# Patient Record
Sex: MALE | Race: WHITE | Employment: OTHER | URBAN - METROPOLITAN AREA
[De-identification: names, ages, dates, MRNs, and addresses within clinical notes are randomized per-mention and may not be internally consistent; named-entity substitution may affect disease eponyms.]

---

## 2017-02-12 DIAGNOSIS — M06.00 RHEUMATOID ARTHRITIS WITH NEGATIVE RHEUMATOID FACTOR, INVOLVING UNSPECIFIED SITE (HCC): ICD-10-CM

## 2017-02-12 DIAGNOSIS — Z51.11 ENCOUNTER FOR ANTINEOPLASTIC CHEMOTHERAPY: ICD-10-CM

## 2017-02-28 ENCOUNTER — OFFICE VISIT (OUTPATIENT)
Dept: RHEUMATOLOGY | Age: 62
End: 2017-02-28

## 2017-02-28 VITALS
HEART RATE: 96 BPM | DIASTOLIC BLOOD PRESSURE: 76 MMHG | SYSTOLIC BLOOD PRESSURE: 112 MMHG | HEIGHT: 75 IN | BODY MASS INDEX: 35.43 KG/M2 | WEIGHT: 285 LBS

## 2017-02-28 DIAGNOSIS — Z79.899 ENCOUNTER FOR LONG-TERM (CURRENT) USE OF HIGH-RISK MEDICATION: ICD-10-CM

## 2017-02-28 DIAGNOSIS — M06.09 RHEUMATOID ARTHRITIS OF MULTIPLE SITES WITH NEGATIVE RHEUMATOID FACTOR (HCC): Primary | ICD-10-CM

## 2017-02-28 DIAGNOSIS — Z51.81 ENCOUNTER FOR THERAPEUTIC DRUG MONITORING: ICD-10-CM

## 2017-02-28 LAB
ALBUMIN SERPL-MCNC: 4.2 G/DL (ref 3.4–5)
ALP BLD-CCNC: 74 U/L (ref 40–129)
ALT SERPL-CCNC: 13 U/L (ref 10–40)
AST SERPL-CCNC: 9 U/L (ref 15–37)
BASOPHILS ABSOLUTE: 0 K/UL (ref 0–0.2)
BASOPHILS RELATIVE PERCENT: 0.4 %
BILIRUB SERPL-MCNC: 0.5 MG/DL (ref 0–1)
BILIRUBIN DIRECT: <0.2 MG/DL (ref 0–0.3)
BILIRUBIN, INDIRECT: ABNORMAL MG/DL (ref 0–1)
CREAT SERPL-MCNC: 0.8 MG/DL (ref 0.8–1.3)
EOSINOPHILS ABSOLUTE: 0.3 K/UL (ref 0–0.6)
EOSINOPHILS RELATIVE PERCENT: 3.6 %
GFR AFRICAN AMERICAN: >60
GFR NON-AFRICAN AMERICAN: >60
HCT VFR BLD CALC: 42.8 % (ref 40.5–52.5)
HEMOGLOBIN: 14.2 G/DL (ref 13.5–17.5)
LYMPHOCYTES ABSOLUTE: 1.9 K/UL (ref 1–5.1)
LYMPHOCYTES RELATIVE PERCENT: 28 %
MCH RBC QN AUTO: 31.5 PG (ref 26–34)
MCHC RBC AUTO-ENTMCNC: 33.1 G/DL (ref 31–36)
MCV RBC AUTO: 95.1 FL (ref 80–100)
MONOCYTES ABSOLUTE: 0.4 K/UL (ref 0–1.3)
MONOCYTES RELATIVE PERCENT: 6.2 %
NEUTROPHILS ABSOLUTE: 4.3 K/UL (ref 1.7–7.7)
NEUTROPHILS RELATIVE PERCENT: 61.8 %
PDW BLD-RTO: 12.9 % (ref 12.4–15.4)
PLATELET # BLD: 223 K/UL (ref 135–450)
PMV BLD AUTO: 9.4 FL (ref 5–10.5)
RBC # BLD: 4.5 M/UL (ref 4.2–5.9)
TOTAL PROTEIN: 6.1 G/DL (ref 6.4–8.2)
WBC # BLD: 6.9 K/UL (ref 4–11)

## 2017-02-28 PROCEDURE — 99214 OFFICE O/P EST MOD 30 MIN: CPT | Performed by: INTERNAL MEDICINE

## 2017-05-28 DIAGNOSIS — M06.00 RHEUMATOID ARTHRITIS WITH NEGATIVE RHEUMATOID FACTOR, INVOLVING UNSPECIFIED SITE (HCC): ICD-10-CM

## 2017-05-28 DIAGNOSIS — Z51.11 ENCOUNTER FOR ANTINEOPLASTIC CHEMOTHERAPY: ICD-10-CM

## 2017-06-02 ENCOUNTER — TELEPHONE (OUTPATIENT)
Dept: INTERNAL MEDICINE CLINIC | Age: 62
End: 2017-06-02

## 2017-07-03 DIAGNOSIS — M06.00 RHEUMATOID ARTHRITIS WITH NEGATIVE RHEUMATOID FACTOR, INVOLVING UNSPECIFIED SITE (HCC): ICD-10-CM

## 2017-07-03 DIAGNOSIS — Z51.11 ENCOUNTER FOR ANTINEOPLASTIC CHEMOTHERAPY: ICD-10-CM

## 2017-07-18 ENCOUNTER — HOSPITAL ENCOUNTER (OUTPATIENT)
Dept: OTHER | Age: 62
Discharge: HOME | End: 2017-07-31
Attending: INTERNAL MEDICINE | Admitting: INTERNAL MEDICINE

## 2017-07-24 ENCOUNTER — OFFICE VISIT (OUTPATIENT)
Dept: RHEUMATOLOGY | Age: 62
End: 2017-07-24

## 2017-07-24 VITALS
HEIGHT: 75 IN | DIASTOLIC BLOOD PRESSURE: 82 MMHG | SYSTOLIC BLOOD PRESSURE: 130 MMHG | BODY MASS INDEX: 34.69 KG/M2 | HEART RATE: 88 BPM | WEIGHT: 279 LBS

## 2017-07-24 DIAGNOSIS — Z79.899 ENCOUNTER FOR LONG-TERM (CURRENT) USE OF HIGH-RISK MEDICATION: ICD-10-CM

## 2017-07-24 DIAGNOSIS — Z51.81 ENCOUNTER FOR THERAPEUTIC DRUG MONITORING: ICD-10-CM

## 2017-07-24 DIAGNOSIS — M06.00 RHEUMATOID ARTHRITIS WITH NEGATIVE RHEUMATOID FACTOR, INVOLVING UNSPECIFIED SITE (HCC): Primary | ICD-10-CM

## 2017-07-24 LAB
ALBUMIN SERPL-MCNC: 4.3 G/DL (ref 3.4–5)
ALP BLD-CCNC: 84 U/L (ref 40–129)
ALT SERPL-CCNC: 19 U/L (ref 10–40)
AST SERPL-CCNC: 13 U/L (ref 15–37)
BASOPHILS ABSOLUTE: 0.1 K/UL (ref 0–0.2)
BASOPHILS RELATIVE PERCENT: 0.8 %
BILIRUB SERPL-MCNC: 0.5 MG/DL (ref 0–1)
BILIRUBIN DIRECT: <0.2 MG/DL (ref 0–0.3)
BILIRUBIN, INDIRECT: ABNORMAL MG/DL (ref 0–1)
CREAT SERPL-MCNC: 0.9 MG/DL (ref 0.8–1.3)
EOSINOPHILS ABSOLUTE: 0.2 K/UL (ref 0–0.6)
EOSINOPHILS RELATIVE PERCENT: 3 %
GFR AFRICAN AMERICAN: >60
GFR NON-AFRICAN AMERICAN: >60
HCT VFR BLD CALC: 43.6 % (ref 40.5–52.5)
HEMOGLOBIN: 14.8 G/DL (ref 13.5–17.5)
LYMPHOCYTES ABSOLUTE: 1.7 K/UL (ref 1–5.1)
LYMPHOCYTES RELATIVE PERCENT: 28 %
MCH RBC QN AUTO: 31.4 PG (ref 26–34)
MCHC RBC AUTO-ENTMCNC: 33.9 G/DL (ref 31–36)
MCV RBC AUTO: 92.8 FL (ref 80–100)
MONOCYTES ABSOLUTE: 0.6 K/UL (ref 0–1.3)
MONOCYTES RELATIVE PERCENT: 9.7 %
NEUTROPHILS ABSOLUTE: 3.6 K/UL (ref 1.7–7.7)
NEUTROPHILS RELATIVE PERCENT: 58.5 %
PDW BLD-RTO: 12.5 % (ref 12.4–15.4)
PLATELET # BLD: 222 K/UL (ref 135–450)
PMV BLD AUTO: 9.8 FL (ref 5–10.5)
RBC # BLD: 4.7 M/UL (ref 4.2–5.9)
TOTAL PROTEIN: 7.1 G/DL (ref 6.4–8.2)
WBC # BLD: 6.2 K/UL (ref 4–11)

## 2017-07-24 PROCEDURE — 99214 OFFICE O/P EST MOD 30 MIN: CPT | Performed by: INTERNAL MEDICINE

## 2017-10-12 ENCOUNTER — OFFICE VISIT (OUTPATIENT)
Dept: ORTHOPEDIC SURGERY | Age: 62
End: 2017-10-12

## 2017-10-12 VITALS
BODY MASS INDEX: 37.77 KG/M2 | DIASTOLIC BLOOD PRESSURE: 87 MMHG | RESPIRATION RATE: 16 BRPM | WEIGHT: 285 LBS | HEIGHT: 73 IN | SYSTOLIC BLOOD PRESSURE: 128 MMHG

## 2017-10-12 DIAGNOSIS — R22.31 MASS OF FINGER, RIGHT: Primary | ICD-10-CM

## 2017-10-12 PROBLEM — R22.30 MASS OF FINGER: Status: ACTIVE | Noted: 2017-10-12

## 2017-10-12 PROCEDURE — 99243 OFF/OP CNSLTJ NEW/EST LOW 30: CPT | Performed by: ORTHOPAEDIC SURGERY

## 2017-10-12 RX ORDER — GABAPENTIN 600 MG/1
300 TABLET ORAL 3 TIMES DAILY
COMMUNITY
End: 2020-12-07

## 2017-10-12 NOTE — LETTER
CONSENT TO OPERATION  AND/OR OTHER PROCEDURE(S)          PATIENT : Allyson Rosas OF BIRTH:  1955      DATE : 10/12/17          1. I request and consent that Dr. Tiny Leonard,  and/or his associates or assistants perform an operation and/or procedures on the above patient at  [place] [address], to treat the condition(s) which appear indicated by the diagnostic studies already performed. I have been told that in general terms the nature, purpose and reasonable expectations of the operation and/or procedure(s) are:     Excision Soft Tissue Tumor Tip of Right Thumb      2. It has been explained to me by the informing physician that during the course of the operation and/or procedure(s) unforeseen conditions may be revealed that necessitate an extension of the original operation and/or procedure(s) or different operation and/or procedures than those set forth in Paragraph 1. I therefore authorize and request that my physician and/or his associates or assistants perform such operations and/or procedures as are necessary and desirable in the exercise of professional judgment. The authority granted under this Paragraph 2 shall extend to all conditions that require treatment and are known to my physician at the time the operation is commenced. 3. I have been made aware by the informing physician of certain risks and consequences that are associated with the operation and/or procedure(s) described in Paragraph 1, the reasonable alternative methods or treatment, the possible consequences, the possibility that the operation and/or procedure(s) may be unsuccessful and the possibility of complications. I understand the reasonably known risks to be:      ? Bleeding  ? Infection  ? Poor Healing  ? Possible Damage to Nerve, Vessel, Tendon/Muscle or Bone  ? Need for further Treatment/Surgery  ? Stiffness  ? Pain  ? Residual or Recurrent Symptoms  ? Anesthetic and/or Medical Risks  ?

## 2017-10-12 NOTE — ADDENDUM NOTE
Encounter addended by: Ming Sharma MA on: 10/12/2017 10:43 AM<BR>    Actions taken: Letter status changed

## 2017-10-24 ENCOUNTER — OFFICE VISIT (OUTPATIENT)
Dept: RHEUMATOLOGY | Age: 62
End: 2017-10-24

## 2017-10-24 VITALS
HEIGHT: 73 IN | BODY MASS INDEX: 38.48 KG/M2 | HEART RATE: 80 BPM | WEIGHT: 290.38 LBS | DIASTOLIC BLOOD PRESSURE: 86 MMHG | SYSTOLIC BLOOD PRESSURE: 136 MMHG

## 2017-10-24 DIAGNOSIS — Z79.899 ENCOUNTER FOR LONG-TERM (CURRENT) USE OF HIGH-RISK MEDICATION: ICD-10-CM

## 2017-10-24 DIAGNOSIS — M06.00 RHEUMATOID ARTHRITIS WITH NEGATIVE RHEUMATOID FACTOR, INVOLVING UNSPECIFIED SITE (HCC): Primary | ICD-10-CM

## 2017-10-24 DIAGNOSIS — Z51.81 ENCOUNTER FOR THERAPEUTIC DRUG MONITORING: ICD-10-CM

## 2017-10-24 LAB
ALBUMIN SERPL-MCNC: 4.4 G/DL (ref 3.4–5)
ALP BLD-CCNC: 82 U/L (ref 40–129)
ALT SERPL-CCNC: 17 U/L (ref 10–40)
AST SERPL-CCNC: 14 U/L (ref 15–37)
BASOPHILS ABSOLUTE: 0 K/UL (ref 0–0.2)
BASOPHILS RELATIVE PERCENT: 0.4 %
BILIRUB SERPL-MCNC: 0.5 MG/DL (ref 0–1)
BILIRUBIN DIRECT: <0.2 MG/DL (ref 0–0.3)
BILIRUBIN, INDIRECT: ABNORMAL MG/DL (ref 0–1)
CREAT SERPL-MCNC: 1 MG/DL (ref 0.8–1.3)
EOSINOPHILS ABSOLUTE: 0.3 K/UL (ref 0–0.6)
EOSINOPHILS RELATIVE PERCENT: 4.9 %
GFR AFRICAN AMERICAN: >60
GFR NON-AFRICAN AMERICAN: >60
HCT VFR BLD CALC: 41.8 % (ref 40.5–52.5)
HEMOGLOBIN: 14.1 G/DL (ref 13.5–17.5)
LYMPHOCYTES ABSOLUTE: 1.9 K/UL (ref 1–5.1)
LYMPHOCYTES RELATIVE PERCENT: 29.5 %
MCH RBC QN AUTO: 31.5 PG (ref 26–34)
MCHC RBC AUTO-ENTMCNC: 33.7 G/DL (ref 31–36)
MCV RBC AUTO: 93.6 FL (ref 80–100)
MONOCYTES ABSOLUTE: 0.6 K/UL (ref 0–1.3)
MONOCYTES RELATIVE PERCENT: 9.4 %
NEUTROPHILS ABSOLUTE: 3.5 K/UL (ref 1.7–7.7)
NEUTROPHILS RELATIVE PERCENT: 55.8 %
PDW BLD-RTO: 12.7 % (ref 12.4–15.4)
PLATELET # BLD: 196 K/UL (ref 135–450)
PMV BLD AUTO: 9.5 FL (ref 5–10.5)
RBC # BLD: 4.46 M/UL (ref 4.2–5.9)
TOTAL PROTEIN: 6.7 G/DL (ref 6.4–8.2)
WBC # BLD: 6.4 K/UL (ref 4–11)

## 2017-10-24 PROCEDURE — 99214 OFFICE O/P EST MOD 30 MIN: CPT | Performed by: INTERNAL MEDICINE

## 2017-10-24 RX ORDER — PREDNISONE 1 MG/1
5 TABLET ORAL DAILY
Qty: 100 TABLET | Refills: 1 | Status: SHIPPED | OUTPATIENT
Start: 2017-10-24 | End: 2017-11-07

## 2017-10-24 RX ORDER — FOLIC ACID 1 MG/1
1 TABLET ORAL DAILY
Qty: 90 TABLET | Refills: 3 | Status: SHIPPED | OUTPATIENT
Start: 2017-10-24 | End: 2018-10-31 | Stop reason: SDUPTHER

## 2017-10-24 NOTE — PROGRESS NOTES
SUBJECTIVE:    Patient ID: Siena Edwards is a 65 y.o. male. The patient returns for follow-up of rheumatoid arthritis. He's concerned that he might have gout. He's had episodes of pain in his small toes 345/45. He states that they're swollen but not read as very tender. The episodes last usually less than 24 hours and often come on while he is lying in bed. He continues on methotrexate 20 mg a week prednisone 5 mg a day. Review of Systems:    Respiratory: denies cough, denies shortness of breath  Gastrointestinal: denies abdominal pain, denies nausea  Musculoskeletal:                  45 minutes       Morning stiffness  Ears, nose, mouth: denies mucosal sores  Skin: denies rash, denies alopecia. The remainder of his review of systems is negative    Prior to Visit Medications    Medication Sig Taking? Authorizing Provider   methotrexate (RHEUMATREX) 2.5 MG chemo tablet TAKE 8 TABLETS ONE TIME WEEKLY Yes Neha Mendoza MD   predniSONE (DELTASONE) 5 MG tablet Take 1 tablet by mouth daily Yes Neha Mendoza MD   folic acid (FOLVITE) 1 MG tablet Take 1 tablet by mouth daily Yes Neha Mendoza MD   gabapentin (NEURONTIN) 600 MG tablet Take 600 mg by mouth nightly 2 at bed time Yes Historical Provider, MD   meloxicam (MOBIC) 15 MG tablet Take 15 mg by mouth daily Yes Historical Provider, MD   pravastatin (PRAVACHOL) 10 MG tablet Take 10 mg by mouth daily Yes Historical Provider, MD   losartan (COZAAR) 50 MG tablet Take 50 mg by mouth daily Yes Historical Provider, MD   Loratadine 10 MG CAPS Take by mouth Yes Historical Provider, MD   Umeclidinium-Vilanterol 62.5-25 MCG/INH AEPB Inhale into the lungs Yes Historical Provider, MD   HYDROcodone-acetaminophen (NORCO) 5-325 MG per tablet Take 1 tablet by mouth every 6 hours as needed for Pain Yes Historical Provider, MD   omeprazole (PRILOSEC) 20 MG capsule Take 20 mg by mouth daily.  Yes Historical Provider, MD   albuterol (PROVENTIL HFA;VENTOLIN HFA) 108 next episode or at least take a picture of his toes when they are involved. I'll see him back in 3 months time.   Patient already had flu vaccine

## 2017-11-07 ENCOUNTER — PAT TELEPHONE (OUTPATIENT)
Dept: PREADMISSION TESTING | Age: 62
End: 2017-11-07

## 2017-11-07 VITALS — HEIGHT: 76 IN | BODY MASS INDEX: 34.46 KG/M2 | WEIGHT: 283 LBS

## 2017-11-07 RX ORDER — OMEPRAZOLE 20 MG/1
20 CAPSULE, DELAYED RELEASE ORAL DAILY
COMMUNITY

## 2017-11-07 ASSESSMENT — PAIN - FUNCTIONAL ASSESSMENT: PAIN_FUNCTIONAL_ASSESSMENT: 0-10

## 2017-11-07 ASSESSMENT — PAIN SCALES - GENERAL: PAINLEVEL_OUTOF10: 6

## 2017-11-07 ASSESSMENT — PAIN DESCRIPTION - PAIN TYPE: TYPE: CHRONIC PAIN

## 2017-11-07 ASSESSMENT — PAIN DESCRIPTION - DESCRIPTORS: DESCRIPTORS: ACHING

## 2017-11-07 ASSESSMENT — PAIN DESCRIPTION - FREQUENCY: FREQUENCY: CONTINUOUS

## 2017-11-07 ASSESSMENT — PAIN DESCRIPTION - ORIENTATION: ORIENTATION: RIGHT

## 2017-11-07 NOTE — PRE-PROCEDURE INSTRUCTIONS
C-Difficile admission screening and protocol:     * Admitted with diarrhea?no     *Prior history of C-Diff. In last 3 months?no     *Antibiotic use in the past 6-8 weeks? yes- sinus     *Prior hospitalization or nursing home in the last month?     no

## 2017-11-07 NOTE — PRE-PROCEDURE INSTRUCTIONS
[address] time__0745 per pt__________        Surgery time__905__________    Take the following medications with a sip of water:losartan,loratadine, omeprazole and anoroTake your inhaler as directed the DOS and bring with you DOS. Do not eat or drink anything after 12:00 midnight prior to your surgery. This includes water chewing gum, mints and ice chips. You may brush your teeth and gargle the morning of your surgery, but do not swallow the water     Please see your family doctor/pediatrician for a history and physical and/or concerning medications. Bring any test results/reports from your physicians office. If you are under the care of a heart doctor or specialist doctor, please be aware that you may be asked to them for clearance    You may be asked to stop blood thinners such as Coumadin, Plavix, Fragmin, Lovenox, etc., or any anti-inflammatories such as:  Aspirin, Ibuprofen, Advil, Naproxen prior to your surgery. We also ask that you stop any OTC medications such as fish oil, vitamin E, glucosamine, garlic, Multivitamins, COQ 10, etc.    We ask that you do not smoke 24 hours prior to surgery  We ask that you do not  drink any alcoholic beverages 24 hours prior to surgery     You must make arrangements for a responsible adult to take you home after your surgery. For your safety you will not be allowed to leave alone or drive yourself home. Your surgery will be cancelled if you do not have a ride home. Also for your safety, it is strongly suggested that someone stay with you the first 24 hours after your surgery. A parent or legal guardian must accompany a child scheduled for surgery and plan to stay at the hospital until the child is discharged. Please do not bring other children with you. For your comfort, please wear simple loose fitting clothing to the hospital.  Please do not bring valuables.     Do not wear any make-up or nail polish on your fingers or toes      For your safety, please do not wear any jewelry or body piercing's on the day of surgery. All jewelry must be removed. If you have dentures, they will be removed before going to operating room. For your convenience, we will provide you with a container. If you wear contact lenses or glasses, they will be removed, please bring a case for them. If you have a living will and a durable power of  for healthcare, please bring in a copy. As part of our patient safety program to minimize surgical site infections, we ask you to do the following:    · Please notify your surgeon if you develop any illness between         now and the  day of your surgery. · This includes a cough, cold, fever, sore throat, nausea,         or vomiting, and diarrhea, etc.  ·  Please notify your surgeon if you experience dizziness, shortness         of breath or blurred vision between now and the time of your surgery. Do not shave your operative site 96 hours prior to surgery. For face and neck surgery, men may use an electric razor 48 hours   prior to surgery. You may shower the night before surgery or the morning of   your surgery with an antibacterial soap. You will need to bring a photo ID and insurance card    [facility] has an onsite pharmacy, would you like to utilize our pharmacy     If you will be staying overnight and use a C-pap machine, please bring   your C-pap to hospital     Our goal is to provide you with excellent care, therefore, visitors will be limited to two(2) in the room at a time so that we may focus on providing this care for you. Please contact pre-admission testing if you have any further questions.                  [facility] phone number:  [phone] PAT fax number:  [phone]  Please note these are generalized instructions for all surgical cases, you may be provided with more specific instructions according to your surgery.

## 2017-11-09 ENCOUNTER — HOSPITAL ENCOUNTER (OUTPATIENT)
Dept: SURGERY | Age: 62
Discharge: HOME | End: 2017-11-09
Admitting: ORTHOPAEDIC SURGERY

## 2017-11-09 VITALS
RESPIRATION RATE: 18 BRPM | DIASTOLIC BLOOD PRESSURE: 87 MMHG | WEIGHT: 286.6 LBS | SYSTOLIC BLOOD PRESSURE: 147 MMHG | HEART RATE: 76 BPM | TEMPERATURE: 97.1 F | HEIGHT: 76 IN | BODY MASS INDEX: 34.9 KG/M2 | OXYGEN SATURATION: 97 %

## 2017-11-09 RX ORDER — SODIUM CHLORIDE 9 MG/ML
INJECTION, SOLUTION INTRAVENOUS CONTINUOUS
Status: DISCONTINUED | OUTPATIENT
Start: 2017-11-09 | End: 2017-11-10 | Stop reason: HOSPADM

## 2017-11-09 RX ORDER — FENTANYL CITRATE 50 UG/ML
25 INJECTION, SOLUTION INTRAMUSCULAR; INTRAVENOUS EVERY 5 MIN PRN
Status: DISCONTINUED | OUTPATIENT
Start: 2017-11-09 | End: 2017-11-10 | Stop reason: HOSPADM

## 2017-11-09 RX ORDER — SODIUM CHLORIDE 0.9 % (FLUSH) 0.9 %
10 SYRINGE (ML) INJECTION PRN
Status: DISCONTINUED | OUTPATIENT
Start: 2017-11-09 | End: 2017-11-10 | Stop reason: HOSPADM

## 2017-11-09 RX ORDER — LABETALOL HYDROCHLORIDE 5 MG/ML
5 INJECTION, SOLUTION INTRAVENOUS EVERY 10 MIN PRN
Status: DISCONTINUED | OUTPATIENT
Start: 2017-11-09 | End: 2017-11-10 | Stop reason: HOSPADM

## 2017-11-09 RX ORDER — SODIUM CHLORIDE 0.9 % (FLUSH) 0.9 %
10 SYRINGE (ML) INJECTION EVERY 12 HOURS SCHEDULED
Status: DISCONTINUED | OUTPATIENT
Start: 2017-11-09 | End: 2017-11-10 | Stop reason: HOSPADM

## 2017-11-09 RX ORDER — PROMETHAZINE HYDROCHLORIDE 25 MG/ML
6.25 INJECTION, SOLUTION INTRAMUSCULAR; INTRAVENOUS
Status: ACTIVE | OUTPATIENT
Start: 2017-11-09 | End: 2017-11-09

## 2017-11-09 ASSESSMENT — PAIN DESCRIPTION - PAIN TYPE
TYPE: SURGICAL PAIN
TYPE: SURGICAL PAIN

## 2017-11-09 ASSESSMENT — PAIN DESCRIPTION - DESCRIPTORS: DESCRIPTORS: ACHING;SHOOTING

## 2017-11-09 ASSESSMENT — PAIN SCALES - GENERAL
PAINLEVEL_OUTOF10: 0

## 2017-11-09 ASSESSMENT — PAIN - FUNCTIONAL ASSESSMENT: PAIN_FUNCTIONAL_ASSESSMENT: 0-10

## 2017-11-09 NOTE — PROGRESS NOTES
To ACU room 4 by cart from PACU. VSS. Awake and talking. Denies pain. Right hand elevated on a pillow. Right pink and warm to touch.

## 2017-11-09 NOTE — PROGRESS NOTES
Awake, alert and oriented. Denies pain and nausea. Dressing remains dry and intact. Phase 1 criteria met, will transfer.

## 2017-11-09 NOTE — ANESTHESIA POST-OP
[facility] Department of Anesthesiology  Post-Anesthesia Note       Name:  Carlos Peraza                                  Age:  65 y.o. MRN:  [id number]     Last Vitals & Oxygen Saturation: BP (!) 147/87   Pulse 76   Temp 97.1 °F (36.2 °C) (Temporal)   Resp 18   Ht 6' 4\" (1.93 m)   Wt 286 lb 9.6 oz (130 kg)   SpO2 97%   BMI 34.89 kg/m²   Patient Vitals for the past 4 hrs:   BP Temp Temp src Pulse Resp SpO2 Height Weight   11/09/17 1016 (!) 147/87 - - 76 18 97 % - -   11/09/17 0951 118/82 97.1 °F (36.2 °C) Temporal 71 18 96 % - -   11/09/17 0942 (!) 140/86 97.8 °F (36.6 °C) Temporal 83 17 95 % - -   11/09/17 0937 129/86 - - 89 14 97 % - -   11/09/17 0935 - - - 90 - - - -   11/09/17 0932 134/89 - - 87 13 97 % - -   11/09/17 0930 (!) 144/90 98.8 °F (37.1 °C) Temporal 88 16 97 % - -   11/09/17 0810 (!) 157/98 98 °F (36.7 °C) - 84 14 93 % 6' 4\" (1.93 m) 286 lb 9.6 oz (130 kg)       Level of consciousness:  Awake, alert to baseline    Respiratory: Respirations easy, no distress. Stable. Cardiovascular: Hemodynamically stable. Hydration: Adequate. PONV: Adequately managed. Post-op pain: Adequately controlled. Post-op assessment: Tolerated anesthetic well without complication. Complications:  None.     Victorino Chau MD  November 9, 2017   12:01 PM

## 2017-11-09 NOTE — OP NOTE
OPERATIVE REPORT              . Patient:  Tessa Rouse    YOB: 1955  Date of Service:  11/9/2017   Location:  [facility]      Preoperative Diagnosis:  Right Thumb Skin Lesion. Postoperative Diagnosis:  Same. Procedure:  Excision of Right Thumb hyponychial Skin Lesion with partial nail plate removal    Surgeon:  Armand Crawley. Evie Gallegos MD    Anesthesia:  MAC/TIVA    Blood Loss:  Minimal.     Complications:  None. Tourniquet Time:  5 minutes. Indications:  Mr. Tessa Rouse is a 65y.o. year old male with a history of Right Thumb hyponychial  Skin Lesion which has been symptomatic. As his symptoms have not responded to conservative treatment, I have discussed with him preoperatively the complications, limitations, expectations, alternatives and risks of the surgical care which he understood. All of his questions have been fully answered, and Mr. Tessa Rouse has provided written informed consent to proceed. After written consent was obtained and the proper operative site was identified and marked, Mr. Tessa Rouse was brought to the operating room, placed in the supine position on the operating room table with the Right arm extended upon a hand table. The planned anesthesia was administered by the anesthesia service and the Right upper extremity was prepped and draped in the usual sterile fashion. After Eshmarch exsanguination, the pneumo-tourniquet was inflated to 250 milimeters of mercury about the upper arm. The distal 1/3 of the nail plate was elevated and removed from the sterile matrix. A 0.8 cm. elliptical incision was fashioned directly around the visible lesion, including a 1mm margin of normal tissue. Dissection was carried carefully through the subcutaneous tissue identifying and protecting the neurovascular structures. The skin lesion and surrounding margin was dissected free of the surrounding soft tissues.   The lesion did not appear to penetrate the deep dermis and was able to be easily removed intact. The resected specimen  measured 1.0 cm in greatest dimension. Final inspection revealed no further visible abnormality. The wound was irrigated copiously with sterile saline for irrigation. The pneumo-tourniquet was deflated after a period of 5 minutes elevation. The fingers & flap were immediately pink and well perfused. Hemostasis was easily obtained with direct pressure and electrocautery and the wound was closed with interrupted absorbable sutures in the skin. The wound was dressed with adaptic, dry sterile dressings, and a very well padded hand and finger dressing was applied. Mr. Vish Soni  was awakened from anesthesia having tolerated the procedure without apparent complication, and was returned to the recovery room in stable condition. At the conclusion of the procedure all needle, instrument, and sponge counts were correct. Leslye Clark MD   11/9/2017 , 9:26 AM

## 2017-11-09 NOTE — H&P
Pre-operative Update of H&P:    I  have seen & examined . Hussein Raphael related solely to his hand and upper extremity conditions, prior to the scheduled procedure on the date of his surgery. The indications for the planned surgical procedure & and his upper-extremity conditionare unchanged. Please see the Anesthesia Pre-Op Note from date of surgery for Mr. Gosia Ma's systemic evaluation.

## 2017-11-09 NOTE — PROGRESS NOTES
Dressed to go home. Discharged to home, ambulatory to car. Stable. Holding right hand up when walking. Given plastic sleeve to ear in the shower.

## 2017-11-09 NOTE — ANESTHESIA PRE-OP
[facility] Department of Anesthesiology  Pre-Anesthesia Evaluation/Consultation       Name:  Siena Edwards  : 1955  Age:  65 y.o.                                            MRN:  [id number]  Date: 2017           Procedure (Scheduled):  Right thumb mass excision  Surgeon:  Dr. Myesha Georges     Allergies   Allergen Reactions    Pcn [Penicillins] Shortness Of Breath    Cephalosporins Itching    Ciprofloxacin Itching    Codeine Itching    Erythromycin Itching    Morphine And Related Itching    Fenoprofen Calcium Other (See Comments)     Pt doesn`t recall     Patient Active Problem List   Diagnosis    Rheumatoid arthritis ([address]    Encounter for antineoplastic chemotherapy    HTN (hypertension)    Abnormal results of cardiovascular function studies    Mass of finger     Past Medical History:   Diagnosis Date    Arthritis     Asthma     BPH (benign prostatic hyperplasia)     Cardiomyopathy (HCC)     GERD (gastroesophageal reflux disease)     HTN (hypertension) 10/29/2012    MVP (mitral valve prolapse)     PPD positive     Prolonged emergence from general anesthesia     Prostatism     Tricuspid valve prolapse     Unspecified sleep apnea      Past Surgical History:   Procedure Laterality Date    CARDIAC CATHETERIZATION      CHOLECYSTECTOMY      FOOT SURGERY      INGUINAL HERNIA REPAIR      bilateral    KNEE ARTHROSCOPY      TENDON RELEASE      right elbow    TONSILLECTOMY       Social History   Substance Use Topics    Smoking status: Former Smoker     Types: Cigarettes     Quit date: 1982    Smokeless tobacco: Never Used      Comment: rare cigar    Alcohol use Yes      Comment: occ     Medications  Current Outpatient Prescriptions on File Prior to Encounter   Medication Sig Dispense Refill    omeprazole (PRILOSEC) 20 MG delayed release capsule Take 20 mg by mouth      methotrexate (RHEUMATREX) 2.5 MG chemo tablet TAKE 8 TABLETS ONE TIME WEEKLY 96 tablet 0    folic acid amitriptyline (ELAVIL) 100 MG tablet Take 100 mg by mouth nightly.  duloxetine (CYMBALTA) 30 MG capsule Take 60 mg by mouth daily. Current Facility-Administered Medications   Medication Dose Route Frequency Provider Last Rate Last Dose    0.9 % sodium chloride infusion   Intravenous Continuous Candida Chappell MD        sodium chloride flush 0.9 % injection 10 mL  10 mL Intravenous 2 times per day Candida Chappell MD        sodium chloride flush 0.9 % injection 10 mL  10 mL Intravenous PRN Candida Chappell MD         Vital Signs (Current) There were no vitals filed for this visit. Vital Signs Statistics (for past 48 hrs)     No Data Recorded    BP Readings from Last 3 Encounters:   10/24/17 136/86   10/12/17 128/87   07/24/17 130/82     BMI  There is no height or weight on file to calculate BMI. Estimated body mass index is 34.45 kg/m² as calculated from the following:    Height as of 11/7/17: 6' 4\" (1.93 m). Weight as of 11/7/17: 283 lb (128.4 kg).     CBC   Lab Results   Component Value Date    WBC 6.4 10/24/2017    RBC 4.46 10/24/2017    HGB 14.1 10/24/2017    HCT 41.8 10/24/2017    MCV 93.6 10/24/2017    RDW 12.7 10/24/2017     10/24/2017     CMP    Lab Results   Component Value Date     09/21/2016    K 4.9 09/21/2016     09/21/2016    CO2 26 09/21/2016    BUN 12 09/21/2016    CREATININE 1.0 10/24/2017    GFRAA >60 10/24/2017    GFRAA >60 06/11/2013    AGRATIO 1.8 09/21/2016    LABGLOM >60 10/24/2017    GLUCOSE 112 09/21/2016    PROT 6.7 10/24/2017    PROT 6.3 03/12/2013    CALCIUM 9.3 09/21/2016    BILITOT 0.5 10/24/2017    ALKPHOS 82 10/24/2017    AST 14 10/24/2017    ALT 17 10/24/2017     BMP    Lab Results   Component Value Date     09/21/2016    K 4.9 09/21/2016     09/21/2016    CO2 26 09/21/2016    BUN 12 09/21/2016    CREATININE 1.0 10/24/2017    CALCIUM 9.3 09/21/2016    GFRAA >60 10/24/2017    GFRAA >60 06/11/2013    LABGLOM >60 10/24/2017

## 2017-11-09 NOTE — PROGRESS NOTES
Pt to PACU from OR. Asleep on arrival. OPA present. Dressing to R thumb dry and intact, elevated on pillow.

## 2017-11-10 NOTE — ADDENDUM NOTE
Encounter addended by: Eleno Moon MD on: 11/10/2017  8:24 AM<BR>    Actions taken: Letter status changed

## 2017-11-13 NOTE — ADDENDUM NOTE
Encounter addended by: Geetha Antonio MA on: 11/13/2017  9:37 AM<BR>    Actions taken: Letter status changed

## 2017-11-17 ENCOUNTER — OFFICE VISIT (OUTPATIENT)
Dept: ORTHOPEDIC SURGERY | Age: 62
End: 2017-11-17

## 2017-11-17 VITALS — HEIGHT: 76 IN | WEIGHT: 286 LBS | RESPIRATION RATE: 16 BRPM | BODY MASS INDEX: 34.83 KG/M2

## 2017-11-17 DIAGNOSIS — R22.31 MASS OF FINGER, RIGHT: Primary | ICD-10-CM

## 2017-11-17 PROCEDURE — 99024 POSTOP FOLLOW-UP VISIT: CPT | Performed by: ORTHOPAEDIC SURGERY

## 2017-11-17 NOTE — Clinical Note
Dear  Priya Roth MD,  Thank you very much for your referral or Mr. Cookie Grullon to me for evaluation and treatment of his Hand & Wrist condition. I appreciate your confidence in me and thank you for allowing me the opportunity to care for your patients. If I can be of any further assistance to you on this or any other patient, please do not hesitate to contact me. Sincerely,  Isha Summers.  Jenn Cates MD

## 2017-11-18 NOTE — PATIENT INSTRUCTIONS
Hand Range of Motion Instructions      Dr. Chavis Flavors    1. Be cautions in resuming fulll activities and use of the hand for next 2 - 4 weeks. 2. Perform the following exercises VIGOROUSLY at least four times a day. Exercises should be performed in the seated position with elbow on tabletop or other firm surface. If you cannot make these motions on your own, you may use other hand to assist in making these motions. A. Fully straighten fingers until hand is flat. Fully bend fingers until hand is in a full fist.   B. Bend wrist forward and backward (grasp hand around knuckles with other hand to do so). C. Rotate forearm so that your palm faces towards your face. Rotate forearm so that your palm faces away from your face (grasp hand around wrist with other hand to do so). D. Fully straighten elbow. Fully bend elbow. 3. Continue light use of the hand progressing to more normal us as it feels comfortable to do so. 4. In 2 - 4 weeks you may discontinue using the brace (if you were using one) and resume normal use of the hand and wrist if you have regained full and painless motion and function. 5. If you are unable to achieve  full and painless motion and function over 4 weeks, please call the office at [phone] to schedule a follow-up appointment with Dr. Santosh Clark. Thank you for choosing [facility]) Physicians for your Hand and Upper Extremity needs. If we can be of any further assistance to you, please do not hesitate to contact us.     Office Phone Number:  [phone]  or  [phone]

## 2017-11-18 NOTE — PROGRESS NOTES
Mr. Siena Edwards returns today in follow-up of his recent right hyponychial Thumb Mass Excision done approximately 1 week ago. He has done well noting mild discomfort and no other reported complications. He notes pre-operative symptoms to be Improved at this time. Physical Exam:  Skin incision is healing well, no significant drainage, no significant erythema. Digital range of motion is full and equal bilateral.  Wrist shows full and equal bilateral range of motion. Sensation is normal in the Whole Hand. Vascular examination reveals normal and good capillary refill. Swelling is mild. There is little residual discomfort at the surgical site, no evidence for recurance of the mass. Impression:  Mr. Siena Edwards is doing well after recent right  hyponychial Thumb Mass Excision. Plan:  Mr. Siena Edwards is instructed in work on Active & Passive range of motion of the digits, wrist, & elbow. These modalities were specifically demonstrated to him today. We discussed the appropriateness of gradual resumption of use of the operated hand and the return to normal use as comfort allows. He is given instructions regarding management of the fresh surgical incision and progressive use of desensitization and tissue massage techniques. We discussed the appropriate expectations and timeline for symptom improvement including the potential for some longer term residual swelling or fullness at the surgical site. I have reviewed the surgical pathology report with him today. He is provided a written patient instruction sheet titled: Postoperative Instructions After Finger Mass Excision. I have asked Mr. Siena Edwards to follow-up with me, either by scheduling an appointment for approximately 2-4 weeks from now, or by contacting me by telephone over the next 2-4 weeks if his symptoms have not fully resolved or if he has not regained full & painless return of function.       He is also specifically

## 2018-01-24 ENCOUNTER — OFFICE VISIT (OUTPATIENT)
Dept: RHEUMATOLOGY | Age: 63
End: 2018-01-24

## 2018-01-24 VITALS
BODY MASS INDEX: 35.31 KG/M2 | HEART RATE: 92 BPM | DIASTOLIC BLOOD PRESSURE: 82 MMHG | WEIGHT: 290 LBS | SYSTOLIC BLOOD PRESSURE: 116 MMHG | HEIGHT: 76 IN

## 2018-01-24 DIAGNOSIS — M06.09 RHEUMATOID ARTHRITIS OF MULTIPLE SITES WITH NEGATIVE RHEUMATOID FACTOR (HCC): Primary | ICD-10-CM

## 2018-01-24 DIAGNOSIS — Z79.899 ENCOUNTER FOR LONG-TERM (CURRENT) USE OF HIGH-RISK MEDICATION: ICD-10-CM

## 2018-01-24 DIAGNOSIS — M25.512 CHRONIC LEFT SHOULDER PAIN: ICD-10-CM

## 2018-01-24 DIAGNOSIS — Z51.81 ENCOUNTER FOR THERAPEUTIC DRUG MONITORING: ICD-10-CM

## 2018-01-24 DIAGNOSIS — G89.29 CHRONIC LEFT SHOULDER PAIN: ICD-10-CM

## 2018-01-24 LAB
ALBUMIN SERPL-MCNC: 4.4 G/DL (ref 3.4–5)
ALP BLD-CCNC: 74 U/L (ref 40–129)
ALT SERPL-CCNC: 16 U/L (ref 10–40)
AST SERPL-CCNC: 14 U/L (ref 15–37)
BASOPHILS ABSOLUTE: 0 K/UL (ref 0–0.2)
BASOPHILS RELATIVE PERCENT: 0.5 %
BILIRUB SERPL-MCNC: 0.5 MG/DL (ref 0–1)
BILIRUBIN DIRECT: <0.2 MG/DL (ref 0–0.3)
BILIRUBIN, INDIRECT: ABNORMAL MG/DL (ref 0–1)
CREAT SERPL-MCNC: 1 MG/DL (ref 0.8–1.3)
EOSINOPHILS ABSOLUTE: 0.2 K/UL (ref 0–0.6)
EOSINOPHILS RELATIVE PERCENT: 3.4 %
GFR AFRICAN AMERICAN: >60
GFR NON-AFRICAN AMERICAN: >60
HCT VFR BLD CALC: 42.3 % (ref 40.5–52.5)
HEMOGLOBIN: 14.4 G/DL (ref 13.5–17.5)
LYMPHOCYTES ABSOLUTE: 1.7 K/UL (ref 1–5.1)
LYMPHOCYTES RELATIVE PERCENT: 29.3 %
MCH RBC QN AUTO: 31.7 PG (ref 26–34)
MCHC RBC AUTO-ENTMCNC: 34.1 G/DL (ref 31–36)
MCV RBC AUTO: 92.9 FL (ref 80–100)
MONOCYTES ABSOLUTE: 0.5 K/UL (ref 0–1.3)
MONOCYTES RELATIVE PERCENT: 8.5 %
NEUTROPHILS ABSOLUTE: 3.4 K/UL (ref 1.7–7.7)
NEUTROPHILS RELATIVE PERCENT: 58.3 %
PDW BLD-RTO: 12.8 % (ref 12.4–15.4)
PLATELET # BLD: 219 K/UL (ref 135–450)
PMV BLD AUTO: 9.9 FL (ref 5–10.5)
RBC # BLD: 4.55 M/UL (ref 4.2–5.9)
TOTAL PROTEIN: 6.8 G/DL (ref 6.4–8.2)
WBC # BLD: 5.8 K/UL (ref 4–11)

## 2018-01-24 PROCEDURE — 99214 OFFICE O/P EST MOD 30 MIN: CPT | Performed by: INTERNAL MEDICINE

## 2018-01-24 NOTE — PROGRESS NOTES
SUBJECTIVE:    Patient ID: Billy Hurtado is a 65 y.o. male. The patient returns for follow-up of rheumatoid arthritis. His only complaint is left shoulder discomfort. He continues on methotrexate 20 mg a week meloxicam 15 mg a day and folic acid 1 mg daily. Review of Systems:    Respiratory: denies cough, denies shortness of breath  Gastrointestinal: denies abdominal pain, denies nausea  Musculoskeletal:               45-60 minutes          Morning stiffness  Ears, nose, mouth: denies mucosal sores  Skin: denies rash, denies alopecia. The remainder of his review of systems is negative. Prior to Visit Medications    Medication Sig Taking? Authorizing Provider   methotrexate (RHEUMATREX) 2.5 MG chemo tablet TAKE 8 TABLETS ONE TIME WEEKLY Yes Oliverio Winter MD   omeprazole (PRILOSEC) 20 MG delayed release capsule Take 20 mg by mouth Yes Historical Provider, MD   folic acid (FOLVITE) 1 MG tablet Take 1 tablet by mouth daily Yes Oliverio Winter MD   gabapentin (NEURONTIN) 600 MG tablet Take 600 mg by mouth nightly 2 at bed time Yes Historical Provider, MD   meloxicam (MOBIC) 15 MG tablet Take 15 mg by mouth daily Yes Historical Provider, MD   pravastatin (PRAVACHOL) 10 MG tablet Take 10 mg by mouth daily Yes Historical Provider, MD   losartan (COZAAR) 50 MG tablet Take 50 mg by mouth daily Yes Historical Provider, MD   Loratadine 10 MG CAPS Take by mouth daily  Yes Historical Provider, MD   Umeclidinium-Vilanterol 62.5-25 MCG/INH AEPB Inhale into the lungs daily  Yes Historical Provider, MD   HYDROcodone-acetaminophen (NORCO) 5-325 MG per tablet Take 1 tablet by mouth every 6 hours as needed for Pain Yes Historical Provider, MD   montelukast (SINGULAIR) 10 MG tablet Take 10 mg by mouth nightly. Yes Historical Provider, MD   tamsulosin (FLOMAX) 0.4 MG capsule Take 0.4 mg by mouth daily. Yes Historical Provider, MD   amitriptyline (ELAVIL) 100 MG tablet Take 100 mg by mouth nightly.  Yes Historical

## 2018-02-13 ENCOUNTER — HOSPITAL ENCOUNTER (OUTPATIENT)
Dept: OTHER | Age: 63
Discharge: HOME | End: 2018-02-13
Attending: INTERNAL MEDICINE | Admitting: INTERNAL MEDICINE

## 2018-02-13 DIAGNOSIS — G89.29 CHRONIC LEFT SHOULDER PAIN: ICD-10-CM

## 2018-02-13 DIAGNOSIS — M25.512 CHRONIC LEFT SHOULDER PAIN: ICD-10-CM

## 2018-02-13 DIAGNOSIS — M06.09 RHEUMATOID ARTHRITIS OF MULTIPLE SITES WITH NEGATIVE RHEUMATOID FACTOR (HCC): ICD-10-CM

## 2018-05-01 ENCOUNTER — TELEPHONE (OUTPATIENT)
Dept: INTERNAL MEDICINE CLINIC | Age: 63
End: 2018-05-01

## 2018-05-01 ENCOUNTER — OFFICE VISIT (OUTPATIENT)
Dept: RHEUMATOLOGY | Age: 63
End: 2018-05-01

## 2018-05-01 VITALS
HEIGHT: 76 IN | DIASTOLIC BLOOD PRESSURE: 82 MMHG | BODY MASS INDEX: 35.19 KG/M2 | HEART RATE: 80 BPM | WEIGHT: 289 LBS | SYSTOLIC BLOOD PRESSURE: 124 MMHG

## 2018-05-01 DIAGNOSIS — M06.09 RHEUMATOID ARTHRITIS OF MULTIPLE SITES WITH NEGATIVE RHEUMATOID FACTOR (HCC): Primary | ICD-10-CM

## 2018-05-01 DIAGNOSIS — Z79.899 ENCOUNTER FOR LONG-TERM (CURRENT) USE OF HIGH-RISK MEDICATION: ICD-10-CM

## 2018-05-01 DIAGNOSIS — Z51.81 ENCOUNTER FOR THERAPEUTIC DRUG MONITORING: ICD-10-CM

## 2018-05-01 LAB
ALBUMIN SERPL-MCNC: 4.4 G/DL (ref 3.4–5)
ALP BLD-CCNC: 76 U/L (ref 40–129)
ALT SERPL-CCNC: 14 U/L (ref 10–40)
AST SERPL-CCNC: 12 U/L (ref 15–37)
BASOPHILS ABSOLUTE: 0 K/UL (ref 0–0.2)
BASOPHILS RELATIVE PERCENT: 0.5 %
BILIRUB SERPL-MCNC: 0.5 MG/DL (ref 0–1)
BILIRUBIN DIRECT: <0.2 MG/DL (ref 0–0.3)
BILIRUBIN, INDIRECT: ABNORMAL MG/DL (ref 0–1)
CREAT SERPL-MCNC: 0.9 MG/DL (ref 0.8–1.3)
EOSINOPHILS ABSOLUTE: 0.2 K/UL (ref 0–0.6)
EOSINOPHILS RELATIVE PERCENT: 3.1 %
GFR AFRICAN AMERICAN: >60
GFR NON-AFRICAN AMERICAN: >60
HCT VFR BLD CALC: 41.4 % (ref 40.5–52.5)
HEMOGLOBIN: 14.1 G/DL (ref 13.5–17.5)
LYMPHOCYTES ABSOLUTE: 1.3 K/UL (ref 1–5.1)
LYMPHOCYTES RELATIVE PERCENT: 23.4 %
MCH RBC QN AUTO: 31.2 PG (ref 26–34)
MCHC RBC AUTO-ENTMCNC: 34 G/DL (ref 31–36)
MCV RBC AUTO: 91.8 FL (ref 80–100)
MONOCYTES ABSOLUTE: 0.4 K/UL (ref 0–1.3)
MONOCYTES RELATIVE PERCENT: 7.3 %
NEUTROPHILS ABSOLUTE: 3.8 K/UL (ref 1.7–7.7)
NEUTROPHILS RELATIVE PERCENT: 65.7 %
PDW BLD-RTO: 13 % (ref 12.4–15.4)
PLATELET # BLD: 207 K/UL (ref 135–450)
PMV BLD AUTO: 9.8 FL (ref 5–10.5)
RBC # BLD: 4.51 M/UL (ref 4.2–5.9)
TOTAL PROTEIN: 6.5 G/DL (ref 6.4–8.2)
WBC # BLD: 5.7 K/UL (ref 4–11)

## 2018-05-01 PROCEDURE — 99214 OFFICE O/P EST MOD 30 MIN: CPT | Performed by: INTERNAL MEDICINE

## 2018-05-09 ENCOUNTER — OFFICE VISIT (OUTPATIENT)
Dept: RHEUMATOLOGY | Age: 63
End: 2018-05-09

## 2018-05-09 VITALS
DIASTOLIC BLOOD PRESSURE: 84 MMHG | HEIGHT: 76 IN | WEIGHT: 289 LBS | HEART RATE: 88 BPM | BODY MASS INDEX: 35.19 KG/M2 | SYSTOLIC BLOOD PRESSURE: 134 MMHG

## 2018-05-09 DIAGNOSIS — M06.09 RHEUMATOID ARTHRITIS OF MULTIPLE SITES WITH NEGATIVE RHEUMATOID FACTOR (HCC): Primary | ICD-10-CM

## 2018-05-09 DIAGNOSIS — G89.29 CHRONIC LEFT SHOULDER PAIN: ICD-10-CM

## 2018-05-09 DIAGNOSIS — M25.512 CHRONIC LEFT SHOULDER PAIN: ICD-10-CM

## 2018-05-09 PROCEDURE — 20610 DRAIN/INJ JOINT/BURSA W/O US: CPT | Performed by: INTERNAL MEDICINE

## 2018-05-09 RX ORDER — TRIAMCINOLONE ACETONIDE 40 MG/ML
40 INJECTION, SUSPENSION INTRA-ARTICULAR; INTRAMUSCULAR ONCE
Status: COMPLETED | OUTPATIENT
Start: 2018-05-09 | End: 2018-05-09

## 2018-05-09 RX ADMIN — TRIAMCINOLONE ACETONIDE 40 MG: 40 INJECTION, SUSPENSION INTRA-ARTICULAR; INTRAMUSCULAR at 08:15

## 2018-10-31 ENCOUNTER — OFFICE VISIT (OUTPATIENT)
Dept: RHEUMATOLOGY | Age: 63
End: 2018-10-31
Payer: COMMERCIAL

## 2018-10-31 VITALS
BODY MASS INDEX: 35.33 KG/M2 | SYSTOLIC BLOOD PRESSURE: 122 MMHG | HEART RATE: 88 BPM | WEIGHT: 290.13 LBS | DIASTOLIC BLOOD PRESSURE: 82 MMHG | HEIGHT: 76 IN

## 2018-10-31 DIAGNOSIS — Z51.81 ENCOUNTER FOR THERAPEUTIC DRUG MONITORING: ICD-10-CM

## 2018-10-31 DIAGNOSIS — M06.00 RHEUMATOID ARTHRITIS WITH NEGATIVE RHEUMATOID FACTOR, INVOLVING UNSPECIFIED SITE (HCC): Primary | ICD-10-CM

## 2018-10-31 DIAGNOSIS — Z79.899 ENCOUNTER FOR LONG-TERM (CURRENT) USE OF HIGH-RISK MEDICATION: ICD-10-CM

## 2018-10-31 LAB
ALBUMIN SERPL-MCNC: 4.4 G/DL (ref 3.4–5)
ALP BLD-CCNC: 79 U/L (ref 40–129)
ALT SERPL-CCNC: 16 U/L (ref 10–40)
AST SERPL-CCNC: 13 U/L (ref 15–37)
BASOPHILS ABSOLUTE: 0 K/UL (ref 0–0.2)
BASOPHILS RELATIVE PERCENT: 0.5 %
BILIRUB SERPL-MCNC: 0.4 MG/DL (ref 0–1)
BILIRUBIN DIRECT: <0.2 MG/DL (ref 0–0.3)
BILIRUBIN, INDIRECT: ABNORMAL MG/DL (ref 0–1)
CREAT SERPL-MCNC: 0.9 MG/DL (ref 0.8–1.3)
EOSINOPHILS ABSOLUTE: 0.2 K/UL (ref 0–0.6)
EOSINOPHILS RELATIVE PERCENT: 3.5 %
GFR AFRICAN AMERICAN: >60
GFR NON-AFRICAN AMERICAN: >60
HCT VFR BLD CALC: 42.3 % (ref 40.5–52.5)
HEMOGLOBIN: 14.4 G/DL (ref 13.5–17.5)
LYMPHOCYTES ABSOLUTE: 1.8 K/UL (ref 1–5.1)
LYMPHOCYTES RELATIVE PERCENT: 29 %
MCH RBC QN AUTO: 31.6 PG (ref 26–34)
MCHC RBC AUTO-ENTMCNC: 34.1 G/DL (ref 31–36)
MCV RBC AUTO: 92.9 FL (ref 80–100)
MONOCYTES ABSOLUTE: 0.5 K/UL (ref 0–1.3)
MONOCYTES RELATIVE PERCENT: 8.7 %
NEUTROPHILS ABSOLUTE: 3.7 K/UL (ref 1.7–7.7)
NEUTROPHILS RELATIVE PERCENT: 58.3 %
PDW BLD-RTO: 12.9 % (ref 12.4–15.4)
PLATELET # BLD: 228 K/UL (ref 135–450)
PMV BLD AUTO: 9.6 FL (ref 5–10.5)
RBC # BLD: 4.55 M/UL (ref 4.2–5.9)
TOTAL PROTEIN: 6.7 G/DL (ref 6.4–8.2)
WBC # BLD: 6.3 K/UL (ref 4–11)

## 2018-10-31 PROCEDURE — 99214 OFFICE O/P EST MOD 30 MIN: CPT | Performed by: INTERNAL MEDICINE

## 2018-10-31 RX ORDER — FINASTERIDE 5 MG/1
5 TABLET, FILM COATED ORAL DAILY
COMMUNITY

## 2018-10-31 RX ORDER — FOLIC ACID 1 MG/1
1 TABLET ORAL DAILY
Qty: 90 TABLET | Refills: 3 | Status: SHIPPED | OUTPATIENT
Start: 2018-10-31 | End: 2020-01-21

## 2018-10-31 NOTE — PROGRESS NOTES
amitriptyline (ELAVIL) 100 MG tablet Take 100 mg by mouth nightly. Yes Historical Provider, MD   duloxetine (CYMBALTA) 30 MG capsule Take 60 mg by mouth daily. Yes Historical Provider, MD        OBJECTIVE:  /82   Pulse 88   Ht 6' 4\" (1.93 m)   Wt 290 lb 2 oz (131.6 kg)   BMI 35.32 kg/m²      Physical Exam:    General: the patient demonstrated normal gait and posture without evidence of overt muscle wasting. Hygiene appears normal    Ears, mouth, nose, throat: no mucosal sores      Respiratory: assessment of the patient's respiratory effort showed no evidence of abnormal respiration and no use of accessory muscles. Diaphragmatic movement is normal.  Percussion of the patient's chest showed no evidence of dullness flatness or hyperresonance. Auscultation of the patient's lungs reveal normal breath sounds in all lung fields. There is no evidence of abnormal sounds such as rales or wheezes. There is no evidence of friction rub. Cardiovascular: palpation of the patient's chest revealed no abnormal thrill. The point of maximal impulse showed no displacement. Auscultation of the heart revealed no evidence of murmur gallop or abnormal heart sound. Musculoskeletal: One plus soft tissue swelling wrists trace swelling right elbow trace swelling PIPs fists 100% on the right 90% on the left  Skin: no rashes    ASSESSMENT: Rheumatoid arthritis. Chemotherapy        PLAN:   Blood work will be obtained today to monitor for adverse drug reactions. Laboratory studies from last visit were reviewed. I'll see patient back in 3 months time.   He declined flu vaccine

## 2019-03-05 ENCOUNTER — OFFICE VISIT (OUTPATIENT)
Dept: RHEUMATOLOGY | Age: 64
End: 2019-03-05
Payer: COMMERCIAL

## 2019-03-05 VITALS
SYSTOLIC BLOOD PRESSURE: 118 MMHG | BODY MASS INDEX: 35.68 KG/M2 | HEART RATE: 60 BPM | WEIGHT: 293 LBS | HEIGHT: 76 IN | DIASTOLIC BLOOD PRESSURE: 72 MMHG

## 2019-03-05 DIAGNOSIS — M06.00 RHEUMATOID ARTHRITIS WITH NEGATIVE RHEUMATOID FACTOR, INVOLVING UNSPECIFIED SITE (HCC): Primary | ICD-10-CM

## 2019-03-05 DIAGNOSIS — Z79.899 ENCOUNTER FOR LONG-TERM (CURRENT) USE OF HIGH-RISK MEDICATION: ICD-10-CM

## 2019-03-05 DIAGNOSIS — Z51.81 ENCOUNTER FOR THERAPEUTIC DRUG MONITORING: ICD-10-CM

## 2019-03-05 PROCEDURE — 99214 OFFICE O/P EST MOD 30 MIN: CPT | Performed by: INTERNAL MEDICINE

## 2019-03-06 LAB
ALBUMIN SERPL-MCNC: 4.6 G/DL (ref 3.4–5)
ALP BLD-CCNC: 75 U/L (ref 40–129)
ALT SERPL-CCNC: 14 U/L (ref 10–40)
AST SERPL-CCNC: 16 U/L (ref 15–37)
BASOPHILS ABSOLUTE: 0.1 K/UL (ref 0–0.2)
BASOPHILS RELATIVE PERCENT: 1 %
BILIRUB SERPL-MCNC: 0.6 MG/DL (ref 0–1)
BILIRUBIN DIRECT: <0.2 MG/DL (ref 0–0.3)
BILIRUBIN, INDIRECT: NORMAL MG/DL (ref 0–1)
CREAT SERPL-MCNC: 1 MG/DL (ref 0.8–1.3)
EOSINOPHILS ABSOLUTE: 0.2 K/UL (ref 0–0.6)
EOSINOPHILS RELATIVE PERCENT: 3.1 %
GFR AFRICAN AMERICAN: >60
GFR NON-AFRICAN AMERICAN: >60
HCT VFR BLD CALC: 45 % (ref 40.5–52.5)
HEMOGLOBIN: 14.7 G/DL (ref 13.5–17.5)
LYMPHOCYTES ABSOLUTE: 1.9 K/UL (ref 1–5.1)
LYMPHOCYTES RELATIVE PERCENT: 27.1 %
MCH RBC QN AUTO: 31.3 PG (ref 26–34)
MCHC RBC AUTO-ENTMCNC: 32.6 G/DL (ref 31–36)
MCV RBC AUTO: 96.1 FL (ref 80–100)
MONOCYTES ABSOLUTE: 0.6 K/UL (ref 0–1.3)
MONOCYTES RELATIVE PERCENT: 8.3 %
NEUTROPHILS ABSOLUTE: 4.3 K/UL (ref 1.7–7.7)
NEUTROPHILS RELATIVE PERCENT: 60.5 %
PDW BLD-RTO: 13.3 % (ref 12.4–15.4)
PLATELET # BLD: 226 K/UL (ref 135–450)
PMV BLD AUTO: 9.6 FL (ref 5–10.5)
RBC # BLD: 4.69 M/UL (ref 4.2–5.9)
TOTAL PROTEIN: 6.7 G/DL (ref 6.4–8.2)
WBC # BLD: 7.1 K/UL (ref 4–11)

## 2019-05-14 ENCOUNTER — HOSPITAL ENCOUNTER (OUTPATIENT)
Dept: NON INVASIVE DIAGNOSTICS | Age: 64
Discharge: HOME | End: 2019-05-14
Payer: COMMERCIAL

## 2019-05-14 LAB
LV EF: 49 %
LVEF MODALITY: NORMAL

## 2019-05-14 PROCEDURE — 3430000000 HC RX DIAGNOSTIC RADIOPHARMACEUTICAL: Performed by: INTERNAL MEDICINE

## 2019-05-14 PROCEDURE — A9502 TC99M TETROFOSMIN: HCPCS | Performed by: INTERNAL MEDICINE

## 2019-05-14 PROCEDURE — 93017 CV STRESS TEST TRACING ONLY: CPT

## 2019-05-14 PROCEDURE — 78452 HT MUSCLE IMAGE SPECT MULT: CPT

## 2019-05-14 RX ADMIN — TETROFOSMIN 10 MILLICURIE: 1.38 INJECTION, POWDER, LYOPHILIZED, FOR SOLUTION INTRAVENOUS at 08:06

## 2019-05-14 RX ADMIN — TETROFOSMIN 30 MILLICURIE: 1.38 INJECTION, POWDER, LYOPHILIZED, FOR SOLUTION INTRAVENOUS at 09:19

## 2019-05-21 ENCOUNTER — HOSPITAL ENCOUNTER (OUTPATIENT)
Dept: NON INVASIVE DIAGNOSTICS | Age: 64
Discharge: HOME | End: 2019-05-21
Payer: COMMERCIAL

## 2019-05-21 LAB
LV EF: 43 %
LVEF MODALITY: NORMAL

## 2019-05-21 PROCEDURE — 93306 TTE W/DOPPLER COMPLETE: CPT

## 2019-06-05 ENCOUNTER — OFFICE VISIT (OUTPATIENT)
Dept: RHEUMATOLOGY | Age: 64
End: 2019-06-05
Payer: COMMERCIAL

## 2019-06-05 VITALS
BODY MASS INDEX: 34.83 KG/M2 | HEART RATE: 72 BPM | WEIGHT: 286 LBS | SYSTOLIC BLOOD PRESSURE: 120 MMHG | DIASTOLIC BLOOD PRESSURE: 78 MMHG | HEIGHT: 76 IN

## 2019-06-05 DIAGNOSIS — M06.00 RHEUMATOID ARTHRITIS WITH NEGATIVE RHEUMATOID FACTOR, INVOLVING UNSPECIFIED SITE (HCC): Primary | ICD-10-CM

## 2019-06-05 DIAGNOSIS — Z51.81 ENCOUNTER FOR THERAPEUTIC DRUG MONITORING: ICD-10-CM

## 2019-06-05 DIAGNOSIS — Z79.899 ENCOUNTER FOR LONG-TERM (CURRENT) USE OF HIGH-RISK MEDICATION: ICD-10-CM

## 2019-06-05 LAB
ALBUMIN SERPL-MCNC: 4.3 G/DL (ref 3.4–5)
ALP BLD-CCNC: 76 U/L (ref 40–129)
ALT SERPL-CCNC: 13 U/L (ref 10–40)
AST SERPL-CCNC: 13 U/L (ref 15–37)
BASOPHILS ABSOLUTE: 0 K/UL (ref 0–0.2)
BASOPHILS RELATIVE PERCENT: 0.3 %
BILIRUB SERPL-MCNC: 0.6 MG/DL (ref 0–1)
BILIRUBIN DIRECT: <0.2 MG/DL (ref 0–0.3)
BILIRUBIN, INDIRECT: ABNORMAL MG/DL (ref 0–1)
CREAT SERPL-MCNC: 0.9 MG/DL (ref 0.8–1.3)
EOSINOPHILS ABSOLUTE: 0.2 K/UL (ref 0–0.6)
EOSINOPHILS RELATIVE PERCENT: 3.7 %
GFR AFRICAN AMERICAN: >60
GFR NON-AFRICAN AMERICAN: >60
HCT VFR BLD CALC: 40.5 % (ref 40.5–52.5)
HEMOGLOBIN: 13.5 G/DL (ref 13.5–17.5)
LYMPHOCYTES ABSOLUTE: 1.6 K/UL (ref 1–5.1)
LYMPHOCYTES RELATIVE PERCENT: 26.4 %
MCH RBC QN AUTO: 31.1 PG (ref 26–34)
MCHC RBC AUTO-ENTMCNC: 33.3 G/DL (ref 31–36)
MCV RBC AUTO: 93.4 FL (ref 80–100)
MONOCYTES ABSOLUTE: 0.5 K/UL (ref 0–1.3)
MONOCYTES RELATIVE PERCENT: 7.9 %
NEUTROPHILS ABSOLUTE: 3.8 K/UL (ref 1.7–7.7)
NEUTROPHILS RELATIVE PERCENT: 61.7 %
PDW BLD-RTO: 13.1 % (ref 12.4–15.4)
PLATELET # BLD: 209 K/UL (ref 135–450)
PMV BLD AUTO: 9.6 FL (ref 5–10.5)
RBC # BLD: 4.34 M/UL (ref 4.2–5.9)
TOTAL PROTEIN: 6.7 G/DL (ref 6.4–8.2)
WBC # BLD: 6.2 K/UL (ref 4–11)

## 2019-06-05 PROCEDURE — 99214 OFFICE O/P EST MOD 30 MIN: CPT | Performed by: INTERNAL MEDICINE

## 2019-06-05 NOTE — PROGRESS NOTES
SUBJECTIVE:    Patient ID: Yoel Venegas is a 59 y.o. male. The patient returns with rheumatoid arthritis. He continues on meloxicam 15 mg a day and methotrexate 20 mg weekly. He is complaining left lower back discomfort. This limits his ADLs  Review of Systems:    Respiratory: denies cough, denies shortness of breath  Gastrointestinal: denies abdominal pain, denies nausea  Musculoskeletal:                  60 minutes       Morning stiffness  Ears, nose, mouth: denies mucosal sores  Skin: denies rash, denies alopecia. The remainder of his review of systems is negative    Prior to Visit Medications    Medication Sig Taking? Authorizing Provider   methotrexate (RHEUMATREX) 2.5 MG chemo tablet TAKE EIGHT TABLETS BY MOUTH ONCE WEEKLY Yes Frank Kaminski MD   finasteride (PROSCAR) 5 MG tablet Take 5 mg by mouth daily Yes Historical Provider, MD   folic acid (FOLVITE) 1 MG tablet Take 1 tablet by mouth daily Yes Frank Kaminski MD   omeprazole (PRILOSEC) 20 MG delayed release capsule Take 20 mg by mouth Yes Historical Provider, MD   gabapentin (NEURONTIN) 600 MG tablet Take 600 mg by mouth nightly 2 at bed time Yes Historical Provider, MD   meloxicam (MOBIC) 15 MG tablet Take 15 mg by mouth daily Yes Historical Provider, MD   pravastatin (PRAVACHOL) 10 MG tablet Take 10 mg by mouth daily Yes Historical Provider, MD   losartan (COZAAR) 50 MG tablet Take 50 mg by mouth daily Yes Historical Provider, MD   Loratadine 10 MG CAPS Take by mouth daily  Yes Historical Provider, MD   Umeclidinium-Vilanterol 62.5-25 MCG/INH AEPB Inhale into the lungs daily  Yes Historical Provider, MD   HYDROcodone-acetaminophen (NORCO) 5-325 MG per tablet Take 1 tablet by mouth every 6 hours as needed for Pain Yes Historical Provider, MD   montelukast (SINGULAIR) 10 MG tablet Take 10 mg by mouth nightly.  Yes Historical Provider, MD   tamsulosin (FLOMAX) 0.4 MG capsule Take 0.4 mg by mouth 2 times daily  Yes Historical Provider, MD amitriptyline (ELAVIL) 100 MG tablet Take 100 mg by mouth nightly. Yes Historical Provider, MD   duloxetine (CYMBALTA) 30 MG capsule Take 60 mg by mouth daily. Yes Historical Provider, MD        OBJECTIVE:  /78   Pulse 72   Ht 6' 4\" (1.93 m)   Wt 286 lb (129.7 kg)   BMI 34.81 kg/m²      Physical Exam:    General: the patient demonstrated normal gait and posture without evidence of overt muscle wasting. Hygiene appears normal    Ears, mouth, nose, throat: no mucosal sores      Respiratory: assessment of the patient's respiratory effort showed no evidence of abnormal respiration and no use of accessory muscles. Diaphragmatic movement is normal.  Percussion of the patient's chest showed no evidence of dullness flatness or hyperresonance. Auscultation of the patient's lungs reveal normal breath sounds in all lung fields. There is no evidence of abnormal sounds such as rales or wheezes. There is no evidence of friction rub. Cardiovascular: palpation of the patient's chest revealed no abnormal thrill. The point of maximal impulse showed no displacement. Auscultation of the heart revealed no evidence of murmur gallop or abnormal heart sound. Musculoskeletal: One plus soft tissue swelling wrists trace swelling PIPs fists % trace swelling knees left hip full range of motion minimal tenderness in the region the trochanteric bursa tenderness in the left paraspinal approximately L 45  Skin: no rashes    ASSESSMENT: Rheumatoid arthritis. Chemotherapy. Low back pain        PLAN: Blood work was obtained today to monitor for adverse drug reactions. Laboratory studies were reviewed. I'll see the patient back in 3 months time. He was reminded to perform his core strengthening exercises  . [place]

## 2019-07-26 ENCOUNTER — OFFICE VISIT (OUTPATIENT)
Dept: CARDIOLOGY CLINIC | Age: 64
End: 2019-07-26
Payer: COMMERCIAL

## 2019-07-26 VITALS
DIASTOLIC BLOOD PRESSURE: 82 MMHG | WEIGHT: 281 LBS | HEIGHT: 75 IN | SYSTOLIC BLOOD PRESSURE: 128 MMHG | OXYGEN SATURATION: 95 % | HEART RATE: 76 BPM | BODY MASS INDEX: 34.94 KG/M2

## 2019-07-26 DIAGNOSIS — E78.2 MIXED HYPERLIPIDEMIA: ICD-10-CM

## 2019-07-26 DIAGNOSIS — I10 ESSENTIAL HYPERTENSION: ICD-10-CM

## 2019-07-26 DIAGNOSIS — R07.9 CHEST PAIN IN ADULT: ICD-10-CM

## 2019-07-26 DIAGNOSIS — R06.02 SOB (SHORTNESS OF BREATH): Primary | ICD-10-CM

## 2019-07-26 DIAGNOSIS — R42 DIZZINESS: ICD-10-CM

## 2019-07-26 DIAGNOSIS — G47.33 OSA (OBSTRUCTIVE SLEEP APNEA): ICD-10-CM

## 2019-07-26 PROCEDURE — 93000 ELECTROCARDIOGRAM COMPLETE: CPT | Performed by: INTERNAL MEDICINE

## 2019-07-26 PROCEDURE — 99205 OFFICE O/P NEW HI 60 MIN: CPT | Performed by: INTERNAL MEDICINE

## 2019-07-26 NOTE — PROGRESS NOTES
[facility], [place], operated by [facility]      Cardiology Consult    Matteo Rossi  1955    Referring Physician: Nati Schneider MD  Reason for Referral: Abnormal echocardiogram    CC: \"I have been getting dizzy. \"    HPI:  The patient is 59 y.o. male with a past medical history significant for hypertension, hyperlipidemia, VICKIE, and pre diabetes. Today, he is here as a new patient. He says that he has been getting dizzy and shortness of breath. He said that he does get a sharp chest pain intermittently but this has been happening for years. He says that his shortness of breath happen with activity but it does not happen all the time. He also get episodes of dizziness. He notice the dizziness more when he is out in the heat. Patient reports compliance to his medications. Past Medical History:   Diagnosis Date    Arthritis     Asthma     BPH (benign prostatic hyperplasia)     Cardiomyopathy ([facility] [address]     GERD (gastroesophageal reflux disease)     HTN (hypertension) 10/29/2012    MVP (mitral valve prolapse)     PPD positive     Prolonged emergence from general anesthesia     Prostatism     Tricuspid valve prolapse     Unspecified sleep apnea      Past Surgical History:   Procedure Laterality Date    CARDIAC CATHETERIZATION      CHOLECYSTECTOMY      FOOT SURGERY      HAND SURGERY Right 2017    thumb mass excision    INGUINAL HERNIA REPAIR      bilateral    KNEE ARTHROSCOPY      TENDON RELEASE      right elbow    TONSILLECTOMY       Family History   Problem Relation Age of Onset    Rheum Arthritis Other     Heart Disease Father     Heart Disease Brother     Lupus Neg Hx      Social History     Tobacco Use    Smoking status: Former Smoker     Types: Cigarettes     Last attempt to quit: 1982     Years since quittin.5    Smokeless tobacco: Never Used    Tobacco comment: rare cigar   Substance Use Topics    Alcohol use:  Yes     Alcohol/week: 12.0 standard drinks diaphoresis. Psychiatric: He has a normal mood and affect. His speech is normal and behavior is normal.     Lab Review:   FLP:    Lab Results   Component Value Date    TRIG 136 09/21/2016    HDL 40 09/21/2016    HDL 41 03/06/2012    LDLCALC 100 09/21/2016    LABVLDL 27 09/21/2016     BUN/Creatinine:    Lab Results   Component Value Date    BUN 12 09/21/2016    CREATININE 0.9 06/05/2019       EKG Interpretation: 7/26/19 Sinus rhythm    Image Review:   Stress test 5/14/19  Normal myocardial perfusion study.    Normal myocardial perfusion.    Normal LV size and systolic function.    Overall findings represent a low risk study. ECHO 5/21/19  There is mild concentric left ventricular hypertrophy. Left ventricular cavity size is mildly dilated. Overall left ventricular systolic function appears mildly reduced. Ejection fraction is visually estimated to be 40-45%. Grade I diastolic dysfunction with normal LV filling pressures. The aortic root is mildly dilated & measures at 4.1 cms. Mildly dilated right ventricle. TAPSE 2.2cm, RV mid 4cm  IVC size is normal (<2.1cm) and collapses > 50% with respiration consistent  with normal RA pressure (3mmHg). Assessment/Plan:   Chest Pain  He is here today as a new patient for intermittent chest pain. He reports that his chest pain is sharp and has been going on for many years. He does notice sweats at times. He also has shortness of breath and dizziness with and without chest pain. his EKG shows no acute changes. His recent nuclear stress test showed no reversible ischemia . due to his ongoing symptoms of chest pain shortness of breath and dizziness, I will do a chest CT with contrast and if this does not show any abnormalities if his chest CT shows no abnormalities, I have recommended that he have an angiogram. I have explained the risks and benefits of this and he is agreeable to this. Shortness of breath  Reports shortness of breath with exertion.  Recent

## 2019-08-01 ENCOUNTER — HOSPITAL ENCOUNTER (OUTPATIENT)
Dept: CT IMAGING | Age: 64
Discharge: HOME | End: 2019-08-01
Payer: COMMERCIAL

## 2019-08-01 DIAGNOSIS — R07.9 CHEST PAIN IN ADULT: ICD-10-CM

## 2019-08-01 DIAGNOSIS — R07.9 CHEST PAIN, UNSPECIFIED TYPE: ICD-10-CM

## 2019-08-01 DIAGNOSIS — R06.02 SOB (SHORTNESS OF BREATH): ICD-10-CM

## 2019-08-01 LAB
GFR AFRICAN AMERICAN: >60
GFR NON-AFRICAN AMERICAN: >60
PERFORMED ON: NORMAL
POC CREATININE: 1 MG/DL (ref 0.8–1.3)
POC SAMPLE TYPE: NORMAL

## 2019-08-01 PROCEDURE — 82565 ASSAY OF CREATININE: CPT

## 2019-08-01 PROCEDURE — 71260 CT THORAX DX C+: CPT

## 2019-08-01 PROCEDURE — 6360000004 HC RX CONTRAST MEDICATION: Performed by: INTERNAL MEDICINE

## 2019-08-01 RX ADMIN — IOPAMIDOL 75 ML: 755 INJECTION, SOLUTION INTRAVENOUS at 09:33

## 2019-08-05 ENCOUNTER — OFFICE VISIT (OUTPATIENT)
Dept: PULMONOLOGY | Age: 64
End: 2019-08-05
Payer: COMMERCIAL

## 2019-08-05 VITALS
DIASTOLIC BLOOD PRESSURE: 80 MMHG | OXYGEN SATURATION: 94 % | SYSTOLIC BLOOD PRESSURE: 130 MMHG | WEIGHT: 284 LBS | BODY MASS INDEX: 35.31 KG/M2 | HEART RATE: 98 BPM | HEIGHT: 75 IN | TEMPERATURE: 98.3 F

## 2019-08-05 DIAGNOSIS — M06.09 RHEUMATOID ARTHRITIS OF MULTIPLE SITES WITH NEGATIVE RHEUMATOID FACTOR (HCC): ICD-10-CM

## 2019-08-05 DIAGNOSIS — Z72.0 TOBACCO ABUSE: ICD-10-CM

## 2019-08-05 DIAGNOSIS — R91.1 LUNG NODULE: Primary | ICD-10-CM

## 2019-08-05 PROCEDURE — 99204 OFFICE O/P NEW MOD 45 MIN: CPT | Performed by: INTERNAL MEDICINE

## 2019-08-05 NOTE — PROGRESS NOTES
tablet Take 15 mg by mouth daily      pravastatin (PRAVACHOL) 10 MG tablet Take 10 mg by mouth daily      losartan (COZAAR) 50 MG tablet Take 50 mg by mouth daily      Loratadine 10 MG CAPS Take by mouth daily       HYDROcodone-acetaminophen (NORCO) 5-325 MG per tablet Take 1 tablet by mouth every 6 hours as needed for Pain      montelukast (SINGULAIR) 10 MG tablet Take 10 mg by mouth nightly.  tamsulosin (FLOMAX) 0.4 MG capsule Take 0.4 mg by mouth 2 times daily       amitriptyline (ELAVIL) 100 MG tablet Take 100 mg by mouth nightly.  duloxetine (CYMBALTA) 30 MG capsule Take 60 mg by mouth daily.  Umeclidinium-Vilanterol 62.5-25 MCG/INH AEPB Inhale into the lungs daily        No current facility-administered medications for this visit. Data Reviewed:   CBC and Renal reviewed  Last CBC  Lab Results   Component Value Date    WBC 6.2 06/05/2019    RBC 4.34 06/05/2019    HGB 13.5 06/05/2019    MCV 93.4 06/05/2019     06/05/2019     Last Renal  Lab Results   Component Value Date     09/21/2016    K 4.9 09/21/2016     09/21/2016    CO2 26 09/21/2016    CO2 27 01/23/2014    CO2 27 04/24/2012    BUN 12 09/21/2016    CREATININE 1.0 08/01/2019    CREATININE 0.9 06/05/2019    GLUCOSE 112 09/21/2016    CALCIUM 9.3 09/21/2016       Last ABG  POC Blood Gas: No results found for: POCPH, POCPCO2, POCPO2, POCHCO3, NBEA, BXTC4TOQ  No results for input(s): PH, PCO2, PO2, HCO3, BE, O2SAT in the last 72 hours. Radiology Review:  Pertinent images / reports were reviewed as a part of this visit. CT Chest w/ contrast:   Results for orders placed during the hospital encounter of 08/01/19   CT CHEST W CONTRAST    Narrative EXAMINATION:  CT OF THE CHEST WITH CONTRAST 8/1/2019 9:33 am    TECHNIQUE:  CT of the chest was performed with the administration of intravenous  contrast. Multiplanar reformatted images are provided for review.  Dose  modulation, iterative reconstruction, and/or

## 2019-08-05 NOTE — ASSESSMENT & PLAN NOTE
-Patient has multiple lung nodule seen on CT scan, some of which are stable for 2009.  -However, there are 2 lesions described above, that appear new. -Given his 30-pack-year smoking history he is at risk of developing a lung neoplasm.  -Having said that the general appearance of his lung nodules appear to be very small, more consistent with possible previous infectious process, scarring, or less likely rheumatoid nodules.  -We will monitor with serial CT scans. Next in 6 months.

## 2019-09-15 DIAGNOSIS — M06.00 RHEUMATOID ARTHRITIS WITH NEGATIVE RHEUMATOID FACTOR, INVOLVING UNSPECIFIED SITE (HCC): Primary | ICD-10-CM

## 2019-09-15 DIAGNOSIS — Z51.81 ENCOUNTER FOR THERAPEUTIC DRUG MONITORING: ICD-10-CM

## 2019-10-02 ENCOUNTER — OFFICE VISIT (OUTPATIENT)
Dept: CARDIOLOGY CLINIC | Age: 64
End: 2019-10-02
Payer: COMMERCIAL

## 2019-10-02 VITALS
HEIGHT: 75 IN | SYSTOLIC BLOOD PRESSURE: 118 MMHG | HEART RATE: 84 BPM | OXYGEN SATURATION: 93 % | BODY MASS INDEX: 34.19 KG/M2 | DIASTOLIC BLOOD PRESSURE: 74 MMHG | WEIGHT: 275 LBS

## 2019-10-02 DIAGNOSIS — R53.83 FATIGUE, UNSPECIFIED TYPE: ICD-10-CM

## 2019-10-02 DIAGNOSIS — E78.2 MIXED HYPERLIPIDEMIA: ICD-10-CM

## 2019-10-02 DIAGNOSIS — R07.9 CHEST PAIN, UNSPECIFIED TYPE: Primary | ICD-10-CM

## 2019-10-02 DIAGNOSIS — I10 ESSENTIAL HYPERTENSION: ICD-10-CM

## 2019-10-02 DIAGNOSIS — G47.30 SLEEP APNEA IN ADULT: ICD-10-CM

## 2019-10-02 PROCEDURE — 99214 OFFICE O/P EST MOD 30 MIN: CPT | Performed by: INTERNAL MEDICINE

## 2019-10-02 PROCEDURE — 93000 ELECTROCARDIOGRAM COMPLETE: CPT | Performed by: INTERNAL MEDICINE

## 2019-10-15 ENCOUNTER — HOSPITAL ENCOUNTER (OUTPATIENT)
Dept: GENERAL RADIOLOGY | Age: 64
Discharge: HOME | End: 2019-10-15
Payer: COMMERCIAL

## 2019-10-15 ENCOUNTER — HOSPITAL ENCOUNTER (OUTPATIENT)
Age: 64
End: 2019-10-15
Payer: COMMERCIAL

## 2019-10-15 DIAGNOSIS — M79.671 RIGHT FOOT PAIN: ICD-10-CM

## 2019-10-15 DIAGNOSIS — M25.571 ACUTE RIGHT ANKLE PAIN: ICD-10-CM

## 2019-10-15 PROCEDURE — 73630 X-RAY EXAM OF FOOT: CPT

## 2019-10-15 PROCEDURE — 73610 X-RAY EXAM OF ANKLE: CPT

## 2019-11-27 ENCOUNTER — HOSPITAL ENCOUNTER (OUTPATIENT)
Age: 64
Discharge: HOME | End: 2019-11-27
Payer: COMMERCIAL

## 2019-11-27 ENCOUNTER — HOSPITAL ENCOUNTER (OUTPATIENT)
Dept: GENERAL RADIOLOGY | Age: 64
Discharge: HOME | End: 2019-11-27
Payer: COMMERCIAL

## 2019-11-27 ENCOUNTER — OFFICE VISIT (OUTPATIENT)
Dept: RHEUMATOLOGY | Age: 64
End: 2019-11-27
Payer: COMMERCIAL

## 2019-11-27 VITALS
BODY MASS INDEX: 35.68 KG/M2 | DIASTOLIC BLOOD PRESSURE: 80 MMHG | HEIGHT: 75 IN | HEART RATE: 80 BPM | SYSTOLIC BLOOD PRESSURE: 118 MMHG | WEIGHT: 287 LBS

## 2019-11-27 DIAGNOSIS — M25.552 BILATERAL HIP PAIN: ICD-10-CM

## 2019-11-27 DIAGNOSIS — M25.551 BILATERAL HIP PAIN: ICD-10-CM

## 2019-11-27 DIAGNOSIS — M06.00 RHEUMATOID ARTHRITIS WITH NEGATIVE RHEUMATOID FACTOR, INVOLVING UNSPECIFIED SITE (HCC): Primary | ICD-10-CM

## 2019-11-27 DIAGNOSIS — Z51.81 ENCOUNTER FOR THERAPEUTIC DRUG MONITORING: ICD-10-CM

## 2019-11-27 DIAGNOSIS — M06.00 RHEUMATOID ARTHRITIS WITH NEGATIVE RHEUMATOID FACTOR, INVOLVING UNSPECIFIED SITE (HCC): ICD-10-CM

## 2019-11-27 DIAGNOSIS — Z79.899 ENCOUNTER FOR LONG-TERM (CURRENT) USE OF HIGH-RISK MEDICATION: ICD-10-CM

## 2019-11-27 LAB
ALBUMIN SERPL-MCNC: 4.3 G/DL (ref 3.4–5)
ALP BLD-CCNC: 77 U/L (ref 40–129)
ALT SERPL-CCNC: 12 U/L (ref 10–40)
AST SERPL-CCNC: 13 U/L (ref 15–37)
BASOPHILS ABSOLUTE: 0 K/UL (ref 0–0.2)
BASOPHILS RELATIVE PERCENT: 0.4 %
BILIRUB SERPL-MCNC: 0.4 MG/DL (ref 0–1)
BILIRUBIN DIRECT: <0.2 MG/DL (ref 0–0.3)
BILIRUBIN, INDIRECT: ABNORMAL MG/DL (ref 0–1)
CREAT SERPL-MCNC: 0.8 MG/DL (ref 0.8–1.3)
EOSINOPHILS ABSOLUTE: 0.2 K/UL (ref 0–0.6)
EOSINOPHILS RELATIVE PERCENT: 3 %
GFR AFRICAN AMERICAN: >60
GFR NON-AFRICAN AMERICAN: >60
HCT VFR BLD CALC: 38.5 % (ref 40.5–52.5)
HEMOGLOBIN: 13.1 G/DL (ref 13.5–17.5)
LYMPHOCYTES ABSOLUTE: 1.8 K/UL (ref 1–5.1)
LYMPHOCYTES RELATIVE PERCENT: 22.6 %
MCH RBC QN AUTO: 32.4 PG (ref 26–34)
MCHC RBC AUTO-ENTMCNC: 34 G/DL (ref 31–36)
MCV RBC AUTO: 95.2 FL (ref 80–100)
MONOCYTES ABSOLUTE: 0.8 K/UL (ref 0–1.3)
MONOCYTES RELATIVE PERCENT: 9.6 %
NEUTROPHILS ABSOLUTE: 5.2 K/UL (ref 1.7–7.7)
NEUTROPHILS RELATIVE PERCENT: 64.4 %
PDW BLD-RTO: 12.9 % (ref 12.4–15.4)
PLATELET # BLD: 226 K/UL (ref 135–450)
PMV BLD AUTO: 9.2 FL (ref 5–10.5)
RBC # BLD: 4.04 M/UL (ref 4.2–5.9)
TOTAL PROTEIN: 6.5 G/DL (ref 6.4–8.2)
WBC # BLD: 8.1 K/UL (ref 4–11)

## 2019-11-27 PROCEDURE — 99214 OFFICE O/P EST MOD 30 MIN: CPT | Performed by: INTERNAL MEDICINE

## 2019-11-27 PROCEDURE — 73521 X-RAY EXAM HIPS BI 2 VIEWS: CPT

## 2019-11-27 RX ORDER — OXYBUTYNIN CHLORIDE 10 MG/1
10 TABLET, EXTENDED RELEASE ORAL DAILY
COMMUNITY
End: 2021-04-29

## 2019-12-02 ENCOUNTER — TELEPHONE (OUTPATIENT)
Dept: INTERNAL MEDICINE CLINIC | Age: 64
End: 2019-12-02

## 2019-12-03 ENCOUNTER — OFFICE VISIT (OUTPATIENT)
Dept: RHEUMATOLOGY | Age: 64
End: 2019-12-03
Payer: COMMERCIAL

## 2019-12-03 VITALS
BODY MASS INDEX: 35.68 KG/M2 | HEIGHT: 75 IN | WEIGHT: 287 LBS | SYSTOLIC BLOOD PRESSURE: 118 MMHG | DIASTOLIC BLOOD PRESSURE: 72 MMHG | HEART RATE: 76 BPM

## 2019-12-03 DIAGNOSIS — M70.62 TROCHANTERIC BURSITIS OF LEFT HIP: Primary | ICD-10-CM

## 2019-12-03 PROCEDURE — 20610 DRAIN/INJ JOINT/BURSA W/O US: CPT | Performed by: INTERNAL MEDICINE

## 2019-12-03 RX ORDER — LIDOCAINE HYDROCHLORIDE 20 MG/ML
5 INJECTION, SOLUTION INFILTRATION; PERINEURAL ONCE
Status: COMPLETED | OUTPATIENT
Start: 2019-12-03 | End: 2019-12-03

## 2019-12-03 RX ORDER — TRIAMCINOLONE ACETONIDE 40 MG/ML
40 INJECTION, SUSPENSION INTRA-ARTICULAR; INTRAMUSCULAR ONCE
Status: COMPLETED | OUTPATIENT
Start: 2019-12-03 | End: 2019-12-03

## 2019-12-03 RX ADMIN — TRIAMCINOLONE ACETONIDE 40 MG: 40 INJECTION, SUSPENSION INTRA-ARTICULAR; INTRAMUSCULAR at 13:48

## 2019-12-03 RX ADMIN — LIDOCAINE HYDROCHLORIDE 5 ML: 20 INJECTION, SOLUTION INFILTRATION; PERINEURAL at 13:47

## 2019-12-06 DIAGNOSIS — M06.00 RHEUMATOID ARTHRITIS WITH NEGATIVE RHEUMATOID FACTOR, INVOLVING UNSPECIFIED SITE (HCC): ICD-10-CM

## 2019-12-06 DIAGNOSIS — Z51.81 ENCOUNTER FOR THERAPEUTIC DRUG MONITORING: ICD-10-CM

## 2020-01-21 RX ORDER — FOLIC ACID 1 MG/1
TABLET ORAL
Qty: 90 TABLET | Refills: 3 | Status: SHIPPED | OUTPATIENT
Start: 2020-01-21 | End: 2020-04-22 | Stop reason: SDUPTHER

## 2020-02-21 ENCOUNTER — TELEPHONE (OUTPATIENT)
Dept: PULMONOLOGY | Age: 65
End: 2020-02-21

## 2020-03-11 ENCOUNTER — TELEPHONE (OUTPATIENT)
Dept: PULMONOLOGY | Age: 65
End: 2020-03-11

## 2020-04-22 ENCOUNTER — VIRTUAL VISIT (OUTPATIENT)
Dept: RHEUMATOLOGY | Age: 65
End: 2020-04-22
Payer: COMMERCIAL

## 2020-04-22 PROCEDURE — 99214 OFFICE O/P EST MOD 30 MIN: CPT | Performed by: INTERNAL MEDICINE

## 2020-04-22 RX ORDER — FOLIC ACID 1 MG/1
TABLET ORAL
Qty: 90 TABLET | Refills: 3 | Status: SHIPPED | OUTPATIENT
Start: 2020-04-22 | End: 2021-05-28

## 2020-04-22 NOTE — PROGRESS NOTES
Historical Provider, MD   montelukast (SINGULAIR) 10 MG tablet Take 10 mg by mouth nightly. Yes Historical Provider, MD   tamsulosin (FLOMAX) 0.4 MG capsule Take 0.4 mg by mouth 2 times daily  Yes Historical Provider, MD   amitriptyline (ELAVIL) 100 MG tablet Take 100 mg by mouth nightly. Yes Historical Provider, MD   duloxetine (CYMBALTA) 30 MG capsule Take 60 mg by mouth daily. Yes Historical Provider, MD        OBJECTIVE:  There were no vitals taken for this visit. Physical Exam:    General: Alert male in no acute distress breathing comfortably. Musculoskeletal:swelling at both wrists using video  Skin: no rashes    ASSESSMENT: Rheumatoid arthritis active. Chemotherapy        PLAN: Patient will have blood work to monitor for adverse drug reaction. Labs from his previous visit were reviewed. He is willing to consider an investigational drug study and once the studies restart he will be contacted. I will see the patient back in 3 months time for routine follow-up.

## 2020-04-28 LAB
ALBUMIN SERPL-MCNC: 4.1 G/DL (ref 3.4–5)
ALP BLD-CCNC: 69 U/L (ref 40–129)
ALT SERPL-CCNC: 21 U/L (ref 10–40)
AST SERPL-CCNC: 16 U/L (ref 15–37)
BASOPHILS ABSOLUTE: 0 K/UL (ref 0–0.2)
BASOPHILS RELATIVE PERCENT: 0.4 %
BILIRUB SERPL-MCNC: 0.4 MG/DL (ref 0–1)
BILIRUBIN DIRECT: <0.2 MG/DL (ref 0–0.3)
BILIRUBIN, INDIRECT: NORMAL MG/DL (ref 0–1)
CREAT SERPL-MCNC: 0.8 MG/DL (ref 0.8–1.3)
EOSINOPHILS ABSOLUTE: 0.2 K/UL (ref 0–0.6)
EOSINOPHILS RELATIVE PERCENT: 3.3 %
GFR AFRICAN AMERICAN: >60
GFR NON-AFRICAN AMERICAN: >60
HCT VFR BLD CALC: 39.3 % (ref 40.5–52.5)
HEMOGLOBIN: 13.4 G/DL (ref 13.5–17.5)
LYMPHOCYTES ABSOLUTE: 1.5 K/UL (ref 1–5.1)
LYMPHOCYTES RELATIVE PERCENT: 23.9 %
MCH RBC QN AUTO: 32.4 PG (ref 26–34)
MCHC RBC AUTO-ENTMCNC: 34.2 G/DL (ref 31–36)
MCV RBC AUTO: 94.6 FL (ref 80–100)
MONOCYTES ABSOLUTE: 0.6 K/UL (ref 0–1.3)
MONOCYTES RELATIVE PERCENT: 8.6 %
NEUTROPHILS ABSOLUTE: 4.1 K/UL (ref 1.7–7.7)
NEUTROPHILS RELATIVE PERCENT: 63.8 %
PDW BLD-RTO: 13.3 % (ref 12.4–15.4)
PLATELET # BLD: 194 K/UL (ref 135–450)
PMV BLD AUTO: 9.5 FL (ref 5–10.5)
RBC # BLD: 4.16 M/UL (ref 4.2–5.9)
TOTAL PROTEIN: 6.4 G/DL (ref 6.4–8.2)
WBC # BLD: 6.4 K/UL (ref 4–11)

## 2020-07-22 ENCOUNTER — OFFICE VISIT (OUTPATIENT)
Dept: RHEUMATOLOGY | Age: 65
End: 2020-07-22
Payer: COMMERCIAL

## 2020-07-22 VITALS
WEIGHT: 285 LBS | HEIGHT: 75 IN | TEMPERATURE: 97.2 F | DIASTOLIC BLOOD PRESSURE: 80 MMHG | HEART RATE: 88 BPM | BODY MASS INDEX: 35.43 KG/M2 | SYSTOLIC BLOOD PRESSURE: 104 MMHG

## 2020-07-22 LAB
ALBUMIN SERPL-MCNC: 4.5 G/DL (ref 3.4–5)
ALP BLD-CCNC: 74 U/L (ref 40–129)
ALT SERPL-CCNC: 15 U/L (ref 10–40)
AST SERPL-CCNC: 14 U/L (ref 15–37)
BASOPHILS ABSOLUTE: 0 K/UL (ref 0–0.2)
BASOPHILS RELATIVE PERCENT: 0.5 %
BILIRUB SERPL-MCNC: 0.5 MG/DL (ref 0–1)
BILIRUBIN DIRECT: <0.2 MG/DL (ref 0–0.3)
BILIRUBIN, INDIRECT: ABNORMAL MG/DL (ref 0–1)
CREAT SERPL-MCNC: 1 MG/DL (ref 0.8–1.3)
EOSINOPHILS ABSOLUTE: 0.3 K/UL (ref 0–0.6)
EOSINOPHILS RELATIVE PERCENT: 3.7 %
GFR AFRICAN AMERICAN: >60
GFR NON-AFRICAN AMERICAN: >60
HCT VFR BLD CALC: 39.9 % (ref 40.5–52.5)
HEMOGLOBIN: 13.7 G/DL (ref 13.5–17.5)
LYMPHOCYTES ABSOLUTE: 1.6 K/UL (ref 1–5.1)
LYMPHOCYTES RELATIVE PERCENT: 23 %
MCH RBC QN AUTO: 33.6 PG (ref 26–34)
MCHC RBC AUTO-ENTMCNC: 34.3 G/DL (ref 31–36)
MCV RBC AUTO: 97.9 FL (ref 80–100)
MONOCYTES ABSOLUTE: 0.5 K/UL (ref 0–1.3)
MONOCYTES RELATIVE PERCENT: 6.9 %
NEUTROPHILS ABSOLUTE: 4.6 K/UL (ref 1.7–7.7)
NEUTROPHILS RELATIVE PERCENT: 65.9 %
PDW BLD-RTO: 13.9 % (ref 12.4–15.4)
PLATELET # BLD: 220 K/UL (ref 135–450)
PMV BLD AUTO: 9.3 FL (ref 5–10.5)
RBC # BLD: 4.07 M/UL (ref 4.2–5.9)
TOTAL PROTEIN: 6.7 G/DL (ref 6.4–8.2)
WBC # BLD: 7 K/UL (ref 4–11)

## 2020-07-22 PROCEDURE — 99214 OFFICE O/P EST MOD 30 MIN: CPT | Performed by: INTERNAL MEDICINE

## 2020-07-22 NOTE — PROGRESS NOTES
by mouth nightly. Yes Historical Provider, MD   tamsulosin (FLOMAX) 0.4 MG capsule Take 0.4 mg by mouth 2 times daily  Yes Historical Provider, MD   amitriptyline (ELAVIL) 100 MG tablet Take 100 mg by mouth nightly. Yes Historical Provider, MD   duloxetine (CYMBALTA) 30 MG capsule Take 60 mg by mouth daily. Yes Historical Provider, MD        OBJECTIVE:  /80   Pulse 88   Temp 97.2 °F (36.2 °C)   Ht 6' 3\" (1.905 m)   Wt 285 lb (129.3 kg)   BMI 35.62 kg/m²      Physical Exam:    General: the patient demonstrated normal gait and posture without evidence of overt muscle wasting. Hygiene appears normal    Ears, mouth, nose, throat: no mucosal sores      Respiratory: assessment of the patient's respiratory effort showed no evidence of abnormal respiration and no use of accessory muscles. Diaphragmatic movement is normal.  Percussion of the patient's chest showed no evidence of dullness flatness or hyperresonance. Auscultation of the patient's lungs reveal normal breath sounds in all lung fields. There is no evidence of abnormal sounds such as rales or wheezes. There is no evidence of friction rub. Cardiovascular: palpation of the patient's chest revealed no abnormal thrill. The point of maximal impulse showed no displacement. Auscultation of the heart revealed no evidence of murmur gallop or abnormal heart sound. Musculoskeletal: 1+ soft tissue swelling both wrists. No swelling PIPs. Fists 100%. Trace to 1+ soft tissue swelling knees  Skin: no rashes    ASSESSMENT: Rheumatoid arthritis. Chemotherapy. Neuropathic pain both feet      PLAN: Nerve conduction velocity will be arranged. Blood work will be obtained to monitor for adverse drug reactions. I will see the patient back in 3 months time. In the interim he will increase Neurontin to 600 mg 3 pills daily.

## 2020-07-30 ENCOUNTER — PROCEDURE VISIT (OUTPATIENT)
Dept: NEUROLOGY | Age: 65
End: 2020-07-30
Payer: COMMERCIAL

## 2020-07-30 PROCEDURE — 95909 NRV CNDJ TST 5-6 STUDIES: CPT | Performed by: PSYCHIATRY & NEUROLOGY

## 2020-07-30 PROCEDURE — 95886 MUSC TEST DONE W/N TEST COMP: CPT | Performed by: PSYCHIATRY & NEUROLOGY

## 2020-08-05 ENCOUNTER — TELEPHONE (OUTPATIENT)
Dept: PULMONOLOGY | Age: 65
End: 2020-08-05

## 2020-08-05 NOTE — TELEPHONE ENCOUNTER
CT chest expires today 8/5/20. Please place a new order the patient plans to have this done within the next week.

## 2020-08-13 ENCOUNTER — TELEPHONE (OUTPATIENT)
Dept: INTERNAL MEDICINE CLINIC | Age: 65
End: 2020-08-13

## 2020-08-13 NOTE — TELEPHONE ENCOUNTER
Spoke with the patient and his wife reviewed results of the EMG will discuss with Dr. Abbie Hanson on whether a biopsy would be helpful.

## 2020-08-17 NOTE — TELEPHONE ENCOUNTER
Spoke with Dr Larissa Carroll rec b12 tsh hgbA1C repeat emg in 6 months if test above neg.  If progress then consider amyloidosis

## 2020-08-17 NOTE — TELEPHONE ENCOUNTER
Spoke with the pt. He already had this stuff done with his primary care physician. He asked me to call and get them faxed to the office. Called 's office. They are faxing the results.      Cell# [phone]

## 2020-08-18 DIAGNOSIS — G62.9 NEUROPATHY: ICD-10-CM

## 2020-08-18 DIAGNOSIS — R53.83 FATIGUE, UNSPECIFIED TYPE: ICD-10-CM

## 2020-08-18 LAB — TSH REFLEX: 2.04 UIU/ML (ref 0.27–4.2)

## 2020-08-19 LAB
ALBUMIN SERPL-MCNC: 3.5 G/DL (ref 3.1–4.9)
ALPHA-1-GLOBULIN: 0.3 G/DL (ref 0.2–0.4)
ALPHA-2-GLOBULIN: 0.6 G/DL (ref 0.4–1.1)
BETA GLOBULIN: 1 G/DL (ref 0.9–1.6)
GAMMA GLOBULIN: 0.8 G/DL (ref 0.6–1.8)
SPE/IFE INTERPRETATION: NORMAL
TOTAL PROTEIN: 6.2 G/DL (ref 6.4–8.2)

## 2020-08-21 ENCOUNTER — HOSPITAL ENCOUNTER (OUTPATIENT)
Dept: CT IMAGING | Age: 65
Discharge: HOME | End: 2020-08-21
Payer: COMMERCIAL

## 2020-08-21 PROCEDURE — 71250 CT THORAX DX C-: CPT

## 2020-08-24 ENCOUNTER — TELEPHONE (OUTPATIENT)
Dept: CARDIOLOGY CLINIC | Age: 65
End: 2020-08-24

## 2020-08-24 NOTE — PROGRESS NOTES
(See Comments)     Pt doesn`t recall     Current Outpatient Medications   Medication Sig Dispense Refill    methotrexate (RHEUMATREX) 2.5 MG chemo tablet TAKE EIGHT TABLETS BY MOUTH ONCE WEEKLY 32 tablet 0    folic acid (FOLVITE) 1 MG tablet TAKE ONE TABLET BY MOUTH DAILY 90 tablet 3    oxybutynin (DITROPAN-XL) 10 MG extended release tablet Take 10 mg by mouth daily      finasteride (PROSCAR) 5 MG tablet Take 5 mg by mouth daily      omeprazole (PRILOSEC) 20 MG delayed release capsule Take 20 mg by mouth      gabapentin (NEURONTIN) 600 MG tablet Take 600 mg by mouth nightly 2 at bed time      meloxicam (MOBIC) 15 MG tablet Take 15 mg by mouth daily      pravastatin (PRAVACHOL) 10 MG tablet Take 10 mg by mouth daily      losartan (COZAAR) 50 MG tablet Take 50 mg by mouth daily      Loratadine 10 MG CAPS Take by mouth daily       Umeclidinium-Vilanterol 62.5-25 MCG/INH AEPB Inhale into the lungs as needed       HYDROcodone-acetaminophen (NORCO) 5-325 MG per tablet Take 1 tablet by mouth every 6 hours as needed for Pain      montelukast (SINGULAIR) 10 MG tablet Take 10 mg by mouth nightly.  tamsulosin (FLOMAX) 0.4 MG capsule Take 0.4 mg by mouth 2 times daily       amitriptyline (ELAVIL) 100 MG tablet Take 100 mg by mouth nightly.  duloxetine (CYMBALTA) 30 MG capsule Take 60 mg by mouth daily. No current facility-administered medications for this visit. Review of Systems:  · Constitutional: no unanticipated weight loss. There's been no change in energy level, sleep pattern, or activity level. No fevers, chills. · Eyes: No visual changes or diplopia. No scleral icterus. · ENT: No Headaches, hearing loss or vertigo. No mouth sores or sore throat. · Cardiovascular: as reviewed in HPI  · Respiratory: No cough or wheezing, no sputum production. No hematemesis. · Gastrointestinal: No abdominal pain, appetite loss, blood in stools.  No change in bowel or bladder habits. · Genitourinary: No dysuria, trouble voiding, or hematuria. · Musculoskeletal:  No gait disturbance, no joint complaints. · Integumentary: No rash or pruritis. · Neurological: No headache, diplopia, change in muscle strength, numbness or tingling. · Psychiatric: No anxiety or depression. · Endocrine: No temperature intolerance. No excessive thirst, fluid intake, or urination. No tremor. · Hematologic/Lymphatic: No abnormal bruising or bleeding, blood clots or swollen lymph nodes. · Allergic/Immunologic: No nasal congestion or hives. Physical Exam:   /86   Pulse 86   Temp 97.8 °F (36.6 °C)   Ht 6' 3\" (1.905 m)   Wt 285 lb (129.3 kg)   SpO2 96%   BMI 35.62 kg/m²   Wt Readings from Last 3 Encounters:   07/22/20 285 lb (129.3 kg)   12/03/19 287 lb (130.2 kg)   11/27/19 287 lb (130.2 kg)     Constitutional: He is oriented to person, place, and time. He appears well-developed and well-nourished. In no acute distress. Head: Normocephalic and atraumatic. Pupils equal and round. Neck: Neck supple. No JVP or carotid bruit appreciated. No mass and no thyromegaly present. No lymphadenopathy present. Cardiovascular: Normal rate. Normal heart sounds. Exam reveals no gallop and no friction rub. No murmur heard. Pulmonary/Chest: Effort normal and breath sounds normal. No respiratory distress. He has no wheezes, rhonchi or rales. Abdominal: Soft, non-tender. Bowel sounds are normal. He exhibits no organomegaly, mass or bruit. Extremities: No edema, cyanosis, or clubbing. Pulses are 2+ radial/dorsalis pedis/posterior tibial/carotid bilaterally. Neurological: No gross cranial nerve deficit. Coordination normal.   Skin: Skin is warm and dry. There is no rash or diaphoresis. Psychiatric: He has a normal mood and affect.  His speech is normal and behavior is normal.     Lab Review:   Lab Results   Component Value Date    TRIG 136 09/21/2016    HDL 40 09/21/2016    HDL 41 03/06/2012    LDLCALC 100 09/21/2016    LABVLDL 27 09/21/2016      Lab Results   Component Value Date    BUN 12 09/21/2016    CREATININE 1.0 07/22/2020       EKG Interpretation:   7/26/19 Sinus rhythm  10/2/20 Sinus  Rhythm  - occasional ectopic ventricular beat    8/25/20 Sinus  Rhythm  - frequent multiform ectopic ventricular beats, # VECs = 2, # types 2  -consider old anterior infarct,  -Nonspecific ST depression   +   Nonspecific T-abnormality  -Nondiagnostic. Image Review:   Chest CT 8/1/19  Atherosclerosis, including coronary artery calcification.       Several indeterminate pulmonary nodules, measuring up to approximately 6 mm. Follow-up recommendations are as below.       RECOMMENDATIONS:   Fleischner Society guidelines for follow-up and management of incidentally   detected pulmonary nodules:       Single Solid Nodule:       Nodule size less than 6 mm   In a low-risk patient, no routine follow-up. In a high-risk patient, optional CT at 12 months.       Nodule size equals 6-8 mm   In a low-risk patient, CT at 6-12 months, then consider CT at 18-24 months. In a high-risk patient, CT at 6-12 months, then CT at 18-24 months.       Nodule size greater than 8 mm           In a low-risk patient, consider CT at 3 months, PET/CT, or tissue sampling.       In a high-risk patient, consider CT at 3 months, PET/CT, or tissue sampling.       Multiple Solid Nodules:       Nodule size less than 6 mm   In a low-risk patient, no routine follow-up. In a high-risk patient, optional CT at 12 months.       Nodule size equals 6-8 mm   In a low-risk patient, CT at 3-6 months, then consider CT at 18-24 months. In a high-risk patient, CT at 3-6 months, then CT at 18-24 months.       Nodule size greater than 8 mm   In a low-risk patient, CT at 3-6 months, then consider CT at 18-24 months.    In a high-risk patient, CT at 3-6 months, then CT at 18-24 months.       - Low risk patients include individuals with minimal or absent history of smoking and other known risk factors.       - High risk patients include individuals with a history or smoking or known   risk factors.         Stress test 5/14/19  Normal myocardial perfusion study.    Normal myocardial perfusion.    Normal LV size and systolic function.    Overall findings represent a low risk study. ECHO 5/21/19  There is mild concentric left ventricular hypertrophy. Left ventricular cavity size is mildly dilated. Overall left ventricular systolic function appears mildly reduced. Ejection fraction is visually estimated to be 40-45%. Grade I diastolic dysfunction with normal LV filling pressures. The aortic root is mildly dilated & measures at 4.1 cms. Mildly dilated right ventricle. TAPSE 2.2cm, RV mid 4cm  IVC size is normal (<2.1cm) and collapses > 50% with respiration consistent  with normal RA pressure (3mmHg). Assessment/Plan:     Unstable angina  - Pt is her for an urgent visit. He is experiencing increasing chest tightness with radiation to his jaws, SOB & diaphoresis. which  occurs with minimal exertion & also at rest. His  EKG  today shows subtle changes  His symptoms are very suspicious for unstable angina. I will proceed with ben angio with possible intervention today. He will be taken to the cath lab. I discussed the risks and benefits of cardiac catheterization with the patient. I also discussed the possible therapies and alternatives including medical management, angioplasty and stenting or coronary bypass surgery. The patient is amenable to undergoing the procedure and voices understanding of the risks. We will have the procedure scheduled. Hypertension, essential   BP is stable today on medical therapy. Hyperlipidemia, unspecified   No recent lipid profile. Continue Pravachol 10 mg daily. Will repeat lipid profile in the near future. Sleep apnea  Has not worn CPap in several years. Would recommend he repeat sleep study.  We placed referral

## 2020-08-24 NOTE — TELEPHONE ENCOUNTER
Reached back out to patient and wife-they are agreeable to tomorrow morning at 0945. Instructed regarding ED. They both vu.

## 2020-08-24 NOTE — TELEPHONE ENCOUNTER
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 He states that he has had left sided chest pain for years and has undergone cardiac workup. He last saw you 10/2019. He started a week ago with chest pain radiating across his chest accompanied by \"funny jaw pain\", SOB at rest, worsening VALVERDE, diaphoresis, headache and worsening fatigue. He is not able to lay flat without all symptoms worsening and having to sit up immediately. He also reports that symptoms  Worsen with minimal exertion. Symptoms have been constant for the last week, waxing and waning in intensity. He is not on a water pill, states he saw Dr. Kathrin Fofana last week and his weight was 298# and today 287# but is feeling worse. He is not checking weight at home daily as he has not been instructed to. Last night his pain was constant from midnight to 0430 and was the worst it has been in a weeks time. He states he is worried because his brother had chest pain for years, repeat cardiac testing wnl and ended up finding out that he had a 90% LAD stenosis. Father had heart disease but was not able to tell me exactly what he had. When he saw Dr. Kathrin Fofana today, he did an EKG and sent him home with Nitro-SL. Has not picked up script yet to take because the pharmacy told him they are \"very busy. \" His wife called Dr. Kathrin Fofana while the patient and I were on the phone and took our fax number for the EKG and progress note to be sent over urgently but was instructed that it will not be faxed until tomorrow. Last labs 7/2020.     While we were on the phone, he wanted to mention that he has severe  Leg pain that he has been following with Rheumatology and Neurology for but feels it is progressive in nature and has undergone recent testing.     (text message also sent to review inbox)

## 2020-08-25 ENCOUNTER — OFFICE VISIT (OUTPATIENT)
Dept: CARDIOLOGY CLINIC | Age: 65
End: 2020-08-25
Payer: COMMERCIAL

## 2020-08-25 ENCOUNTER — HOSPITAL ENCOUNTER (OUTPATIENT)
Dept: CARDIAC CATH/INVASIVE PROCEDURES | Age: 65
Discharge: HOME | End: 2020-08-26
Attending: INTERNAL MEDICINE | Admitting: INTERNAL MEDICINE
Payer: COMMERCIAL

## 2020-08-25 VITALS
HEART RATE: 86 BPM | HEIGHT: 75 IN | BODY MASS INDEX: 35.43 KG/M2 | TEMPERATURE: 97.8 F | SYSTOLIC BLOOD PRESSURE: 138 MMHG | DIASTOLIC BLOOD PRESSURE: 86 MMHG | OXYGEN SATURATION: 96 % | WEIGHT: 285 LBS

## 2020-08-25 PROBLEM — Z98.61 CAD S/P PERCUTANEOUS CORONARY ANGIOPLASTY: Status: ACTIVE | Noted: 2020-08-25

## 2020-08-25 PROBLEM — I25.10 CAD S/P PERCUTANEOUS CORONARY ANGIOPLASTY: Status: ACTIVE | Noted: 2020-08-25

## 2020-08-25 LAB
ANION GAP SERPL CALCULATED.3IONS-SCNC: 11 MMOL/L (ref 3–16)
BUN BLDV-MCNC: 16 MG/DL (ref 7–20)
CALCIUM SERPL-MCNC: 9.1 MG/DL (ref 8.3–10.6)
CHLORIDE BLD-SCNC: 104 MMOL/L (ref 99–110)
CO2: 21 MMOL/L (ref 21–32)
CREAT SERPL-MCNC: 0.9 MG/DL (ref 0.8–1.3)
EKG ATRIAL RATE: 71 BPM
EKG DIAGNOSIS: NORMAL
EKG P AXIS: 35 DEGREES
EKG P-R INTERVAL: 176 MS
EKG Q-T INTERVAL: 418 MS
EKG QRS DURATION: 88 MS
EKG QTC CALCULATION (BAZETT): 454 MS
EKG R AXIS: 29 DEGREES
EKG T AXIS: 28 DEGREES
EKG VENTRICULAR RATE: 71 BPM
GFR AFRICAN AMERICAN: >60
GFR NON-AFRICAN AMERICAN: >60
GLUCOSE BLD-MCNC: 127 MG/DL (ref 70–99)
HCT VFR BLD CALC: 43.3 % (ref 40.5–52.5)
HEMOGLOBIN: 15 G/DL (ref 13.5–17.5)
LEFT VENTRICULAR EJECTION FRACTION MODE: NORMAL
LV EF: 60 %
MCH RBC QN AUTO: 32.5 PG (ref 26–34)
MCHC RBC AUTO-ENTMCNC: 34.6 G/DL (ref 31–36)
MCV RBC AUTO: 94 FL (ref 80–100)
PDW BLD-RTO: 13.2 % (ref 12.4–15.4)
PLATELET # BLD: 251 K/UL (ref 135–450)
PMV BLD AUTO: 10.4 FL (ref 5–10.5)
POC ACT LR: 270 SEC
POTASSIUM SERPL-SCNC: 3.9 MMOL/L (ref 3.5–5.1)
RBC # BLD: 4.61 M/UL (ref 4.2–5.9)
REASON FOR REJECTION: NORMAL
REJECTED TEST: NORMAL
SODIUM BLD-SCNC: 136 MMOL/L (ref 136–145)
TROPONIN: 0.1 NG/ML
TROPONIN: 0.2 NG/ML
WBC # BLD: 7.3 K/UL (ref 4–11)

## 2020-08-25 PROCEDURE — C1769 GUIDE WIRE: HCPCS

## 2020-08-25 PROCEDURE — 6370000000 HC RX 637 (ALT 250 FOR IP): Performed by: INTERNAL MEDICINE

## 2020-08-25 PROCEDURE — 99215 OFFICE O/P EST HI 40 MIN: CPT | Performed by: INTERNAL MEDICINE

## 2020-08-25 PROCEDURE — 93010 ELECTROCARDIOGRAM REPORT: CPT | Performed by: INTERNAL MEDICINE

## 2020-08-25 PROCEDURE — 84484 ASSAY OF TROPONIN QUANT: CPT

## 2020-08-25 PROCEDURE — 93000 ELECTROCARDIOGRAM COMPLETE: CPT | Performed by: INTERNAL MEDICINE

## 2020-08-25 PROCEDURE — 99153 MOD SED SAME PHYS/QHP EA: CPT

## 2020-08-25 PROCEDURE — 99152 MOD SED SAME PHYS/QHP 5/>YRS: CPT

## 2020-08-25 PROCEDURE — C1725 CATH, TRANSLUMIN NON-LASER: HCPCS

## 2020-08-25 PROCEDURE — C1874 STENT, COATED/COV W/DEL SYS: HCPCS

## 2020-08-25 PROCEDURE — 94760 N-INVAS EAR/PLS OXIMETRY 1: CPT

## 2020-08-25 PROCEDURE — 99220 PR INITIAL OBSERVATION CARE/DAY 70 MINUTES: CPT | Performed by: INTERNAL MEDICINE

## 2020-08-25 PROCEDURE — 92941 PRQ TRLML REVSC TOT OCCL AMI: CPT

## 2020-08-25 PROCEDURE — C1887 CATHETER, GUIDING: HCPCS

## 2020-08-25 PROCEDURE — 2500000003 HC RX 250 WO HCPCS

## 2020-08-25 PROCEDURE — 80048 BASIC METABOLIC PNL TOTAL CA: CPT

## 2020-08-25 PROCEDURE — 85027 COMPLETE CBC AUTOMATED: CPT

## 2020-08-25 PROCEDURE — C1894 INTRO/SHEATH, NON-LASER: HCPCS

## 2020-08-25 PROCEDURE — 6370000000 HC RX 637 (ALT 250 FOR IP)

## 2020-08-25 PROCEDURE — 2709999900 HC NON-CHARGEABLE SUPPLY

## 2020-08-25 PROCEDURE — 93458 L HRT ARTERY/VENTRICLE ANGIO: CPT | Performed by: INTERNAL MEDICINE

## 2020-08-25 PROCEDURE — 92928 PRQ TCAT PLMT NTRAC ST 1 LES: CPT | Performed by: INTERNAL MEDICINE

## 2020-08-25 PROCEDURE — 93458 L HRT ARTERY/VENTRICLE ANGIO: CPT

## 2020-08-25 PROCEDURE — 2580000003 HC RX 258

## 2020-08-25 PROCEDURE — 6360000004 HC RX CONTRAST MEDICATION: Performed by: INTERNAL MEDICINE

## 2020-08-25 PROCEDURE — 85347 COAGULATION TIME ACTIVATED: CPT

## 2020-08-25 PROCEDURE — 6360000002 HC RX W HCPCS

## 2020-08-25 PROCEDURE — 36415 COLL VENOUS BLD VENIPUNCTURE: CPT

## 2020-08-25 PROCEDURE — 93005 ELECTROCARDIOGRAM TRACING: CPT | Performed by: INTERNAL MEDICINE

## 2020-08-25 PROCEDURE — 1200000000 HC SEMI PRIVATE

## 2020-08-25 RX ORDER — SODIUM CHLORIDE 0.9 % (FLUSH) 0.9 %
10 SYRINGE (ML) INJECTION EVERY 12 HOURS SCHEDULED
Status: DISCONTINUED | OUTPATIENT
Start: 2020-08-25 | End: 2020-08-26 | Stop reason: HOSPADM

## 2020-08-25 RX ORDER — TAMSULOSIN HYDROCHLORIDE 0.4 MG/1
0.4 CAPSULE ORAL 2 TIMES DAILY
Status: DISCONTINUED | OUTPATIENT
Start: 2020-08-25 | End: 2020-08-26 | Stop reason: HOSPADM

## 2020-08-25 RX ORDER — GABAPENTIN 300 MG/1
600 CAPSULE ORAL NIGHTLY
Status: DISCONTINUED | OUTPATIENT
Start: 2020-08-25 | End: 2020-08-26 | Stop reason: HOSPADM

## 2020-08-25 RX ORDER — OXYBUTYNIN CHLORIDE 10 MG/1
10 TABLET, EXTENDED RELEASE ORAL NIGHTLY
Status: DISCONTINUED | OUTPATIENT
Start: 2020-08-25 | End: 2020-08-26 | Stop reason: HOSPADM

## 2020-08-25 RX ORDER — MORPHINE SULFATE 2 MG/ML
2 INJECTION, SOLUTION INTRAMUSCULAR; INTRAVENOUS
Status: ACTIVE | OUTPATIENT
Start: 2020-08-25 | End: 2020-08-25

## 2020-08-25 RX ORDER — ATROPINE SULFATE 0.4 MG/ML
0.5 AMPUL (ML) INJECTION
Status: ACTIVE | OUTPATIENT
Start: 2020-08-25 | End: 2020-08-25

## 2020-08-25 RX ORDER — LOSARTAN POTASSIUM 50 MG/1
50 TABLET ORAL DAILY
Status: DISCONTINUED | OUTPATIENT
Start: 2020-08-26 | End: 2020-08-26 | Stop reason: HOSPADM

## 2020-08-25 RX ORDER — METOPROLOL TARTRATE 50 MG/1
25 TABLET, FILM COATED ORAL 2 TIMES DAILY
COMMUNITY
End: 2021-09-29 | Stop reason: ALTCHOICE

## 2020-08-25 RX ORDER — FENTANYL CITRATE 50 UG/ML
25 INJECTION, SOLUTION INTRAMUSCULAR; INTRAVENOUS
Status: ACTIVE | OUTPATIENT
Start: 2020-08-25 | End: 2020-08-25

## 2020-08-25 RX ORDER — ASPIRIN 81 MG/1
81 TABLET, CHEWABLE ORAL DAILY
Status: DISCONTINUED | OUTPATIENT
Start: 2020-08-26 | End: 2020-08-26 | Stop reason: HOSPADM

## 2020-08-25 RX ORDER — PANTOPRAZOLE SODIUM 40 MG/1
40 TABLET, DELAYED RELEASE ORAL
Status: DISCONTINUED | OUTPATIENT
Start: 2020-08-26 | End: 2020-08-26 | Stop reason: HOSPADM

## 2020-08-25 RX ORDER — FINASTERIDE 5 MG/1
5 TABLET, FILM COATED ORAL DAILY
Status: DISCONTINUED | OUTPATIENT
Start: 2020-08-26 | End: 2020-08-26 | Stop reason: HOSPADM

## 2020-08-25 RX ORDER — MONTELUKAST SODIUM 10 MG/1
10 TABLET ORAL NIGHTLY
Status: DISCONTINUED | OUTPATIENT
Start: 2020-08-25 | End: 2020-08-26 | Stop reason: HOSPADM

## 2020-08-25 RX ORDER — HYDROCODONE BITARTRATE AND ACETAMINOPHEN 5; 325 MG/1; MG/1
1 TABLET ORAL EVERY 6 HOURS PRN
Status: DISCONTINUED | OUTPATIENT
Start: 2020-08-25 | End: 2020-08-26 | Stop reason: HOSPADM

## 2020-08-25 RX ORDER — ATORVASTATIN CALCIUM 40 MG/1
40 TABLET, FILM COATED ORAL NIGHTLY
Status: DISCONTINUED | OUTPATIENT
Start: 2020-08-25 | End: 2020-08-26 | Stop reason: HOSPADM

## 2020-08-25 RX ORDER — DULOXETIN HYDROCHLORIDE 60 MG/1
60 CAPSULE, DELAYED RELEASE ORAL DAILY
Status: DISCONTINUED | OUTPATIENT
Start: 2020-08-26 | End: 2020-08-26 | Stop reason: HOSPADM

## 2020-08-25 RX ORDER — SODIUM CHLORIDE 0.9 % (FLUSH) 0.9 %
10 SYRINGE (ML) INJECTION PRN
Status: DISCONTINUED | OUTPATIENT
Start: 2020-08-25 | End: 2020-08-26 | Stop reason: HOSPADM

## 2020-08-25 RX ORDER — SODIUM CHLORIDE 9 MG/ML
INJECTION, SOLUTION INTRAVENOUS CONTINUOUS
Status: DISCONTINUED | OUTPATIENT
Start: 2020-08-25 | End: 2020-08-26 | Stop reason: HOSPADM

## 2020-08-25 RX ORDER — ONDANSETRON 2 MG/ML
4 INJECTION INTRAMUSCULAR; INTRAVENOUS EVERY 6 HOURS PRN
Status: DISCONTINUED | OUTPATIENT
Start: 2020-08-25 | End: 2020-08-26 | Stop reason: HOSPADM

## 2020-08-25 RX ORDER — METOPROLOL TARTRATE 50 MG/1
50 TABLET, FILM COATED ORAL 2 TIMES DAILY
Status: DISCONTINUED | OUTPATIENT
Start: 2020-08-25 | End: 2020-08-26 | Stop reason: HOSPADM

## 2020-08-25 RX ORDER — NITROGLYCERIN 0.4 MG/1
0.4 TABLET SUBLINGUAL EVERY 5 MIN PRN
COMMUNITY
End: 2022-01-03 | Stop reason: SDUPTHER

## 2020-08-25 RX ORDER — ACETAMINOPHEN 325 MG/1
650 TABLET ORAL EVERY 4 HOURS PRN
Status: DISCONTINUED | OUTPATIENT
Start: 2020-08-25 | End: 2020-08-26 | Stop reason: HOSPADM

## 2020-08-25 RX ORDER — MIDAZOLAM HYDROCHLORIDE 1 MG/ML
2 INJECTION INTRAMUSCULAR; INTRAVENOUS
Status: ACTIVE | OUTPATIENT
Start: 2020-08-25 | End: 2020-08-25

## 2020-08-25 RX ORDER — CETIRIZINE HYDROCHLORIDE 10 MG/1
10 TABLET ORAL DAILY
Status: DISCONTINUED | OUTPATIENT
Start: 2020-08-26 | End: 2020-08-26 | Stop reason: HOSPADM

## 2020-08-25 RX ORDER — FOLIC ACID 1 MG/1
1 TABLET ORAL DAILY
Status: DISCONTINUED | OUTPATIENT
Start: 2020-08-26 | End: 2020-08-26 | Stop reason: HOSPADM

## 2020-08-25 RX ORDER — 0.9 % SODIUM CHLORIDE 0.9 %
500 INTRAVENOUS SOLUTION INTRAVENOUS PRN
Status: DISCONTINUED | OUTPATIENT
Start: 2020-08-25 | End: 2020-08-26 | Stop reason: HOSPADM

## 2020-08-25 RX ORDER — AMITRIPTYLINE HYDROCHLORIDE 50 MG/1
100 TABLET, FILM COATED ORAL NIGHTLY
Status: DISCONTINUED | OUTPATIENT
Start: 2020-08-25 | End: 2020-08-26 | Stop reason: HOSPADM

## 2020-08-25 RX ADMIN — TAMSULOSIN HYDROCHLORIDE 0.4 MG: 0.4 CAPSULE ORAL at 20:49

## 2020-08-25 RX ADMIN — OXYBUTYNIN CHLORIDE 10 MG: 10 TABLET, EXTENDED RELEASE ORAL at 20:50

## 2020-08-25 RX ADMIN — MONTELUKAST 10 MG: 10 TABLET, FILM COATED ORAL at 20:49

## 2020-08-25 RX ADMIN — TICAGRELOR 90 MG: 90 TABLET ORAL at 20:49

## 2020-08-25 RX ADMIN — ATORVASTATIN CALCIUM 40 MG: 40 TABLET, FILM COATED ORAL at 20:49

## 2020-08-25 RX ADMIN — IOPAMIDOL 80 ML: 755 INJECTION, SOLUTION INTRAVENOUS at 14:18

## 2020-08-25 RX ADMIN — GABAPENTIN 600 MG: 300 CAPSULE ORAL at 20:49

## 2020-08-25 RX ADMIN — AMITRIPTYLINE HYDROCHLORIDE 100 MG: 50 TABLET, FILM COATED ORAL at 20:49

## 2020-08-25 ASSESSMENT — PAIN DESCRIPTION - ORIENTATION
ORIENTATION: LEFT
ORIENTATION: LEFT

## 2020-08-25 ASSESSMENT — PAIN SCALES - GENERAL
PAINLEVEL_OUTOF10: 0
PAINLEVEL_OUTOF10: 3
PAINLEVEL_OUTOF10: 2

## 2020-08-25 ASSESSMENT — PAIN DESCRIPTION - LOCATION
LOCATION: CHEST
LOCATION: CHEST

## 2020-08-25 ASSESSMENT — PAIN DESCRIPTION - PAIN TYPE
TYPE: ACUTE PAIN
TYPE: ACUTE PAIN

## 2020-08-25 NOTE — PROCEDURES
[address] [place] [address]                            CARDIAC CATHETERIZATION    PATIENT NAME: Audrey Weber                    :        1955  MED REC NO:   [id number]                          ROOM:       1556  ACCOUNT NO:   [de-identified]                           ADMIT DATE: 2020  PROVIDER:     Dennis Calvillo MD    DATE OF PROCEDURE:  2020    REFERRING PROVIDERS:  Dr. Yoselin Joseph and Dr. Felicia Godinez. BRIEF HISTORY:  The patient is a 56-year-old gentleman who has been  having chest pain occurring with exertion as well as with rest.  He  described it as a pressure across the chest.  It has been getting more  frequent. PROCEDURES:  1. Left heart catheterization, coronary angiogram performed through the  right radial approach. 2.  PCI and stenting of the dominant right coronary artery. DIAGNOSTIC ANGIOGRAM:  Catheters used are JL3.5, JR4, and a pigtail  catheter. HEMODYNAMICS:  Aortic pressure was 100/60. Left ventricular pressure  was 100/20 mmHg. There was no gradient across the aortic valve. LV angiogram performed in the SEARS 30 degrees projection shows normal  left ventricular size and systolic function with ejection fraction of  60%. CORONARY ANGIOGRAM:  1. There is single-vessel disease. 2.  Dominant RCA, 99% with LEYDI grade 1 flow. 3.  Left main:  Free of disease. 4. LAD has a 10-20% proximal lesion. 5.  Left circumflex:  No obstructive disease. CONCLUSION:  1. Dominant RCA has a 99% proximal stenosis with LEYDI grade 1 flow. 2.  Elevated left heart filling pressures. 3.  Normal left ventricular size and systolic function with ejection  fraction of 60%. INTERVENTIONAL PROCEDURE:  Catheters used are Data ExpeditionRC guide, Runthrough  wire, a 3.5 balloon, and a 4.0 x 15 mm JUAN stent. TECHNICAL COMMENTS:  The guide engaged the ostium well and provided good  support.   The lesion was crossed without much difficult. After  predilatation, the stent was deployed to 14 atmospheres which  corresponds to a vessel size of approximately 4.2. Followup angiography  shows 0% residual.    CONCLUSION:  Successful PCI and stenting of the proximal RCA, lesion  reduced to 0%.     EBL : < 20 cc    JENNIFER YU MD    D: 08/25/2020 14:54:35       T: 08/25/2020 15:47:22     [email or web address]  Job#: [id number]     Doc#: [id number]    CC:

## 2020-08-25 NOTE — PLAN OF CARE
Problem: Pain:  Goal: Pain level will decrease  Description: Pain level will decrease  Outcome: Ongoing     Problem: Falls - Risk of:  Goal: Will remain free from falls  Description: Will remain free from falls  Outcome: Ongoing  Goal: Absence of physical injury  Description: Absence of physical injury  Outcome: Ongoing     Problem: SAFETY  Goal: Free from accidental physical injury  Outcome: Ongoing     Problem: DAILY CARE  Goal: Daily care needs are met  Outcome: Ongoing     Problem: PAIN  Goal: Patient's pain/discomfort is manageable  Outcome: Ongoing     Problem: KNOWLEDGE DEFICIT  Goal: Patient/S.O. demonstrates understanding of disease process, treatment plan, medications, and discharge instructions.   Outcome: Ongoing     Problem: DISCHARGE BARRIERS  Goal: Patient's continuum of care needs are met  Outcome: Ongoing

## 2020-08-25 NOTE — PROGRESS NOTES
@1410 did receive from cath lab via cart. Did move himself over to the bed. Does have TR band with 11cc air to the right radial. No bleeding or hematoma present. Able to move extremity with no problems. Fingers warm and dry. Patient is alert and oriented. Sinus per the monitor.

## 2020-08-25 NOTE — PRE SEDATION
Brief Pre-Op Note/Sedation Assessment      Alexandre Osborne  1955  Room/bed info not found      [id number]  1:27 PM    Planned Procedure: Cardiac Catheterization Procedure    Post Procedure Plan: Return to same level of care    Consent: I have discussed with the patient and/or the patient representative the indication, alternatives, and the possible risks and/or complications of the planned procedure and the anesthesia methods. The patient and/or patient representative appear to understand and agree to proceed. Chief Complaint: Chest Pain/Pressure      Indications for Cath Procedure:  ACS > 24 hrs  Anginal Classification within 2 weeks:  CCS III - Symptoms with everyday living activities, i.e., moderate limitation  NYHA Heart Failure Class within 2 weeks: No symptoms  Is Cath Lab Visit Valve-related?: No  Surgical Risk: Low  Functional Type: >= 4 METS without symptoms    Anti- Anginal Meds within 2 weeks:   Yes: Beta Blockers, Aspirin and Statin (Any)    Stress or Imaging Studies Performed (within 6 months):  Stress Test with SPECT Result: Negative Risk/Extent of Ischemia:  Low      Vital Signs:  BP (!) 145/103   Pulse 87   Temp 98 °F (36.7 °C) (Oral)   Resp 16   Ht 6' 3\" (1.905 m)   Wt 286 lb (129.7 kg)   SpO2 98%   BMI 35.75 kg/m²     Allergies:   Allergies   Allergen Reactions    Pcn [Penicillins] Shortness Of Breath    Penicillin G Sodium Shortness Of Breath    Cephalosporins Itching    Ciprofloxacin Itching    Codeine Itching    Erythromycin Itching    Morphine And Related Itching    Fenoprofen Calcium Other (See Comments)     Pt doesn`t recall       Past Medical History:  Past Medical History:   Diagnosis Date    Arthritis     Asthma     BPH (benign prostatic hyperplasia)     Cardiomyopathy (HCC)     GERD (gastroesophageal reflux disease)     HTN (hypertension) 10/29/2012    MVP (mitral valve prolapse)     PPD positive     Prolonged emergence from general anesthesia     (PRILOSEC) 20 MG delayed release capsule Take 20 mg by mouth      Loratadine 10 MG CAPS Take by mouth daily        No current facility-administered medications for this encounter. Pre-Sedation:    Pre-Sedation Documentation and Exam:  I have personally completed a history, physical exam & review of systems for this patient (see notes). Prior History of Anesthesia Complications:   none    Modified Mallampati:  I (soft palate, uvula, fauces, tonsillar pillars visible)    ASA Classification:  Class 2 - A normal healthy patient with mild systemic disease      Augustus Scale: Activity:  2 - Able to move 4 extremities voluntarily on command  Respiration:  2 - Able to breathe deeply and cough freely  Circulation:  2 - BP+/- 20mmHg of normal  Consciousness:  2 - Fully awake  Oxygen Saturation (color):  2 - Able to maintain oxygen saturation >92% on room air    Sedation/Anesthesia Plan:  Guard the patient's safety and welfare. Minimize physical discomfort and pain. Minimize negative psychological responses to treatment by providing sedation and analgesia and maximize the potential amnesia. Patient to meet pre-procedure discharge plan.     Medication Planned:  fentanyl intravenously and morphine intravenously    Patient is an appropriate candidate for plan of sedation: yes      Electronically signed by Landon Wick MD on 8/25/2020 at 1:27 PM

## 2020-08-25 NOTE — H&P
[facility]  Admission H & P    CC: Chest pain               Martin Monroy MD:           HPI:   This is a 62 y.o. male with history of smoking and hypertension who has been having chest pain for quite some time for the last 2 weeks or so is getting much worse. It occurs with exertion as well as at rest.  About 10 months ago he had a stress nuclear scan for a similar complaint which was negative both the stress part and the nuclear part was negative for ischemia. Lately has been having more frequently with rest as well as exertion he also gets it when he is laying down    Past Medical History:   Diagnosis Date    Arthritis     Asthma     BPH (benign prostatic hyperplasia)     Cardiomyopathy ([address]     GERD (gastroesophageal reflux disease)     HTN (hypertension) 10/29/2012    MVP (mitral valve prolapse)     PPD positive     Prolonged emergence from general anesthesia     Prostatism     Tricuspid valve prolapse     Unspecified sleep apnea         Past Surgical History:   Procedure Laterality Date    CARDIAC CATHETERIZATION      CHOLECYSTECTOMY      FOOT SURGERY      HAND SURGERY Right 2017    thumb mass excision    INGUINAL HERNIA REPAIR      bilateral    KNEE ARTHROSCOPY      OTHER SURGICAL HISTORY  2019    epidural Dr. Yadi Starr at [address]      right elbow    TONSILLECTOMY           Family History   Problem Relation Age of Onset    Rheum Arthritis Other     Heart Disease Father     Heart Disease Brother     Other Brother         histoplasmosis     Lupus Neg Hx         Social History     Tobacco Use    Smoking status: Former Smoker     Types: Cigarettes     Last attempt to quit: 1982     Years since quittin.5    Smokeless tobacco: Never Used   Substance Use Topics    Alcohol use:  Yes     Alcohol/week: 12.0 standard drinks     Types: 12 Cans of beer per week     Comment: wine & shots rare    Drug use: No        Allergies   Allergen Reactions    Pcn [Penicillins] Shortness Of Breath    Penicillin G Sodium Shortness Of Breath    Cephalosporins Itching    Ciprofloxacin Itching    Codeine Itching    Erythromycin Itching    Morphine And Related Itching    Fenoprofen Calcium Other (See Comments)     Pt doesn`t recall             Review of Systems -   Constitutional: Negative for weight gain/loss; malaise, fever  Respiratory: Negative for Asthma;  cough and hemoptysis  Cardiovascular: Negative for palpitations,dizziness   Gastrointestinal: Negative for abd.pain; constipation/diarrhea;    Genitourinary: Negative for stones; hematuria; frequency hesitancy  Integumentt: Negative for rash or pruritis  Hematologic/lymphatic: Negative for blood dyscrasia; leukemia/lymphoma  Musculoskeletal: Negative for Connective tissue disease  Neurological:  Negative for Seizure   Behavioral/Psych:Negative for Bipolar disorder, Schizophrenia; Dementia  Endocrine: negative for thyroid, parathyroid disease    No intake or output data in the 24 hours ending 08/25/20 1323     Physical Examination:    BP (!) 145/103   Pulse 87   Temp 98 °F (36.7 °C) (Oral)   Resp 16   Ht 6' 3\" (1.905 m)   Wt 286 lb (129.7 kg)   SpO2 98%   BMI 35.75 kg/m²    Vitals:    08/25/20 1225   BP: (!) 145/103   Pulse: 87   Resp: 16   Temp: 98 °F (36.7 °C)   TempSrc: Oral   SpO2: 98%   Weight: 286 lb (129.7 kg)   Height: 6' 3\" (1.905 m)     Wt Readings from Last 3 Encounters:   08/25/20 286 lb (129.7 kg)   08/25/20 285 lb (129.3 kg)   07/22/20 285 lb (129.3 kg)      BP Readings from Last 3 Encounters:   08/25/20 (!) 145/103   08/25/20 138/86   07/22/20 104/80         HEENT:  Face: Atraumatic, Conjunctiva: Pink; non icteric,  Mucous Memb:  Moist, No thyromegaly or Lymphadenopathy  Respiratory:  Resp Assessment: normal, Resp Auscultation: clear   Cardiovascular: Auscultation: nl S1 & S2, Palpation:  Nl PMI;  No heaves or thrills, JVP:  normal  Abdomen: Soft, non-tender, Normal bowel sounds,  No organomegaly  Extremities: No Cyanosis or Clubbing  Neurological: Oriented to time, place, and person, Non-anxious  Psychiatric: Normal mood and affect  Skin: Warm and dry,  No rash seen    No current facility-administered medications for this encounter. Labs:   Recent Labs     08/25/20  1145   WBC 7.3   HGB 15.0   HCT 43.3        Recent Labs     08/25/20  1235      K 3.9   CO2 21   BUN 16   CREATININE 0.9   LABRCNT(BNP:2)@  No results for input(s): PROTIME, INR in the last 72 hours. No results for input(s): APTT in the last 72 hours. No results for input(s): CKTOTAL, CKMB, CKMBINDEX, TROPONINI in the last 72 hours. Lab Results   Component Value Date    HDL 40 09/21/2016    HDL 41 03/06/2012    LDLCALC 100 09/21/2016    TRIG 136 09/21/2016     No results for input(s): AST, ALT, LABALBU in the last 72 hours.     EKG:   Sinus rhythm with PVCs           Stress Nuclear:  Negative treadmill part of the test.  Exercised for 4-1/2 minutes without ischemic ST changes  Nuclear scan negative for ischemia  EF 72%        ASSESSMENT AND PLAN:        Atypical chest pain in my opinion  However since has become more frequent I think is reasonable to rule out  By performing coronary geography  Brother had stent put in; he has hypertension and a history of smoking         Monica Small M.D  8/25/2020

## 2020-08-26 VITALS
OXYGEN SATURATION: 100 % | HEART RATE: 67 BPM | BODY MASS INDEX: 35.55 KG/M2 | DIASTOLIC BLOOD PRESSURE: 74 MMHG | RESPIRATION RATE: 16 BRPM | HEIGHT: 75 IN | TEMPERATURE: 98.9 F | SYSTOLIC BLOOD PRESSURE: 120 MMHG | WEIGHT: 285.94 LBS

## 2020-08-26 LAB
ANION GAP SERPL CALCULATED.3IONS-SCNC: 10 MMOL/L (ref 3–16)
BUN BLDV-MCNC: 17 MG/DL (ref 7–20)
CALCIUM SERPL-MCNC: 8.7 MG/DL (ref 8.3–10.6)
CHLORIDE BLD-SCNC: 104 MMOL/L (ref 99–110)
CO2: 22 MMOL/L (ref 21–32)
CREAT SERPL-MCNC: 1 MG/DL (ref 0.8–1.3)
EKG ATRIAL RATE: 94 BPM
EKG DIAGNOSIS: NORMAL
EKG P AXIS: 53 DEGREES
EKG P-R INTERVAL: 180 MS
EKG Q-T INTERVAL: 380 MS
EKG QRS DURATION: 100 MS
EKG QTC CALCULATION (BAZETT): 475 MS
EKG R AXIS: 36 DEGREES
EKG T AXIS: 6 DEGREES
EKG VENTRICULAR RATE: 94 BPM
GFR AFRICAN AMERICAN: >60
GFR NON-AFRICAN AMERICAN: >60
GLUCOSE BLD-MCNC: 133 MG/DL (ref 70–99)
HCT VFR BLD CALC: 39 % (ref 40.5–52.5)
HEMOGLOBIN: 13.2 G/DL (ref 13.5–17.5)
MCH RBC QN AUTO: 32.2 PG (ref 26–34)
MCHC RBC AUTO-ENTMCNC: 33.8 G/DL (ref 31–36)
MCV RBC AUTO: 95.1 FL (ref 80–100)
PDW BLD-RTO: 13.1 % (ref 12.4–15.4)
PLATELET # BLD: 184 K/UL (ref 135–450)
PMV BLD AUTO: 9.6 FL (ref 5–10.5)
POTASSIUM SERPL-SCNC: 4.1 MMOL/L (ref 3.5–5.1)
RBC # BLD: 4.1 M/UL (ref 4.2–5.9)
SODIUM BLD-SCNC: 136 MMOL/L (ref 136–145)
WBC # BLD: 7.4 K/UL (ref 4–11)

## 2020-08-26 PROCEDURE — 80048 BASIC METABOLIC PNL TOTAL CA: CPT

## 2020-08-26 PROCEDURE — 93010 ELECTROCARDIOGRAM REPORT: CPT | Performed by: INTERNAL MEDICINE

## 2020-08-26 PROCEDURE — 93005 ELECTROCARDIOGRAM TRACING: CPT | Performed by: INTERNAL MEDICINE

## 2020-08-26 PROCEDURE — 85027 COMPLETE CBC AUTOMATED: CPT

## 2020-08-26 PROCEDURE — 94760 N-INVAS EAR/PLS OXIMETRY 1: CPT

## 2020-08-26 PROCEDURE — 99239 HOSP IP/OBS DSCHRG MGMT >30: CPT | Performed by: INTERNAL MEDICINE

## 2020-08-26 PROCEDURE — 6370000000 HC RX 637 (ALT 250 FOR IP): Performed by: INTERNAL MEDICINE

## 2020-08-26 PROCEDURE — 2580000003 HC RX 258: Performed by: INTERNAL MEDICINE

## 2020-08-26 PROCEDURE — 36415 COLL VENOUS BLD VENIPUNCTURE: CPT

## 2020-08-26 RX ORDER — ASPIRIN 81 MG/1
81 TABLET, CHEWABLE ORAL DAILY
Qty: 30 TABLET | Refills: 3 | Status: SHIPPED
Start: 2020-08-27 | End: 2020-12-07

## 2020-08-26 RX ORDER — ATORVASTATIN CALCIUM 40 MG/1
40 TABLET, FILM COATED ORAL NIGHTLY
Qty: 30 TABLET | Refills: 3 | Status: SHIPPED | OUTPATIENT
Start: 2020-08-26 | End: 2020-12-28

## 2020-08-26 RX ADMIN — DULOXETINE HYDROCHLORIDE 60 MG: 60 CAPSULE, DELAYED RELEASE ORAL at 09:26

## 2020-08-26 RX ADMIN — PANTOPRAZOLE SODIUM 40 MG: 40 TABLET, DELAYED RELEASE ORAL at 06:22

## 2020-08-26 RX ADMIN — FOLIC ACID 1 MG: 1 TABLET ORAL at 09:26

## 2020-08-26 RX ADMIN — LOSARTAN POTASSIUM 50 MG: 50 TABLET, FILM COATED ORAL at 09:26

## 2020-08-26 RX ADMIN — FINASTERIDE 5 MG: 5 TABLET, FILM COATED ORAL at 09:26

## 2020-08-26 RX ADMIN — TICAGRELOR 90 MG: 90 TABLET ORAL at 09:26

## 2020-08-26 RX ADMIN — CETIRIZINE HYDROCHLORIDE 10 MG: 10 TABLET, FILM COATED ORAL at 09:26

## 2020-08-26 RX ADMIN — METOPROLOL TARTRATE 50 MG: 50 TABLET, FILM COATED ORAL at 09:26

## 2020-08-26 RX ADMIN — Medication 10 ML: at 09:34

## 2020-08-26 RX ADMIN — ASPIRIN 81 MG: 81 TABLET, CHEWABLE ORAL at 09:26

## 2020-08-26 RX ADMIN — TAMSULOSIN HYDROCHLORIDE 0.4 MG: 0.4 CAPSULE ORAL at 09:26

## 2020-08-26 ASSESSMENT — PAIN SCALES - GENERAL
PAINLEVEL_OUTOF10: 0

## 2020-08-26 NOTE — PROGRESS NOTES
Discharge instructions reviewed with patient and family member. Patient and family verbalized understanding. All home medications have been reviewed, questions answered and patient voiced understanding. All medication side effects reviewed and patient and family verbalized understanding. Patient given prescriptions, discharge instructions, and appointment times. Patient discharged to home with family via private car.  Eran Correa did take to front of the hospital for discharge with wife,

## 2020-08-26 NOTE — DISCHARGE SUMMARY
Component Value Date    CREATININE 1.0 08/26/2020    BUN 17 08/26/2020     08/26/2020    K 4.1 08/26/2020     08/26/2020    CO2 22 08/26/2020      Lab Results   Component Value Date    WBC 7.4 08/26/2020    HGB 13.2 (L) 08/26/2020    HCT 39.0 (L) 08/26/2020    MCV 95.1 08/26/2020     08/26/2020    No results found for: INR, PROTIME No results found for: BNP    Disposition: home    Patient Instructions:    Jamie Bowen   Home Medication Instructions FBX:[id number]    Printed on:08/26/20 1049   Medication Information                      amitriptyline (ELAVIL) 100 MG tablet  Take 100 mg by mouth nightly. aspirin 81 MG chewable tablet  Take 1 tablet by mouth daily             atorvastatin (LIPITOR) 40 MG tablet  Take 1 tablet by mouth nightly             DULoxetine (CYMBALTA) 60 MG extended release capsule  Take 60 mg by mouth daily              finasteride (PROSCAR) 5 MG tablet  Take 5 mg by mouth daily             folic acid (FOLVITE) 1 MG tablet  TAKE ONE TABLET BY MOUTH DAILY             gabapentin (NEURONTIN) 600 MG tablet  Take 600 mg by mouth nightly 2 at bed time             HYDROcodone-acetaminophen (NORCO) 5-325 MG per tablet  Take 1 tablet by mouth every 6 hours as needed for Pain             Loratadine 10 MG CAPS  Take by mouth daily              losartan (COZAAR) 50 MG tablet  Take 50 mg by mouth daily             methotrexate (RHEUMATREX) 2.5 MG chemo tablet  TAKE EIGHT TABLETS BY MOUTH ONCE WEEKLY             metoprolol tartrate (LOPRESSOR) 50 MG tablet  Take 50 mg by mouth 2 times daily             montelukast (SINGULAIR) 10 MG tablet  Take 10 mg by mouth nightly. nitroGLYCERIN (NITROSTAT) 0.4 MG SL tablet  Place 0.4 mg under the tongue every 5 minutes as needed for Chest pain up to max of 3 total doses. If no relief after 1 dose, call 911.              omeprazole (PRILOSEC) 20 MG delayed release capsule  Take 20 mg by mouth             oxybutynin (DITROPAN-XL) 10 MG extended release tablet  Take 10 mg by mouth daily             tamsulosin (FLOMAX) 0.4 MG capsule  Take 0.4 mg by mouth 2 times daily              ticagrelor (BRILINTA) 90 MG TABS tablet  Take 1 tablet by mouth 2 times daily             Umeclidinium-Vilanterol 62.5-25 MCG/INH AEPB  Inhale into the lungs as needed                  Follow-up with [place] [id number] in 2 weeks. Patient has been advised to take his medications regularly and increase his activity gradually. Total time spent in discharging the patient was 35 minutes.     Signed:  Krystle Jalloh MD    The [address], [address] [place] [address]  Phone: [phone]  Fax: [phone]

## 2020-08-26 NOTE — PLAN OF CARE
Problem: Pain:  Goal: Pain level will decrease  Description: Pain level will decrease  Outcome: Ongoing     Problem: Falls - Risk of:  Goal: Will remain free from falls  Description: Will remain free from falls  8/26/2020 1025 by Demetria Eugene RN  Outcome: Ongoing  8/26/2020 0512 by Nette Gilmore RN  Outcome: Ongoing  Goal: Absence of physical injury  Description: Absence of physical injury  Outcome: Ongoing     Problem: SAFETY  Goal: Free from accidental physical injury  8/26/2020 0512 by Nette Gilmore RN  Outcome: Ongoing     Problem: DAILY CARE  Goal: Daily care needs are met  8/26/2020 4068 by Nette Gilmore RN  Outcome: Ongoing     Problem: SKIN INTEGRITY  Goal: Skin integrity is maintained or improved  8/26/2020 0512 by Nette Gilmore RN  Outcome: Ongoing     Problem: DISCHARGE BARRIERS  Goal: Patient's continuum of care needs are met  8/26/2020 3325 by Nette Gilmore RN  Outcome: Ongoing

## 2020-08-26 NOTE — PLAN OF CARE
Problem: Pain:  Goal: Pain level will decrease  Description: Pain level will decrease  8/25/2020 1521 by Geri Murcia RN  Outcome: Ongoing     Problem: Falls - Risk of:  Goal: Will remain free from falls  Description: Will remain free from falls  8/26/2020 0512 by Alesha Booker RN  Outcome: Ongoing  8/25/2020 1521 by Geri Murcia RN  Outcome: Ongoing  Goal: Absence of physical injury  Description: Absence of physical injury  8/25/2020 1521 by Geri Murcia RN  Outcome: Ongoing     Problem: SAFETY  Goal: Free from accidental physical injury  8/26/2020 0512 by Alesha Booker RN  Outcome: Ongoing  8/25/2020 1521 by Geri Murcia RN  Outcome: Ongoing     Problem: DAILY CARE  Goal: Daily care needs are met  8/26/2020 4749 by Alesha Booker RN  Outcome: Ongoing  8/25/2020 1521 by Geri Murcia RN  Outcome: Ongoing     Problem: PAIN  Goal: Patient's pain/discomfort is manageable  8/25/2020 1521 by Geri Murcia RN  Outcome: Ongoing     Problem: SKIN INTEGRITY  Goal: Skin integrity is maintained or improved  Outcome: Ongoing     Problem: KNOWLEDGE DEFICIT  Goal: Patient/S.O. demonstrates understanding of disease process, treatment plan, medications, and discharge instructions.   8/25/2020 1521 by Geri Murcia RN  Outcome: Ongoing     Problem: DISCHARGE BARRIERS  Goal: Patient's continuum of care needs are met  8/26/2020 1145 by Alesha Booker RN  Outcome: Ongoing  8/25/2020 1521 by Geri Murcia RN  Outcome: Ongoing

## 2020-08-26 NOTE — PROGRESS NOTES
Call placed to Dr Deirdre Nicolas regarding Metoprolol dose; this would be a first time dose for this patient as it was just prescribed and patient has not had prescription filled as yet. Per Dr Isha Aviles hold Metoprolol dose tonight.

## 2020-08-26 NOTE — PROGRESS NOTES
CLINICAL PHARMACY NOTE: MEDS TO [address] Select Patient?: No  Total # of Prescriptions Filled: 1   The following medications were delivered to the patient:  · Brilinta  Total # of Interventions Completed: 0  Time Spent (min): 30    Additional Documentation:

## 2020-08-26 NOTE — PROGRESS NOTES
Salomón Higginbotham is a 62 y.o. male patient.     Current Facility-Administered Medications   Medication Dose Route Frequency Provider Last Rate Last Dose    0.9 % sodium chloride infusion   Intravenous Continuous Paul Moura MD 75 mL/hr at 08/25/20 1410 75 mL/hr at 08/25/20 1410    sodium chloride flush 0.9 % injection 10 mL  10 mL Intravenous 2 times per day Paul Moura MD        sodium chloride flush 0.9 % injection 10 mL  10 mL Intravenous PRN Paul Moura MD        acetaminophen (TYLENOL) tablet 650 mg  650 mg Oral Q4H PRN Paul Moura MD        0.9 % sodium chloride bolus  500 mL Intravenous PRN Paul Moura MD        [facility] injection 4 mg  4 mg Intravenous Q6H PRN Paul Moura MD        atorvastatin (LIPITOR) tablet 40 mg  40 mg Oral Nightly Paul Moura MD   40 mg at 08/25/20 2049    tirofiban (AGGRASTAT) IV bolus 3,242.5 mcg  25 mcg/kg Intravenous Once Paul Moura MD        ticagrelor [facility]) tablet 90 mg  90 mg Oral BID Paul Moura MD   90 mg at 08/25/20 2049    aspirin chewable tablet 81 mg  81 mg Oral Daily Paul Moura MD        amitriptyline (ELAVIL) tablet 100 mg  100 mg Oral Nightly Paul Moura MD   100 mg at 08/25/20 2049    DULoxetine (CYMBALTA) extended release capsule 60 mg  60 mg Oral Daily Paul Moura MD        finasteride (PROSCAR) tablet 5 mg  5 mg Oral Daily Paul Moura MD        folic acid (FOLVITE) tablet 1 mg  1 mg Oral Daily Paul Moura MD        gabapentin (NEURONTIN) capsule 600 mg  600 mg Oral Nightly Paul Moura MD   600 mg at 08/25/20 2049    HYDROcodone-acetaminophen (NORCO) 5-325 MG per tablet 1 tablet  1 tablet Oral Q6H PRN Paul Moura MD        cetirizine (ZYRTEC) tablet 10 mg  10 mg Oral Daily Paul Moura MD        losartan (COZAAR) tablet 50 mg  50 mg Oral Daily Paul Moura MD        metoprolol tartrate (LOPRESSOR) tablet 50 mg  50 mg Oral BID Paul Moura MD        montelukast (SINGULAIR) tablet 10 mg  10 mg Oral Nightly Paul Moura MD   10 mg at 08/25/20 2049    pantoprazole (PROTONIX) tablet 40 mg  40 mg Oral QAM AC Hunter Gomez MD   40 mg at 08/26/20 7604    oxybutynin (DITROPAN-XL) extended release tablet 10 mg  10 mg Oral Nightly Hunter Gomez MD   10 mg at 08/25/20 2050    tamsulosin (FLOMAX) capsule 0.4 mg  0.4 mg Oral BID Hunter Gomez MD   0.4 mg at 08/25/20 2049     Allergies   Allergen Reactions    Pcn [Penicillins] Shortness Of Breath    Penicillin G Sodium Shortness Of Breath    Cephalosporins Itching    Ciprofloxacin Itching    Codeine Itching    Erythromycin Itching    Morphine And Related Itching    Fenoprofen Calcium Other (See Comments)     Pt doesn`t recall     Active Problems:    CAD S/P percutaneous coronary angioplasty    Unstable angina (HCC)  Resolved Problems:    * No resolved hospital problems. *    Blood pressure (!) 99/49, pulse 102, temperature 98.4 °F (36.9 °C), temperature source Oral, resp. rate 12, height 6' 3\" (1.905 m), weight 285 lb 15 oz (129.7 kg), SpO2 97 %. Subjective Feels better. No further angina  Objective A&O,  VSS with PVCs  Assessment & Plan Per Dr. Kar Michael.  D/W patient    Ismael Robbins MD  8/26/2020

## 2020-08-26 NOTE — PROGRESS NOTES
1900- Report and bedside handoff from [facility]. Patient resting in bed. 1930- TR band air removed over 1900 hour will remove band at 2000.     2000- TR band removed, tegaderm applied. Will continue to monitor. Aggrastat turned off. Shift assessment completed. PM meds given. 0000- NS turned off.     0300- BMP ordered to obtain K+ level due to patient having frequent PVC's. CBC was only lab previously ordered. 0700- Report and bedside handoff with [facility]. Patient resting in bed.

## 2020-09-03 ENCOUNTER — TELEPHONE (OUTPATIENT)
Dept: CARDIOLOGY CLINIC | Age: 65
End: 2020-09-03

## 2020-09-03 RX ORDER — BUSPIRONE HYDROCHLORIDE 7.5 MG/1
7.5 TABLET ORAL
COMMUNITY

## 2020-09-03 NOTE — TELEPHONE ENCOUNTER
Pt called for  Marice Kehr who had stents put in last week. She wants to know if he can take his medicine that was prescribed by his pcp  Dr Barb Kelly. I did not see the med on his current or prev med list. It is for his anxiety. Buspirone HCL 7.5mg.     Please call wife Katherine at  [phone]

## 2020-09-08 NOTE — PROGRESS NOTES
[facility]  Cardiology Progress Note    Carmelina Harkins  [birth year]    September 8, 2020      CC: \"I have had a few episodes of chest pain. \"     HPI:  The patient is 62 y.o. male with a past medical history significant for hypertension, hyperlipidemia, VICKIE, and pre diabetes. Last office visit 10/2019 he had reported that he had been very tired in the mornings. Says he had an episode the other day, lasting 1 hour. Felt chest pain with a cramping sensation felt in his tongue and head. Says he felt lightheaded, but didn't pass out. Daughter showed up where he was and said he didn't look good. He was able to drive home with no issues. Denies any swelling in legs/feet. Says he gets short of breath with exertion; this has not worsened. Doesn't use a Cpap machine since his dog chewed up the hoses several years ago. Reports compliance with medication. 8/25/20 he presented semi-urgently accompanied by his wife with complaints of sharp chest pain radiating across chest, \"funny jaw pain\", SOB at rest, worsening chronic VALVERDE, diaphoresis, headache off/on, neck, jaws off/on, nausea off/on and worsening chronic fatigue. He has a long standing history of fatigue and left sided chest pain but has had an increase in intensity and frequency for the last 1 week. He states his symptoms worsen with laying down, wakes him up out of his sleep and with minimal exertion. Sitting up improves symptoms slightly. Constant with varying intensity. He does not weigh himself daily, no diuretic therapy onboard. Per patient's report, weight down 11# in one week at Dr. Kathia Nelson office but he is feeling worse. Family history father with unknown heart disease and brother in his 63's with normal EKG's and cardiac workup for chest pain and was found eventually to be 90% LAD stenosis.  Last night he work from symptoms around 1230 a.m., took 1nitro-sl, resolve symptoms and slept great the rest of the night, he did wake at 0300 and felt cold. When he woke this morning and started moving about the house, symptoms returned and states \"they were awakened. \" He again reports constant with exertion varying intensity. No nausea or metallic taste in mouth. He was also started on metoprolol yesterday per Dr. Andreia Ching but has not started. Today, he returns in follow up s/p LHC with PCI RCA. heh is accompanied by his wife. He reports that he has no further angina symptoms. He does have left upper chest, near the shoulder pain with once radiating thru to his shoulder blade. He did take SL nitro with relief. This is not similar to his prior angina. He also notes off/on \"trouble breathing. \" He has been compliant with medical therapy and feels he is tolerating. He denies any abnormal bruising or bleeding on DAPT. Patient denies exertional chest pain/pressure, dyspnea at rest, VALVERDE, PND, orthopnea, palpitations, lightheadedness, weight changes, changes in LE edema, and syncope.     Past Medical History:   Diagnosis Date    Arthritis     Asthma     BPH (benign prostatic hyperplasia)     Cardiomyopathy ([address]     GERD (gastroesophageal reflux disease)     HTN (hypertension) 10/29/2012    MVP (mitral valve prolapse)     PPD positive     Prolonged emergence from general anesthesia     Prostatism     Tricuspid valve prolapse     Unspecified sleep apnea      Past Surgical History:   Procedure Laterality Date    CARDIAC CATHETERIZATION      CHOLECYSTECTOMY      FOOT SURGERY      HAND SURGERY Right 11/09/2017    thumb mass excision    INGUINAL HERNIA REPAIR      bilateral    KNEE ARTHROSCOPY      OTHER SURGICAL HISTORY  08/28/2019    epidural Dr[address] Terry Lott at [address]      right elbow    TONSILLECTOMY       Family History   Problem Relation Age of Onset    Rheum Arthritis Other     Heart Disease Father     Heart Disease Brother     Other Brother         histoplasmosis     Lupus Neg Hx      Social History     Tobacco Use    Smoking status: Former Smoker     Types: Cigarettes     Last attempt to quit: 1982     Years since quittin.6    Smokeless tobacco: Never Used   Substance Use Topics    Alcohol use: Yes     Alcohol/week: 12.0 standard drinks     Types: 12 Cans of beer per week     Comment: wine & shots rare    Drug use: No       Allergies   Allergen Reactions    Pcn [Penicillins] Shortness Of Breath    Penicillin G Sodium Shortness Of Breath    Cephalosporins Itching    Ciprofloxacin Itching    Codeine Itching    Erythromycin Itching    Morphine And Related Itching    Fenoprofen Calcium Other (See Comments)     Pt doesn`t recall     Current Outpatient Medications   Medication Sig Dispense Refill    busPIRone (BUSPAR) 7.5 MG tablet Take 7.5 mg by mouth Every 4 - 6 hours PRN      atorvastatin (LIPITOR) 40 MG tablet Take 1 tablet by mouth nightly 30 tablet 3    aspirin 81 MG chewable tablet Take 1 tablet by mouth daily 30 tablet 3    ticagrelor (BRILINTA) 90 MG TABS tablet Take 1 tablet by mouth 2 times daily 60 tablet 0    nitroGLYCERIN (NITROSTAT) 0.4 MG SL tablet Place 0.4 mg under the tongue every 5 minutes as needed for Chest pain up to max of 3 total doses. If no relief after 1 dose, call 911.       metoprolol tartrate (LOPRESSOR) 50 MG tablet Take 50 mg by mouth 2 times daily      methotrexate (RHEUMATREX) 2.5 MG chemo tablet TAKE EIGHT TABLETS BY MOUTH ONCE WEEKLY 32 tablet 0    folic acid (FOLVITE) 1 MG tablet TAKE ONE TABLET BY MOUTH DAILY 90 tablet 3    oxybutynin (DITROPAN-XL) 10 MG extended release tablet Take 10 mg by mouth daily      finasteride (PROSCAR) 5 MG tablet Take 5 mg by mouth daily      omeprazole (PRILOSEC) 20 MG delayed release capsule Take 20 mg by mouth      gabapentin (NEURONTIN) 600 MG tablet Take 600 mg by mouth nightly 2 at bed time      losartan (COZAAR) 50 MG tablet Take 50 mg by mouth daily      Loratadine 10 MG CAPS Take by mouth daily       Umeclidinium-Vilanterol 62.5-25 MCG/INH AEPB Inhale into the lungs as needed       HYDROcodone-acetaminophen (NORCO) 5-325 MG per tablet Take 1 tablet by mouth every 6 hours as needed for Pain      montelukast (SINGULAIR) 10 MG tablet Take 10 mg by mouth nightly.  tamsulosin (FLOMAX) 0.4 MG capsule Take 0.4 mg by mouth 2 times daily       amitriptyline (ELAVIL) 100 MG tablet Take 100 mg by mouth nightly.  DULoxetine (CYMBALTA) 60 MG extended release capsule Take 60 mg by mouth daily        No current facility-administered medications for this visit. Review of Systems:  · Constitutional: no unanticipated weight loss. There's been no change in energy level, sleep pattern, or activity level. No fevers, chills. · Eyes: No visual changes or diplopia. No scleral icterus. · ENT: No Headaches, hearing loss or vertigo. No mouth sores or sore throat. · Cardiovascular: as reviewed in HPI  · Respiratory: No cough or wheezing, no sputum production. No hematemesis. · Gastrointestinal: No abdominal pain, appetite loss, blood in stools. No change in bowel or bladder habits. · Genitourinary: No dysuria, trouble voiding, or hematuria. · Musculoskeletal:  No gait disturbance, no joint complaints. · Integumentary: No rash or pruritis. · Neurological: No headache, diplopia, change in muscle strength, numbness or tingling. · Psychiatric: No anxiety or depression. · Endocrine: No temperature intolerance. No excessive thirst, fluid intake, or urination. No tremor. · Hematologic/Lymphatic: No abnormal bruising or bleeding, blood clots or swollen lymph nodes. · Allergic/Immunologic: No nasal congestion or hives.     Physical Exam:   /66   Pulse 62   Temp 97.5 °F (36.4 °C)   Ht 6' 3\" (1.905 m)   Wt 281 lb (127.5 kg) Comment: with shoes  SpO2 97%   BMI 35.12 kg/m²   Wt Readings from Last 3 Encounters:   08/25/20 285 lb 15 oz (129.7 kg)   08/25/20 285 lb (129.3 kg)   07/22/20 285 lb (129.3 kg)     Constitutional: He is oriented to person, place, and time. He appears well-developed and well-nourished. In no acute distress. Head: Normocephalic and atraumatic. Pupils equal and round. Neck: Neck supple. No JVP or carotid bruit appreciated. No mass and no thyromegaly present. No lymphadenopathy present. Cardiovascular: Normal rate. Normal heart sounds. Exam reveals no gallop and no friction rub. No murmur heard. Pulmonary/Chest: Effort normal and breath sounds normal. No respiratory distress. He has no wheezes, rhonchi or rales. Abdominal: Soft, non-tender. Bowel sounds are normal. He exhibits no organomegaly, mass or bruit. Extremities: No edema, cyanosis, or clubbing. Pulses are 2+ radial/dorsalis pedis/posterior tibial/carotid bilaterally. Neurological: No gross cranial nerve deficit. Coordination normal.   Skin: Skin is warm and dry. There is no rash or diaphoresis. Psychiatric: He has a normal mood and affect. His speech is normal and behavior is normal.     Lab Review:   Lab Results   Component Value Date    TRIG 136 09/21/2016    HDL 40 09/21/2016    HDL 41 03/06/2012    LDLCALC 100 09/21/2016    LABVLDL 27 09/21/2016      Lab Results   Component Value Date    BUN 17 08/26/2020    CREATININE 1.0 08/26/2020       EKG Interpretation:   7/26/19 Sinus rhythm  10/2/20 Sinus  Rhythm  - occasional ectopic ventricular beat    8/25/20 Sinus  Rhythm  - frequent multiform ectopic ventricular beats, # VECs = 2, # types 2  -consider old anterior infarct,  -Nonspecific ST depression   +   Nonspecific T-abnormality  -Nondiagnostic. 9/10/20 sinus bradycardia, 57 bpm     Image Review:   Chest CT 8/1/19  Atherosclerosis, including coronary artery calcification.       Several indeterminate pulmonary nodules, measuring up to approximately 6 mm.    Follow-up recommendations are as below.       RECOMMENDATIONS:   Fleischner Society guidelines for follow-up and management of incidentally   detected pulmonary nodules:       Single Solid Nodule:       Nodule size less than 6 mm   In a low-risk patient, no routine follow-up. In a high-risk patient, optional CT at 12 months.       Nodule size equals 6-8 mm   In a low-risk patient, CT at 6-12 months, then consider CT at 18-24 months. In a high-risk patient, CT at 6-12 months, then CT at 18-24 months.       Nodule size greater than 8 mm           In a low-risk patient, consider CT at 3 months, PET/CT, or tissue sampling.       In a high-risk patient, consider CT at 3 months, PET/CT, or tissue sampling.       Multiple Solid Nodules:       Nodule size less than 6 mm   In a low-risk patient, no routine follow-up. In a high-risk patient, optional CT at 12 months.       Nodule size equals 6-8 mm   In a low-risk patient, CT at 3-6 months, then consider CT at 18-24 months. In a high-risk patient, CT at 3-6 months, then CT at 18-24 months.       Nodule size greater than 8 mm   In a low-risk patient, CT at 3-6 months, then consider CT at 18-24 months. In a high-risk patient, CT at 3-6 months, then CT at 18-24 months.       - Low risk patients include individuals with minimal or absent history of   smoking and other known risk factors.       - High risk patients include individuals with a history or smoking or known   risk factors.         Stress test 5/14/19  Normal myocardial perfusion study.    Normal myocardial perfusion.    Normal LV size and systolic function.    Overall findings represent a low risk study. ECHO 5/21/19  There is mild concentric left ventricular hypertrophy. Left ventricular cavity size is mildly dilated. Overall left ventricular systolic function appears mildly reduced. Ejection fraction is visually estimated to be 40-45%. Grade I diastolic dysfunction with normal LV filling pressures. The aortic root is mildly dilated & measures at 4.1 cms. Mildly dilated right ventricle.   TAPSE 2.2cm, RV mid 4cm  IVC size is normal (<2.1cm) and collapses > 50% with respiration contact me if you have any questions. Complexity of medical decision making-high    Sincerely,  Manan Gifford MD      [address] [place] [address]  Ph: [phone]  Fax: [phone]      This note was scribed in the presence of Dr. Rex Samuel MD by José Zelaya, SHERIN. Physician Attestation:  The scribes documentation has been prepared under my direction and personally reviewed by me in its entirety. I confirm that the note above accurately reflects all work, treatment, procedures, and medical decision making performed by me.

## 2020-09-10 ENCOUNTER — OFFICE VISIT (OUTPATIENT)
Dept: CARDIOLOGY CLINIC | Age: 65
End: 2020-09-10
Payer: COMMERCIAL

## 2020-09-10 ENCOUNTER — TELEPHONE (OUTPATIENT)
Dept: CARDIOLOGY CLINIC | Age: 65
End: 2020-09-10

## 2020-09-10 VITALS
OXYGEN SATURATION: 97 % | SYSTOLIC BLOOD PRESSURE: 118 MMHG | HEIGHT: 75 IN | BODY MASS INDEX: 34.94 KG/M2 | DIASTOLIC BLOOD PRESSURE: 66 MMHG | TEMPERATURE: 97.5 F | WEIGHT: 281 LBS | HEART RATE: 62 BPM

## 2020-09-10 PROCEDURE — 93000 ELECTROCARDIOGRAM COMPLETE: CPT | Performed by: INTERNAL MEDICINE

## 2020-09-10 PROCEDURE — 99214 OFFICE O/P EST MOD 30 MIN: CPT | Performed by: INTERNAL MEDICINE

## 2020-09-10 NOTE — TELEPHONE ENCOUNTER
Dr. Casimiro De La Torre saw patient today in follow up and ordered Treadmill Perfusion Myoview-stress test but after patient's appt., Dr. Casimiro De La Torre decided to change his stress test to a Plain GXT/Treadmill stress test-do not hold BB. New order placed. Please call scheduling to adjust and if any new instructions for the patient, please call him. Any questions, let me know. Thanks.

## 2020-09-10 NOTE — PATIENT INSTRUCTIONS
Patient Education        Heart-Healthy Diet: Care Instructions  Your Care Instructions     A heart-healthy diet has lots of vegetables, fruits, nuts, beans, and whole grains, and is low in salt. It limits foods that are high in saturated fat, such as meats, cheeses, and fried foods. It may be hard to change your diet, but even small changes can lower your risk of heart attack and heart disease. Follow-up care is a key part of your treatment and safety. Be sure to make and go to all appointments, and call your doctor if you are having problems. It's also a good idea to know your test results and keep a list of the medicines you take. How can you care for yourself at home? Watch your portions  · Learn what a serving is. A \"serving\" and a \"portion\" are not always the same thing. Make sure that you are not eating larger portions than are recommended. For example, a serving of pasta is ½ cup. A serving size of meat is 2 to 3 ounces. A 3-ounce serving is about the size of a deck of cards. Measure serving sizes until you are good at [place]" them. Keep in mind that restaurants often serve portions that are 2 or 3 times the size of one serving. · To keep your energy level up and keep you from feeling hungry, eat often but in smaller portions. · Eat only the number of calories you need to stay at a healthy weight. If you need to lose weight, eat fewer calories than your body burns (through exercise and other physical activity). Eat more fruits and vegetables  · Eat a variety of fruit and vegetables every day. Dark green, deep orange, red, or yellow fruits and vegetables are especially good for you. Examples include spinach, carrots, peaches, and berries. · Keep carrots, celery, and other veggies handy for snacks. Buy fruit that is in season and store it where you can see it so that you will be tempted to eat it. · Cook dishes that have a lot of veggies in them, such as stir-fries and soups.   Limit saturated and trans fat  · Read food labels, and try to avoid saturated and trans fats. They increase your risk of heart disease. · Use olive or canola oil when you cook. · Bake, broil, grill, or steam foods instead of frying them. · Choose lean meats instead of high-fat meats such as hot dogs and sausages. Cut off all visible fat when you prepare meat. · Eat fish, skinless poultry, and meat alternatives such as soy products instead of high-fat meats. Soy products, such as tofu, may be especially good for your heart. · Choose low-fat or fat-free milk and dairy products. Eat foods high in fiber  · Eat a variety of grain products every day. Include whole-grain foods that have lots of fiber and nutrients. Examples of whole-grain foods include oats, whole wheat bread, and brown rice. · Buy whole-grain breads and cereals, instead of white bread or pastries. Limit salt and sodium  · Limit how much salt and sodium you eat to help lower your blood pressure. · Taste food before you salt it. Add only a little salt when you think you need it. With time, your taste buds will adjust to less salt. · Eat fewer snack items, fast foods, and other high-salt, processed foods. Check food labels for the amount of sodium in packaged foods. · Choose low-sodium versions of canned goods (such as soups, vegetables, and beans). Limit sugar  · Limit drinks and foods with added sugar. These include candy, desserts, and soda pop. Limit alcohol  · Limit alcohol to no more than 2 drinks a day for men and 1 drink a day for women. Too much alcohol can cause health problems. When should you call for help? Watch closely for changes in your health, and be sure to contact your doctor if:  · You would like help planning heart-healthy meals. Where can you learn more? Go to https://cheliaseb.healthBeCouplypartners. org and sign in to your [employer] account.  Enter V137 in the [employer] box to learn more about \"Heart-Healthy Diet: Care Instructions. \"     If you do not have an account, please click on the \"Sign Up Now\" link. Current as of: August 22, 2019               Content Version: 12.5  © 2006-2020 Healthwise, Incorporated. Care instructions adapted under license by [facility] ([facility]). If you have questions about a medical condition or this instruction, always ask your healthcare professional. [address] any warranty or liability for your use of this information. Patient Education        Low Sodium Diet (2,000 Milligram): Care Instructions  Your Care Instructions     Too much sodium causes your body to hold on to extra water. This can raise your blood pressure and force your heart and kidneys to work harder. In very serious cases, this could cause you to be put in the hospital. It might even be life-threatening. By limiting sodium, you will feel better and lower your risk of serious problems. The most common source of sodium is salt. People get most of the salt in their diet from canned, prepared, and packaged foods. Fast food and restaurant meals also are very high in sodium. Your doctor will probably limit your sodium to less than 2,000 milligrams (mg) a day. This limit counts all the sodium in prepared and packaged foods and any salt you add to your food. Follow-up care is a key part of your treatment and safety. Be sure to make and go to all appointments, and call your doctor if you are having problems. It's also a good idea to know your test results and keep a list of the medicines you take. How can you care for yourself at home? Read food labels  · Read labels on cans and food packages. The labels tell you how much sodium is in each serving. Make sure that you look at the serving size. If you eat more than the serving size, you have eaten more sodium. · Food labels also tell you the Percent Daily Value for sodium. Choose products with low Percent Daily Values for sodium.   · Be aware that sodium can come in forms other than salt, including monosodium glutamate (MSG), sodium citrate, and sodium bicarbonate (baking soda). MSG is often added to Asian food. When you eat out, you can sometimes ask for food without MSG or added salt. Buy low-sodium foods  · Buy foods that are labeled \"unsalted\" (no salt added), \"sodium-free\" (less than 5 mg of sodium per serving), or \"low-sodium\" (less than 140 mg of sodium per serving). Foods labeled \"reduced-sodium\" and \"light sodium\" may still have too much sodium. Be sure to read the label to see how much sodium you are getting. · Buy fresh vegetables, or frozen vegetables without added sauces. Buy low-sodium versions of canned vegetables, soups, and other canned goods. Prepare low-sodium meals  · Cut back on the amount of salt you use in cooking. This will help you adjust to the taste. Do not add salt after cooking. One teaspoon of salt has about 2,300 mg of sodium. · Take the salt shaker off the table. · Flavor your food with garlic, lemon juice, onion, vinegar, herbs, and spices. Do not use soy sauce, lite soy sauce, steak sauce, onion salt, garlic salt, celery salt, mustard, or ketchup on your food. · Use low-sodium salad dressings, sauces, and ketchup. Or make your own salad dressings and sauces without adding salt. · Use less salt (or none) when recipes call for it. You can often use half the salt a recipe calls for without losing flavor. Other foods such as rice, pasta, and grains do not need added salt. · Rinse canned vegetables, and cook them in fresh water. This removes some--but not all--of the salt. · Avoid water that is naturally high in sodium or that has been treated with water softeners, which add sodium. Call your local water company to find out the sodium content of your water supply. If you buy bottled water, read the label and choose a sodium-free brand.   Avoid high-sodium foods  · Avoid eating:  ? Smoked, cured, salted, and canned meat, fish, and poultry. ? Ham, frazier, hot dogs, and luncheon meats. ? Regular, hard, and processed cheese and regular peanut butter. ? Crackers with salted tops, and other salted snack foods such as pretzels, chips, and salted popcorn. ? Frozen prepared meals, unless labeled low-sodium. ? Canned and dried soups, broths, and bouillon, unless labeled sodium-free or low-sodium. ? Canned vegetables, unless labeled sodium-free or low-sodium. ? [place] fries, pizza, tacos, and other fast foods. ? Pickles, olives, ketchup, and other condiments, especially soy sauce, unless labeled sodium-free or low-sodium. Where can you learn more? Go to https://iSpot.tvpeashlyUGEeb.ShowNearby. org and sign in to your [employer] account. Enter B991 in the [employer] box to learn more about \"Low Sodium Diet (2,000 Milligram): Care Instructions. \"     If you do not have an account, please click on the \"Sign Up Now\" link. Current as of: August 22, 2019               Content Version: 12.5  © 4273-5453 [employer]. Care instructions adapted under license by [facility] ([facility]). If you have questions about a medical condition or this instruction, always ask your healthcare professional. [address] any warranty or liability for your use of this information. Patient Education        Walking for Exercise: Care Instructions  Your Care Instructions     Walking is one of the easiest ways to get the exercise you need for good health. A brisk, 30-minute walk each day can help you feel better and have more energy. It can help you lower your risk of disease. Walking can help you keep your bones strong and your heart healthy. Check with your doctor before you start a walking plan if you have heart problems, other health issues, or you have not been active in a long time. Follow your doctor's instructions for safe levels of exercise. Follow-up care is a key part of your treatment and safety.  Be sure to make and go to all appointments, and call your doctor if you are having problems. It's also a good idea to know your test results and keep a list of the medicines you take. How can you care for yourself at home? Getting started  · Start slowly and set a short-term goal. For example, walk for 5 or 10 minutes every day. · Bit by bit, increase the amount you walk every day. Try for at least 30 minutes on most days of the week. You also may want to swim, bike, or do other activities. · If finding enough time is a problem, it is fine to be active in blocks of 10 minutes or more throughout your day and week. · To get the heart-healthy benefits of walking, you need to walk briskly enough to increase your heart rate and breathing, but not so fast that you cannot talk comfortably. · Wear comfortable shoes that fit well and provide good support for your feet and ankles. Staying with your plan  · After you've made walking a habit, set a longer-term goal. You may want to set a goal of walking briskly for longer or walking farther. Experts say to do 2½ hours of moderate activity a week. A faster heartbeat is what defines moderate-level activity. · To stay motivated, walk with friends, coworkers, or pets. · Use a phone nova or pedometer to track your steps each day. Set a goal to increase your steps. Once you get there, set a higher goal. Aim for 10,000 steps a day. · If the weather keeps you from walking outside, go for walks at the mall with a friend. Local schools and churches may have indoor gyms where you can walk. Fitting a walk into your workday  · Park several blocks away from work, or get off the bus a few stops early. · Use the stairs instead of the elevator, at least for a few floors. · Suggest holding meetings with colleagues during a walk inside or outside the building. · Use the restroom that is the farthest from your desk or workstation.   · Use your morning and afternoon breaks to take quick 15-minute walks.  Staying safe  · Know your surroundings. Walk in a well-lighted, safe place. If it is dark, walk with a partner. Wear light-colored clothing. If you can, buy a vest or jacket that reflects light. · Carry a cell phone for emergencies. · Drink plenty of water. Take a water bottle with you when you walk. This is very important if it is hot out. · Be careful not to slip on wet or icy ground. You can buy \"grippers\" for your shoes to help keep you from slipping. · Pay attention to your walking surface. Use sidewalks and paths. · If you have breathing problems like asthma or COPD, ask your doctor when it is safe for you to walk outdoors. Cold, dry air, smog, pollen, or other things in the air could cause breathing problems. Where can you learn more? Go to https://Omrix Biopharmaceuticals.Saguna Networks. org and sign in to your [employer] account. Enter R159 in the [employer] box to learn more about \"Walking for Exercise: Care Instructions. \"     If you do not have an account, please click on the \"Sign Up Now\" link. Current as of: January 16, 2020               Content Version: 12.5  © 2006-2020 [employer]. Care instructions adapted under license by [facility] ([facility]). If you have questions about a medical condition or this instruction, always ask your healthcare professional. [place] [address] any warranty or liability for your use of this information. Patient Education        Cardiac Rehabilitation: Care Instructions  Your Care Instructions     Cardiac rehabilitation is a program for people who have a heart problem, such as a heart attack, heart failure, or a heart valve disease. The program includes exercise, lifestyle changes, education, and emotional support. Cardiac rehab can help you improve the quality of your life through better overall health. It can help you lose weight and feel better about yourself.   On your cardiac rehab team, you may have your doctor, a nurse specialist, a dietitian, and a physical therapist. They will design your cardiac rehab program specifically for you. You will learn how to reduce your risk for heart problems, how to manage stress, and how to eat a heart-healthy diet. By the end of the program, you will be ready to maintain a healthier lifestyle on your own. Follow-up care is a key part of your treatment and safety. Be sure to make and go to all appointments, and call your doctor if you are having problems. It's also a good idea to know your test results and keep a list of the medicines you take. How can you care for yourself at home? · Take your medicines exactly as prescribed. Call your doctor if you think you are having a problem with your medicine. You will get more details on the specific medicines your doctor prescribes. · Weigh yourself every day if your doctor tells you to. Watch for sudden weight gain. Weigh yourself on the same scale with the same amount of clothing at the same time of day. · Plan your meals so that you are eating heart-healthy foods. ? Eat a variety of foods daily. Fresh fruits and vegetables and whole-grains are good choices. ? Limit your fat intake, especially saturated and trans fat. ? Limit salt (sodium). ? Increase fiber in your diet. ? Limit alcohol. · Learn how to take your pulse so that you can track your heart rate during exercise. · Always check with your doctor before you begin a new exercise program.  · Warm up before you exercise and cool down afterward for at least 15 minutes each. This will help your heart gradually prepare for and recover from exercise and avoid pushing your heart too hard. · Stop exercising if you have any unusual discomfort, such as chest pain. · Do not smoke. Smoking can make heart problems worse. If you need help quitting, talk to your doctor about stop-smoking programs and medicines. These can increase your chances of quitting for good. When should you call for help? [id number] anytime you think you may need emergency care. For example, call if:  · You have severe trouble breathing. · You cough up pink, foamy mucus and you have trouble breathing. · You have symptoms of a heart attack. These may include:  ? Chest pain or pressure, or a strange feeling in the chest.  ? Sweating. ? Shortness of breath. ? Nausea or vomiting. ? Pain, pressure, or a strange feeling in the back, neck, jaw, or upper belly or in one or both shoulders or arms. ? Lightheadedness or sudden weakness. ? A fast or irregular heartbeat. After you call 911, the  may tell you to chew 1 adult-strength or 2 to 4 low-dose aspirin. Wait for an ambulance. Do not try to drive yourself. · You have angina symptoms (such as chest pain or pressure) that do not go away with rest or are not getting better within 5 minutes after you take a dose of nitroglycerin. · You have symptoms of a stroke. These may include:  ? Sudden numbness, tingling, weakness, or loss of movement in your face, arm, or leg, especially on only one side of your body. ? Sudden vision changes. ? Sudden trouble speaking. ? Sudden confusion or trouble understanding simple statements. ? Sudden problems with walking or balance. ? A sudden, severe headache that is different from past headaches. · You passed out (lost consciousness). Call your doctor now or seek immediate medical care if:  · You have new or increased shortness of breath. · You are dizzy or lightheaded, or you feel like you may faint. · You gain weight suddenly, such as more than 2 to 3 pounds in a day or 5 pounds in a week. (Your doctor may suggest a different range of weight gain.)  · You have increased swelling in your legs, ankles, or feet. Watch closely for changes in your health, and be sure to contact your doctor if you have any problems. Where can you learn more? Go to https://bobby.Cerevellum Design. org and sign in to your Mobeon account.  Enter J059 in the Search Health Information box to learn more about \"Cardiac Rehabilitation: Care Instructions. \"     If you do not have an account, please click on the \"Sign Up Now\" link. Current as of: December 16, 2019               Content Version: 12.5  © 4344-6834 Healthwise, Incorporated. Care instructions adapted under license by [facility] ([facility]). If you have questions about a medical condition or this instruction, always ask your healthcare professional. [address] any warranty or liability for your use of this information. Patient Education        Learning About Low-Carbohydrate Foods  What foods are low in carbohydrate? The foods you eat contain nutrients, such as vitamins and minerals. Carbohydrate is a nutrient. Your body needs the right amount to stay healthy and work as it should. You can use the list below to help you make choices about which foods to eat. Some foods that are lower in carbohydrate include:  Dairy and dairy alternatives  · Cheese  · Cottage cheese  · Cream cheese  · Nut milk (unsweetened)  · Soy milk (unsweetened)  · Yogurt (Greek, plain)  Fruits  · Avocado  · [employer] and other protein foods  · Almonds  · Beef  · Chicken  · Cod  · Eggs  · Halibut  · Peanut butter and other nut butters  · Pistachios  · Pork  · Pumpkin seeds  · Tofu  · Trout  · [place]  · [place] ([facility])  · Walnuts  Vegetables  · Broccoli  · Carrots  · Cauliflower  · Green beans  · Mushrooms  · Peppers  · Salad greens  · Spinach  · Tomatoes  Work with your doctor to find out how much of this nutrient you need. Depending on your health, you may need more or less of it in your diet. Where can you learn more? Go to https://chpepiceweb.healthFlexuspine. org and sign in to your [employer] account. Enter [id number] [id number] in the [employer] box to learn more about \"Learning About Low-Carbohydrate Foods. \"     If you do not have an account, please click on the \"Sign Up Now\" link.   Current as of: August 22, 1203               LGVUPSO Version: 12.5  © 8127-8562 Healthwise, Praccel. Care instructions adapted under license by [facility] ([facility]). If you have questions about a medical condition or this instruction, always ask your healthcare professional. [address] any warranty or liability for your use of this information. Patient Education        Learning About Low-Fat Eating  What is low-fat eating? Most food has some fat in it. Your body needs some fat to be healthy. But some kinds of fats are healthier than others. In a low-fat eating plan, you try to choose healthier fats and eat fewer unhealthy fats. Healthy fats include olive and canola oil. Try to avoid eating too much saturated fat (such as in cheese and meats) and trans fat (a type of fat found in many packaged snack foods and other baked goods). You do not need to cut all fat from your diet. But you can make healthier choices about the types and amount of fat you eat. Even though it is a good idea to choose healthier fats, it is still important to be careful of how much fat you eat, because all fats are high in calories. What are the different types of fats? Unhealthy fat  · Saturated fat. Saturated fats are mostly in animal foods, such as meat and dairy foods. Tropical oils, such as coconut oil, palm oil, and cocoa butter, are also saturated fats. Healthy fats  · Monounsaturated fat. Monounsaturated fats are liquid at room temperature but get solid when refrigerated. Eating foods that are high in this fat may help lower your \"bad\" (LDL) cholesterol, keep your \"good\" (HDL) cholesterol level up, and lower your chances of getting coronary artery disease. This fat is found in canola oil, olive oil, peanut oil, olives, avocados, nuts, and nut butters. · Polyunsaturated fat. Polyunsaturated fats are liquid at room temperature. They are in safflower, sunflower, and corn oils. They are also the main fat in seafood.  Omega-3 fatty acids are types of polyunsaturated fat. Eating fish may lower your chances of getting coronary artery disease. Fatty fish such as salmon and mackerel contain these healthy fatty acids. So do ground flaxseeds and flaxseed oil, soybeans, walnuts, and seeds. Why cut down on unhealthy fats? Eating foods that contain saturated fats can raise the LDL (\"bad\") cholesterol in your blood. Having a high level of LDL cholesterol increases your chance of hardening of the arteries (atherosclerosis), which can lead to heart disease, heart attack, and stroke. Trans fat raises the level of \"bad\" LDL cholesterol in your blood and may lower the \"good\" HDL cholesterol in your blood. Trans fat can raise your risk of heart disease, heart attack, and stroke. In general:  · No more than 10% of your daily calories should come from saturated fat. This is about 20 grams in a 2,000-calorie diet. · No more than 10% of your daily calories should come from polyunsaturated fat. This is about 20 grams in a 2,000-calorie diet. · Monounsaturated fats can be up to 15% of your daily calories. This is about 25 to 30 grams in a 2,000-calorie diet. If you're not sure how much fat you should be eating or how many calories you need each day to stay at a healthy weight, talk to a registered dietitian. He or she can help you create a plan that's right for you. What can you do to cut down on fat? Foods like cheese, butter, sausage, and desserts can have a lot of unhealthy fats. Try these tips for healthier meals at home and when you eat out. At home  · Fill up on fruits, vegetables, and whole grains. · Think of meat as a side dish instead of as the main part of your meal.  · When you do eat meat, make it extra-lean ground beef (97% lean), ground turkey breast (without skin added), meats with fat trimmed off before cooking, or skinless chicken. · Try main dishes that use whole wheat pasta, brown rice, dried beans, or vegetables.   · Use cooking methods that use little or no fat, such as broiling, steaming, or grilling. Use cooking spray instead of oil. If you use oil, use a monounsaturated oil, such as canola or olive oil. · Read food labels on canned, bottled, or packaged foods. Choose those with little saturated fat and no trans fat. When eating out at a restaurant  · Order foods that are broiled or poached instead of fried or breaded. · Cut back on the amount of butter or margarine that you use on bread. Use small amounts of olive oil instead. · Order sauces, gravies, and salad dressings on the side, and use only a little. · When you order pasta, choose tomato sauce instead of cream sauce. · Ask for salsa with your baked potato instead of sour cream, butter, cheese, or frazier. Where can you learn more? Go to https://IndyGeekwallyiCracked.[employer]. org and sign in to your [employer] account. Enter G650 in the [employer] box to learn more about \"Learning About Low-Fat Eating. \"     If you do not have an account, please click on the \"Sign Up Now\" link. Current as of: August 22, 2019               Content Version: 12.5  © 5949-3225 Healthwise, Incorporated. Care instructions adapted under license by [facility] ([facility]). If you have questions about a medical condition or this instruction, always ask your healthcare professional. [place] [address] any warranty or liability for your use of this information.

## 2020-09-15 DIAGNOSIS — E78.5 HYPERLIPIDEMIA, UNSPECIFIED HYPERLIPIDEMIA TYPE: ICD-10-CM

## 2020-09-15 LAB
ALBUMIN SERPL-MCNC: 4.4 G/DL (ref 3.4–5)
ALP BLD-CCNC: 89 U/L (ref 40–129)
ALT SERPL-CCNC: 12 U/L (ref 10–40)
AST SERPL-CCNC: 13 U/L (ref 15–37)
BILIRUB SERPL-MCNC: 0.5 MG/DL (ref 0–1)
BILIRUBIN DIRECT: <0.2 MG/DL (ref 0–0.3)
BILIRUBIN, INDIRECT: ABNORMAL MG/DL (ref 0–1)
CHOLESTEROL, TOTAL: 105 MG/DL (ref 0–199)
HDLC SERPL-MCNC: 33 MG/DL (ref 40–60)
LDL CHOLESTEROL CALCULATED: 48 MG/DL
TOTAL PROTEIN: 6.5 G/DL (ref 6.4–8.2)
TRIGL SERPL-MCNC: 118 MG/DL (ref 0–150)
VLDLC SERPL CALC-MCNC: 24 MG/DL

## 2020-09-25 ENCOUNTER — TELEPHONE (OUTPATIENT)
Dept: CARDIOLOGY CLINIC | Age: 65
End: 2020-09-25

## 2020-09-25 NOTE — TELEPHONE ENCOUNTER
Franky Noble called in this morning wanting to talk to Dr. Vanessa Jarrell RN regarding his stress test.      You can reach Franky Noble at #[phone]

## 2020-09-25 NOTE — TELEPHONE ENCOUNTER
Patient states that he is not able to walk on treadmill. It looks like you originally ordered nuclear stress test then changed to plain GXT .  Okay to switch back to nuclear stress test?

## 2020-09-25 NOTE — TELEPHONE ENCOUNTER
Attempted to return call to patient [phone]. Left message on voicemail that I was returning his call and requested call back to our office. Stress test scheduled for 10/2/20. Last OV 9/10/20.      CAD   8/25/20 unstable angina--increasing chest tightness with radiation to his jaws, SOB & diaphoresis. which  occurs with minimal exertion & also at rest. EKG showed subtle changes  S/p successful PCI and stenting of proximal RCA, 99% to 0% with LEYDI 1 by Dr. Castillo Found on 8/25/2020. Today, he reports 2 episodes of left sided chest pain with one episode radiating to his left shoulder blade with relief of symptoms with SL nitro. He has been very sedentary since his [facility] because he doesn't know what to do and due to the outside heat/humidity. He also reports off/on feeling of shortness of breath which may be due to Brilinta. His EKG shows no acute changes OMT includes lipitor,  DAPT-ASA/brilinta, lopressor, losartan. I will plan to complete Treadmill to further assess and refer to Cardiac Rehab, phase II. Telephone encounter 9/10/10  Note      Dr. Munguia Found saw patient today in follow up and ordered Treadmill Perfusion Myoview-stress test but after patient's appt., Dr. Munguia Found decided to change his stress test to a Plain GXT/Treadmill stress test-do not hold BB. New order placed. Please call scheduling to adjust and if any new instructions for the patient, please call him. Any questions, let me know. Thanks.

## 2020-09-28 NOTE — TELEPHONE ENCOUNTER
Patient states that he is unable to walk to restroom so he does not think he can walk on a treadmill.

## 2020-09-29 ENCOUNTER — TELEPHONE (OUTPATIENT)
Dept: CARDIOLOGY CLINIC | Age: 65
End: 2020-09-29

## 2020-09-29 NOTE — TELEPHONE ENCOUNTER
Discussed with Dr. Avis Mchugh. Stress test was to be completed to assess exercise capacity prior to starting cardiac rehab. Due to him being unable to walk for a stress test, he will not have him complete this. Referral placed to cardiac rehab and patient should call us with any exertional symptoms. Please call to discuss.

## 2020-09-29 NOTE — TELEPHONE ENCOUNTER
Roselia Trotter called in this afternoon and seems to be a little confused? He said he spoke with someone about 15 mins ago from our office regarding his stress test coming up on 10/2. He said he normally talks to Dada Marinelli, but that's not who he talked to earlier? He stated whoever he talked to said they were going to cancel the stress test because he cant walk very well and they just wanted him to go to his rehab? His stress test is still scheduled and he just needs some reassurance as to what exactly is bailey on?     Roselia Trotter can be reached at [phone]

## 2020-09-29 NOTE — TELEPHONE ENCOUNTER
Called patient. Relayed message from Claribel Benavidez and Dr. Layla Mcnamara. Patient verbalized and confirmed understanding.

## 2020-09-29 NOTE — TELEPHONE ENCOUNTER
Wife knows to take patient to ED if pain or symptom worsens. She said it is not emergent now and can for answer until tomorrow. Returned call to patient. He said his SOB is getting worse. He said that he does not have a problem with his legs. Patient wanted me to talk to his wife Katherine  because she could answer better than him. He is having increased SOB with exertion. Having episodes of CP almost daily, did not have any today. Sometimes. after he eats, he has CP. Sometimes the nitro helps and sometime it does not. Wife said that last night his pain was muscular. She said it is more in his left shoulder. Last OV 9/10/20. Assessment/Plan:      CAD   8/25/20 unstable angina--increasing chest tightness with radiation to his jaws, SOB & diaphoresis. which  occurs with minimal exertion & also at rest. EKG showed subtle changes  S/p successful PCI and stenting of proximal RCA, 99% to 0% with LEYDI 1 by Dr. Isha Aviles on 8/25/2020. Today, he reports 2 episodes of left sided chest pain with one episode radiating to his left shoulder blade with relief of symptoms with SL nitro. He has been very sedentary since his [facility] because he doesn't know what to do and due to the outside heat/humidity. He also reports off/on feeling of shortness of breath which may be due to Brilinta. His EKG shows no acute changes OMT includes lipitor,  DAPT-ASA/brilinta, lopressor, losartan. I will plan to complete Treadmill to further assess and refer to Cardiac Rehab, phase II.

## 2020-09-29 NOTE — TELEPHONE ENCOUNTER
Called Scheduling [phone] spoke to Luís Dang. She cancelled stress test as requested by Dr. Dex Trinidad

## 2020-09-30 NOTE — TELEPHONE ENCOUNTER
If pain and shortness of breath continue and or worsen he should proceed to the ER or call to get an earlier appointment.

## 2020-09-30 NOTE — TELEPHONE ENCOUNTER
Attempted to return call to patient. Left message on home phone to return call to our office. Called patient on cell phone. Relayed message from [facility]. Patient verbalized and confirmed understanding.

## 2020-10-01 ENCOUNTER — TELEPHONE (OUTPATIENT)
Dept: CARDIOLOGY CLINIC | Age: 65
End: 2020-10-01

## 2020-10-01 NOTE — TELEPHONE ENCOUNTER
Pt is getting a cleaning at the dentist soon, and wants to know if there is anything he needs to do before going, like stop medications or get an antibiotic.     Please call him back at [phone]

## 2020-10-21 ENCOUNTER — VIRTUAL VISIT (OUTPATIENT)
Dept: RHEUMATOLOGY | Age: 65
End: 2020-10-21
Payer: COMMERCIAL

## 2020-10-21 PROCEDURE — 99214 OFFICE O/P EST MOD 30 MIN: CPT | Performed by: INTERNAL MEDICINE

## 2020-10-21 NOTE — PROGRESS NOTES
SUBJECTIVE:    Patient ID: Danny Severs is a 60 y.o. male. The patient returns for follow-up of rheumatoid arthritis. Since his last visit he had a marked reduction he had stents placed. He continues on methotrexate 20 mg a week. Review of Systems:    Respiratory: denies cough, denies shortness of breath  Gastrointestinal: denies abdominal pain, denies nausea  Musculoskeletal:             2 to 3 hours            morning stiffness  Ears, nose, mouth: denies mucosal sores  Skin: denies rash, denies alopecia. The remainder of his review of systems is negative    Prior to Visit Medications    Medication Sig Taking? Authorizing Provider   methotrexate (RHEUMATREX) 2.5 MG chemo tablet TAKE EIGHT TABLETS BY MOUTH ONCE A WEEK Yes Marko Macias MD   busPIRone (BUSPAR) 7.5 MG tablet Take 7.5 mg by mouth Every 4 - 6 hours PRN Yes Historical Provider, MD   atorvastatin (LIPITOR) 40 MG tablet Take 1 tablet by mouth nightly Yes Wayne Bridges MD   aspirin 81 MG chewable tablet Take 1 tablet by mouth daily Yes Wayne Bridges MD   ticagrelor (BRILINTA) 90 MG TABS tablet Take 1 tablet by mouth 2 times daily Yes Peggy Martinez MD   nitroGLYCERIN (NITROSTAT) 0.4 MG SL tablet Place 0.4 mg under the tongue every 5 minutes as needed for Chest pain up to max of 3 total doses. If no relief after 1 dose, call 911. Yes Historical Provider, MD   metoprolol tartrate (LOPRESSOR) 50 MG tablet Take 50 mg by mouth 2 times daily Yes Historical Provider, MD   folic acid (FOLVITE) 1 MG tablet TAKE ONE TABLET BY MOUTH DAILY Yes Marko Macias MD   oxybutynin (DITROPAN-XL) 10 MG extended release tablet Take 10 mg by mouth daily Yes Historical Provider, MD   finasteride (PROSCAR) 5 MG tablet Take 5 mg by mouth daily Yes Historical Provider, MD   omeprazole (PRILOSEC) 20 MG delayed release capsule Take 20 mg by mouth Yes Historical Provider, MD   gabapentin (NEURONTIN) 600 MG tablet Take 300 mg by mouth 3 times daily.   Yes Historical Provider, MD   losartan (COZAAR) 50 MG tablet Take 50 mg by mouth daily Yes Historical Provider, MD   Loratadine 10 MG CAPS Take by mouth daily  Yes Historical Provider, MD   HYDROcodone-acetaminophen (NORCO) 5-325 MG per tablet Take 1 tablet by mouth every 6 hours as needed for Pain Yes Historical Provider, MD   montelukast (SINGULAIR) 10 MG tablet Take 10 mg by mouth nightly. Yes Historical Provider, MD   tamsulosin (FLOMAX) 0.4 MG capsule Take 0.4 mg by mouth 2 times daily  Yes Historical Provider, MD   amitriptyline (ELAVIL) 100 MG tablet Take 100 mg by mouth nightly. Yes Historical Provider, MD   DULoxetine (CYMBALTA) 60 MG extended release capsule Take 60 mg by mouth daily  Yes Historical Provider, MD        OBJECTIVE:  There were no vitals taken for this visit. Physical Exam:    General: Alert male in no acute distress respiratory rate normal  Musculoskeletal: Swelling third MCP on the right hand equivocal swelling PIPs. Skin: no rashes    ASSESSMENT: Rheumatoid arthritis. Chemotherapy        PLAN: Blood work will be obtained next week to monitor for adverse drug reaction. Laboratory studies from last visit were reviewed. I will see him back 3 months time.

## 2020-10-21 NOTE — PROGRESS NOTES
Kira Darby is a 60 y.o. male being evaluated by a Virtual Visit (video visit) encounter to address concerns as mentioned above. A caregiver was present when appropriate. Due to this being a TeleHealth encounter (During JVG-98 public health emergency), evaluation of the following organ systems was limited: Vitals/Constitutional/EENT/Resp/CV/GI//MS/Neuro/Skin/Heme-Lymph-Imm. Pursuant to the emergency declaration under the [address] [place] [address] and the [employer] Act, this Virtual Visit was conducted with patient's (and/or legal guardian's) consent, to reduce the patient's risk of exposure to COVID-19 and provide necessary medical care. The patient (and/or legal guardian) has also been advised to contact this office for worsening conditions or problems, and seek emergency medical treatment and/or call 911 if deemed necessary. Patient identification was verified at the start of the visit: Yes    Total time spent for this encounter: Not billed by time    Services were provided through a video synchronous discussion virtually to substitute for in-person clinic visit. Patient and provider were located at their individual homes. --Lela Aquino RN on 10/21/2020 at 9:28 AM    An electronic signature was used to authenticate this note.

## 2020-10-27 LAB
ALBUMIN SERPL-MCNC: 3.8 G/DL (ref 3.4–5)
ALP BLD-CCNC: 94 U/L (ref 40–129)
ALT SERPL-CCNC: 11 U/L (ref 10–40)
AST SERPL-CCNC: 11 U/L (ref 15–37)
BASOPHILS ABSOLUTE: 0 K/UL (ref 0–0.2)
BASOPHILS RELATIVE PERCENT: 0.4 %
BILIRUB SERPL-MCNC: 0.5 MG/DL (ref 0–1)
BILIRUBIN DIRECT: <0.2 MG/DL (ref 0–0.3)
BILIRUBIN, INDIRECT: ABNORMAL MG/DL (ref 0–1)
CREAT SERPL-MCNC: 1 MG/DL (ref 0.8–1.3)
EOSINOPHILS ABSOLUTE: 0.2 K/UL (ref 0–0.6)
EOSINOPHILS RELATIVE PERCENT: 3.3 %
GFR AFRICAN AMERICAN: >60
GFR NON-AFRICAN AMERICAN: >60
HCT VFR BLD CALC: 35.5 % (ref 40.5–52.5)
HEMOGLOBIN: 12 G/DL (ref 13.5–17.5)
LYMPHOCYTES ABSOLUTE: 1.2 K/UL (ref 1–5.1)
LYMPHOCYTES RELATIVE PERCENT: 17.6 %
MCH RBC QN AUTO: 32.6 PG (ref 26–34)
MCHC RBC AUTO-ENTMCNC: 33.8 G/DL (ref 31–36)
MCV RBC AUTO: 96.3 FL (ref 80–100)
MONOCYTES ABSOLUTE: 0.5 K/UL (ref 0–1.3)
MONOCYTES RELATIVE PERCENT: 7.5 %
NEUTROPHILS ABSOLUTE: 4.8 K/UL (ref 1.7–7.7)
NEUTROPHILS RELATIVE PERCENT: 71.2 %
PDW BLD-RTO: 13.7 % (ref 12.4–15.4)
PLATELET # BLD: 197 K/UL (ref 135–450)
PMV BLD AUTO: 9.6 FL (ref 5–10.5)
RBC # BLD: 3.69 M/UL (ref 4.2–5.9)
TOTAL PROTEIN: 5.8 G/DL (ref 6.4–8.2)
WBC # BLD: 6.7 K/UL (ref 4–11)

## 2020-11-02 ENCOUNTER — TELEPHONE (OUTPATIENT)
Dept: CARDIOLOGY CLINIC | Age: 65
End: 2020-11-02

## 2020-11-02 NOTE — TELEPHONE ENCOUNTER
Last OV 9/10/20. DX: CAD PCI/ stent 8/25/20   On DAPT Brilinta & Aspirin. Still did not have Stress mich ordered on 9/10/20. Called patient and made aware that he should not drink alcohol. Patient verbalized and confirmed understanding.

## 2020-12-04 NOTE — PROGRESS NOTES
[address]  Cardiology Progress Note    Anaya Mendez  [birth year]    December 4, 2020      CC: \"I just have days I don't feel good. \"     HPI:  The patient is 60 y.o. male with a past medical history significant for hypertension, hyperlipidemia, VICKIE, and pre diabetes. 8/25/20 he presented semi-urgently accompanied by his wife with complaints of sharp chest pain radiating across chest, \"funny jaw pain\", SOB at rest, worsening chronic VALVERDE, diaphoresis, headache off/on, neck, jaws off/on, nausea off/on and worsening chronic fatigue. He has a long standing history of fatigue and left sided chest pain but has had an increase in intensity and frequency for the last 1 week. He states his symptoms worsen with laying down, wakes him up out of his sleep and with minimal exertion. Sitting up improves symptoms slightly. Constant with varying intensity. He does not weigh himself daily, no diuretic therapy onboard. Per patient's report, weight down 11# in one week at Dr. Coni Peraza office but he is feeling worse. Family history father with unknown heart disease and brother in his 63's with normal EKG's and cardiac workup for chest pain and was found eventually to be 90% LAD stenosis. Last night he work from symptoms around 1230 a.m., took 1nitro-sl, resolve symptoms and slept great the rest of the night, he did wake at 0300 and felt cold. When he woke this morning and started moving about the house, symptoms returned and states \"they were awakened. \" He again reports constant with exertion varying intensity. No nausea or metallic taste in mouth. He was also started on metoprolol yesterday per Dr. Janis Boston but has not started. He was sent for and is s/p LHC with PCI RCA per Dr. Jeffry Trejo. Today, he is accompanied by his wife today. He states he continues with VALVERDE and other times with no VALVERDE. Lay down, sit up, walking, stairs.  He also notices some lightheadedness, stomach cramps, food is not appealing, lost weight, Breath    Cephalosporins Itching    Ciprofloxacin Itching    Codeine Itching    Erythromycin Itching    Morphine And Related Itching    Fenoprofen Calcium Other (See Comments)     Pt doesn`t recall     Current Outpatient Medications   Medication Sig Dispense Refill    methotrexate (RHEUMATREX) 2.5 MG chemo tablet TAKE EIGHT TABLETS BY MOUTH ONCE A WEEK 32 tablet 2    busPIRone (BUSPAR) 7.5 MG tablet Take 7.5 mg by mouth Every 4 - 6 hours PRN      atorvastatin (LIPITOR) 40 MG tablet Take 1 tablet by mouth nightly 30 tablet 3    ticagrelor (BRILINTA) 90 MG TABS tablet Take 1 tablet by mouth 2 times daily 60 tablet 0    nitroGLYCERIN (NITROSTAT) 0.4 MG SL tablet Place 0.4 mg under the tongue every 5 minutes as needed for Chest pain up to max of 3 total doses. If no relief after 1 dose, call 911.  metoprolol tartrate (LOPRESSOR) 50 MG tablet Take 50 mg by mouth 2 times daily      folic acid (FOLVITE) 1 MG tablet TAKE ONE TABLET BY MOUTH DAILY 90 tablet 3    oxybutynin (DITROPAN-XL) 10 MG extended release tablet Take 10 mg by mouth daily      finasteride (PROSCAR) 5 MG tablet Take 5 mg by mouth daily      omeprazole (PRILOSEC) 20 MG delayed release capsule Take 20 mg by mouth      losartan (COZAAR) 50 MG tablet Take 50 mg by mouth daily      Loratadine 10 MG CAPS Take by mouth daily       HYDROcodone-acetaminophen (NORCO) 5-325 MG per tablet Take 1 tablet by mouth every 6 hours as needed for Pain      montelukast (SINGULAIR) 10 MG tablet Take 10 mg by mouth nightly.  tamsulosin (FLOMAX) 0.4 MG capsule Take 0.4 mg by mouth 2 times daily       amitriptyline (ELAVIL) 100 MG tablet Take 100 mg by mouth nightly.  DULoxetine (CYMBALTA) 60 MG extended release capsule Take 60 mg by mouth daily        No current facility-administered medications for this visit. Review of Systems:  · Constitutional: no unanticipated weight loss.  There's been no change in energy level, sleep pattern, or 2+ radial/dorsalis pedis/posterior tibial/carotid bilaterally. Neurological: No gross cranial nerve deficit. Coordination normal.   Skin: Skin is warm and dry. There is no rash or diaphoresis. Psychiatric: He has a normal mood and affect. His speech is normal and behavior is normal.     Lab Review:   Lab Results   Component Value Date    TRIG 118 09/15/2020    HDL 33 09/15/2020    HDL 41 03/06/2012    LDLCALC 48 09/15/2020    LABVLDL 24 09/15/2020      Lab Results   Component Value Date    BUN 17 08/26/2020    CREATININE 1.0 10/27/2020       EKG Interpretation:   8/25/20 Sinus  Rhythm  - frequent multiform ectopic ventricular beats, # VECs = 2, # types 2, consider old anterior infarct,  -Nonspecific ST depression   +   Nonspecific T-abnormality  -Nondiagnostic. 9/10/20 sinus bradycardia, 57 bpm   12/7/20 Sinus  Bradycardia  - frequent ectopic ventricular beat s, # VECs = 3, ~57 bpm.     Image Review:     Chest CT 8/1/19  Atherosclerosis, including coronary artery calcification.       Several indeterminate pulmonary nodules, measuring up to approximately 6 mm. Follow-up recommendations are as below.       RECOMMENDATIONS:   Fleischner Society guidelines for follow-up and management of incidentally   detected pulmonary nodules:       Single Solid Nodule:       Nodule size less than 6 mm   In a low-risk patient, no routine follow-up. In a high-risk patient, optional CT at 12 months.       Nodule size equals 6-8 mm   In a low-risk patient, CT at 6-12 months, then consider CT at 18-24 months. In a high-risk patient, CT at 6-12 months, then CT at 18-24 months.       Nodule size greater than 8 mm           In a low-risk patient, consider CT at 3 months, PET/CT, or tissue sampling.       In a high-risk patient, consider CT at 3 months, PET/CT, or tissue sampling.       Multiple Solid Nodules:       Nodule size less than 6 mm   In a low-risk patient, no routine follow-up.    In a high-risk patient, optional CT at 12 months.       Nodule size equals 6-8 mm   In a low-risk patient, CT at 3-6 months, then consider CT at 18-24 months. In a high-risk patient, CT at 3-6 months, then CT at 18-24 months.       Nodule size greater than 8 mm   In a low-risk patient, CT at 3-6 months, then consider CT at 18-24 months. In a high-risk patient, CT at 3-6 months, then CT at 18-24 months.       - Low risk patients include individuals with minimal or absent history of   smoking and other known risk factors.       - High risk patients include individuals with a history or smoking or known   risk factors.         Stress test 5/14/19  Normal myocardial perfusion study.    Normal myocardial perfusion.    Normal LV size and systolic function.    Overall findings represent a low risk study. ECHO 5/21/19  There is mild concentric left ventricular hypertrophy. Left ventricular cavity size is mildly dilated. Overall left ventricular systolic function appears mildly reduced. Ejection fraction is visually estimated to be 40-45%. Grade I diastolic dysfunction with normal LV filling pressures. The aortic root is mildly dilated & measures at 4.1 cms. Mildly dilated right ventricle. TAPSE 2.2cm, RV mid 4cm  IVC size is normal (<2.1cm) and collapses > 50% with respiration consistent  with normal RA pressure (3mmHg). [facility]: 8/25/20 per Dr. Earnest Lenz:  1. There is single-vessel disease. 2.  Dominant RCA, 99% with LEYDI grade 1 flow. 3.  Left main:  Free of disease. 4. LAD has a 10-20% proximal lesion. 5.  Left circumflex:  No obstructive disease.  CONCLUSION:  1. Dominant RCA has a 99% proximal stenosis with LEYDI grade 1 flow. 2.  Elevated left heart filling pressures. 3.  Normal left ventricular size and systolic function with ejection  fraction of 60%. CONCLUSION:  Successful PCI and stenting of the proximal RCA, lesion  reduced to 0%. Assessment/Plan:     CAD   Patient is here for a routine follow-up. Currently denies having any symptoms of chest tightness or pressure but continues to have shortness of breath. S/p successful PCI and stenting of proximal RCA, 99% to 0% with LEYDI 1 by Dr. Lluvia Jameson on 8/25/2020. Edwin Vargas He had also reported off/on feeling of shortness of breath which may be due to Brilinta. OMT includes lipitor,  DAPT-ASA/brilinta, lopressor, losartan. Edwin Vargas He also has had some stomach cramps on ASA that was not enteric coated ASA. He denies any abnormal bleeding or bruising. He is not able to answer definitively if he has had any dark stool. I will obtain a occult blood stool. I have also instructed to switch to [facility] ASA 81 mg daily. I have asked him to be considered for GI evaluation if his occult stool is positive or his GI symptoms still persist in spite of being switched to enteric-coated aspirin. I will also switch him to Effient 10 mg daily with one time loading dose 60 mg (6 tablets) tomorrow then once daily  to see if his VALVERDE improves/resolves. Hypertension, essential   BP is stable today on medical therapy. Cr 1.0 10/27/20. Hyperlipidemia, unspecified   Last lipid profile 9/15/20 wnl except low HDL. Pravachol 10 mg daily has been switched to Lipitor 40 mg nightly     Sleep apnea  Has not worn CPAP in several years. Would recommend he repeat sleep study. We placed referral 10/2019. We will plan 4-6 weeks. Instructed to obtain flu vaccine this season and annually. Complexity of medical decision making-high    Thank you very much for allowing me to participate in the care of your patient. Please do not hesitate to contact me if you have any questions. Sincerely,  Vern Lai MD      [address] [place] [address]  Ph: [phone]  Fax: [phone]    This note was scribed in the presence of Dr. Fitz Flores MD by Ector Dowell RN.     Physician Attestation:  The scribes documentation has been prepared under my

## 2020-12-04 NOTE — PATIENT INSTRUCTIONS
Patient Education        Learning About Coronary Artery Disease (CAD)  What is coronary artery disease? Coronary artery disease (CAD) occurs when plaque builds up in the arteries that bring oxygen-rich blood to your heart. Plaque is a fatty substance made of cholesterol, calcium, and other substances in the blood. This process is called hardening of the arteries, or atherosclerosis. What happens when you have coronary artery disease? · Plaque may narrow the coronary arteries. Narrowed arteries cause poor blood flow. This can lead to angina symptoms such as chest pain or discomfort. If blood flow is completely blocked, you could have a heart attack. · You can slow CAD and reduce the risk of future problems by making changes in your lifestyle. These include quitting smoking and eating heart-healthy foods. · Treatments for CAD, along with changes in your lifestyle, can help you live a longer and healthier life. How can you prevent coronary artery disease? · Do not smoke. It may be the best thing you can do to prevent heart disease. If you need help quitting, talk to your doctor about stop-smoking programs and medicines. These can increase your chances of quitting for good. · Be active. Get at least 30 minutes of exercise on most days of the week. Walking is a good choice. You also may want to do other activities, such as running, swimming, cycling, or playing tennis or team sports. · Eat heart-healthy foods. Eat more fruits and vegetables and less foods that contain saturated and trans fats. Limit alcohol, sodium, and sweets. · Stay at a healthy weight. Lose weight if you need to. · Manage other health problems such as diabetes, high blood pressure, and high cholesterol. How is coronary artery disease treated? · Your doctor will suggest that you make lifestyle changes. For example, your doctor may ask you to eat healthy foods, quit smoking, lose extra weight, and be more active.   · You will have to take medicines. · Your doctor may suggest a procedure to open narrowed or blocked arteries. This is called angioplasty. Or your doctor may suggest using healthy blood vessels to create detours around narrowed or blocked arteries. This is called bypass surgery. Follow-up care is a key part of your treatment and safety. Be sure to make and go to all appointments, and call your doctor if you are having problems. It's also a good idea to know your test results and keep a list of the medicines you take. Where can you learn more? Go to https://BioscanpeashlyOptimus.C2cube. org and sign in to your [employer] account. Enter [phone] in the [employer] box to learn more about \"Learning About Coronary Artery Disease (CAD). \"     If you do not have an account, please click on the \"Sign Up Now\" link. Current as of: December 16, 2019               Content Version: 12.6  © 8906-3736 [employer]. Care instructions adapted under license by [facility] ([facility]). If you have questions about a medical condition or this instruction, always ask your healthcare professional. [place] [address] any warranty or liability for your use of this information. Patient Education        Low Sodium Diet (2,000 Milligram): Care Instructions  Your Care Instructions     Too much sodium causes your body to hold on to extra water. This can raise your blood pressure and force your heart and kidneys to work harder. In very serious cases, this could cause you to be put in the hospital. It might even be life-threatening. By limiting sodium, you will feel better and lower your risk of serious problems. The most common source of sodium is salt. People get most of the salt in their diet from canned, prepared, and packaged foods. Fast food and restaurant meals also are very high in sodium. Your doctor will probably limit your sodium to less than 2,000 milligrams (mg) a day.  This limit counts all the sodium in prepared own salad dressings and sauces without adding salt. · Use less salt (or none) when recipes call for it. You can often use half the salt a recipe calls for without losing flavor. Other foods such as rice, pasta, and grains do not need added salt. · Rinse canned vegetables, and cook them in fresh water. This removes some--but not all--of the salt. · Avoid water that is naturally high in sodium or that has been treated with water softeners, which add sodium. Call your local water company to find out the sodium content of your water supply. If you buy bottled water, read the label and choose a sodium-free brand. Avoid high-sodium foods  · Avoid eating:  ? Smoked, cured, salted, and canned meat, fish, and poultry. ? Ham, frazier, hot dogs, and luncheon meats. ? Regular, hard, and processed cheese and regular peanut butter. ? Crackers with salted tops, and other salted snack foods such as pretzels, chips, and salted popcorn. ? Frozen prepared meals, unless labeled low-sodium. ? Canned and dried soups, broths, and bouillon, unless labeled sodium-free or low-sodium. ? Canned vegetables, unless labeled sodium-free or low-sodium. ? [place] fries, pizza, tacos, and other fast foods. ? Pickles, olives, ketchup, and other condiments, especially soy sauce, unless labeled sodium-free or low-sodium. Where can you learn more? Go to https://Certonayoli.HelloNature. org and sign in to your [employer] account. Enter S749 in the [facility] box to learn more about \"Low Sodium Diet (2,000 Milligram): Care Instructions. \"     If you do not have an account, please click on the \"Sign Up Now\" link. Current as of: August 22, 2019               Content Version: 12.6  © 8068-2445 [employer]. Care instructions adapted under license by [facility] ([facility]).  If you have questions about a medical condition or this instruction, always ask your healthcare professional. Woodrow Russell disclaims any warranty or liability for your use of this information. Patient Education        Heart-Healthy Diet: Care Instructions  Your Care Instructions     A heart-healthy diet has lots of vegetables, fruits, nuts, beans, and whole grains, and is low in salt. It limits foods that are high in saturated fat, such as meats, cheeses, and fried foods. It may be hard to change your diet, but even small changes can lower your risk of heart attack and heart disease. Follow-up care is a key part of your treatment and safety. Be sure to make and go to all appointments, and call your doctor if you are having problems. It's also a good idea to know your test results and keep a list of the medicines you take. How can you care for yourself at home? Watch your portions  · Learn what a serving is. A \"serving\" and a \"portion\" are not always the same thing. Make sure that you are not eating larger portions than are recommended. For example, a serving of pasta is ½ cup. A serving size of meat is 2 to 3 ounces. A 3-ounce serving is about the size of a deck of cards. Measure serving sizes until you are good at Anderson" them. Keep in mind that restaurants often serve portions that are 2 or 3 times the size of one serving. · To keep your energy level up and keep you from feeling hungry, eat often but in smaller portions. · Eat only the number of calories you need to stay at a healthy weight. If you need to lose weight, eat fewer calories than your body burns (through exercise and other physical activity). Eat more fruits and vegetables  · Eat a variety of fruit and vegetables every day. Dark green, deep orange, red, or yellow fruits and vegetables are especially good for you. Examples include spinach, carrots, peaches, and berries. · Keep carrots, celery, and other veggies handy for snacks. Buy fruit that is in season and store it where you can see it so that you will be tempted to eat it.   · Cook dishes that have a lot of veggies in them, such as stir-fries and soups. Limit saturated and trans fat  · Read food labels, and try to avoid saturated and trans fats. They increase your risk of heart disease. · Use olive or canola oil when you cook. · Bake, broil, grill, or steam foods instead of frying them. · Choose lean meats instead of high-fat meats such as hot dogs and sausages. Cut off all visible fat when you prepare meat. · Eat fish, skinless poultry, and meat alternatives such as soy products instead of high-fat meats. Soy products, such as tofu, may be especially good for your heart. · Choose low-fat or fat-free milk and dairy products. Eat foods high in fiber  · Eat a variety of grain products every day. Include whole-grain foods that have lots of fiber and nutrients. Examples of whole-grain foods include oats, whole wheat bread, and brown rice. · Buy whole-grain breads and cereals, instead of white bread or pastries. Limit salt and sodium  · Limit how much salt and sodium you eat to help lower your blood pressure. · Taste food before you salt it. Add only a little salt when you think you need it. With time, your taste buds will adjust to less salt. · Eat fewer snack items, fast foods, and other high-salt, processed foods. Check food labels for the amount of sodium in packaged foods. · Choose low-sodium versions of canned goods (such as soups, vegetables, and beans). Limit sugar  · Limit drinks and foods with added sugar. These include candy, desserts, and soda pop. Limit alcohol  · Limit alcohol to no more than 2 drinks a day for men and 1 drink a day for women. Too much alcohol can cause health problems. When should you call for help? Watch closely for changes in your health, and be sure to contact your doctor if:    · You would like help planning heart-healthy meals. Where can you learn more? Go to https://bobby.healthKlir [email or web address]. org and sign in to your Checkd.In account.  Enter V137 in the [facility] box to learn more about \"Heart-Healthy Diet: Care Instructions. \"     If you do not have an account, please click on the \"Sign Up Now\" link. Current as of: August 22, 2019               Content Version: 12.6  © 1010-5982 [employer], Incorporated. Care instructions adapted under license by [facility] ([facility]). If you have questions about a medical condition or this instruction, always ask your healthcare professional. [address] any warranty or liability for your use of this information. Patient Education        prasugrel  Pronunciation:  SHELLI jaquez  Brand:  Effient  What is the most important information I should know about prasugrel? You should not take prasugrel if you have active bleeding such as a stomach ulcer or bleeding in the brain, if you have ever had a stroke or \"mini-stroke\", or if you are scheduled to have surgery, especially heart bypass surgery. Prasugrel increases your risk of bleeding, which can be severe or life-threatening. Call your doctor or seek emergency medical attention if you have bleeding that will not stop, if you have black or bloody stools, or if you cough up blood or vomit that looks like coffee grounds. You may need to stop using the medicine for a short time before any surgery or dental treatment. Do not stop taking prasugrel unless your doctor tells you to. What is prasugrel? Prasugrel is used to prevent blood clots in people with acute coronary syndrome who are undergoing a procedure after a recent heart attack or stroke, and in people with certain disorders of the heart or blood vessels. Prasugrel may also be used for other purposes not listed in this medication guide. What should I discuss with my healthcare provider before taking prasugrel?   You should not use prasugrel if you are allergic to it, or if you have:  · any active bleeding such as a stomach ulcer or bleeding in the brain (such as from a head injury);  · a history of stroke, including TIA (\"mini-stroke\"); or  · if you are scheduled to have surgery, especially heart bypass surgery (coronary artery bypass graft, or CABG). Tell your doctor if you have ever had:  · a stomach ulcer;  · bleeding problems;  · surgery, an injury, or a medical emergency;  · liver or kidney disease;  · a severe allergic reaction to clopidogrel (Plavix) or ticlopidine (Ticlid); o  · if you weigh less than 132 pounds; or  · if you also use other medicines to treat or prevent blood clots. Aspirin is usually given with prasugrel, and aspirin can cause bleeding when taken during the last 3 months of pregnancy. Aspirin can also cause side effects in a  baby. Tell your doctor if you are pregnant or plan to become pregnant during treatment. You should not breast-feed while using this medicine. How should I take prasugrel? Follow all directions on your prescription label and read all medication guides or instruction sheets. Use the medicine exactly as directed. Prasugrel can be taken with or without food. If you also take aspirin, follow your doctor's instructions about how much aspirin to take and for how long. Because prasugrel keeps your blood from clotting, this medicine can also make it easier for you to bleed, even from a minor injury such as a fall or a bump on the head. Seek emergency medical attention if you have any bleeding that will not stop. If you need surgery or dental work, tell the surgeon or dentist ahead of time that you are using prasugrel. You may need to stop using the medicine for a short time. Do not stop taking prasugrel unless your doctor tells you to. If you stop taking this medicine too soon you could have life-threatening medical problems such as a blood clot or a heart attack. Store at room temperature away from moisture and heat. Keep the tablets in their original container, along with the packet or canister of moisture-absorbing preservative.   What happens if I miss a dose?  Take the medicine as soon as you can, but skip the missed dose if it is almost time for your next dose. Do not take two doses at one time. What happens if I overdose? Seek emergency medical attention or call the Poison Help line at 1-581.677.3843. What should I avoid while taking prasugrel? Avoid activities that may increase your risk of bleeding or injury. Use extra care to prevent bleeding while shaving or brushing your teeth. Ask your doctor before taking a nonsteroidal anti-inflammatory drug (NSAID) for pain, arthritis, fever, or swelling. This includes aspirin, ibuprofen (Advil, Motrin), naproxen (Aleve), and others. Using an NSAID with prasugrel may cause you to bruise or bleed easily. What are the possible side effects of prasugrel? Get emergency medical help if you have signs of an allergic reaction: hives; dizziness, chest pain, difficulty breathing; swelling of your face, lips, tongue, or throat. Call your doctor at once if you have:  · a light-headed feeling, like you might pass out;  · any bleeding that will not stop;  · pink or brown urine;  · signs of a serious blood-clotting problem --pale skin, purple spots under your skin or on your mouth, fever, fast heart rate, weakness, stomach pain, trouble breathing, jaundice (yellowing of the skin or eyes);  · signs of stomach bleeding --bloody or tarry stools, coughing up blood or vomit that looks like coffee grounds; or  · signs of a stroke --sudden numbness or weakness (especially on one side of the body), sudden severe headache, slurred speech, problems with vision or balance. The risk of bleeding is higher in older adults. Common side effects may include:  · nosebleeds; or  · easy bruising or bleeding. This is not a complete list of side effects and others may occur. Call your doctor for medical advice about side effects. You may report side effects to FDA at 9-969-FDA-6835. What other drugs will affect prasugrel?   Taking prasugrel with certain other drugs can increase your risk of bleeding. Tell your doctor about all your other medicines, especially:  · opioid medication;  · any other medicines to treat or prevent blood clots, including heparin or warfarin (Coumadin, Jantoven); or  · NSAIDs (nonsteroidal anti-inflammatory drugs) --aspirin, ibuprofen (Advil, Motrin), naproxen (Aleve), celecoxib, diclofenac, indomethacin, meloxicam, and others. This list is not complete. Other drugs may affect prasugrel, including prescription and over-the-counter medicines, vitamins, and herbal products. Not all possible drug interactions are listed here. Where can I get more information? Your pharmacist can provide more information about prasugrel. Remember, keep this and all other medicines out of the reach of children, never share your medicines with others, and use this medication only for the indication prescribed. Every effort has been made to ensure that the information provided by [address] is accurate, up-to-date, and complete, but no guarantee is made to that effect. Drug information contained herein may be time sensitive. [facility] information has been compiled for use by healthcare practitioners and consumers in the [place] and therefore [employer] does not warrant that uses outside of the [place] are appropriate, unless specifically indicated otherwise. [facility]'s drug information does not endorse drugs, diagnose patients or recommend therapy. [facility] drug information is an informational resource designed to assist licensed healthcare practitioners in caring for their patients and/or to serve consumers viewing this service as a supplement to, and not a substitute for, the expertise, skill, knowledge and judgment of healthcare practitioners.  The absence of a warning for a given drug or drug combination in no way should be construed to indicate that the drug or drug combination is safe, effective or appropriate for any given patient. [facility] does not assume any responsibility for any aspect of healthcare administered with the aid of information [facility] provides. The information contained herein is not intended to cover all possible uses, directions, precautions, warnings, drug interactions, allergic reactions, or adverse effects. If you have questions about the drugs you are taking, check with your doctor, nurse or pharmacist.  Copyright 5189-7797 [address] [place] [address] Dr HODGE. Version: 3.01. Revision date: 3/18/2019. Care instructions adapted under license by [facility] ([facility]). If you have questions about a medical condition or this instruction, always ask your healthcare professional. [address] any warranty or liability for your use of this information.

## 2020-12-07 ENCOUNTER — OFFICE VISIT (OUTPATIENT)
Dept: CARDIOLOGY CLINIC | Age: 65
End: 2020-12-07
Payer: COMMERCIAL

## 2020-12-07 VITALS
DIASTOLIC BLOOD PRESSURE: 66 MMHG | SYSTOLIC BLOOD PRESSURE: 110 MMHG | BODY MASS INDEX: 34.07 KG/M2 | HEIGHT: 75 IN | OXYGEN SATURATION: 97 % | TEMPERATURE: 97.9 F | HEART RATE: 62 BPM | WEIGHT: 274 LBS

## 2020-12-07 PROCEDURE — 99214 OFFICE O/P EST MOD 30 MIN: CPT | Performed by: INTERNAL MEDICINE

## 2020-12-07 PROCEDURE — 93000 ELECTROCARDIOGRAM COMPLETE: CPT | Performed by: INTERNAL MEDICINE

## 2020-12-07 RX ORDER — PRASUGREL 10 MG/1
10 TABLET, FILM COATED ORAL DAILY
Qty: 30 TABLET | Refills: 6 | Status: SHIPPED | OUTPATIENT
Start: 2020-12-07 | End: 2021-07-20 | Stop reason: SDUPTHER

## 2020-12-07 RX ORDER — ASPIRIN 81 MG/1
81 TABLET ORAL DAILY
Qty: 30 TABLET | Refills: 6 | Status: SHIPPED | OUTPATIENT
Start: 2020-12-07 | End: 2021-07-02

## 2020-12-07 RX ORDER — PRASUGREL 10 MG/1
TABLET, FILM COATED ORAL
Qty: 1 TABLET | Refills: 0 | Status: SHIPPED | OUTPATIENT
Start: 2020-12-07 | End: 2021-01-04

## 2020-12-07 NOTE — TELEPHONE ENCOUNTER
Alfred Glynn called in from [facility] this afternoon wanting clarification on the medication effient. Is it One time loading dose of 60 mg (6 tablets) by mouth tomorrow then once daily or Take 1 tablet by mouth daily.     [facility] number [phone]

## 2020-12-21 RX ORDER — ASPIRIN 81 MG
TABLET,CHEWABLE ORAL
Qty: 30 TABLET | Refills: 2 | Status: SHIPPED
Start: 2020-12-21 | End: 2021-01-07 | Stop reason: ALTCHOICE

## 2020-12-28 RX ORDER — ATORVASTATIN CALCIUM 40 MG/1
TABLET, FILM COATED ORAL
Qty: 30 TABLET | Refills: 2 | Status: SHIPPED | OUTPATIENT
Start: 2020-12-28 | End: 2021-03-29

## 2021-01-04 RX ORDER — PRASUGREL 10 MG/1
10 TABLET, FILM COATED ORAL DAILY
Qty: 30 TABLET | Refills: 5 | Status: SHIPPED
Start: 2021-01-04 | End: 2021-01-07 | Stop reason: SDUPTHER

## 2021-01-07 ENCOUNTER — OFFICE VISIT (OUTPATIENT)
Dept: CARDIOLOGY CLINIC | Age: 66
End: 2021-01-07
Payer: COMMERCIAL

## 2021-01-07 VITALS
DIASTOLIC BLOOD PRESSURE: 64 MMHG | SYSTOLIC BLOOD PRESSURE: 114 MMHG | OXYGEN SATURATION: 96 % | HEART RATE: 70 BPM | BODY MASS INDEX: 33.94 KG/M2 | HEIGHT: 75 IN | TEMPERATURE: 97.8 F | WEIGHT: 273 LBS

## 2021-01-07 DIAGNOSIS — G47.30 SLEEP APNEA, UNSPECIFIED TYPE: ICD-10-CM

## 2021-01-07 DIAGNOSIS — I25.10 CORONARY ARTERY DISEASE INVOLVING NATIVE CORONARY ARTERY OF NATIVE HEART WITHOUT ANGINA PECTORIS: Primary | ICD-10-CM

## 2021-01-07 DIAGNOSIS — I10 ESSENTIAL HYPERTENSION: ICD-10-CM

## 2021-01-07 DIAGNOSIS — E78.5 HYPERLIPIDEMIA, UNSPECIFIED HYPERLIPIDEMIA TYPE: ICD-10-CM

## 2021-01-07 PROCEDURE — 1036F TOBACCO NON-USER: CPT | Performed by: INTERNAL MEDICINE

## 2021-01-07 PROCEDURE — 3017F COLORECTAL CA SCREEN DOC REV: CPT | Performed by: INTERNAL MEDICINE

## 2021-01-07 PROCEDURE — G8484 FLU IMMUNIZE NO ADMIN: HCPCS | Performed by: INTERNAL MEDICINE

## 2021-01-07 PROCEDURE — G8417 CALC BMI ABV UP PARAM F/U: HCPCS | Performed by: INTERNAL MEDICINE

## 2021-01-07 PROCEDURE — 99214 OFFICE O/P EST MOD 30 MIN: CPT | Performed by: INTERNAL MEDICINE

## 2021-01-07 PROCEDURE — G8427 DOCREV CUR MEDS BY ELIG CLIN: HCPCS | Performed by: INTERNAL MEDICINE

## 2021-01-07 NOTE — PROGRESS NOTES
[facility]  Cardiology Progress Note    Britney Flanagan  [id number] January 7, 2021      CC: \"I still have some nausea and dizziness. \"      HPI:  The patient is 60 y.o. male with a past medical history significant for hypertension, hyperlipidemia, VICKIE, and pre diabetes. 8/25/20 he presented semi-urgently accompanied by his wife with complaints of sharp chest pain radiating across chest, \"funny jaw pain\", SOB at rest, worsening chronic VALVERDE, diaphoresis, headache off/on, neck, jaws off/on, nausea off/on and worsening chronic fatigue. He has a long standing history of fatigue and left sided chest pain but has had an increase in intensity and frequency for the last 1 week. He states his symptoms worsen with laying down, wakes him up out of his sleep and with minimal exertion. Sitting up improves symptoms slightly. Constant with varying intensity. He does not weigh himself daily, no diuretic therapy onboard. Per patient's report, weight down 11# in one week at Dr. Mindy Zapata office but he is feeling worse. Family history father with unknown heart disease and brother in his 63's with normal EKG's and cardiac workup for chest pain and was found eventually to be 90% LAD stenosis. Last night he work from symptoms around 1230 a.m., took 1nitro-sl, resolve symptoms and slept great the rest of the night, he did wake at 0300 and felt cold. When he woke this morning and started moving about the house, symptoms returned and states \"they were awakened. \" He again reports constant with exertion varying intensity. No nausea or metallic taste in mouth. He was also started on metoprolol yesterday per Dr. Ana M Olivares but has not started. He was sent for and is s/p C with PCI RCA per Dr. Darshana Del Toro. At his last visit 12/7/20. He states he continues with VALVERDE and other times with no VALVERDE. Lay down, sit up, walking, stairs.  He also notices some lightheadedness, stomach cramps, food is not appealing, lost weight, days he doesn't eat. He forgot this past Friday to take aspirin, and he felt \"the best he had in a week. \" He reports that he has not taken since and the stomach cramps and anorexia have resolved. He is on Prilosec 20 mg daily. He is not taking enteric coated. Today, he is again accompanied by his wife. He continues with nausea and dizziness since our last visit. He also offers on and off poor appetite. He has been trying to work on a heart healthy diet, eating lighter meals. His wife spaced out his medical therapy and they both feel this has improved his symptoms, especially switching to [facility] Aspirin. They also report improvement in SOB with switching from Brilinta to Effient. Dr. Lance Hernandez is holding his Flomax and reduced his metoprolol to 1/2 daily due to low BP. He denies any new cardiac sounding complaints today. He reports medical therapy compliance and otherwise feels he is tolerating. He denies any abnormal bruising or bleeding. Patient specifically denies exertional chest pain/pressure, dyspnea at rest, VALVERDE, PND, orthopnea, palpitations, lightheadedness, weight changes, changes in LE edema, and syncope. They report he has not required SL Nitro.        Past Medical History:   Diagnosis Date    Arthritis     Asthma     BPH (benign prostatic hyperplasia)     Cardiomyopathy ([address]     GERD (gastroesophageal reflux disease)     HTN (hypertension) 10/29/2012    MVP (mitral valve prolapse)     PPD positive     Prolonged emergence from general anesthesia     Prostatism     Tricuspid valve prolapse     Unspecified sleep apnea      Past Surgical History:   Procedure Laterality Date    CARDIAC CATHETERIZATION      CHOLECYSTECTOMY      FOOT SURGERY      HAND SURGERY Right 11/09/2017    thumb mass excision    INGUINAL HERNIA REPAIR      bilateral    KNEE ARTHROSCOPY      OTHER SURGICAL HISTORY  08/28/2019    epidural Dr. Sendy Luu at [address]      right elbow    TONSILLECTOMY (COZAAR) 50 MG tablet Take 50 mg by mouth daily      Loratadine 10 MG CAPS Take by mouth daily       HYDROcodone-acetaminophen (NORCO) 5-325 MG per tablet Take 1 tablet by mouth every 6 hours as needed for Pain      montelukast (SINGULAIR) 10 MG tablet Take 10 mg by mouth nightly.  amitriptyline (ELAVIL) 100 MG tablet Take 100 mg by mouth nightly.  DULoxetine (CYMBALTA) 60 MG extended release capsule Take 60 mg by mouth daily        No current facility-administered medications for this visit. Review of Systems:  · Constitutional: no unanticipated weight loss. There's been no change in energy level, sleep pattern, or activity level. No fevers, chills. · Eyes: No visual changes or diplopia. No scleral icterus. · ENT: No Headaches, hearing loss or vertigo. No mouth sores or sore throat. · Cardiovascular: as reviewed in HPI  · Respiratory: No cough or wheezing, no sputum production. No hematemesis. · Gastrointestinal: + nausea. No abdominal pain, appetite loss, blood in stools. No change in bowel or bladder habits. · Genitourinary: No dysuria, trouble voiding, or hematuria. · Musculoskeletal:  No gait disturbance, no joint complaints. · Integumentary: No rash or pruritis. · Neurological: No headache, diplopia, change in muscle strength, numbness or tingling.  + dizziness. · Psychiatric: No anxiety or depression. · Endocrine: No temperature intolerance. No excessive thirst, fluid intake, or urination. No tremor. · Hematologic/Lymphatic: No abnormal bruising or bleeding, blood clots or swollen lymph nodes. · Allergic/Immunologic: No nasal congestion or hives.     Physical Exam:   /64   Pulse 70   Temp 97.8 °F (36.6 °C)   Ht 6' 3\" (1.905 m)   Wt 273 lb (123.8 kg) Comment: with shoes  SpO2 96%   BMI 34.12 kg/m²   Wt Readings from Last 3 Encounters:   01/07/21 273 lb (123.8 kg)   12/07/20 274 lb (124.3 kg)   09/10/20 281 lb (127.5 kg)     Constitutional: He is oriented to person, place, and time. He appears well-developed and well-nourished. In no acute distress. Head: Normocephalic and atraumatic. Pupils equal and round. Neck: Neck supple. No JVP or carotid bruit appreciated. No mass and no thyromegaly present. No lymphadenopathy present. Cardiovascular: Normal rate. Normal heart sounds. Exam reveals no gallop and no friction rub. No murmur heard. Pulmonary/Chest: Effort normal and breath sounds normal. No respiratory distress. He has no wheezes, rhonchi or rales. Abdominal: Soft, non-tender. Bowel sounds are normal. He exhibits no organomegaly, mass or bruit. Extremities: No edema, cyanosis, or clubbing. Pulses are 2+ radial/dorsalis pedis/posterior tibial/carotid bilaterally. Neurological: No gross cranial nerve deficit. Coordination normal.   Skin: Skin is warm and dry. There is no rash or diaphoresis. Psychiatric: He has a normal mood and affect. His speech is normal and behavior is normal.     Lab Review:   Lab Results   Component Value Date    TRIG 118 09/15/2020    HDL 33 09/15/2020    HDL 41 03/06/2012    LDLCALC 48 09/15/2020    LABVLDL 24 09/15/2020      Lab Results   Component Value Date    BUN 17 08/26/2020    CREATININE 1.0 10/27/2020       EKG Interpretation:   8/25/20 Sinus  Rhythm  - frequent multiform ectopic ventricular beats, # VECs = 2, # types 2, consider old anterior infarct,  -Nonspecific ST depression   +   Nonspecific T-abnormality  -Nondiagnostic. 9/10/20 sinus bradycardia, 57 bpm   12/7/20 Sinus  Bradycardia  - frequent ectopic ventricular beat s, # VECs = 3, ~57 bpm.   1/7/21 not completed today     Image Review:     Chest CT 8/1/19  Atherosclerosis, including coronary artery calcification.       Several indeterminate pulmonary nodules, measuring up to approximately 6 mm.    Follow-up recommendations are as below.       RECOMMENDATIONS:   Fleischner Society guidelines for follow-up and management of incidentally   detected pulmonary nodules:     Single Solid Nodule:       Nodule size less than 6 mm   In a low-risk patient, no routine follow-up. In a high-risk patient, optional CT at 12 months.       Nodule size equals 6-8 mm   In a low-risk patient, CT at 6-12 months, then consider CT at 18-24 months. In a high-risk patient, CT at 6-12 months, then CT at 18-24 months.       Nodule size greater than 8 mm           In a low-risk patient, consider CT at 3 months, PET/CT, or tissue sampling.       In a high-risk patient, consider CT at 3 months, PET/CT, or tissue sampling.       Multiple Solid Nodules:       Nodule size less than 6 mm   In a low-risk patient, no routine follow-up. In a high-risk patient, optional CT at 12 months.       Nodule size equals 6-8 mm   In a low-risk patient, CT at 3-6 months, then consider CT at 18-24 months. In a high-risk patient, CT at 3-6 months, then CT at 18-24 months.       Nodule size greater than 8 mm   In a low-risk patient, CT at 3-6 months, then consider CT at 18-24 months. In a high-risk patient, CT at 3-6 months, then CT at 18-24 months.       - Low risk patients include individuals with minimal or absent history of   smoking and other known risk factors.       - High risk patients include individuals with a history or smoking or known   risk factors.         Stress test 5/14/19  Normal myocardial perfusion study.    Normal myocardial perfusion.    Normal LV size and systolic function.    Overall findings represent a low risk study. ECHO 5/21/19  There is mild concentric left ventricular hypertrophy. Left ventricular cavity size is mildly dilated. Overall left ventricular systolic function appears mildly reduced. Ejection fraction is visually estimated to be 40-45%. Grade I diastolic dysfunction with normal LV filling pressures. The aortic root is mildly dilated & measures at 4.1 cms. Mildly dilated right ventricle.   TAPSE 2.2cm, RV mid 4cm  IVC size is normal (<2.1cm) and collapses > 50% with respiration consistent  with normal RA pressure (3mmHg). [facility]: 8/25/20 per Dr. Negrita Rojas:  1. There is single-vessel disease. 2.  Dominant RCA, 99% with LEYDI grade 1 flow. 3.  Left main:  Free of disease. 4. LAD has a 10-20% proximal lesion. 5.  Left circumflex:  No obstructive disease.  CONCLUSION:  1. Dominant RCA has a 99% proximal stenosis with LEYDI grade 1 flow. 2.  Elevated left heart filling pressures. 3.  Normal left ventricular size and systolic function with ejection  fraction of 60%. CONCLUSION:  Successful PCI and stenting of the proximal RCA, lesion  reduced to 0%. Assessment/Plan:     CAD   S/p successful PCI and stenting of proximal RCA, 99% to 0% with LEYDI 1 by Dr. Prince Alcala on 8/25/2020. Patient is here for a 1 month follow-up. Currently denies having any symptoms of chest tightness or pressure but continues to have shortness of breath that he feels has improved with switching Brilinta to Effient. He also has had an improvement in stomach cramps but continues with off/on nausea with switching his ASA to an enteric coated tablet. OMT includes lipitor,  DAPT-ASA/brilinta, lopressor, losartan. Continue to work on heart healthy diet, low salt diet and slowly increase walking program.     Hypertension, essential   BP is stable today on medical therapy. Cr 1.0 10/27/20. Hyperlipidemia, unspecified   Last lipid profile 9/15/20 wnl except low HDL. Pravachol 10 mg daily has been switched to Lipitor 40 mg nightly     Sleep apnea  Has not worn CPAP in several years. Would recommend he repeat sleep study. We placed referral. Will follow up next visit. We will plan 6 months. Instructed to obtain flu vaccine this season and annually. Thank you very much for allowing me to participate in the care of your patient. Please do not hesitate to contact me if you have any questions.     Sincerely,  Duarte Warner MD      [address]  [facility], [address] [place] [address]  Ph: [phone]  Fax: [phone]    This note was scribed in the presence of Dr. Davi Guillory MD by Manan Méndez RN.

## 2021-01-07 NOTE — PATIENT INSTRUCTIONS
Patient Education        Walking for Exercise: Care Instructions  Your Care Instructions     Walking is one of the easiest ways to get the exercise you need for good health. A brisk, 30-minute walk each day can help you feel better and have more energy. It can help you lower your risk of disease. Walking can help you keep your bones strong and your heart healthy. Check with your doctor before you start a walking plan if you have heart problems, other health issues, or you have not been active in a long time. Follow your doctor's instructions for safe levels of exercise. Follow-up care is a key part of your treatment and safety. Be sure to make and go to all appointments, and call your doctor if you are having problems. It's also a good idea to know your test results and keep a list of the medicines you take. How can you care for yourself at home? Getting started  · Start slowly and set a short-term goal. For example, walk for 5 or 10 minutes every day. · Bit by bit, increase the amount you walk every day. Try for at least 30 minutes on most days of the week. You also may want to swim, bike, or do other activities. · If finding enough time is a problem, it is fine to be active in blocks of 10 minutes or more throughout your day and week. · To get the heart-healthy benefits of walking, you need to walk briskly enough to increase your heart rate and breathing, but not so fast that you cannot talk comfortably. · Wear comfortable shoes that fit well and provide good support for your feet and ankles. Staying with your plan  · After you've made walking a habit, set a longer-term goal. You may want to set a goal of walking briskly for longer or walking farther. Experts say to do 2½ hours of moderate activity a week. A faster heartbeat is what defines moderate-level activity. · To stay motivated, walk with friends, coworkers, or pets. Care instructions adapted under license by [facility] ([facility]). If you have questions about a medical condition or this instruction, always ask your healthcare professional. [address] any warranty or liability for your use of this information. Patient Education        Heart-Healthy Diet: Care Instructions  Your Care Instructions     A heart-healthy diet has lots of vegetables, fruits, nuts, beans, and whole grains, and is low in salt. It limits foods that are high in saturated fat, such as meats, cheeses, and fried foods. It may be hard to change your diet, but even small changes can lower your risk of heart attack and heart disease. Follow-up care is a key part of your treatment and safety. Be sure to make and go to all appointments, and call your doctor if you are having problems. It's also a good idea to know your test results and keep a list of the medicines you take. How can you care for yourself at home? Watch your portions  · Learn what a serving is. A \"serving\" and a \"portion\" are not always the same thing. Make sure that you are not eating larger portions than are recommended. For example, a serving of pasta is ½ cup. A serving size of meat is 2 to 3 ounces. A 3-ounce serving is about the size of a deck of cards. Measure serving sizes until you are good at [place]" them. Keep in mind that restaurants often serve portions that are 2 or 3 times the size of one serving. · To keep your energy level up and keep you from feeling hungry, eat often but in smaller portions. · Eat only the number of calories you need to stay at a healthy weight. If you need to lose weight, eat fewer calories than your body burns (through exercise and other physical activity).   Eat more fruits and vegetables · Eat a variety of fruit and vegetables every day. Dark green, deep orange, red, or yellow fruits and vegetables are especially good for you. Examples include spinach, carrots, peaches, and berries. · Keep carrots, celery, and other veggies handy for snacks. Buy fruit that is in season and store it where you can see it so that you will be tempted to eat it. · Cook dishes that have a lot of veggies in them, such as stir-fries and soups. Limit saturated and trans fat  · Read food labels, and try to avoid saturated and trans fats. They increase your risk of heart disease. · Use olive or canola oil when you cook. · Bake, broil, grill, or steam foods instead of frying them. · Choose lean meats instead of high-fat meats such as hot dogs and sausages. Cut off all visible fat when you prepare meat. · Eat fish, skinless poultry, and meat alternatives such as soy products instead of high-fat meats. Soy products, such as tofu, may be especially good for your heart. · Choose low-fat or fat-free milk and dairy products. Eat foods high in fiber  · Eat a variety of grain products every day. Include whole-grain foods that have lots of fiber and nutrients. Examples of whole-grain foods include oats, whole wheat bread, and brown rice. · Buy whole-grain breads and cereals, instead of white bread or pastries. Limit salt and sodium  · Limit how much salt and sodium you eat to help lower your blood pressure. · Taste food before you salt it. Add only a little salt when you think you need it. With time, your taste buds will adjust to less salt. · Eat fewer snack items, fast foods, and other high-salt, processed foods. Check food labels for the amount of sodium in packaged foods. · Choose low-sodium versions of canned goods (such as soups, vegetables, and beans). Limit sugar  · Limit drinks and foods with added sugar. These include candy, desserts, and soda pop.   Limit alcohol · Limit alcohol to no more than 2 drinks a day for men and 1 drink a day for women. Too much alcohol can cause health problems. When should you call for help? Watch closely for changes in your health, and be sure to contact your doctor if:    · You would like help planning heart-healthy meals. Where can you learn more? Go to https://chyoli.healthDevtap. org and sign in to your Damballa account. Enter V137 in the [employer]BridGe box to learn more about \"Heart-Healthy Diet: Care Instructions. \"     If you do not have an account, please click on the \"Sign Up Now\" link. Current as of: August 22, 2019               Content Version: 12.6  © 2006-2020 [employer]. Care instructions adapted under license by [facility] ([facility]). If you have questions about a medical condition or this instruction, always ask your healthcare professional. [place] [address] any warranty or liability for your use of this information. Patient Education        How to Read a Food Label to Limit Sodium: Care Instructions  Your Care Instructions  Sodium causes your body to hold on to extra water. This can raise your blood pressure and force your heart and kidneys to work harder. In very serious cases, this could cause you to be put in the hospital. It might even be life-threatening. By limiting sodium, you will feel better and lower your risk of serious problems. Processed foods, fast food, and restaurant foods are the major sources of dietary sodium. The most common name for sodium is salt. Try to limit how much sodium you eat to less than 2,300 milligrams (mg) a day. If you limit your sodium to 1,500 mg a day, you can lower your blood pressure even more. This limit counts all the salt that you eat in foods you cook or in packaged foods. Keep a list of everything you eat and drink. Follow-up care is a key part of your treatment and safety. Be sure to make and go to all appointments, and call your doctor if you are having problems. It's also a good idea to know your test results and keep a list of the medicines you take. How can you care for yourself at home? Read ingredient lists on food labels  · Read the list of ingredients on food labels to help you find how much sodium is in a food. The label lists the ingredients in a food in descending order (from the most to the least). If salt or sodium is high on the list, there may be a lot of sodium in the food. · Know that sodium has different names. Sodium is also called monosodium glutamate (MSG, common in [facility] food), sodium citrate, sodium alginate, sodium hydroxide, and sodium phosphate. Read Nutrition Facts labels  · On most foods, there is a Nutrition Facts label. This will tell you how much sodium is in one serving of food. Look at both the serving size and the sodium amount. The serving size is located at the top of the label, usually right under the \"Nutrition Facts\" title. The amount of sodium is given in the list under the title. It is given in milligrams (mg). ? Check the serving size carefully. A single serving is often very small, and you may eat more than one serving. If this is the case, you will eat more sodium than listed on the label. For example, if the serving size for a canned soup is 1 cup and the sodium amount is 470 mg, if you have 2 cups you will eat 940 mg of sodium. · The nutrition facts for fresh fruits and vegetables are not listed on the food. They may be listed somewhere in the store. These foods usually have no sodium or low sodium. · The Nutrition Facts label also gives you the Percent Daily Value for sodium. This is how much of the recommended amount of sodium a serving contains. The daily value for sodium is less than 2,300 mg. So if the Percent Daily Value says 50%, this means one serving is giving you half of this, or 1,150 mg. Buy low-sodium foods  · Look for foods that are made with less sodium. Watch for the following words on the label. ? \"Unsalted\" means there is no sodium added to the food. But there may be sodium already in the food naturally. ? \"Sodium-free\" means a serving has less than 5 mg of sodium. ? \"Very low sodium\" means a serving has 35 mg or less of sodium. ? \"Low-sodium\" means a serving has 140 mg or less of sodium. · \"Reduced-sodium\" means that there is 25% less sodium than what the food normally has. This is still usually too much sodium. Try not to buy foods with this on the label. · Buy fresh vegetables, or frozen vegetables without added sauces. Buy low-sodium versions of canned vegetables, soups, and other canned goods. Where can you learn more? Go to https://MailanapeCO2Nexus.NewsiT. org and sign in to your logolineup account. Enter 26 [id number] in the [facility] box to learn more about \"How to Read a Food Label to Limit Sodium: Care Instructions. \"     If you do not have an account, please click on the \"Sign Up Now\" link. Current as of: August 22, 2019               Content Version: 12.6  © 4725-7156 [employer]. Care instructions adapted under license by [facility] ([facility]). If you have questions about a medical condition or this instruction, always ask your healthcare professional. [place] [address] any warranty or liability for your use of this information. Patient Education        Learning About Coronary Artery Disease (CAD)  What is coronary artery disease? Coronary artery disease (CAD) occurs when plaque builds up in the arteries that bring oxygen-rich blood to your heart. Plaque is a fatty substance made of cholesterol, calcium, and other substances in the blood. This process is called hardening of the arteries, or atherosclerosis. What happens when you have coronary artery disease? · Plaque may narrow the coronary arteries. Narrowed arteries cause poor blood flow. This can lead to angina symptoms such as chest pain or discomfort. If blood flow is completely blocked, you could have a heart attack. · You can slow CAD and reduce the risk of future problems by making changes in your lifestyle. These include quitting smoking and eating heart-healthy foods. · Treatments for CAD, along with changes in your lifestyle, can help you live a longer and healthier life. How can you prevent coronary artery disease? · Do not smoke. It may be the best thing you can do to prevent heart disease. If you need help quitting, talk to your doctor about stop-smoking programs and medicines. These can increase your chances of quitting for good. · Be active. Get at least 30 minutes of exercise on most days of the week. Walking is a good choice. You also may want to do other activities, such as running, swimming, cycling, or playing tennis or team sports. · Eat heart-healthy foods. Eat more fruits and vegetables and less foods that contain saturated and trans fats. Limit alcohol, sodium, and sweets. · Stay at a healthy weight. Lose weight if you need to. · Manage other health problems such as diabetes, high blood pressure, and high cholesterol. How is coronary artery disease treated? · Your doctor will suggest that you make lifestyle changes. For example, your doctor may ask you to eat healthy foods, quit smoking, lose extra weight, and be more active. · You will have to take medicines. · Your doctor may suggest a procedure to open narrowed or blocked arteries. This is called angioplasty. Or your doctor may suggest using healthy blood vessels to create detours around narrowed or blocked arteries. This is called bypass surgery. Follow-up care is a key part of your treatment and safety. Be sure to make and go to all appointments, and call your doctor if you are having problems. It's also a good idea to know your test results and keep a list of the medicines you take. Where can you learn more? Go to https://EQALpeDuel.Fortscale. org and sign in to your YellowBrck account. Enter [phone] in the [facility] box to learn more about \"Learning About Coronary Artery Disease (CAD). \"     If you do not have an account, please click on the \"Sign Up Now\" link. Current as of: December 16, 2019               Content Version: 12.6  © 5776-8311 [employer]. Care instructions adapted under license by [facility] ([facility]). If you have questions about a medical condition or this instruction, always ask your healthcare professional. [address] any warranty or liability for your use of this information.

## 2021-01-21 LAB
A/G RATIO: 1.9 (ref 1.1–2.2)
ALBUMIN SERPL-MCNC: 4.1 G/DL (ref 3.4–5)
ALP BLD-CCNC: 97 U/L (ref 40–129)
ALT SERPL-CCNC: 9 U/L (ref 10–40)
ANION GAP SERPL CALCULATED.3IONS-SCNC: 10 MMOL/L (ref 3–16)
AST SERPL-CCNC: 12 U/L (ref 15–37)
BASOPHILS ABSOLUTE: 0 K/UL (ref 0–0.2)
BASOPHILS RELATIVE PERCENT: 0.5 %
BILIRUB SERPL-MCNC: 0.4 MG/DL (ref 0–1)
BUN BLDV-MCNC: 15 MG/DL (ref 7–20)
CALCIUM SERPL-MCNC: 8.7 MG/DL (ref 8.3–10.6)
CHLORIDE BLD-SCNC: 102 MMOL/L (ref 99–110)
CHOLESTEROL, TOTAL: 111 MG/DL (ref 0–199)
CO2: 26 MMOL/L (ref 21–32)
CREAT SERPL-MCNC: 0.9 MG/DL (ref 0.8–1.3)
EOSINOPHILS ABSOLUTE: 0.3 K/UL (ref 0–0.6)
EOSINOPHILS RELATIVE PERCENT: 5.1 %
GFR AFRICAN AMERICAN: >60
GFR NON-AFRICAN AMERICAN: >60
GLOBULIN: 2.2 G/DL
GLUCOSE BLD-MCNC: 127 MG/DL (ref 70–99)
HCT VFR BLD CALC: 38.6 % (ref 40.5–52.5)
HDLC SERPL-MCNC: 35 MG/DL (ref 40–60)
HEMOGLOBIN: 12.8 G/DL (ref 13.5–17.5)
LDL CHOLESTEROL CALCULATED: 51 MG/DL
LYMPHOCYTES ABSOLUTE: 1.5 K/UL (ref 1–5.1)
LYMPHOCYTES RELATIVE PERCENT: 26.9 %
MCH RBC QN AUTO: 31.8 PG (ref 26–34)
MCHC RBC AUTO-ENTMCNC: 33.1 G/DL (ref 31–36)
MCV RBC AUTO: 96 FL (ref 80–100)
MONOCYTES ABSOLUTE: 0.5 K/UL (ref 0–1.3)
MONOCYTES RELATIVE PERCENT: 8.6 %
NEUTROPHILS ABSOLUTE: 3.2 K/UL (ref 1.7–7.7)
NEUTROPHILS RELATIVE PERCENT: 58.9 %
PDW BLD-RTO: 13.7 % (ref 12.4–15.4)
PLATELET # BLD: 191 K/UL (ref 135–450)
PMV BLD AUTO: 9.3 FL (ref 5–10.5)
POTASSIUM SERPL-SCNC: 4.3 MMOL/L (ref 3.5–5.1)
RBC # BLD: 4.02 M/UL (ref 4.2–5.9)
SODIUM BLD-SCNC: 138 MMOL/L (ref 136–145)
TOTAL PROTEIN: 6.3 G/DL (ref 6.4–8.2)
TRIGL SERPL-MCNC: 123 MG/DL (ref 0–150)
TSH SERPL DL<=0.05 MIU/L-ACNC: 3.25 UIU/ML (ref 0.27–4.2)
VLDLC SERPL CALC-MCNC: 25 MG/DL
WBC # BLD: 5.5 K/UL (ref 4–11)

## 2021-01-22 LAB
ESTIMATED AVERAGE GLUCOSE: 116.9 MG/DL
HBA1C MFR BLD: 5.7 %

## 2021-01-27 ENCOUNTER — VIRTUAL VISIT (OUTPATIENT)
Dept: RHEUMATOLOGY | Age: 66
End: 2021-01-27
Payer: COMMERCIAL

## 2021-01-27 DIAGNOSIS — Z79.899 ENCOUNTER FOR LONG-TERM (CURRENT) USE OF HIGH-RISK MEDICATION: ICD-10-CM

## 2021-01-27 DIAGNOSIS — Z51.81 ENCOUNTER FOR THERAPEUTIC DRUG MONITORING: ICD-10-CM

## 2021-01-27 DIAGNOSIS — M06.00 RHEUMATOID ARTHRITIS WITH NEGATIVE RHEUMATOID FACTOR, INVOLVING UNSPECIFIED SITE (HCC): Primary | ICD-10-CM

## 2021-01-27 PROCEDURE — 99441 PR PHYS/QHP TELEPHONE EVALUATION 5-10 MIN: CPT | Performed by: INTERNAL MEDICINE

## 2021-01-27 NOTE — PROGRESS NOTES
SUBJECTIVE:    Patient ID: Robby Aldridge is a 60 y.o. male. Multi-. The patient returns for follow-up of rheumatoid arthritis. He is off his meloxicam because of issues with his cardiac status. He continues on methotrexate 20 mg a week. He can do his ADLs with some difficulty,. He complains of knee pain when going up stairs    Review of Systems:    Respiratory: denies cough, denies shortness of breath  Gastrointestinal: denies abdominal pain, denies nausea  Musculoskeletal:                  60 to 90 minutes       morning stiffness  Ears, nose, mouth: denies mucosal sores  Skin: denies rash, denies alopecia. The remainder of his review of systems is negative    Prior to Visit Medications    Medication Sig Taking? Authorizing Provider   atorvastatin (LIPITOR) 40 MG tablet TAKE ONE TABLET BY MOUTH ONCE NIGHTLY Yes Grisel Gonsalez MD   prasugrel (EFFIENT) 10 MG TABS Take 1 tablet by mouth daily Yes Jessica Juan MD   aspirin EC 81 MG EC tablet Take 1 tablet by mouth daily Yes Jessica Juan MD   methotrexate (RHEUMATREX) 2.5 MG chemo tablet TAKE EIGHT TABLETS BY MOUTH ONCE A WEEK Yes Estefani Medina MD   busPIRone (BUSPAR) 7.5 MG tablet Take 7.5 mg by mouth Every 4 - 6 hours PRN Yes Historical Provider, MD   nitroGLYCERIN (NITROSTAT) 0.4 MG SL tablet Place 0.4 mg under the tongue every 5 minutes as needed for Chest pain up to max of 3 total doses. If no relief after 1 dose, call 911.  Yes Historical Provider, MD   metoprolol tartrate (LOPRESSOR) 50 MG tablet Take 25 mg by mouth 2 times daily  Yes Historical Provider, MD   folic acid (FOLVITE) 1 MG tablet TAKE ONE TABLET BY MOUTH DAILY Yes Estefani Medina MD   oxybutynin (DITROPAN-XL) 10 MG extended release tablet Take 10 mg by mouth daily Yes Historical Provider, MD   finasteride (PROSCAR) 5 MG tablet Take 5 mg by mouth daily Yes Historical Provider, MD omeprazole (PRILOSEC) 20 MG delayed release capsule Take 20 mg by mouth Yes Historical Provider, MD   losartan (COZAAR) 50 MG tablet Take 50 mg by mouth daily Yes Historical Provider, MD   Loratadine 10 MG CAPS Take by mouth daily  Yes Historical Provider, MD   HYDROcodone-acetaminophen (NORCO) 5-325 MG per tablet Take 1 tablet by mouth every 6 hours as needed for Pain Yes Historical Provider, MD   montelukast (SINGULAIR) 10 MG tablet Take 10 mg by mouth nightly. Yes Historical Provider, MD   amitriptyline (ELAVIL) 100 MG tablet Take 100 mg by mouth nightly. Yes Historical Provider, MD   DULoxetine (CYMBALTA) 60 MG extended release capsule Take 60 mg by mouth daily  Yes Historical Provider, MD        OBJECTIVE:  There were no vitals taken for this visit. Physical Exam:    General: Alert male in no acute distress. Respirations within normal limits  Musculoskeletal:   Patient reports swelling in the hands wrists. He can make about a 90% fist.  Equivocal swelling knees  Skin: no rashes    ASSESSMENT: Rheumatoid arthritis. Chemotherapy        PLAN: Patient was offered low-dose prednisone but decided to decline because of his prediabetic state. This was to replace his meloxicam and help his knee pain. Blood work recently obtained was reviewed. I will see patient back in 3 months time.   Covid vaccine was discussed

## 2021-01-27 NOTE — PROGRESS NOTES
Lilliana Rich is a 60 y.o. male evaluated via telephone on 1/27/2021. Consent:  He and/or health care decision maker is aware that that he may receive a bill for this telephone service, depending on his insurance coverage, and has provided verbal consent to proceed: Yes      Documentation:  I communicated with the patient and/or health care decision maker about . Details of this discussion including any medical advice provided:     I affirm this is a Patient Initiated Episode with a Patient who has not had a related appointment within my department in the past 7 days or scheduled within the next 24 hours.     Patient identification was verified at the start of the visit: Yes    Total Time: minutes: 5-10 minutes    Note: not billable if this call serves to triage the patient into an appointment for the relevant concern      [place]

## 2021-02-04 DIAGNOSIS — Z79.899 ENCOUNTER FOR LONG-TERM (CURRENT) USE OF HIGH-RISK MEDICATION: ICD-10-CM

## 2021-02-04 DIAGNOSIS — Z51.81 ENCOUNTER FOR THERAPEUTIC DRUG MONITORING: ICD-10-CM

## 2021-02-04 DIAGNOSIS — M06.00 RHEUMATOID ARTHRITIS WITH NEGATIVE RHEUMATOID FACTOR, INVOLVING UNSPECIFIED SITE (HCC): ICD-10-CM

## 2021-03-29 RX ORDER — ATORVASTATIN CALCIUM 40 MG/1
TABLET, FILM COATED ORAL
Qty: 30 TABLET | Refills: 1 | Status: SHIPPED | OUTPATIENT
Start: 2021-03-29 | End: 2021-06-01

## 2021-04-28 ENCOUNTER — OFFICE VISIT (OUTPATIENT)
Dept: RHEUMATOLOGY | Age: 66
End: 2021-04-28
Payer: COMMERCIAL

## 2021-04-28 VITALS
HEART RATE: 44 BPM | BODY MASS INDEX: 34.82 KG/M2 | SYSTOLIC BLOOD PRESSURE: 108 MMHG | HEIGHT: 75 IN | WEIGHT: 280 LBS | DIASTOLIC BLOOD PRESSURE: 60 MMHG

## 2021-04-28 DIAGNOSIS — M06.00 RHEUMATOID ARTHRITIS WITH NEGATIVE RHEUMATOID FACTOR, INVOLVING UNSPECIFIED SITE (HCC): Primary | ICD-10-CM

## 2021-04-28 DIAGNOSIS — Z79.899 ENCOUNTER FOR LONG-TERM (CURRENT) USE OF HIGH-RISK MEDICATION: ICD-10-CM

## 2021-04-28 PROCEDURE — G8427 DOCREV CUR MEDS BY ELIG CLIN: HCPCS | Performed by: INTERNAL MEDICINE

## 2021-04-28 PROCEDURE — 99214 OFFICE O/P EST MOD 30 MIN: CPT | Performed by: INTERNAL MEDICINE

## 2021-04-28 PROCEDURE — 3017F COLORECTAL CA SCREEN DOC REV: CPT | Performed by: INTERNAL MEDICINE

## 2021-04-28 PROCEDURE — G8417 CALC BMI ABV UP PARAM F/U: HCPCS | Performed by: INTERNAL MEDICINE

## 2021-04-28 PROCEDURE — 1036F TOBACCO NON-USER: CPT | Performed by: INTERNAL MEDICINE

## 2021-04-28 RX ORDER — GABAPENTIN 300 MG/1
300 CAPSULE ORAL NIGHTLY
COMMUNITY

## 2021-04-28 NOTE — PROGRESS NOTES
delayed release capsule Take 20 mg by mouth Yes Historical Provider, MD   losartan (COZAAR) 50 MG tablet Take 50 mg by mouth daily Yes Historical Provider, MD   Loratadine 10 MG CAPS Take by mouth daily  Yes Historical Provider, MD   HYDROcodone-acetaminophen (NORCO) 5-325 MG per tablet Take 1 tablet by mouth every 6 hours as needed for Pain Yes Historical Provider, MD   montelukast (SINGULAIR) 10 MG tablet Take 10 mg by mouth nightly. Yes Historical Provider, MD   amitriptyline (ELAVIL) 100 MG tablet Take 100 mg by mouth nightly. Yes Historical Provider, MD   DULoxetine (CYMBALTA) 60 MG extended release capsule Take 60 mg by mouth daily  Yes Historical Provider, MD        OBJECTIVE:  /60   Pulse (!) 44   Ht 6' 3\" (1.905 m)   Wt 280 lb (127 kg)   BMI 35.00 kg/m²      Physical Exam:    General: the patient demonstrated normal gait and posture without evidence of overt muscle wasting. Hygiene appears normal    Ears, mouth, nose, throat: no mucosal sores      Respiratory: assessment of the patient's respiratory effort showed no evidence of abnormal respiration and no use of accessory muscles. Diaphragmatic movement is normal.  Percussion of the patient's chest showed no evidence of dullness flatness or hyperresonance. Auscultation of the patient's lungs reveal normal breath sounds in all lung fields. There is no evidence of abnormal sounds such as rales or wheezes. There is no evidence of friction rub. Cardiovascular: palpation of the patient's chest revealed no abnormal thrill. The point of maximal impulse showed no displacement. Auscultation of the heart revealed no evidence of murmur gallop or abnormal heart sound. Musculoskeletal: 1+ soft tissue swelling right greater than left knee 1+ soft tissue swelling both wrists 1+ soft tissue swelling elbows trace to 1+ soft tissue swelling PIPs fists 95%  Skin: no rashes    ASSESSMENT: Rheumatoid arthritis. Chemotherapy.   Bradycardia        PLAN:

## 2021-04-28 NOTE — PROGRESS NOTES
[facility]  Office Visit    Heraclio Shah  1955 April 29, 2021    CC:   Chief Complaint   Patient presents with    Check-Up     sob and chest pain      HPI:  The patient is 60 y.o. male with a past medical history significant for   HTN, HLP, VICKIE/intolerant to CPAP  and CAD with prior stenting of prox RCA who is here for follow up d/t low HR, dizziness, chest pain and fatigue. He recently called office with above complaints. He was last seen in January 2021 by Dr. Martha Szymanski and no medication changes were made. Felt well after stent placed last August for few weeks and then began to feel symptomatic again. He has noted increased fatigue, SOB and dizziness. Few weeks back he cut up tree in yard that had fallen and had to take NTG on few occasions d/t L side chest pain (stabbing, sharp pain) and at times heaviness (similar to chest discomfort last year prior to stent). Episode of bilateral jaw pain few evenings ago and he went to sleep and was resolved by time he woke up. Similar symptoms last August that he was taken directly to cath lab. Wife states his hands and feet change color to bluish-red. He has noted increased lack of ambition and fatigue. Wife states he snores loudly and quits breathing in his sleep. He tires quicker than previous. Denies orthopnea/PND, cough, palpitations,  syncope, edema , weight change. + leg pain at times. Review of Systems:  Constitutional: Denies  night sweats or fever. + fatigue/weakness   HEENT: Denies new visual changes, ringing in ears, nosebleeds,nasal congestion  Respiratory: Denies new or change in SOB, PND, orthopnea or cough. Cardiovascular: see HPI  GI: Denies N/V, diarrhea, constipation, abdominal pain, change in bowel habits, melena or hematochezia  : Denies urinary frequency, urgency, incontinence, hematuria or dysuria.   Skin: Denies rash, hives, or cyanosis  Musculoskeletal: Denies joint or muscle aches/pain  Neurological: Denies syncope or TIA-like symptoms. Psychiatric: Denies anxiety, insomnia or depression. + snorer     Past Medical History:   Diagnosis Date    Arthritis     Asthma     BPH (benign prostatic hyperplasia)     Cardiomyopathy ([address]     GERD (gastroesophageal reflux disease)     HTN (hypertension) 10/29/2012    MVP (mitral valve prolapse)     PPD positive     Prolonged emergence from general anesthesia     Prostatism     Tricuspid valve prolapse     Unspecified sleep apnea      Past Surgical History:   Procedure Laterality Date    CARDIAC CATHETERIZATION      CHOLECYSTECTOMY      FOOT SURGERY      HAND SURGERY Right 2017    thumb mass excision    INGUINAL HERNIA REPAIR      bilateral    KNEE ARTHROSCOPY      OTHER SURGICAL HISTORY  2019    epidural Dr. Tamika Llamas at [address]      right elbow    TONSILLECTOMY       Family History   Problem Relation Age of Onset    Rheum Arthritis Other     Heart Disease Father     Heart Disease Brother     Other Brother         histoplasmosis     Lupus Neg Hx      Social History     Tobacco Use    Smoking status: Former Smoker     Types: Cigarettes     Quit date: 1982     Years since quittin.2    Smokeless tobacco: Never Used   Substance Use Topics    Alcohol use:  Yes     Alcohol/week: 12.0 standard drinks     Types: 12 Cans of beer per week     Comment: wine & shots rare    Drug use: No       Allergies   Allergen Reactions    Pcn [Penicillins] Shortness Of Breath    Penicillin G Sodium Shortness Of Breath    Cephalosporins Itching    Ciprofloxacin Itching    Codeine Itching    Erythromycin Itching    Morphine And Related Itching    Fenoprofen Calcium Other (See Comments)     Pt doesn`t recall     Current Outpatient Medications   Medication Sig Dispense Refill    methotrexate (RHEUMATREX) 2.5 MG chemo tablet TAKE EIGHT TABLETS BY MOUTH ONCE WEEKLY 32 tablet 2    gabapentin (NEURONTIN) 300 MG capsule Take 300 mg by mouth 2 times daily.  atorvastatin (LIPITOR) 40 MG tablet TAKE ONE TABLET BY MOUTH ONCE NIGHTLY 30 tablet 1    prasugrel (EFFIENT) 10 MG TABS Take 1 tablet by mouth daily 30 tablet 6    aspirin EC 81 MG EC tablet Take 1 tablet by mouth daily 30 tablet 6    busPIRone (BUSPAR) 7.5 MG tablet Take 7.5 mg by mouth Every 4 - 6 hours PRN      nitroGLYCERIN (NITROSTAT) 0.4 MG SL tablet Place 0.4 mg under the tongue every 5 minutes as needed for Chest pain up to max of 3 total doses. If no relief after 1 dose, call 911.  metoprolol tartrate (LOPRESSOR) 50 MG tablet Take 25 mg by mouth 2 times daily       folic acid (FOLVITE) 1 MG tablet TAKE ONE TABLET BY MOUTH DAILY 90 tablet 3    finasteride (PROSCAR) 5 MG tablet Take 5 mg by mouth daily      omeprazole (PRILOSEC) 20 MG delayed release capsule Take 20 mg by mouth      losartan (COZAAR) 50 MG tablet Take 50 mg by mouth daily      Loratadine 10 MG CAPS Take by mouth daily       HYDROcodone-acetaminophen (NORCO) 5-325 MG per tablet Take 1 tablet by mouth every 6 hours as needed for Pain      montelukast (SINGULAIR) 10 MG tablet Take 10 mg by mouth nightly.  amitriptyline (ELAVIL) 100 MG tablet Take 100 mg by mouth nightly.  DULoxetine (CYMBALTA) 60 MG extended release capsule Take 60 mg by mouth daily        No current facility-administered medications for this visit. Physical Exam:   /64   Pulse (!) 43   Ht 6' 3\" (1.905 m)   Wt 280 lb (127 kg)   SpO2 97%   BMI 35.00 kg/m²   Wt Readings from Last 2 Encounters:   04/29/21 280 lb (127 kg)   04/28/21 280 lb (127 kg)     Constitutional: He is oriented to person, place, and time. He appears well-developed and well-nourished. In no acute distress. HEENT: Normocephalic and atraumatic. Sclerae anicteric. No xanthelasmas. EOM's intact. Neck: Supple and thick. No JVD present. Carotids without bruits. No thyromegaly present. No lymphadenopathy present.   Cardiovascular: occasional skipped beat, normal S1 and S2; no murmur/gallop or rub  Pulmonary/Chest: Effort normal.  Lungs clear to auscultation. Chest wall nontender  Abdominal: obese,soft, nontender, nondistended. + bowel sounds; no hepatomegaly or bruits. Aorta normal  Extremities: No edema, cyanosis, or clubbing. Pulses are 2+ radial/carotid and 1+ bilateral DP/PT pulses bilaterally. Cap refill brisk. Neurological: No focal deficit. Skin: Skin is warm and dry. Psychiatric: He has a normal mood and affect. His speech is normal and behavior is normal.     Lab Review:   Lab Results   Component Value Date    TRIG 123 01/21/2021    HDL 35 01/21/2021    HDL 41 03/06/2012    LDLCALC 51 01/21/2021    LABVLDL 25 01/21/2021     Lab Results   Component Value Date     01/21/2021    K 4.3 01/21/2021     01/21/2021    CO2 26 01/21/2021    BUN 15 01/21/2021    CREATININE 0.9 01/21/2021    GLUCOSE 127 01/21/2021    CALCIUM 8.7 01/21/2021      Lab Results   Component Value Date    WBC 5.5 01/21/2021    HGB 12.8 (L) 01/21/2021    HCT 38.6 (L) 01/21/2021    MCV 96.0 01/21/2021     01/21/2021     Results for Veronica Chavez (MRN [id number]) as of 4/29/2021 15:03   Ref. Range 1/21/2021 08:36   TSH Latest Ref Range: 0.27 - 4.20 uIU/mL 3.25       4/29/2021 ECG:  Sinus rhythm with PVC's; nonspecific ST-T wave changes    Ambulatory KELLY  Date: 4/29/2021    Right Arm:   Left Arm:129/64    Right Ankle: 1.19  Left ankle: 1.20      8/25/2020 LHC/PCI:  CORONARY ANGIOGRAM:  1. There is single-vessel disease. 2.  Dominant RCA, 99% with LEYDI grade 1 flow. 3.  Left main:  Free of disease. 4. LAD has a 10-20% proximal lesion. 5.  Left circumflex:  No obstructive disease.     CONCLUSION:  1. Dominant RCA has a 99% proximal stenosis with LEYDI grade 1 flow. 2.  Elevated left heart filling pressures.   3.  Normal left ventricular size and systolic function with ejection  fraction of 60%.     CONCLUSION:  Successful PCI and stenting of the proximal RCA, lesion  reduced to 0%. Echo 5/21/2019: There is mild concentric left ventricular hypertrophy. Left ventricular cavity size is mildly dilated. Overall left ventricular systolic function appears mildly reduced. Ejection fraction is visually estimated to be 40-45%. Grade I diastolic dysfunction with normal LV filling pressures. The aortic root is mildly dilated & measures at 4.1 cms. Mildly dilated right ventricle. TAPSE 2.2cm, RV mid 4cm   IVC size is normal (<2.1cm) and collapses > 50% with respiration consistent  with normal RA pressure (3mmHg). 5/4/2019 Stress Myoview:  Normal myocardial perfusion study.    Normal myocardial perfusion.    Normal LV size and systolic function.    Overall findings represent a low risk study.             Assessment:    1. Coronary artery disease involving native coronary artery of native heart without angina pectoris  -episodes of chest pain, SOBOE and fatigue: ? Angina  -single vessel disease with JUAN to RCA in August 2020  -continue DAPT, statin and low dose metoprolol  -LVEF preserved on cath; prior echo in 5/2019 LVEF 40-45%  -risk factor modification    2. Essential hypertension  -controlled  -goal BP < 130/80  -continue medical management    3. Dyslipidemia  -on high intensity statin  -@ goal LDL in January 2021    4. Sleep apnea in adult  -had CPAP several years ago  -wife reports witnessed apnea and loud snoring  -refer for repeat sleep evaluation    5. Chronic fatigue  -suspect largely d/t sleep apnea that is untreated  -will ask for TSH and CBC to R/O thyroid disease or anemia    6. PVC's (premature ventricular contractions)  -asymptomatic  -will check electrolytes and TSH  -check echocardiogram  -consider stress test pending other results    7. Shortness of breath  -mainly with exertion  -Brilinta dc'd in past and changed to Effient but persistent  -suspect multifactorial: deconditioning+/-  Obesity +/- ?  Anginal equivalent vs decreased LVEF  -does not appear to be volume overloaded on exam  -check echo and labs    Plan:  Check echocardiogram in interest of LV function  Check BMP, magnesium, CBC  Consider lexiscan-myoview if echo unremarkable and continued symptoms. ECG without ischemia  Discussed low fat/low sodium diet and reinforced regular aerobic exercise. Refer to sleep center (Dr. Rene Litten)  Continue ASA, statin, Effient, losartan, metoprolol  Reinforced regular exercise (walking)  Follow up with Dr. Shawn Grey as scheduled in July 2021 or sooner if needed    Return for with Dr. Shawn Grey in July 2021. Thanks for allowing me to participate in the care of this patient.       BERNY Montanez  [facility], [address] [place] [address] [address] [place] [address] [address], [address] [place] [address]  Office: [phone]  Fax: [phone]      Electronically signed by ESTELA Redmond CNP on 4/29/2021 at 4:47 PM

## 2021-04-29 ENCOUNTER — OFFICE VISIT (OUTPATIENT)
Dept: CARDIOLOGY CLINIC | Age: 66
End: 2021-04-29
Payer: COMMERCIAL

## 2021-04-29 VITALS
OXYGEN SATURATION: 97 % | HEIGHT: 75 IN | BODY MASS INDEX: 34.82 KG/M2 | HEART RATE: 43 BPM | WEIGHT: 280 LBS | SYSTOLIC BLOOD PRESSURE: 118 MMHG | DIASTOLIC BLOOD PRESSURE: 64 MMHG

## 2021-04-29 DIAGNOSIS — R06.02 SOB (SHORTNESS OF BREATH): ICD-10-CM

## 2021-04-29 DIAGNOSIS — I49.3 PVC'S (PREMATURE VENTRICULAR CONTRACTIONS): ICD-10-CM

## 2021-04-29 DIAGNOSIS — Z79.899 ENCOUNTER FOR LONG-TERM (CURRENT) USE OF HIGH-RISK MEDICATION: ICD-10-CM

## 2021-04-29 DIAGNOSIS — I10 ESSENTIAL HYPERTENSION: ICD-10-CM

## 2021-04-29 DIAGNOSIS — I25.10 CORONARY ARTERY DISEASE INVOLVING NATIVE CORONARY ARTERY OF NATIVE HEART WITHOUT ANGINA PECTORIS: Primary | ICD-10-CM

## 2021-04-29 DIAGNOSIS — Z51.81 ENCOUNTER FOR THERAPEUTIC DRUG MONITORING: ICD-10-CM

## 2021-04-29 DIAGNOSIS — G47.30 SLEEP APNEA IN ADULT: ICD-10-CM

## 2021-04-29 DIAGNOSIS — R53.82 CHRONIC FATIGUE: ICD-10-CM

## 2021-04-29 DIAGNOSIS — I25.10 CORONARY ARTERY DISEASE INVOLVING NATIVE CORONARY ARTERY OF NATIVE HEART WITHOUT ANGINA PECTORIS: ICD-10-CM

## 2021-04-29 DIAGNOSIS — M06.00 RHEUMATOID ARTHRITIS WITH NEGATIVE RHEUMATOID FACTOR, INVOLVING UNSPECIFIED SITE (HCC): ICD-10-CM

## 2021-04-29 DIAGNOSIS — E78.5 DYSLIPIDEMIA: ICD-10-CM

## 2021-04-29 LAB
ANION GAP SERPL CALCULATED.3IONS-SCNC: 13 MMOL/L (ref 3–16)
BASOPHILS ABSOLUTE: 0 K/UL (ref 0–0.2)
BASOPHILS RELATIVE PERCENT: 0.3 %
BUN BLDV-MCNC: 15 MG/DL (ref 7–20)
CALCIUM SERPL-MCNC: 9 MG/DL (ref 8.3–10.6)
CHLORIDE BLD-SCNC: 103 MMOL/L (ref 99–110)
CO2: 24 MMOL/L (ref 21–32)
CREAT SERPL-MCNC: 1.2 MG/DL (ref 0.8–1.3)
EOSINOPHILS ABSOLUTE: 0.2 K/UL (ref 0–0.6)
EOSINOPHILS RELATIVE PERCENT: 2.1 %
GFR AFRICAN AMERICAN: >60
GFR NON-AFRICAN AMERICAN: >60
GLUCOSE BLD-MCNC: 101 MG/DL (ref 70–99)
HCT VFR BLD CALC: 39.5 % (ref 40.5–52.5)
HEMOGLOBIN: 13.6 G/DL (ref 13.5–17.5)
LYMPHOCYTES ABSOLUTE: 2.1 K/UL (ref 1–5.1)
LYMPHOCYTES RELATIVE PERCENT: 26 %
MAGNESIUM: 2.1 MG/DL (ref 1.8–2.4)
MCH RBC QN AUTO: 32.1 PG (ref 26–34)
MCHC RBC AUTO-ENTMCNC: 34.5 G/DL (ref 31–36)
MCV RBC AUTO: 93.2 FL (ref 80–100)
MONOCYTES ABSOLUTE: 0.7 K/UL (ref 0–1.3)
MONOCYTES RELATIVE PERCENT: 8.6 %
NEUTROPHILS ABSOLUTE: 5.2 K/UL (ref 1.7–7.7)
NEUTROPHILS RELATIVE PERCENT: 63 %
PDW BLD-RTO: 12.9 % (ref 12.4–15.4)
PLATELET # BLD: 204 K/UL (ref 135–450)
PMV BLD AUTO: 9.9 FL (ref 5–10.5)
POTASSIUM SERPL-SCNC: 4.5 MMOL/L (ref 3.5–5.1)
RBC # BLD: 4.24 M/UL (ref 4.2–5.9)
SODIUM BLD-SCNC: 140 MMOL/L (ref 136–145)
WBC # BLD: 8.2 K/UL (ref 4–11)

## 2021-04-29 PROCEDURE — 3017F COLORECTAL CA SCREEN DOC REV: CPT | Performed by: NURSE PRACTITIONER

## 2021-04-29 PROCEDURE — G8427 DOCREV CUR MEDS BY ELIG CLIN: HCPCS | Performed by: NURSE PRACTITIONER

## 2021-04-29 PROCEDURE — 99214 OFFICE O/P EST MOD 30 MIN: CPT | Performed by: NURSE PRACTITIONER

## 2021-04-29 PROCEDURE — 1036F TOBACCO NON-USER: CPT | Performed by: NURSE PRACTITIONER

## 2021-04-29 PROCEDURE — 93000 ELECTROCARDIOGRAM COMPLETE: CPT | Performed by: NURSE PRACTITIONER

## 2021-04-29 PROCEDURE — G8417 CALC BMI ABV UP PARAM F/U: HCPCS | Performed by: NURSE PRACTITIONER

## 2021-04-29 NOTE — PATIENT INSTRUCTIONS
Check echocardiogram    Check BMP, magnesium, CBC    Refer to sleep center (Dr. Rosalia Pruitt)    Continue same medications

## 2021-05-28 DIAGNOSIS — Z51.81 ENCOUNTER FOR THERAPEUTIC DRUG MONITORING: ICD-10-CM

## 2021-05-28 DIAGNOSIS — Z79.899 ENCOUNTER FOR LONG-TERM (CURRENT) USE OF HIGH-RISK MEDICATION: ICD-10-CM

## 2021-05-28 DIAGNOSIS — M06.00 RHEUMATOID ARTHRITIS WITH NEGATIVE RHEUMATOID FACTOR, INVOLVING UNSPECIFIED SITE (HCC): ICD-10-CM

## 2021-05-28 RX ORDER — FOLIC ACID 1 MG/1
TABLET ORAL
Qty: 90 TABLET | Refills: 0 | Status: SHIPPED | OUTPATIENT
Start: 2021-05-28 | End: 2021-08-31

## 2021-06-01 RX ORDER — ATORVASTATIN CALCIUM 40 MG/1
TABLET, FILM COATED ORAL
Qty: 90 TABLET | Refills: 3 | Status: SHIPPED | OUTPATIENT
Start: 2021-06-01 | End: 2022-05-31

## 2021-06-01 NOTE — TELEPHONE ENCOUNTER
Last ov 4/29/21  Pending appt 7/8/21  (if apt not scheduled call the pt or send my chart message)  Last refill 3/29/21 #30x1  Last labs 1/24/21

## 2021-06-04 ENCOUNTER — HOSPITAL ENCOUNTER (OUTPATIENT)
Dept: NON INVASIVE DIAGNOSTICS | Age: 66
Discharge: HOME | End: 2021-06-04
Payer: COMMERCIAL

## 2021-06-04 DIAGNOSIS — R06.02 SOB (SHORTNESS OF BREATH): Primary | ICD-10-CM

## 2021-06-04 DIAGNOSIS — I25.10 CORONARY ARTERY DISEASE INVOLVING NATIVE CORONARY ARTERY OF NATIVE HEART WITHOUT ANGINA PECTORIS: ICD-10-CM

## 2021-06-04 DIAGNOSIS — G47.30 SLEEP APNEA IN ADULT: ICD-10-CM

## 2021-06-04 DIAGNOSIS — R53.82 CHRONIC FATIGUE: ICD-10-CM

## 2021-06-04 DIAGNOSIS — I10 ESSENTIAL HYPERTENSION: ICD-10-CM

## 2021-06-04 LAB
LV EF: 50 %
LVEF MODALITY: NORMAL

## 2021-06-04 PROCEDURE — 93306 TTE W/DOPPLER COMPLETE: CPT

## 2021-06-21 ENCOUNTER — HOSPITAL ENCOUNTER (OUTPATIENT)
Dept: NON INVASIVE DIAGNOSTICS | Age: 66
Discharge: HOME | End: 2021-06-21
Payer: COMMERCIAL

## 2021-06-21 DIAGNOSIS — R06.02 SOB (SHORTNESS OF BREATH): ICD-10-CM

## 2021-06-21 LAB
LV EF: 44 %
LVEF MODALITY: NORMAL

## 2021-06-21 PROCEDURE — 6360000002 HC RX W HCPCS: Performed by: NURSE PRACTITIONER

## 2021-06-21 PROCEDURE — 93017 CV STRESS TEST TRACING ONLY: CPT

## 2021-06-21 PROCEDURE — A9502 TC99M TETROFOSMIN: HCPCS | Performed by: NURSE PRACTITIONER

## 2021-06-21 PROCEDURE — 3430000000 HC RX DIAGNOSTIC RADIOPHARMACEUTICAL: Performed by: NURSE PRACTITIONER

## 2021-06-21 PROCEDURE — 78452 HT MUSCLE IMAGE SPECT MULT: CPT

## 2021-06-21 RX ADMIN — TETROFOSMIN 30 MILLICURIE: 1.38 INJECTION, POWDER, LYOPHILIZED, FOR SOLUTION INTRAVENOUS at 11:00

## 2021-06-21 RX ADMIN — REGADENOSON 0.4 MG: 0.08 INJECTION, SOLUTION INTRAVENOUS at 10:58

## 2021-06-21 RX ADMIN — TETROFOSMIN 10 MILLICURIE: 1.38 INJECTION, POWDER, LYOPHILIZED, FOR SOLUTION INTRAVENOUS at 09:55

## 2021-07-02 RX ORDER — ASPIRIN 81 MG/1
TABLET, COATED ORAL
Qty: 30 TABLET | Refills: 5 | Status: SHIPPED | OUTPATIENT
Start: 2021-07-02 | End: 2022-01-03

## 2021-07-02 NOTE — TELEPHONE ENCOUNTER
Last OV: 04/29/2021  Next OV: 07/08/2021  Most recent Labs: cbc, bmp, 04/29/2021 tsh, lipid, cmp 01/2/2021  Last PT/INR (if needed):  Last EKG (if needed):04/29/2021

## 2021-07-08 ENCOUNTER — TELEPHONE (OUTPATIENT)
Dept: CARDIOLOGY CLINIC | Age: 66
End: 2021-07-08

## 2021-07-08 ENCOUNTER — OFFICE VISIT (OUTPATIENT)
Dept: CARDIOLOGY CLINIC | Age: 66
End: 2021-07-08
Payer: COMMERCIAL

## 2021-07-08 VITALS
HEIGHT: 75 IN | HEART RATE: 78 BPM | BODY MASS INDEX: 35.06 KG/M2 | OXYGEN SATURATION: 96 % | SYSTOLIC BLOOD PRESSURE: 116 MMHG | DIASTOLIC BLOOD PRESSURE: 60 MMHG | WEIGHT: 282 LBS

## 2021-07-08 DIAGNOSIS — I25.10 CORONARY ARTERY DISEASE INVOLVING NATIVE CORONARY ARTERY OF NATIVE HEART WITHOUT ANGINA PECTORIS: Primary | ICD-10-CM

## 2021-07-08 DIAGNOSIS — R06.09 CHRONIC DYSPNEA: ICD-10-CM

## 2021-07-08 DIAGNOSIS — R53.82 CHRONIC FATIGUE: ICD-10-CM

## 2021-07-08 DIAGNOSIS — I10 ESSENTIAL HYPERTENSION: ICD-10-CM

## 2021-07-08 DIAGNOSIS — E78.5 HYPERLIPIDEMIA, UNSPECIFIED HYPERLIPIDEMIA TYPE: ICD-10-CM

## 2021-07-08 PROCEDURE — G8417 CALC BMI ABV UP PARAM F/U: HCPCS | Performed by: INTERNAL MEDICINE

## 2021-07-08 PROCEDURE — 99215 OFFICE O/P EST HI 40 MIN: CPT | Performed by: INTERNAL MEDICINE

## 2021-07-08 PROCEDURE — 3017F COLORECTAL CA SCREEN DOC REV: CPT | Performed by: INTERNAL MEDICINE

## 2021-07-08 PROCEDURE — G8427 DOCREV CUR MEDS BY ELIG CLIN: HCPCS | Performed by: INTERNAL MEDICINE

## 2021-07-08 PROCEDURE — 1036F TOBACCO NON-USER: CPT | Performed by: INTERNAL MEDICINE

## 2021-07-08 RX ORDER — CYCLOBENZAPRINE HCL 10 MG
TABLET ORAL
COMMUNITY
Start: 2021-06-29 | End: 2021-12-03

## 2021-07-08 RX ORDER — AMITRIPTYLINE HYDROCHLORIDE 50 MG/1
TABLET, FILM COATED ORAL
COMMUNITY
Start: 2021-07-04 | End: 2021-09-02

## 2021-07-08 NOTE — TELEPHONE ENCOUNTER
Katherine, patient's wife returned call and they would like to proceed with Thursday 7/15/21 noon arrival with 1:30 pm procedure time. We reviewed stress and echo findings. They will get sleep medicine rescheduled. Published on [employer] and e-mail to [place].

## 2021-07-08 NOTE — TELEPHONE ENCOUNTER
Left patient satish  [phone] to call and go over possible dates/times: Thursday 7/15 1:30pm/noon arrival  7/23 1000/0830  7/28 1000/0830    LEFT HEART CATHETERIZATION WITH DR. Gamaliel Martin AT [facility]  The morning of your procedure you will park in the hospital parking lot and report directly to the cath lab to check in.      DATE:    TIME:   ARRIVAL TIME:      Pre-Procedure Instructions   1. You will need to fast for at least 8 hours prior to procedure. No caffeine the morning of.   2.  All  medications can be taken in the morning with sips of water-INCLUDING ASPIRIN AND EFFIENT. 3. Do not use any lotions, creams or perfume the morning of procedure. 4. Pre-procedure lab work will need to be completed 5-7 days prior to procedure. 5. Please have a responsible adult to drive you home after procedure. 6. We advise you have someone to stay with you for 24 hours following procedure for precautionary measures. 7. Depending on procedure you may require an overnight stay. 8. Cath lab will provide you with all post procedure instructions.      If you have any questions regarding the procedure itself or medications, please call [phone] and ask to speak with a nurse.

## 2021-07-08 NOTE — PROGRESS NOTES
Smokeless tobacco: Never Used   Vaping Use    Vaping Use: Never used   Substance Use Topics    Alcohol use: Yes     Alcohol/week: 12.0 standard drinks     Types: 12 Cans of beer per week     Comment: wine & shots rare    Drug use: No       Allergies   Allergen Reactions    Pcn [Penicillins] Shortness Of Breath    Penicillin G Sodium Shortness Of Breath    Cephalosporins Itching    Ciprofloxacin Itching    Codeine Itching    Erythromycin Itching    Morphine And Related Itching    Fenoprofen Calcium Other (See Comments)     Pt doesn`t recall     Current Outpatient Medications   Medication Sig Dispense Refill    amitriptyline (ELAVIL) 50 MG tablet       cyclobenzaprine (FLEXERIL) 10 MG tablet       ASPIRIN LOW DOSE 81 MG EC tablet TAKE ONE TABLET BY MOUTH DAILY 30 tablet 5    atorvastatin (LIPITOR) 40 MG tablet TAKE ONE TABLET BY MOUTH ONCE NIGHTLY 90 tablet 3    folic acid (FOLVITE) 1 MG tablet TAKE ONE TABLET BY MOUTH DAILY 90 tablet 0    methotrexate (RHEUMATREX) 2.5 MG chemo tablet TAKE EIGHT TABLETS BY MOUTH ONCE WEEKLY 32 tablet 2    gabapentin (NEURONTIN) 300 MG capsule Take 300 mg by mouth 2 times daily.  prasugrel (EFFIENT) 10 MG TABS Take 1 tablet by mouth daily 30 tablet 6    busPIRone (BUSPAR) 7.5 MG tablet Take 7.5 mg by mouth Every 4 - 6 hours PRN      nitroGLYCERIN (NITROSTAT) 0.4 MG SL tablet Place 0.4 mg under the tongue every 5 minutes as needed for Chest pain up to max of 3 total doses. If no relief after 1 dose, call 911.       metoprolol tartrate (LOPRESSOR) 50 MG tablet Take 25 mg by mouth 2 times daily       finasteride (PROSCAR) 5 MG tablet Take 5 mg by mouth daily      omeprazole (PRILOSEC) 20 MG delayed release capsule Take 20 mg by mouth      losartan (COZAAR) 50 MG tablet Take 50 mg by mouth daily      Loratadine 10 MG CAPS Take by mouth daily       HYDROcodone-acetaminophen (NORCO) 5-325 MG per tablet Take 1 tablet by mouth every 6 hours as needed for Pain  montelukast (SINGULAIR) 10 MG tablet Take 10 mg by mouth nightly.  amitriptyline (ELAVIL) 100 MG tablet Take 100 mg by mouth nightly.  DULoxetine (CYMBALTA) 60 MG extended release capsule Take 60 mg by mouth daily        No current facility-administered medications for this visit. Review of Systems:  · Constitutional: no unanticipated weight loss. There's been no change in energy level, sleep pattern, or activity level. No fevers, chills. · Eyes: No visual changes or diplopia. No scleral icterus. · ENT: No Headaches, hearing loss or vertigo. No mouth sores or sore throat. · Cardiovascular: as reviewed in HPI  · Respiratory: No cough or wheezing, no sputum production. No hematemesis. · Gastrointestinal: + nausea. No abdominal pain, appetite loss, blood in stools. No change in bowel or bladder habits. · Genitourinary: No dysuria, trouble voiding, or hematuria. · Musculoskeletal:  No gait disturbance, no joint complaints. · Integumentary: No rash or pruritis. · Neurological: No headache, diplopia, change in muscle strength, numbness or tingling.  + dizziness. · Psychiatric: No anxiety or depression. · Endocrine: No temperature intolerance. No excessive thirst, fluid intake, or urination. No tremor. · Hematologic/Lymphatic: No abnormal bruising or bleeding, blood clots or swollen lymph nodes. · Allergic/Immunologic: No nasal congestion or hives. Physical Exam:   /60   Pulse 78   Ht 6' 3\" (1.905 m)   Wt 282 lb (127.9 kg) Comment: with shoes  SpO2 96%   BMI 35.25 kg/m²   Wt Readings from Last 3 Encounters:   07/08/21 282 lb (127.9 kg)   04/29/21 280 lb (127 kg)   04/28/21 280 lb (127 kg)     Constitutional: He is oriented to person, place, and time. He appears well-developed and well-nourished. In no acute distress. Head: Normocephalic and atraumatic. Pupils equal and round. Neck: Neck supple. No JVP or carotid bruit appreciated. No mass and no thyromegaly present.  No lymphadenopathy present. Cardiovascular: Normal rate. Normal heart sounds. Exam reveals no gallop and no friction rub. No murmur heard. Pulmonary/Chest: Effort normal and breath sounds normal. No respiratory distress. He has no wheezes, rhonchi or rales. Abdominal: Soft, non-tender. Bowel sounds are normal. He exhibits no organomegaly, mass or bruit. Extremities: No edema, cyanosis, or clubbing. Pulses are 2+ radial/dorsalis pedis/posterior tibial/carotid bilaterally. Neurological: No gross cranial nerve deficit. Coordination normal.   Skin: Skin is warm and dry. There is no rash or diaphoresis. Psychiatric: He has a normal mood and affect. His speech is normal and behavior is normal.     Lab Review:   Lab Results   Component Value Date    TRIG 123 01/21/2021    HDL 35 01/21/2021    HDL 41 03/06/2012    LDLCALC 51 01/21/2021    LABVLDL 25 01/21/2021      Lab Results   Component Value Date    BUN 15 04/29/2021    CREATININE 1.2 04/29/2021       EKG Interpretation:   8/25/20 Sinus  Rhythm  - frequent multiform ectopic ventricular beats, # VECs = 2, # types 2, consider old anterior infarct,  -Nonspecific ST depression   +   Nonspecific T-abnormality  -Nondiagnostic. 9/10/20 sinus bradycardia, 57 bpm   12/7/20 Sinus  Bradycardia  - frequent ectopic ventricular beat s, # VECs = 3, ~57 bpm.   7/8/21 not completed     Image Review:     Chest CT 8/1/19  Atherosclerosis, including coronary artery calcification.       Several indeterminate pulmonary nodules, measuring up to approximately 6 mm. Follow-up recommendations are as below.       RECOMMENDATIONS:   Fleischner Society guidelines for follow-up and management of incidentally   detected pulmonary nodules:       Single Solid Nodule:       Nodule size less than 6 mm   In a low-risk patient, no routine follow-up.    In a high-risk patient, optional CT at 12 months.       Nodule size equals 6-8 mm   In a low-risk patient, CT at 6-12 months, then consider CT at 18-24 months. In a high-risk patient, CT at 6-12 months, then CT at 18-24 months.       Nodule size greater than 8 mm           In a low-risk patient, consider CT at 3 months, PET/CT, or tissue sampling.       In a high-risk patient, consider CT at 3 months, PET/CT, or tissue sampling.       Multiple Solid Nodules:       Nodule size less than 6 mm   In a low-risk patient, no routine follow-up. In a high-risk patient, optional CT at 12 months.       Nodule size equals 6-8 mm   In a low-risk patient, CT at 3-6 months, then consider CT at 18-24 months. In a high-risk patient, CT at 3-6 months, then CT at 18-24 months.       Nodule size greater than 8 mm   In a low-risk patient, CT at 3-6 months, then consider CT at 18-24 months. In a high-risk patient, CT at 3-6 months, then CT at 18-24 months.       - Low risk patients include individuals with minimal or absent history of   smoking and other known risk factors.       - High risk patients include individuals with a history or smoking or known   risk factors.         Stress test 5/14/19  Normal myocardial perfusion study.    Normal myocardial perfusion.    Normal LV size and systolic function.    Overall findings represent a low risk study. ECHO 5/21/19  There is mild concentric left ventricular hypertrophy. Left ventricular cavity size is mildly dilated. Overall left ventricular systolic function appears mildly reduced. Ejection fraction is visually estimated to be 40-45%. Grade I diastolic dysfunction with normal LV filling pressures. The aortic root is mildly dilated & measures at 4.1 cms. Mildly dilated right ventricle. TAPSE 2.2cm, RV mid 4cm  IVC size is normal (<2.1cm) and collapses > 50% with respiration consistent  with normal RA pressure (3mmHg). [facility]: 8/25/20 per Dr. Chaidez Dk:  1. There is single-vessel disease. 2.  Dominant RCA, 99% with LEYDI grade 1 flow. 3.  Left main:  Free of disease. 4.   LAD has a 10-20% proximal lesion. 5.  Left circumflex:  No obstructive disease.  CONCLUSION:  1. Dominant RCA has a 99% proximal stenosis with LEYDI grade 1 flow. 2.  Elevated left heart filling pressures. 3.  Normal left ventricular size and systolic function with ejection  fraction of 60%. CONCLUSION:  Successful PCI and stenting of the proximal RCA, lesion  reduced to 0%. Echo: 6/4/21  Summary   There is mildly increased left ventricular wall thickness. EF 50%. Indeterminate diastolic function. The left atrium is moderately dilated. Right ventricular systolic function is normal .   Trivial mitral, and tricuspid regurgitation. Stress study: 6/21/21    Summary    moderate size moderate severity fixed inferior wall defect consist with    prior inferior wall MI. no evidence of ischemia        Recommendation    Aggressive medical therapy for coronary artery disease.       Stress Protocols         Assessment/Plan:     CAD   S/p successful PCI and stenting of proximal RCA, 99% to 0% with LEYDI 1 by Dr. Bunny Danielle on 8/25/2020. \.    Currently denies having any symptoms of chest tightness or pressure but continues to have shortness of breath that he feels has improved with switching Brilinta to Effient but still continues to complain of shortness of breath with minimal exertion. I reviewed echo and stress study 6/2021 reviewed and discussed with patient revealing moderate size moderate severity fixed inferior wall defect consist with prior inferior wall MI. Even though nuclear stress test shows no  evidence of ischemia. This can also be due to hibernating myocardium from in-stent restenosis in the right coronary artery. I am concerned about his RCA stent occlusion causing inferior wall MI. I have therefore decided to treat with left heart catheterization to assess this further  Again encouraged to reschedule his consultations with sleep medicine. H his ASA to an enteric coated tablet.    OMT includes lipitor,  DAPT-ASA/brilinta, lopressor, losartan. Continue to work on heart healthy diet, low salt diet and slowly increase walking program.   I had a detail discussion with the patient and the family members regarding the risk and benefit of the procedures. I explained to them the details of the procedure. I explained to them that the procedure will be performed using moderate sedation and local anaesthesia using lidocaine. I explained any risk of bleeding, perforation of the vessel, stroke, myocardial infarction or death. The patient and the family members understood the risk and benefits and the details of procedure and would like to go ahead with the procedure. The patient gave informed consent.  e also has had an improvement in stomach cramps but continues with off/on nausea with switching    Hypertension, essential   BP is stable today on medical therapy. BMP stable 4/29/21     Hyperlipidemia, unspecified   Last lipid profile 1/21/21 wnl except low HDL, A1c 5.7. Lipitor 40 mg nightly     Sleep apnea  Has not worn CPAP in several years. Would recommend he repeat sleep study. We placed referral. Will follow up next visit. Chronic fatigue  TSH wnl 1/21/21, CBC stable 4/29/21. PVC's   Denies symptoms. Follow up post procedure laxity of medical decision making-high. Thank you very much for allowing me to participate in the care of your patient. Please do not hesitate to contact me if you have any questions. Sincerely,  Tobin Nowak MD      [facility] [address] [place] [address]  Ph: [phone]  Fax: [phone]      This note was scribed in the presence of Dr. Nahomy Beltran MD by Ary Nobles RN.

## 2021-07-08 NOTE — PATIENT INSTRUCTIONS
LEFT HEART CATHETERIZATION WITH DR. Elise Turner AT [facility]  The morning of your procedure you will park in the hospital parking lot and report directly to the cath lab to check in. DATE: ___________________________    TIME: ____________________________    ARRIVAL TIME: ____________________    Pre-Procedure Instructions   1. You will need to fast for at least 8 hours prior to procedure. No caffeine the morning of.   2.  All  medications can be taken in the morning with sips of water-INCLUDING ASPIRIN AND EFFIENT. 3. Do not use any lotions, creams or perfume the morning of procedure. 4. Pre-procedure lab work will need to be completed 5-7 days prior to procedure. 5. Please have a responsible adult to drive you home after procedure. 6. We advise you have someone to stay with you for 24 hours following procedure for precautionary measures. 7. Depending on procedure you may require an overnight stay. 8. Cath lab will provide you with all post procedure instructions. If you have any questions regarding the procedure itself or medications, please call [phone] and ask to speak with a nurse. Patient Education        Left Heart Catheterization: About This Test  What is it? Left heart catheterization is a test to check the left side of your heart. Your doctor might look at the shape of your heart, the motion of your heart, or the blood pressure inside the chambers. Why is this test done? This test gives information about how your heart is working. It can:  · Check blood flow and blood pressure in the chambers of the heart. · Check the pumping action of the heart. · Find out if a heart defect is present and how severe it is. · Find out how well the heart valves work. How is the test done? · You will get medicine to help you relax. · A thin tube called a catheter is put into a blood vessel in the groin or the arm.  The doctor moves the catheter through the blood vessel into your heart.  · You will get a shot to numb the skin where the catheter goes in. · Dye may be injected into your heart. Your doctor can watch on special monitors as the dye moves in your heart. The dye helps your doctor see blood flow in your heart. · If a heart defect is found, cardiac catheterization sometimes is used to correct it during the test.  · You will stay in a room for at least a few hours to make sure the catheter site starts to heal. You may have a bandage or a compression device on your groin or arm to prevent bleeding. · If the catheter was placed in your groin, you may lie in bed for a few hours. If the catheter was put in your arm, you will need to keep your arm still for at least 1 hour. How long does it take? The test will take about 30 minutes. If a problem is found and the doctor treats it, the test can take a few hours longer. What happens after the test?  · You may or may not need to stay in the hospital overnight. You will get more instructions for what to do at home. · Drink plenty of fluids for several hours after the test.  Follow-up care is a key part of your treatment and safety. Be sure to make and go to all appointments, and call your doctor if you are having problems. It's also a good idea to know your test results and keep a list of the medicines you take. Where can you learn more? Go to https://WavebornpeHospitality Leaders.Blossom. org and sign in to your [employer] account. Enter W306 in the [facility] box to learn more about \"Left Heart Catheterization: About This Test.\"     If you do not have an account, please click on the \"Sign Up Now\" link. Current as of: August 31, 2020               Content Version: 12.9  © 9676-6336 Healthwise, brotips. Care instructions adapted under license by [address]  If you have questions about a medical condition or this instruction, always ask your healthcare professional. Valentino Amble disclaims any warranty or liability procedure    · Be sure you have someone to take you home. Anesthesia and pain medicine will make it unsafe for you to drive or get home on your own.     · Understand exactly what procedure is planned, along with the risks, benefits, and other options. · Tell your doctor ALL the medicines, vitamins, supplements, and herbal remedies you take. Some may increase the risk of problems during your procedure. Your doctor will tell you if you should stop taking any of them before the procedure and how soon to do it.     · If you take aspirin or some other blood thinner, ask your doctor if you should stop taking it before your procedure. Make sure that you understand exactly what your doctor wants you to do. These medicines increase the risk of bleeding.     · Make sure your doctor and the hospital have a copy of your advance directive. If you don't have one, you may want to prepare one. It lets others know your health care wishes. It's a good thing to have before any type of surgery or procedure. What happens on the day of the procedure? · Follow the instructions exactly about when to stop eating and drinking. If you don't, your procedure may be canceled. If your doctor told you to take your medicines on the day of the procedure, take them with only a sip of water.     · Take a bath or shower before you come in for your procedure. Do not apply lotions, perfumes, deodorants, or nail polish.     · Do not shave the procedure site yourself.     · Take off all jewelry and piercings. And take out contact lenses, if you wear them. At the hospital or surgery center   · Bring a picture ID.     · You will be kept comfortable and safe by your anesthesia provider. You may get medicine that relaxes you or puts you in a light sleep. The area being worked on will be numb.     · After the procedure, pressure will be applied to the area where the catheter was put into your artery.  Then you may have a bandage or a compression device Angina is a sign of coronary artery disease (CAD). CAD occurs when blood vessels that supply the heart become narrowed. Having CAD increases your risk of a heart attack. Chest pain or pressure is the most common symptom of angina. But some people have other symptoms, like:  · Pain, pressure, or a strange feeling in the back, neck, jaw, or upper belly, or in one or both shoulders or arms. · Shortness of breath. · Nausea or vomiting. · Lightheadedness or sudden weakness. · Fast or irregular heartbeat. Women are somewhat more likely than men to have angina symptoms like shortness of breath, nausea, and back or jaw pain. Angina can be dangerous. That's why it is important to pay attention to your symptoms. Know what is typical for you, learn how to control your symptoms, and understand when you need to get treatment. A change in your usual pattern of symptoms is an emergency. It may mean that you are having a heart attack. The doctor has checked you carefully, but problems can develop later. If you notice any problems or new symptoms, get medical treatment right away. Follow-up care is a key part of your treatment and safety. Be sure to make and go to all appointments, and call your doctor if you are having problems. It's also a good idea to know your test results and keep a list of the medicines you take. How can you care for yourself at home? Medicines    · If your doctor has given you nitroglycerin for angina symptoms, keep it with you at all times. If you have symptoms, sit down and rest, and take the first dose of nitroglycerin as directed. If your symptoms get worse or are not getting better within 5 minutes, call 911 right away. Stay on the phone. The emergency  will give you further instructions.     · If your doctor advises it, take 1 low-dose aspirin a day to prevent heart attack.     · Be safe with medicines. Take your medicines exactly as prescribed.  Call your doctor if you think you are having a problem with your medicine. You will get more details on the specific medicines your doctor prescribes. Lifestyle changes    · Do not smoke. If you need help quitting, talk to your doctor about stop-smoking programs and medicines. These can increase your chances of quitting for good.     · Eat a heart-healthy diet that is low in saturated fat and salt, and is high in fiber. Talk to your doctor or a dietitian about healthy eating.     · Stay at a healthy weight. Or lose weight if you need to. Activity    · Talk to your doctor about a level of activity that is safe for you.     · If an activity causes angina symptoms, stop and rest.   When should you call for help? Call 911 anytime you think you may need emergency care. For example, call if:    · You passed out (lost consciousness).     · You have symptoms of a heart attack. These may include:  ? Chest pain or pressure, or a strange feeling in the chest.  ? Sweating. ? Shortness of breath. ? Nausea or vomiting. ? Pain, pressure, or a strange feeling in the back, neck, jaw, or upper belly or in one or both shoulders or arms. ? Lightheadedness or sudden weakness. ? A fast or irregular heartbeat. After you call 911, the  may tell you to chew 1 adult-strength or 2 to 4 low-dose aspirin. Wait for an ambulance. Do not try to drive yourself.     · You have angina symptoms that do not go away with rest or are not getting better within 5 minutes after you take a dose of nitroglycerin. Call your doctor now if:    · Your angina symptoms seem worse but still follow your typical pattern. You can predict when symptoms will happen, but they may come on sooner, feel worse, or last longer.     · You feel dizzy or lightheaded, or you feel like you may faint. Watch closely for changes in your health, and be sure to contact your doctor if you have any problems. Where can you learn more? Go to https://bobby.healthConcard. org and sign in to your [employer] account. Enter H129 in the [facility] box to learn more about \"Angina: Care Instructions. \"     If you do not have an account, please click on the \"Sign Up Now\" link. Current as of: August 31, 2020               Content Version: 12.9  © 2006-2021 [employer]. Care instructions adapted under license by [facility] ([facility]). If you have questions about a medical condition or this instruction, always ask your healthcare professional. [place] [address] any warranty or liability for your use of this information. Patient Education        Heart Failure and Sleep Apnea: Care Instructions  Overview     Sleep apnea is fairly common in people with heart failure. Sleep apnea means you stop breathing for 10 seconds or longer during sleep. When you stop breathing, the amount of oxygen in your blood drops, so your heart has to work harder to get enough oxygen to your body's tissues. This stress on your heart can lead to serious problems, like high blood pressure or heart disease. It may also cause you to snore loudly and not sleep well, so you wake up feeling tired. Getting treatment for sleep apnea can help you sleep and feel better. It may also help keep your heart failure from getting worse. Follow-up care is a key part of your treatment and safety. Be sure to make and go to all appointments, and call your doctor if you are having problems. It's also a good idea to know your test results and keep a list of the medicines you take. How can you care for yourself at home? · Lose weight, if needed. · Sleep on your side. It may help mild apnea. · Avoid alcohol and medicines such as sleeping pills, opioids, or sedatives before bed. · Don't smoke. If you need help quitting, talk to your doctor. · Prop up the head of your bed. · Treat breathing problems, such as a stuffy nose, that are caused by a cold or allergies.   · Try a continuous positive airway pressure (CPAP) breathing machine if your doctor recommends it. · If CPAP doesn't work for you, ask your doctor if you can try other masks, settings, or breathing machines. · Try oral breathing devices or other nasal devices. · Talk to your doctor if your nose feels dry or bleeds, or if it gets runny or stuffy when you use a breathing machine. · Tell your doctor if you're sleepy during the day and it affects your daily life. Don't drive or operate machinery when you're drowsy. When should you call for help? Watch closely for changes in your health, and be sure to contact your doctor if you have any problems. Where can you learn more? Go to https://MedifocuspeHawthorneeweb.NeoPath Networks. org and sign in to your [facility] account. Enter A820 in the [employer] box to learn more about \"Heart Failure and Sleep Apnea: Care Instructions. \"     If you do not have an account, please click on the \"Sign Up Now\" link. Current as of: August 31, 2020               Content Version: 12.9  © 2006-2021 [employer]ik. Care instructions adapted under license by [facility] ([place] [facility]). If you have questions about a medical condition or this instruction, always ask your healthcare professional. [place] [address] any warranty or liability for your use of this information. Patient Education        Low Sodium Diet (2,000 Milligram): Care Instructions  Overview     Limiting sodium can be an important part of managing some health problems. The most common source of sodium is salt. People get most of the salt in their diet from canned, prepared, and packaged foods. Fast food and restaurant meals also are very high in sodium. Your doctor will probably limit your sodium to less than 2,000 milligrams (mg) a day. This limit counts all the sodium in prepared and packaged foods and any salt you add to your food. Follow-up care is a key part of your treatment and safety.  Be sure to make and go to all appointments, and call your doctor if you are having problems. It's also a good idea to know your test results and keep a list of the medicines you take. How can you care for yourself at home? Read food labels  · Read labels on cans and food packages. The labels tell you how much sodium is in each serving. Make sure that you look at the serving size. If you eat more than the serving size, you have eaten more sodium. · Food labels also tell you the Percent Daily Value for sodium. Choose products with low Percent Daily Values for sodium. · Be aware that sodium can come in forms other than salt, including monosodium glutamate (MSG), sodium citrate, and sodium bicarbonate (baking soda). MSG is often added to Asian food. When you eat out, you can sometimes ask for food without MSG or added salt. Buy low-sodium foods  · Buy foods that are labeled \"unsalted\" (no salt added), \"sodium-free\" (less than 5 mg of sodium per serving), or \"low-sodium\" (140 mg or less of sodium per serving). Foods labeled \"reduced-sodium\" and \"light sodium\" may still have too much sodium. Be sure to read the label to see how much sodium you are getting. · Buy fresh vegetables, or frozen vegetables without added sauces. Buy low-sodium versions of canned vegetables, soups, and other canned goods. Prepare low-sodium meals  · Cut back on the amount of salt you use in cooking. This will help you adjust to the taste. Do not add salt after cooking. One teaspoon of salt has about 2,300 mg of sodium. · Take the salt shaker off the table. · Flavor your food with garlic, lemon juice, onion, vinegar, herbs, and spices. Do not use soy sauce, lite soy sauce, steak sauce, onion salt, garlic salt, celery salt, or ketchup on your food. · Use low-sodium salad dressings, sauces, and ketchup. Or make your own salad dressings and sauces without adding salt. · Use less salt (or none) when recipes call for it. You can often use half the salt a recipe calls for without losing flavor. Other foods such as rice, pasta, and grains do not need added salt. · Rinse canned vegetables, and cook them in fresh water. This removes somebut not allof the salt. · Avoid water that is naturally high in sodium or that has been treated with water softeners, which add sodium. If you buy bottled water, read the label and choose a sodium-free brand. Avoid high-sodium foods  · Avoid eating:  ? Smoked, cured, salted, and canned meat, fish, and poultry. ? Ham, frazier, hot dogs, and luncheon meats. ? Regular, hard, and processed cheese and regular peanut butter. ? Crackers with salted tops, and other salted snack foods such as pretzels, chips, and salted popcorn. ? Frozen prepared meals, unless labeled low-sodium. ? Canned and dried soups, broths, and bouillon, unless labeled sodium-free or low-sodium. ? Canned vegetables, unless labeled sodium-free or low-sodium. ? [place] fries, pizza, tacos, and other fast foods. ? Pickles, olives, ketchup, and other condiments, especially soy sauce, unless labeled sodium-free or low-sodium. Where can you learn more? Go to https://[employer] Sciences.The Whoot. org and sign in to your [employer] account. Enter D747 in the [facility] box to learn more about \"Low Sodium Diet (2,000 Milligram): Care Instructions. \"     If you do not have an account, please click on the \"Sign Up Now\" link. Current as of: December 17, 2020               Content Version: 12.9  © 3085-6478 Healthwise, Incorporated. Care instructions adapted under license by [facility] ([facility]). If you have questions about a medical condition or this instruction, always ask your healthcare professional. [address] any warranty or liability for your use of this information.

## 2021-07-09 DIAGNOSIS — R53.82 CHRONIC FATIGUE: ICD-10-CM

## 2021-07-09 DIAGNOSIS — I25.10 CORONARY ARTERY DISEASE INVOLVING NATIVE CORONARY ARTERY OF NATIVE HEART WITHOUT ANGINA PECTORIS: ICD-10-CM

## 2021-07-09 DIAGNOSIS — R06.09 CHRONIC DYSPNEA: ICD-10-CM

## 2021-07-09 LAB
ANION GAP SERPL CALCULATED.3IONS-SCNC: 10 MMOL/L (ref 3–16)
BUN BLDV-MCNC: 9 MG/DL (ref 7–20)
CALCIUM SERPL-MCNC: 9.2 MG/DL (ref 8.3–10.6)
CHLORIDE BLD-SCNC: 104 MMOL/L (ref 99–110)
CO2: 26 MMOL/L (ref 21–32)
CREAT SERPL-MCNC: 1 MG/DL (ref 0.8–1.3)
GFR AFRICAN AMERICAN: >60
GFR NON-AFRICAN AMERICAN: >60
GLUCOSE BLD-MCNC: 143 MG/DL (ref 70–99)
HCT VFR BLD CALC: 39.4 % (ref 40.5–52.5)
HEMOGLOBIN: 13.3 G/DL (ref 13.5–17.5)
MCH RBC QN AUTO: 32.4 PG (ref 26–34)
MCHC RBC AUTO-ENTMCNC: 33.8 G/DL (ref 31–36)
MCV RBC AUTO: 95.8 FL (ref 80–100)
PDW BLD-RTO: 15.1 % (ref 12.4–15.4)
PLATELET # BLD: 186 K/UL (ref 135–450)
PLATELET SLIDE REVIEW: ADEQUATE
PMV BLD AUTO: 9.9 FL (ref 5–10.5)
POTASSIUM SERPL-SCNC: 4.4 MMOL/L (ref 3.5–5.1)
RBC # BLD: 4.11 M/UL (ref 4.2–5.9)
SLIDE REVIEW: ABNORMAL
SODIUM BLD-SCNC: 140 MMOL/L (ref 136–145)
WBC # BLD: 5.3 K/UL (ref 4–11)

## 2021-07-15 ENCOUNTER — HOSPITAL ENCOUNTER (OUTPATIENT)
Dept: CARDIAC CATH/INVASIVE PROCEDURES | Age: 66
Discharge: HOME | End: 2021-07-15
Payer: COMMERCIAL

## 2021-07-15 VITALS
OXYGEN SATURATION: 97 % | SYSTOLIC BLOOD PRESSURE: 136 MMHG | DIASTOLIC BLOOD PRESSURE: 88 MMHG | RESPIRATION RATE: 18 BRPM | TEMPERATURE: 98.2 F | HEIGHT: 75 IN | HEART RATE: 77 BPM | BODY MASS INDEX: 34.32 KG/M2 | WEIGHT: 276 LBS

## 2021-07-15 PROBLEM — I25.83 CORONARY ARTERY DISEASE DUE TO LIPID RICH PLAQUE: Status: ACTIVE | Noted: 2020-08-25

## 2021-07-15 PROCEDURE — 99152 MOD SED SAME PHYS/QHP 5/>YRS: CPT

## 2021-07-15 PROCEDURE — 2580000003 HC RX 258

## 2021-07-15 PROCEDURE — C1769 GUIDE WIRE: HCPCS

## 2021-07-15 PROCEDURE — 93458 L HRT ARTERY/VENTRICLE ANGIO: CPT | Performed by: INTERNAL MEDICINE

## 2021-07-15 PROCEDURE — 93458 L HRT ARTERY/VENTRICLE ANGIO: CPT

## 2021-07-15 PROCEDURE — 6360000002 HC RX W HCPCS

## 2021-07-15 PROCEDURE — 2500000003 HC RX 250 WO HCPCS

## 2021-07-15 PROCEDURE — 99153 MOD SED SAME PHYS/QHP EA: CPT

## 2021-07-15 PROCEDURE — 6370000000 HC RX 637 (ALT 250 FOR IP)

## 2021-07-15 PROCEDURE — 93571 IV DOP VEL&/PRESS C FLO 1ST: CPT

## 2021-07-15 PROCEDURE — C1894 INTRO/SHEATH, NON-LASER: HCPCS

## 2021-07-15 PROCEDURE — 93799 UNLISTED CV SVC/PROCEDURE: CPT | Performed by: INTERNAL MEDICINE

## 2021-07-15 PROCEDURE — 2709999900 HC NON-CHARGEABLE SUPPLY

## 2021-07-15 PROCEDURE — 6360000004 HC RX CONTRAST MEDICATION: Performed by: INTERNAL MEDICINE

## 2021-07-15 PROCEDURE — C1887 CATHETER, GUIDING: HCPCS

## 2021-07-15 RX ORDER — SODIUM CHLORIDE 0.9 % (FLUSH) 0.9 %
5-40 SYRINGE (ML) INJECTION PRN
Status: DISCONTINUED | OUTPATIENT
Start: 2021-07-15 | End: 2021-07-16 | Stop reason: HOSPADM

## 2021-07-15 RX ORDER — SODIUM CHLORIDE 0.9 % (FLUSH) 0.9 %
5-40 SYRINGE (ML) INJECTION EVERY 12 HOURS SCHEDULED
Status: DISCONTINUED | OUTPATIENT
Start: 2021-07-15 | End: 2021-07-16 | Stop reason: HOSPADM

## 2021-07-15 RX ORDER — SODIUM CHLORIDE 9 MG/ML
25 INJECTION, SOLUTION INTRAVENOUS PRN
Status: DISCONTINUED | OUTPATIENT
Start: 2021-07-15 | End: 2021-07-16 | Stop reason: HOSPADM

## 2021-07-15 RX ORDER — SODIUM CHLORIDE 9 MG/ML
25 INJECTION, SOLUTION INTRAVENOUS PRN
Status: DISCONTINUED | OUTPATIENT
Start: 2021-07-15 | End: 2021-07-15 | Stop reason: SDUPTHER

## 2021-07-15 RX ORDER — ACETAMINOPHEN 325 MG/1
650 TABLET ORAL EVERY 4 HOURS PRN
Status: DISCONTINUED | OUTPATIENT
Start: 2021-07-15 | End: 2021-07-16 | Stop reason: HOSPADM

## 2021-07-15 RX ORDER — SODIUM CHLORIDE 9 MG/ML
INJECTION, SOLUTION INTRAVENOUS CONTINUOUS
Status: DISCONTINUED | OUTPATIENT
Start: 2021-07-15 | End: 2021-07-16 | Stop reason: HOSPADM

## 2021-07-15 RX ORDER — SODIUM CHLORIDE 0.9 % (FLUSH) 0.9 %
5-40 SYRINGE (ML) INJECTION EVERY 12 HOURS SCHEDULED
Status: DISCONTINUED | OUTPATIENT
Start: 2021-07-15 | End: 2021-07-15 | Stop reason: SDUPTHER

## 2021-07-15 RX ORDER — SODIUM CHLORIDE 0.9 % (FLUSH) 0.9 %
5-40 SYRINGE (ML) INJECTION PRN
Status: DISCONTINUED | OUTPATIENT
Start: 2021-07-15 | End: 2021-07-15 | Stop reason: SDUPTHER

## 2021-07-15 RX ADMIN — IOPAMIDOL 145 ML: 755 INJECTION, SOLUTION INTRAVENOUS at 16:10

## 2021-07-15 NOTE — PRE SEDATION
 Prolonged emergence from general anesthesia     Prostatism     Tricuspid valve prolapse     Unspecified sleep apnea          Surgical History:  Past Surgical History:   Procedure Laterality Date    CARDIAC CATHETERIZATION      CHOLECYSTECTOMY      FOOT SURGERY      HAND SURGERY Right 11/09/2017    thumb mass excision    INGUINAL HERNIA REPAIR      bilateral    KNEE ARTHROSCOPY      OTHER SURGICAL HISTORY  08/28/2019    epidural Dr. Fernando Florez at [address]      right elbow    TONSILLECTOMY           Medications:  Current Outpatient Medications   Medication Sig Dispense Refill    amitriptyline (ELAVIL) 50 MG tablet       cyclobenzaprine (FLEXERIL) 10 MG tablet       ASPIRIN LOW DOSE 81 MG EC tablet TAKE ONE TABLET BY MOUTH DAILY 30 tablet 5    atorvastatin (LIPITOR) 40 MG tablet TAKE ONE TABLET BY MOUTH ONCE NIGHTLY 90 tablet 3    folic acid (FOLVITE) 1 MG tablet TAKE ONE TABLET BY MOUTH DAILY 90 tablet 0    methotrexate (RHEUMATREX) 2.5 MG chemo tablet TAKE EIGHT TABLETS BY MOUTH ONCE WEEKLY 32 tablet 2    gabapentin (NEURONTIN) 300 MG capsule Take 300 mg by mouth 2 times daily.  prasugrel (EFFIENT) 10 MG TABS Take 1 tablet by mouth daily 30 tablet 6    busPIRone (BUSPAR) 7.5 MG tablet Take 7.5 mg by mouth Every 4 - 6 hours PRN      metoprolol tartrate (LOPRESSOR) 50 MG tablet Take 25 mg by mouth 2 times daily       finasteride (PROSCAR) 5 MG tablet Take 5 mg by mouth daily      omeprazole (PRILOSEC) 20 MG delayed release capsule Take 20 mg by mouth      losartan (COZAAR) 50 MG tablet Take 50 mg by mouth daily      Loratadine 10 MG CAPS Take by mouth daily       HYDROcodone-acetaminophen (NORCO) 5-325 MG per tablet Take 1 tablet by mouth every 6 hours as needed for Pain      montelukast (SINGULAIR) 10 MG tablet Take 10 mg by mouth nightly.  amitriptyline (ELAVIL) 100 MG tablet Take 100 mg by mouth nightly.       DULoxetine (CYMBALTA) 60 MG extended release capsule Take 60 mg by mouth daily       nitroGLYCERIN (NITROSTAT) 0.4 MG SL tablet Place 0.4 mg under the tongue every 5 minutes as needed for Chest pain up to max of 3 total doses. If no relief after 1 dose, call 911. Current Facility-Administered Medications   Medication Dose Route Frequency Provider Last Rate Last Admin    0.9 % sodium chloride infusion   Intravenous Continuous Marlon Galo MD        sodium chloride flush 0.9 % injection 5-40 mL  5-40 mL Intravenous 2 times per day Marlon Galo MD        sodium chloride flush 0.9 % injection 5-40 mL  5-40 mL Intravenous PRN Marlon Galo MD        0.9 % sodium chloride infusion  25 mL Intravenous PRN Jeanie Alberts MD               Pre-Sedation:    Pre-Sedation Documentation and Exam:  I have personally completed a history, physical exam & review of systems for this patient (see notes). Prior History of Anesthesia Complications:   none    Modified Mallampati:  I (soft palate, uvula, fauces, tonsillar pillars visible)    ASA Classification:  Class 3 - A patient with severe systemic disease that limits activity but is not incapacitating      Augustus Scale: Activity:  2 - Able to move 4 extremities voluntarily on command  Respiration:  2 - Able to breathe deeply and cough freely  Circulation:  2 - BP+/- 20mmHg of normal  Consciousness:  2 - Fully awake  Oxygen Saturation (color):  2 - Able to maintain oxygen saturation >92% on room air    Sedation/Anesthesia Plan:  Guard the patient's safety and welfare. Minimize physical discomfort and pain. Minimize negative psychological responses to treatment by providing sedation and analgesia and maximize the potential amnesia. Patient to meet pre-procedure discharge plan.     Medication Planned:  midazolam intravenously and fentanyl intravenously    Patient is an appropriate candidate for plan of sedation: yes      Electronically signed by Karthik Hinson MD on 7/15/2021 at 1:08 PM

## 2021-07-15 NOTE — PROCEDURES
[address] [address]                            CARDIAC CATHETERIZATION    PATIENT NAME: Kayce Fried                    :        1955  MED REC NO:   [id number]                          ROOM:  ACCOUNT NO:   [de-identified]                           ADMIT DATE: 07/15/2021  PROVIDER:     Roxi Verduzco MD    DATE OF PROCEDURE:  07/15/2021    PROCEDURE NOTE:  The patient was explained benefits and risks of the  procedure. Informed consent was obtained. The patient's right radial  artery was prepared in the usual sterile fashion after performing Yan  test.  A 5/6-Chinese sheath was placed in the right radial artery. The  patient was given intraarterial heparin, verapamil, nitroglycerin. 5-Chinese Tiger catheter was used to cannulate the left coronary artery. Multiple pictures of the left coronary arteries were obtained and  recorded. Subsequently, a 5-Chinese Jefry catheter was used to cannulate  the ostium of the right coronary artery and multiple pictures of the  right coronary arteries were obtained in Stateless/SEARS projection with cranial  and caudal angulations. Angled pigtail catheter was used for a left  ventriculogram.  Pressure in the left ventricular cavity was obtained  before and after left ventriculogram.  The patient had no gradient  across the aortic valve. Left ventriculogram was done in SEARS  projection. The patient's angiogram was carefully reviewed with Dr. Edna Billings, the interventionalist and it was felt that the patient would  benefit from a FFR of his left anterior descending artery which will be  dictated by Dr. Edna Billings separately. The patient tolerated the procedure  well. Sedation received is 2 mg of Versed and 100 mcg of fentanyl so  far during the procedure. HEMODYNAMIC DATA:  Please the computerized printout. No gradient across  the aortic valve noted. CORONARY ANGIOGRAPHY:  1.   Left main trunk: It arises from the left sinus of Valsalva. It  divides into a left anterior descending artery and left circumflex  artery. Left main trunk is free of atherosclerosis. 2.  Left anterior descending artery: It is a moderate to large-sized  artery and it has an evidence of discrete 50 to 60% stenosis in the  proximal segment. Remainder of the left anterior descending artery  appears free of significant atherosclerosis. 3.  Left circumflex artery: It arises from the left main trunk. It is  a moderate to large-sized artery, gives rise to obtuse marginal branches  and right circumflex artery and its branches are free of  atherosclerosis. 4.  Right coronary artery: It arises from the right sinus of Valsalva. It is a large dominant artery and it gives rise to posterior descending  and posterior lateral branches. Right coronary artery has evidence of a  stent in the proximal to mid segment which is widely patent. There is a  mild disease distal to the stent but it is not hemodynamically  significant. OVERALL IMPRESSION:  1. Patent left main trunk. 2.  50 to 60% discrete stenosis of proximal left anterior descending  artery. 3.  Patent circumflex artery and its branches. 4.  Widely patent stented proximal right coronary artery with mild  disease post stent which does not appear hemodynamically significant. 5.  Mild mid-inferior wall hypokinesis with an estimated EF of 50% with  normal left heart hemodynamics. In view of these findings, we will proceed first with the FFR of the  left anterior descending artery which is being performed by Dr. Paulette Chaudhari  and further recommendations to follow the FFR.         Darline Escobar MD    D: 07/15/2021 15:49:16       T: 07/15/2021 16:35:45     [email or web address]  Job#: [id number]     Doc#: [id number]    CC:  MD Ryanne Argueta MD

## 2021-07-20 RX ORDER — PRASUGREL 10 MG/1
10 TABLET, FILM COATED ORAL DAILY
Qty: 30 TABLET | Refills: 6 | Status: SHIPPED | OUTPATIENT
Start: 2021-07-20 | End: 2022-01-03 | Stop reason: SDUPTHER

## 2021-07-20 NOTE — TELEPHONE ENCOUNTER
Last OV: 7/8/21  Next OV: 9/2/21  Last refill: 12/7/20 30 x 6  Most recent Labs:   Last PT/INR (if needed):  Last EKG (if needed):

## 2021-07-28 NOTE — OP NOTE
[address]   Procedure Note    CLINICAL HISTORY:       Marion Marino is a 60 y.o. male with a history of HTN, who presented for work up of chest pain. Coronary angiogram performed by my partner Dr. Cathi Chaudhary. Asked to perform ischemic evaluation with FFR. Patient Active Problem List   Diagnosis    Rheumatoid arthritis ([facility] [address]    HTN (hypertension)    Abnormal results of cardiovascular function studies    Mass of finger    Lung nodule    Tobacco abuse    Coronary artery disease due to lipid rich plaque    Unstable angina (HCC)    Abnormal nuclear stress test       Prior to Admission medications    Medication Sig Start Date End Date Taking? Authorizing Provider   amitriptyline (ELAVIL) 50 MG tablet  7/4/21  Yes Historical Provider, MD   cyclobenzaprine (FLEXERIL) 10 MG tablet  6/29/21  Yes Historical Provider, MD   ASPIRIN LOW DOSE 81 MG EC tablet TAKE ONE TABLET BY MOUTH DAILY 7/2/21  Yes Shanda Guadalupe MD   atorvastatin (LIPITOR) 40 MG tablet TAKE ONE TABLET BY MOUTH ONCE NIGHTLY 6/1/21  Yes Yumiko Crawford MD   folic acid (FOLVITE) 1 MG tablet TAKE ONE TABLET BY MOUTH DAILY 5/28/21  Yes Claude Decant, MD   methotrexate (RHEUMATREX) 2.5 MG chemo tablet TAKE EIGHT TABLETS BY MOUTH ONCE WEEKLY 4/29/21  Yes Claude Decant, MD   gabapentin (NEURONTIN) 300 MG capsule Take 300 mg by mouth 2 times daily.    Yes Historical Provider, MD   busPIRone (BUSPAR) 7.5 MG tablet Take 7.5 mg by mouth Every 4 - 6 hours PRN   Yes Historical Provider, MD   metoprolol tartrate (LOPRESSOR) 50 MG tablet Take 25 mg by mouth 2 times daily    Yes Historical Provider, MD   finasteride (PROSCAR) 5 MG tablet Take 5 mg by mouth daily   Yes Historical Provider, MD   omeprazole (PRILOSEC) 20 MG delayed release capsule Take 20 mg by mouth   Yes Historical Provider, MD   losartan (COZAAR) 50 MG tablet Take 50 mg by mouth daily   Yes Historical Provider, MD   Loratadine 10 MG CAPS Take by mouth daily    Yes Historical Provider, MD   HYDROcodone-acetaminophen (NORCO) 5-325 MG per tablet Take 1 tablet by mouth every 6 hours as needed for Pain   Yes Historical Provider, MD   montelukast (SINGULAIR) 10 MG tablet Take 10 mg by mouth nightly. Yes Historical Provider, MD   amitriptyline (ELAVIL) 100 MG tablet Take 100 mg by mouth nightly. Yes Historical Provider, MD   DULoxetine (CYMBALTA) 60 MG extended release capsule Take 60 mg by mouth daily    Yes Historical Provider, MD   prasugrel (EFFIENT) 10 MG TABS Take 1 tablet by mouth daily 7/20/21   Flakito Solis MD   nitroGLYCERIN (NITROSTAT) 0.4 MG SL tablet Place 0.4 mg under the tongue every 5 minutes as needed for Chest pain up to max of 3 total doses. If no relief after 1 dose, call 911. Historical Provider, MD            PROCEDURES PERFORMED:   FFR     CONTRAST:  Total of 10 cc. COMPLICATIONS:  None. At the end of the procedure a TR device was used for hemostasis. EBL: 10 cc     INTERVENTION  1. XB 3.5 guide   2. Runthrough wire used to cross LAD lesion   3.  FFR 0.81 ( physiologically non-significant )     SUMMARY:   Nonobstructive CAD     RECOMMENDATION:     - recommend aggressive medical therapy for CAD   - if symptoms persist recommend LAD PCI in the future     Cher Nunez MD [address] [place] [address], Interventional Cardiology, and Peripheral Vascular [address]   (C): [phone]  (O): [phone]

## 2021-08-30 DIAGNOSIS — Z79.899 ENCOUNTER FOR LONG-TERM (CURRENT) USE OF HIGH-RISK MEDICATION: ICD-10-CM

## 2021-08-30 DIAGNOSIS — M06.00 RHEUMATOID ARTHRITIS WITH NEGATIVE RHEUMATOID FACTOR, INVOLVING UNSPECIFIED SITE (HCC): ICD-10-CM

## 2021-08-30 DIAGNOSIS — Z51.81 ENCOUNTER FOR THERAPEUTIC DRUG MONITORING: ICD-10-CM

## 2021-08-30 NOTE — PROGRESS NOTES
[facility]  Cardiology Progress Note    Lela Fraction  [birth year]    September 2, 2021      CC: \"I still have shortness of breath and chest discomfort. \"     HPI:  The patient is 60 y.o. male with a past medical history significant for CAD s/p LHC with PCI RCA per Dr. Jonita Favre 8/2020, hypertension, hyperlipidemia, VICKIE, and pre diabetes. Today, presents in follow up s/p LHC with FFR prox. LAD 50-60% stenosis revealing non obstructive CAD per Dr. Franky Womack 7/15/21. He is again accompanied by his wife. He reports off/on VALVERDE that is \"unpredictable. \" he also reports the rare chest discomfort. His weight is down a few pounds. Patient denies exertional chest pain/pressure, dyspnea at rest, PND, orthopnea, palpitations, heart fluttering, pounding, lightheadedness, dizziness, weight changes, changes in LE edema, and syncope. He reports medical therapy compliance with continued tolerance. The patient denies abnormal bruising or bleeding on DAPT.      Past Medical History:   Diagnosis Date    Arthritis     Asthma     BPH (benign prostatic hyperplasia)     Cardiomyopathy ([facility] [address]     GERD (gastroesophageal reflux disease)     HTN (hypertension) 10/29/2012    MVP (mitral valve prolapse)     PPD positive     Prolonged emergence from general anesthesia     Prostatism     Tricuspid valve prolapse     Unspecified sleep apnea      Past Surgical History:   Procedure Laterality Date    CARDIAC CATHETERIZATION      CHOLECYSTECTOMY      FOOT SURGERY      HAND SURGERY Right 11/09/2017    thumb mass excision    INGUINAL HERNIA REPAIR      bilateral    KNEE ARTHROSCOPY      OTHER SURGICAL HISTORY  08/28/2019    epidural Dr. Steve Richardson at [address]      right elbow    TONSILLECTOMY       Family History   Problem Relation Age of Onset    Rheum Arthritis Other     Heart Disease Father     Heart Disease Brother     Other Brother         histoplasmosis     Lupus Neg Hx      Social History Tobacco Use    Smoking status: Former Smoker     Types: Cigarettes     Quit date: 1982     Years since quittin.6    Smokeless tobacco: Never Used   Vaping Use    Vaping Use: Never used   Substance Use Topics    Alcohol use: Yes     Alcohol/week: 12.0 standard drinks     Types: 12 Cans of beer per week     Comment: wine & shots rare    Drug use: No       Allergies   Allergen Reactions    Pcn [Penicillins] Shortness Of Breath    Penicillin G Sodium Shortness Of Breath    Cephalosporins Itching    Ciprofloxacin Itching    Codeine Itching    Erythromycin Itching    Morphine And Related Itching    Fenoprofen Calcium Other (See Comments)     Pt doesn`t recall     Current Outpatient Medications   Medication Sig Dispense Refill    folic acid (FOLVITE) 1 MG tablet TAKE ONE TABLET BY MOUTH DAILY 90 tablet 0    prasugrel (EFFIENT) 10 MG TABS Take 1 tablet by mouth daily 30 tablet 6    cyclobenzaprine (FLEXERIL) 10 MG tablet       ASPIRIN LOW DOSE 81 MG EC tablet TAKE ONE TABLET BY MOUTH DAILY 30 tablet 5    atorvastatin (LIPITOR) 40 MG tablet TAKE ONE TABLET BY MOUTH ONCE NIGHTLY 90 tablet 3    methotrexate (RHEUMATREX) 2.5 MG chemo tablet TAKE EIGHT TABLETS BY MOUTH ONCE WEEKLY 32 tablet 2    gabapentin (NEURONTIN) 300 MG capsule Take 300 mg by mouth 2 times daily.  busPIRone (BUSPAR) 7.5 MG tablet Take 7.5 mg by mouth Every 4 - 6 hours PRN      nitroGLYCERIN (NITROSTAT) 0.4 MG SL tablet Place 0.4 mg under the tongue every 5 minutes as needed for Chest pain up to max of 3 total doses. If no relief after 1 dose, call 911.       metoprolol tartrate (LOPRESSOR) 50 MG tablet Take 25 mg by mouth 2 times daily       finasteride (PROSCAR) 5 MG tablet Take 5 mg by mouth daily      omeprazole (PRILOSEC) 20 MG delayed release capsule Take 20 mg by mouth      losartan (COZAAR) 50 MG tablet Take 50 mg by mouth daily      Loratadine 10 MG CAPS Take by mouth daily       HYDROcodone-acetaminophen (NORCO) 5-325 MG per tablet Take 1 tablet by mouth every 6 hours as needed for Pain      montelukast (SINGULAIR) 10 MG tablet Take 10 mg by mouth nightly.  amitriptyline (ELAVIL) 100 MG tablet Take 100 mg by mouth nightly.  DULoxetine (CYMBALTA) 60 MG extended release capsule Take 60 mg by mouth daily        No current facility-administered medications for this visit. Review of Systems:  · Constitutional: no unanticipated weight loss. There's been no change in energy level, sleep pattern, or activity level. No fevers, chills. · Eyes: No visual changes or diplopia. No scleral icterus. · ENT: No Headaches, hearing loss or vertigo. No mouth sores or sore throat. · Cardiovascular: as reviewed in HPI  · Respiratory: No cough or wheezing, no sputum production. No hematemesis. · Gastrointestinal: + nausea. No abdominal pain, appetite loss, blood in stools. No change in bowel or bladder habits. · Genitourinary: No dysuria, trouble voiding, or hematuria. · Musculoskeletal:  No gait disturbance, no joint complaints. · Integumentary: No rash or pruritis. · Neurological: No headache, diplopia, change in muscle strength, numbness or tingling.  + dizziness. · Psychiatric: No anxiety or depression. · Endocrine: No temperature intolerance. No excessive thirst, fluid intake, or urination. No tremor. · Hematologic/Lymphatic: No abnormal bruising or bleeding, blood clots or swollen lymph nodes. · Allergic/Immunologic: No nasal congestion or hives. Physical Exam:   /60   Pulse 74 Comment: irreg  Ht 6' 3\" (1.905 m)   Wt 279 lb (126.6 kg)   SpO2 95%   BMI 34.87 kg/m²   Wt Readings from Last 3 Encounters:   09/02/21 279 lb (126.6 kg)   07/15/21 276 lb (125.2 kg)   07/08/21 282 lb (127.9 kg)     Constitutional: He is oriented to person, place, and time. He appears well-developed and well-nourished. In no acute distress. Head: Normocephalic and atraumatic. Pupils equal and round.   Neck: Neck supple. No JVP or carotid bruit appreciated. No mass and no thyromegaly present. No lymphadenopathy present. Cardiovascular: Normal rate. Normal heart sounds. Exam reveals no gallop and no friction rub. No murmur heard. Pulmonary/Chest: Effort normal and breath sounds normal. No respiratory distress. He has no wheezes, rhonchi or rales. Abdominal: Soft, non-tender. Bowel sounds are normal. He exhibits no organomegaly, mass or bruit. Extremities: No edema, cyanosis, or clubbing. Pulses are 2+ radial/dorsalis pedis/posterior tibial/carotid bilaterally. Neurological: No gross cranial nerve deficit. Coordination normal.   Skin: Skin is warm and dry. There is no rash or diaphoresis. Psychiatric: He has a normal mood and affect. His speech is normal and behavior is normal.     Lab Review:   Lab Results   Component Value Date    TRIG 123 01/21/2021    HDL 35 01/21/2021    HDL 41 03/06/2012    LDLCALC 51 01/21/2021    LABVLDL 25 01/21/2021      Lab Results   Component Value Date    BUN 9 07/09/2021    CREATININE 1.0 07/09/2021       EKG Interpretation:   8/25/20: Sinus  Rhythm  - frequent multiform ectopic ventricular beats, # VECs = 2, # types 2, consider old anterior infarct,  -Nonspecific ST depression   +   Nonspecific T-abnormality  -Nondiagnostic. 9/10/20: sinus bradycardia, 57 bpm   12/7/20: Sinus  Bradycardia  - frequent ectopic ventricular beat s, # VECs = 3, ~57 bpm.   9/2/21: Sinus  Rhythm  -with ectopic ventricular couplets with Nonspecific ST depression  -Nondiagnostic. ST and T wave abnormalities in the inferior leads. Cannot rule out ischemia ~75 bpm.     Image Review:     Chest CT 8/1/19  Atherosclerosis, including coronary artery calcification.       Several indeterminate pulmonary nodules, measuring up to approximately 6 mm.    Follow-up recommendations are as below.       RECOMMENDATIONS:   Fleischner Society guidelines for follow-up and management of incidentally   detected pulmonary nodules:     Single Solid Nodule:       Nodule size less than 6 mm   In a low-risk patient, no routine follow-up. In a high-risk patient, optional CT at 12 months.       Nodule size equals 6-8 mm   In a low-risk patient, CT at 6-12 months, then consider CT at 18-24 months. In a high-risk patient, CT at 6-12 months, then CT at 18-24 months.       Nodule size greater than 8 mm           In a low-risk patient, consider CT at 3 months, PET/CT, or tissue sampling.       In a high-risk patient, consider CT at 3 months, PET/CT, or tissue sampling.       Multiple Solid Nodules:       Nodule size less than 6 mm   In a low-risk patient, no routine follow-up. In a high-risk patient, optional CT at 12 months.       Nodule size equals 6-8 mm   In a low-risk patient, CT at 3-6 months, then consider CT at 18-24 months. In a high-risk patient, CT at 3-6 months, then CT at 18-24 months.       Nodule size greater than 8 mm   In a low-risk patient, CT at 3-6 months, then consider CT at 18-24 months. In a high-risk patient, CT at 3-6 months, then CT at 18-24 months.       - Low risk patients include individuals with minimal or absent history of   smoking and other known risk factors.       - High risk patients include individuals with a history or smoking or known   risk factors.         Stress test 5/14/19  Normal myocardial perfusion study.    Normal myocardial perfusion.    Normal LV size and systolic function.    Overall findings represent a low risk study. ECHO 5/21/19  There is mild concentric left ventricular hypertrophy. Left ventricular cavity size is mildly dilated. Overall left ventricular systolic function appears mildly reduced. Ejection fraction is visually estimated to be 40-45%. Grade I diastolic dysfunction with normal LV filling pressures. The aortic root is mildly dilated & measures at 4.1 cms. Mildly dilated right ventricle.   TAPSE 2.2cm, RV mid 4cm  IVC size is normal (<2.1cm) and collapses > 50% with respiration consistent  with normal RA pressure (3mmHg). [facility]: 8/25/20 per Dr. Marcy Lutz:  1. There is single-vessel disease. 2.  Dominant RCA, 99% with LEYDI grade 1 flow. 3.  Left main:  Free of disease. 4. LAD has a 10-20% proximal lesion. 5.  Left circumflex:  No obstructive disease.  CONCLUSION:  1. Dominant RCA has a 99% proximal stenosis with LEYDI grade 1 flow. 2.  Elevated left heart filling pressures. 3.  Normal left ventricular size and systolic function with ejection  fraction of 60%. CONCLUSION:  Successful PCI and stenting of the proximal RCA, lesion  reduced to 0%. Echo: 6/4/21  Summary   There is mildly increased left ventricular wall thickness. EF 50%. Indeterminate diastolic function. The left atrium is moderately dilated. Right ventricular systolic function is normal .   Trivial mitral, and tricuspid regurgitation. Stress study: 6/21/21  Summary    moderate size moderate severity fixed inferior wall defect consist with    prior inferior wall MI. no evidence of ischemia        Recommendation    Aggressive medical therapy for coronary artery disease.       Stress Protocols     [facility]:7/15/21  CORONARY ANGIOGRAPHY:  1. Left main trunk: It arises from the left sinus of Valsalva. It  divides into a left anterior descending artery and left circumflex  artery. Left main trunk is free of atherosclerosis. 2.  Left anterior descending artery: It is a moderate to large-sized  artery and it has an evidence of discrete 50 to 60% stenosis in the  proximal segment. Remainder of the left anterior descending artery  appears free of significant atherosclerosis. 3.  Left circumflex artery: It arises from the left main trunk. It is  a moderate to large-sized artery, gives rise to obtuse marginal branches  and right circumflex artery and its branches are free of  atherosclerosis. 4.  Right coronary artery: It arises from the right sinus of Valsalva.    It is a large dominant artery and it gives rise to posterior descending  and posterior lateral branches. Right coronary artery has evidence of a  stent in the proximal to mid segment which is widely patent. There is a  mild disease distal to the stent but it is not hemodynamically  significant.     OVERALL IMPRESSION:  1. Patent left main trunk. 2.  50 to 60% discrete stenosis of proximal left anterior descending artery. 3.  Patent circumflex artery and its branches. 4.  Widely patent stented proximal right coronary artery with mild   disease post stent which does not appear hemodynamically significant. 5.  Mild mid-inferior wall hypokinesis with an estimated EF of 50% with  normal left heart hemodynamics.     In view of these findings, we will proceed first with the FFR of the  left anterior descending artery which is being performed by Dr. Jazmyne Baig  and further recommendations to follow the FFR.     Dr. Albania Soliz  1. XB 3.5 guide   2. Runthrough wire used to cross LAD lesion   3. FFR 0.81 ( physiologically non-significant )      SUMMARY:   Nonobstructive CAD      RECOMMENDATION:   - recommend aggressive medical therapy for CAD   - if symptoms persist recommend LAD PCI in the future      Assessment/Plan:     CAD   S/p successful PCI and stenting of proximal RCA, 99% to 0% with LEYDI 1 by Dr. Lluvia Jameson on 8/25/2020. He is here for a follow-up after he underwent repeat coronary angiography which revealed 50-60% discrete stenosis prox LAD, Dr. Jazmyne Baig performed FFR revealing non obstructive CAD with plan for aggressive medical therapy. Currently denies having any symptoms of chest tightness or pressure but continues to have shortness of breath that he feels has improved with switching Brilinta to Effient but still continues to complain of shortness of breath with minimal exertion. If symptoms persist, Dr. Jazmyne Baig recommends LAD PCI in the future.      TAgain encouraged to reschedule his consultations with sleep medicine as he was diagnosed with sleep apnea and not useSinus Rhythm  -with ectopic ventricular couplets with Nonspecific ST depression  -Nondiagnostic. ST and T wave abnormalities in the inferior leads. Cannot rule out ischemia ~75 bpm.   OMT includes lipitor,  DAPT-ASA/Effient, lopressor, losartan. Will complete 7 day CAM patch to assess PVC burden. Continue to work on heart healthy diet, low salt diet and slowly increase walking program.   He has obtained his COVID vaccine. PVC's   Chest tightness, VALVERDE may be secondary to PVC's. Sinus  Rhythm  -with ectopic ventricular couplets with Nonspecific ST depression  -Nondiagnostic. ST and T wave abnormalities in the inferior leads. Cannot rule out ischemia ~75 bpm. Will assess PVC burden and assess cardiac arrhthymias by obtaining 7-day monitor. CBC abnormal but stable 7/9/21. Hypertension, essential   BP is stable today on medical therapy. BMP stable 7/9/21. Hyperlipidemia, unspecified   Last lipid profile 1/21/21 wnl except low HDL, A1c 5.7. Lipitor 40 mg nightly     Sleep apnea  Has not worn CPAP in several years. We have discussed multiple times proceeding with, referral for repeat sleep study. Will follow up next visit. Chronic fatigue  TSH wnl 1/21/21, CBC stable 7/9/21. Former smoker  Quit ~20-30 years ago but reports he was a heavy smoker. Encouraged continued smoking cessation. We will call with CAM Heart monitor findings and plan 4 month follow up or further cardiac testing as needed, stressed importance of sleep medicine referral before our next visit. Complexity of medical decision making-high      Thank you very much for allowing me to participate in the care of your patient. Please do not hesitate to contact me if you have any questions.     Sincerely,  Edilia Martini MD      [facility], [address] [place] [address]  Ph: [phone]  Fax: [phone]    This note was scribed in the presence of Dr. Sarah Tejada MD by Robert Phillips RN.

## 2021-08-31 RX ORDER — FOLIC ACID 1 MG/1
TABLET ORAL
Qty: 90 TABLET | Refills: 0 | Status: SHIPPED | OUTPATIENT
Start: 2021-08-31 | End: 2021-12-02 | Stop reason: SDUPTHER

## 2021-09-02 ENCOUNTER — OFFICE VISIT (OUTPATIENT)
Dept: CARDIOLOGY CLINIC | Age: 66
End: 2021-09-02
Payer: COMMERCIAL

## 2021-09-02 VITALS
OXYGEN SATURATION: 95 % | HEART RATE: 74 BPM | HEIGHT: 75 IN | SYSTOLIC BLOOD PRESSURE: 118 MMHG | WEIGHT: 279 LBS | DIASTOLIC BLOOD PRESSURE: 60 MMHG | BODY MASS INDEX: 34.69 KG/M2

## 2021-09-02 DIAGNOSIS — R06.09 DOE (DYSPNEA ON EXERTION): ICD-10-CM

## 2021-09-02 DIAGNOSIS — R07.89 CHEST TIGHTNESS: ICD-10-CM

## 2021-09-02 DIAGNOSIS — I25.10 CORONARY ARTERY DISEASE INVOLVING NATIVE CORONARY ARTERY OF NATIVE HEART WITHOUT ANGINA PECTORIS: Primary | ICD-10-CM

## 2021-09-02 DIAGNOSIS — E78.5 HYPERLIPIDEMIA, UNSPECIFIED HYPERLIPIDEMIA TYPE: ICD-10-CM

## 2021-09-02 DIAGNOSIS — I49.3 PVC (PREMATURE VENTRICULAR CONTRACTION): ICD-10-CM

## 2021-09-02 DIAGNOSIS — I10 ESSENTIAL HYPERTENSION: ICD-10-CM

## 2021-09-02 DIAGNOSIS — Z87.891 FORMER SMOKER: ICD-10-CM

## 2021-09-02 DIAGNOSIS — R53.82 CHRONIC FATIGUE: ICD-10-CM

## 2021-09-02 DIAGNOSIS — G47.30 SLEEP APNEA, UNSPECIFIED TYPE: ICD-10-CM

## 2021-09-02 PROCEDURE — G8427 DOCREV CUR MEDS BY ELIG CLIN: HCPCS | Performed by: INTERNAL MEDICINE

## 2021-09-02 PROCEDURE — 93242 EXT ECG>48HR<7D RECORDING: CPT | Performed by: INTERNAL MEDICINE

## 2021-09-02 PROCEDURE — 1036F TOBACCO NON-USER: CPT | Performed by: INTERNAL MEDICINE

## 2021-09-02 PROCEDURE — G8417 CALC BMI ABV UP PARAM F/U: HCPCS | Performed by: INTERNAL MEDICINE

## 2021-09-02 PROCEDURE — 99214 OFFICE O/P EST MOD 30 MIN: CPT | Performed by: INTERNAL MEDICINE

## 2021-09-02 PROCEDURE — 93000 ELECTROCARDIOGRAM COMPLETE: CPT | Performed by: INTERNAL MEDICINE

## 2021-09-02 PROCEDURE — 3017F COLORECTAL CA SCREEN DOC REV: CPT | Performed by: INTERNAL MEDICINE

## 2021-09-02 NOTE — PATIENT INSTRUCTIONS
Patient Education        Premature Heartbeat: Care Instructions  Your Care Instructions     A premature heartbeat happens when the heart beats earlier than it should. This briefly interrupts the heart's rhythm. You do not usually feel the early heartbeat, and the next beat is stronger. To many people, this feels like a skipped heartbeat or a flutter. This heartbeat is also called a premature ventricular contraction (PVC). If you have no heart problems, premature heartbeats that happen once in a while are not a cause for concern. Most people have them at some time. They may happen more often if you drink a lot of caffeine or alcohol or are under stress. Usually, no cause for a premature heartbeat is found, and no treatment is needed. Some people may take medicine to prevent these heartbeats and to relieve symptoms. Follow-up care is a key part of your treatment and safety. Be sure to make and go to all appointments, and call your doctor if you are having problems. It's also a good idea to know your test results and keep a list of the medicines you take. How can you care for yourself at home? · Limit caffeine and other stimulants if they trigger premature heartbeats. · Reduce stress. Avoid people and places that make you feel anxious, if you can. Learn ways to reduce stress, such as biofeedback, guided imagery, and meditation. · Do not smoke or allow others to smoke around you. If you need help quitting, talk to your doctor about stop-smoking programs and medicines. These can increase your chances of quitting for good. · Get at least 30 minutes of exercise on most days of the week. Walking is a good choice. You also may want to do other activities, such as running, swimming, cycling, or playing tennis or team sports. · Eat heart-healthy foods. · Stay at a healthy weight. Lose weight if you need to. · Get enough sleep.  Keep your room dark and quiet, and try to go to bed at the same time every monitors. Your doctor will choose the type that works best for you and is most likely to help find your heart problem. Why is this test done? This test is used to look for irregular heartbeats. It can help your doctor find out what is causing symptoms such as chest pain, fainting, or lightheadedness. It also can help the doctor see if treatment for an abnormal heartbeat is working. Many people have abnormal heartbeats from time to time. Because these kinds of heartbeats can come and go, it may be hard to record one while you are in the doctor's office. Tracking your heartbeat for a longer time and during your whole day makes it easier to record your cardiac events. How is the test done? If you are getting a monitor with electrode pads on your chest:  · Several areas on your chest may be shaved and cleaned. Then a small amount of gel will be put on those areas. The electrode pads will then be attached to the skin of your chest. Thin wires will connect the electrodes to the monitor. · You will get instructions for how and when to change the electrodes at home. Some types of monitors don't use electrode pads. Some types are worn on your wrist like a watch. Others are stuck to your chest with a sticky patch. Or you may have a monitor that you carry with you. Your doctor will explain which type of monitor you have and how to use it. Some monitors start recording on their own when they detect an abnormal heartbeat. With others, you may have to start the recording when you have symptoms. Your doctor will explain which type of monitor you have and how to use it. How is the monitor used? · With some monitors, you may need to do something to record when you have symptoms. You might use a handheld device to start it. Or you may need to press a button on the monitor. · You may keep a diary of what you were doing when you had symptoms such as chest pain or dizziness. Your doctor will show you how to do this.   · [employer] disclaims any warranty or liability for your use of this information.

## 2021-09-17 PROCEDURE — 93244 EXT ECG>48HR<7D REV&INTERPJ: CPT | Performed by: INTERNAL MEDICINE

## 2021-09-20 ENCOUNTER — TELEPHONE (OUTPATIENT)
Dept: CARDIOLOGY CLINIC | Age: 66
End: 2021-09-20

## 2021-09-20 NOTE — TELEPHONE ENCOUNTER
Dr. Lisandro Salas interpreted 7 day CAM heart monitor. EP referral for predominantly NSR with PVC burden 15% and very frequent episodes of wide complex tachycardia. Discussed with Dr. Love Copeland. Echo has been completed and repeat LHC to rule out progressive CAD. Called and spoke with wife-Katherine. She does report that they were diagnosed ~ 1-2 days prior to end of monitor with COVID. Sinus infection symptoms and mild cough. Doing well now per wife. NP appt made with Dr. Karen Betancur for 9/29/21 [facility] at 80. No further q/c at this time.

## 2021-09-21 ENCOUNTER — VIRTUAL VISIT (OUTPATIENT)
Dept: RHEUMATOLOGY | Age: 66
End: 2021-09-21
Payer: COMMERCIAL

## 2021-09-21 DIAGNOSIS — I49.3 PVC (PREMATURE VENTRICULAR CONTRACTION): ICD-10-CM

## 2021-09-21 DIAGNOSIS — R06.09 DOE (DYSPNEA ON EXERTION): ICD-10-CM

## 2021-09-21 DIAGNOSIS — M06.00 RHEUMATOID ARTHRITIS WITH NEGATIVE RHEUMATOID FACTOR, INVOLVING UNSPECIFIED SITE (HCC): Primary | ICD-10-CM

## 2021-09-21 DIAGNOSIS — Z51.81 ENCOUNTER FOR THERAPEUTIC DRUG MONITORING: ICD-10-CM

## 2021-09-21 DIAGNOSIS — Z79.899 ENCOUNTER FOR LONG-TERM (CURRENT) USE OF HIGH-RISK MEDICATION: ICD-10-CM

## 2021-09-21 DIAGNOSIS — R07.89 CHEST TIGHTNESS: ICD-10-CM

## 2021-09-21 PROCEDURE — G8427 DOCREV CUR MEDS BY ELIG CLIN: HCPCS | Performed by: INTERNAL MEDICINE

## 2021-09-21 PROCEDURE — 3017F COLORECTAL CA SCREEN DOC REV: CPT | Performed by: INTERNAL MEDICINE

## 2021-09-21 PROCEDURE — 99214 OFFICE O/P EST MOD 30 MIN: CPT | Performed by: INTERNAL MEDICINE

## 2021-09-21 NOTE — PROGRESS NOTES
SUBJECTIVE:    Patient ID: Forrest Ga is a 60 y.o. male. The patient returns for follow-up of rheumatoid arthritis. Since his last visit he was diagnosed with Covid. He is also scheduled to see an electrophysiologist because of his irregular heartbeat. He did not discontinue methotrexate at the time of. DIAG. Patient is improving. Review of Systems:    Respiratory: denies cough, denies shortness of breath  Gastrointestinal: denies abdominal pain, denies nausea  Musculoskeletal:                  60 minutes       morning stiffness  Ears, nose, mouth: denies mucosal sores  Skin: denies rash, denies alopecia. The remainder of his review of systems is negative    Prior to Visit Medications    Medication Sig Taking? Authorizing Provider   folic acid (FOLVITE) 1 MG tablet TAKE ONE TABLET BY MOUTH DAILY Yes Antonio Bauer MD   prasugrel (EFFIENT) 10 MG TABS Take 1 tablet by mouth daily Yes Sheeba Dash MD   cyclobenzaprine (FLEXERIL) 10 MG tablet  Yes Historical Provider, MD   ASPIRIN LOW DOSE 81 MG EC tablet TAKE ONE TABLET BY MOUTH DAILY Yes Tiny Camargo MD   atorvastatin (LIPITOR) 40 MG tablet TAKE ONE TABLET BY MOUTH ONCE NIGHTLY Yes Sheeba Dash MD   methotrexate (RHEUMATREX) 2.5 MG chemo tablet TAKE EIGHT TABLETS BY MOUTH ONCE WEEKLY Yes Antonio Bauer MD   gabapentin (NEURONTIN) 300 MG capsule Take 300 mg by mouth 2 times daily. Yes Historical Provider, MD   busPIRone (BUSPAR) 7.5 MG tablet Take 7.5 mg by mouth Every 4 - 6 hours PRN Yes Historical Provider, MD   nitroGLYCERIN (NITROSTAT) 0.4 MG SL tablet Place 0.4 mg under the tongue every 5 minutes as needed for Chest pain up to max of 3 total doses. If no relief after 1 dose, call 911.  Yes Historical Provider, MD   metoprolol tartrate (LOPRESSOR) 50 MG tablet Take 25 mg by mouth 2 times daily  Yes Historical Provider, MD   finasteride (PROSCAR) 5 MG tablet Take 5 mg by mouth daily Yes Historical Provider, MD   omeprazole (PRILOSEC) 20 MG delayed release capsule Take 20 mg by mouth Yes Historical Provider, MD   losartan (COZAAR) 50 MG tablet Take 50 mg by mouth daily Yes Historical Provider, MD   Loratadine 10 MG CAPS Take by mouth daily  Yes Historical Provider, MD   HYDROcodone-acetaminophen (NORCO) 5-325 MG per tablet Take 1 tablet by mouth every 6 hours as needed for Pain Yes Historical Provider, MD   montelukast (SINGULAIR) 10 MG tablet Take 10 mg by mouth nightly. Yes Historical Provider, MD   amitriptyline (ELAVIL) 100 MG tablet Take 100 mg by mouth nightly. Yes Historical Provider, MD   DULoxetine (CYMBALTA) 60 MG extended release capsule Take 60 mg by mouth daily  Yes Historical Provider, MD        OBJECTIVE:  There were no vitals taken for this visit. Physical Exam:    General: the patient demonstrated normal gait and posture without evidence of overt muscle wasting. Hygiene appears normal    Musculoskeletal: 1-2+ soft tissue swelling wrist no definite swelling PIPs fists 85 to 95%. Skin: no rashes    ASSESSMENT: Rheumatoid arthritis. Chemotherapy        PLAN: Patient will come in next Tuesday for blood work. I will see him back in 3 months time. Labs from last visit were reviewed.

## 2021-09-29 ENCOUNTER — OFFICE VISIT (OUTPATIENT)
Dept: CARDIOLOGY CLINIC | Age: 66
End: 2021-09-29
Payer: COMMERCIAL

## 2021-09-29 VITALS
SYSTOLIC BLOOD PRESSURE: 98 MMHG | WEIGHT: 275 LBS | OXYGEN SATURATION: 96 % | DIASTOLIC BLOOD PRESSURE: 62 MMHG | HEART RATE: 52 BPM | BODY MASS INDEX: 34.19 KG/M2 | HEIGHT: 75 IN

## 2021-09-29 DIAGNOSIS — I49.3 PVC (PREMATURE VENTRICULAR CONTRACTION): Primary | ICD-10-CM

## 2021-09-29 DIAGNOSIS — I10 ESSENTIAL HYPERTENSION: ICD-10-CM

## 2021-09-29 DIAGNOSIS — I50.22 CHRONIC SYSTOLIC HEART FAILURE (HCC): ICD-10-CM

## 2021-09-29 DIAGNOSIS — I25.10 CORONARY ARTERY DISEASE INVOLVING NATIVE CORONARY ARTERY OF NATIVE HEART WITHOUT ANGINA PECTORIS: ICD-10-CM

## 2021-09-29 PROCEDURE — 4040F PNEUMOC VAC/ADMIN/RCVD: CPT | Performed by: INTERNAL MEDICINE

## 2021-09-29 PROCEDURE — G8427 DOCREV CUR MEDS BY ELIG CLIN: HCPCS | Performed by: INTERNAL MEDICINE

## 2021-09-29 PROCEDURE — 93000 ELECTROCARDIOGRAM COMPLETE: CPT | Performed by: INTERNAL MEDICINE

## 2021-09-29 PROCEDURE — G8417 CALC BMI ABV UP PARAM F/U: HCPCS | Performed by: INTERNAL MEDICINE

## 2021-09-29 PROCEDURE — 99204 OFFICE O/P NEW MOD 45 MIN: CPT | Performed by: INTERNAL MEDICINE

## 2021-09-29 PROCEDURE — 3017F COLORECTAL CA SCREEN DOC REV: CPT | Performed by: INTERNAL MEDICINE

## 2021-09-29 PROCEDURE — 1123F ACP DISCUSS/DSCN MKR DOCD: CPT | Performed by: INTERNAL MEDICINE

## 2021-09-29 PROCEDURE — 1036F TOBACCO NON-USER: CPT | Performed by: INTERNAL MEDICINE

## 2021-09-29 RX ORDER — METOPROLOL SUCCINATE 25 MG/1
25 TABLET, EXTENDED RELEASE ORAL DAILY
Qty: 30 TABLET | Refills: 11 | Status: SHIPPED | OUTPATIENT
Start: 2021-09-29 | End: 2022-10-11

## 2021-09-29 RX ORDER — LOSARTAN POTASSIUM 25 MG/1
25 TABLET ORAL DAILY
Qty: 90 TABLET | Refills: 3 | Status: SHIPPED | OUTPATIENT
Start: 2021-09-29

## 2021-09-29 RX ORDER — LOSARTAN POTASSIUM 25 MG/1
25 TABLET ORAL DAILY
Qty: 90 TABLET | Refills: 3
Start: 2021-09-29 | End: 2021-09-29 | Stop reason: SDUPTHER

## 2021-09-29 NOTE — PROGRESS NOTES
Cardiac Electrophysiology Consultation   Date: 9/29/2021  Reason for Consultation: PVCs  Consult Requesting Physician:  Kylie Braden MD  Primary Care Physician: Rosa Tabares MD    Chief Complaint:   Chief Complaint   Patient presents with    New Patient     tachycardia        HPI: Matt Blackburn is a 73 y.o. patient with a history of CAD s/p LHC with PCI RCA per Dr. Bunny Danielle 8/2020  hypertension, hyperlipidemia, VICKIE, and pre diabetes. He underwent a  LHC on 7/15/2021 with Dr. Shirin Rizo with FFR prox. LAD 50-60% stenosis revealing non obstructive CAD. Today, he present to office accompanied by his wife for follow up on the results of CAM monitor that was ordered for symptoms of chest discomfort and shortness of breath. He reports having shortness of breath with activity. Patient denies exertional chest pain/pressure, dyspnea at rest, PND, orthopnea, lightheadedness, weight changes, changes in LE edema, and syncope. Past Medical History:   Diagnosis Date    Arthritis     Asthma     BPH (benign prostatic hyperplasia)     Cardiomyopathy ([address]     GERD (gastroesophageal reflux disease)     HTN (hypertension) 10/29/2012    MVP (mitral valve prolapse)     PPD positive     Prolonged emergence from general anesthesia     Prostatism     Tricuspid valve prolapse     Unspecified sleep apnea         Past Surgical History:   Procedure Laterality Date    CARDIAC CATHETERIZATION      CHOLECYSTECTOMY      FOOT SURGERY      HAND SURGERY Right 11/09/2017    thumb mass excision    INGUINAL HERNIA REPAIR      bilateral    KNEE ARTHROSCOPY      OTHER SURGICAL HISTORY  08/28/2019    epidural Dr. Leonardo Gaxiola at [address]      right elbow    TONSILLECTOMY         Allergies:   Allergies   Allergen Reactions    Pcn [Penicillins] Shortness Of Breath    Penicillin G Sodium Shortness Of Breath    Cephalosporins Itching    Ciprofloxacin Itching    Codeine Itching    Erythromycin Itching    Morphine And Related Itching    Fenoprofen Calcium Other (See Comments)     Pt doesn`t recall       Medication:   Prior to Admission medications    Medication Sig Start Date End Date Taking? Authorizing Provider   folic acid (FOLVITE) 1 MG tablet TAKE ONE TABLET BY MOUTH DAILY 8/31/21  Yes Rita Cramer MD   prasugrel (EFFIENT) 10 MG TABS Take 1 tablet by mouth daily 7/20/21  Yes Darylene Alosa, MD   cyclobenzaprine (FLEXERIL) 10 MG tablet  6/29/21  Yes Historical Provider, MD   ASPIRIN LOW DOSE 81 MG EC tablet TAKE ONE TABLET BY MOUTH DAILY 7/2/21  Yes Marianna Gandara MD   atorvastatin (LIPITOR) 40 MG tablet TAKE ONE TABLET BY MOUTH ONCE NIGHTLY 6/1/21  Yes Darylene Alosa, MD   methotrexate (RHEUMATREX) 2.5 MG chemo tablet TAKE EIGHT TABLETS BY MOUTH ONCE WEEKLY 4/29/21  Yes Rita Cramer MD   gabapentin (NEURONTIN) 300 MG capsule Take 300 mg by mouth 2 times daily. Yes Historical Provider, MD   busPIRone (BUSPAR) 7.5 MG tablet Take 7.5 mg by mouth Every 4 - 6 hours PRN   Yes Historical Provider, MD   nitroGLYCERIN (NITROSTAT) 0.4 MG SL tablet Place 0.4 mg under the tongue every 5 minutes as needed for Chest pain up to max of 3 total doses. If no relief after 1 dose, call 911. Yes Historical Provider, MD   metoprolol tartrate (LOPRESSOR) 50 MG tablet Take 25 mg by mouth 2 times daily    Yes Historical Provider, MD   finasteride (PROSCAR) 5 MG tablet Take 5 mg by mouth daily   Yes Historical Provider, MD   omeprazole (PRILOSEC) 20 MG delayed release capsule Take 20 mg by mouth   Yes Historical Provider, MD   losartan (COZAAR) 50 MG tablet Take 50 mg by mouth daily   Yes Historical Provider, MD   Loratadine 10 MG CAPS Take by mouth daily    Yes Historical Provider, MD   HYDROcodone-acetaminophen (NORCO) 5-325 MG per tablet Take 1 tablet by mouth every 6 hours as needed for Pain   Yes Historical Provider, MD   montelukast (SINGULAIR) 10 MG tablet Take 10 mg by mouth nightly.    Yes Historical Provider, MD   amitriptyline (ELAVIL) 100 MG tablet Take 100 mg by mouth nightly. Yes Historical Provider, MD   DULoxetine (CYMBALTA) 60 MG extended release capsule Take 60 mg by mouth daily    Yes Historical Provider, MD       Social History:   reports that he quit smoking about 39 years ago. His smoking use included cigarettes. He has never used smokeless tobacco. He reports current alcohol use of about 12.0 standard drinks of alcohol per week. He reports that he does not use drugs. Family History:  family history includes Heart Disease in his brother and father; Other in his brother; Rheum Arthritis in an other family member. Reviewed. Denies family history of sudden cardiac death, arrhythmia, premature CAD    Review of System:    · General ROS: negative for - chills, fever   · Psychological ROS: negative for - anxiety or depression  · Ophthalmic ROS: negative for - eye pain or loss of vision  · ENT ROS: negative for - epistaxis, headaches, nasal discharge, sore throat   · Allergy and Immunology ROS: negative for - hives, nasal congestion   · Hematological and Lymphatic ROS: negative for - bleeding problems, blood clots, bruising or jaundice  · Endocrine ROS: negative for - skin changes, temperature intolerance or unexpected weight changes  · Respiratory ROS: negative for - cough, hemoptysis, pleuritic pain, SOB, sputum changes or wheezing  · Cardiovascular ROS: Per HPI.    · Gastrointestinal ROS: negative for - abdominal pain, blood in stools, diarrhea, hematemesis, melena, nausea/vomiting or swallowing difficulty/pain  · Genito-Urinary ROS: negative for - dysuria or incontinence  · Musculoskeletal ROS: negative for - joint swelling or muscle pain  · Neurological ROS: negative for - confusion, dizziness, gait disturbance, headaches, numbness/tingling, seizures, speech problems, tremors, visual changes or weakness  · Dermatological ROS: negative for - rash    Physical Examination:  Vitals:    09/29/21 1125   BP: 98/62   Pulse: 52   SpO2: 96%       · Constitutional: Oriented. No distress. · Head: Normocephalic and atraumatic. · Mouth/Throat: Oropharynx is clear and moist.   · Eyes: Conjunctivae normal. EOM are normal.   · Neck: Normal range of motion. Neck supple. No rigidity. No JVD present. · Cardiovascular: Normal rate, regular rhythm, S1&S2 and intact distal pulses. · Pulmonary/Chest: Bilateral respiratory sounds. No wheezes. No rhonchi. · Abdominal: Soft. Bowel sounds present. No distension, No tenderness. · Musculoskeletal: No tenderness. No edema    · Lymphadenopathy: Has no cervical adenopathy. · Neurological: Alert and oriented. Cranial nerve appears intact, No Gross deficit   · Skin: Skin is warm and dry. No rash noted. · Psychiatric: Has a normal mood, affect and behavior     Labs:  Reviewed. EC2021 sinus rhythm, frequent ectopic ventricular beats with v-rate of 75 bpm with QRS duration 99 ms. No pathologic Q waves, ventricular pre-excitation, or QT prolongation. Studies:   1. Event monitor: -2021  Predominate rhythm: NSR  Atrial tachycardia (AT) on episode 4 beats at an average 149 bpm up to 155 bpm.  Non-sustained Ventricular tachycardia 18032 episodes longest 9 beats @ average 136 bpm up to 156 bpm. Fastest 3 beats @ average 144 bpm up to 169 bpm.  PAC 0.02 %     2. Echo 2021  Summary  There is mildly increased left ventricular wall thickness. EF ~50%. Indeterminate diastolic function. The left atrium is moderately dilated. Right ventricular systolic function is normal .  Trivial mitral, and tricuspid regurgitation. Echo: 2019  There is mild concentric left ventricular hypertrophy. Left ventricular cavity size is mildly dilated. Overall left ventricular systolic function appears mildly reduced. Ejection fraction is visually estimated to be 40-45%. Grade I diastolic dysfunction with normal LV filling pressures.   The aortic root is mildly dilated & measures at 4.1 cms. Mildly dilated right ventricle. TAPSE 2.2cm, RV mid 4cm  IVC size is normal (<2.1cm) and collapses > 50% with respiration consistent  with normal RA pressure (3mmHg). 3. Stress test 6/21/2021  Summary    moderate size moderate severity fixed inferior wall defect consist with    prior inferior wall MI. no evidence of ischemia       Stress Test: 5/14/2019  Normal myocardial perfusion study.    Normal myocardial perfusion.    Normal LV size and systolic function.    Overall findings represent a low risk study.        4. [facility]: 8/25/20 per Dr. Shameka Henleyp:  1. Rosalva Price is single-vessel disease. 2.  Dominant RCA, 99% with LEYDI grade 1 flow. 3.  Left main:  Free of disease. 4.  LAD has a 10-20% proximal lesion. 5.  Left circumflex:  No obstructive disease.  CONCLUSION:  1.  Dominant RCA has a 99% proximal stenosis with LEYDI grade 1 flow. 2.  Elevated left heart filling pressures. 3.  Normal left ventricular size and systolic function with ejection  fraction of 60%. CONCLUSION:  Successful PCI and stenting of the proximal RCA, lesion  reduced to 0%. [facility]:7/15/2021     OVERALL IMPRESSION:  1.  Patent left main trunk. 2.  50 to 60% discrete stenosis of proximal left anterior descending artery. 3.  Patent circumflex artery and its branches. 4.  Widely patent stented proximal right coronary artery with mild   disease post stent which does not appear hemodynamically significant. 5.  Mild mid-inferior wall hypokinesis with an estimated EF of 50% with  normal left heart hemodynamics. INTERVENTION  1. XB 3.5 guide   2. Runthrough wire used to cross LAD lesion   3. FFR 0.81 ( physiologically non-significant )      SUMMARY:   Nonobstructive CAD   I independently reviewed and interpreted the ECG, MCOT, echocardiogram, stress test, and coronary angiography/PCI results and used them for my plan of care.       Procedures:  1. n/a    Assessment/Plan:     PAC's & PVC's  Advised that the patient's PAC's & PVC (both of which are no longer than 6-8 beats in duration) are benign and no treatment is needed.  -Patient educated to eat a diet which includes organic fruits, vegetables and meats.  -Encouraged to watch \"That Sugar Film\" on [employer], which discusses the negative impact of processed sugar has on the body. CAD  Denies angina  DAPT: ASA & Effient  S/p Charan to proximal PCA, 99 % to 0 % with LEYDI by Dr. Olamide Morocho on 8/25/2021. Repeat coronary angiography on 7/15/2021 revealed 50-60 % discrete stenosis prox LAD, Dr. Melody Hobbs performed FFR revealing non obstructive CAD. Continue with medical management and risk factor modification including aspirin, statin, and beta blocker. Essential Hypertension  Hypotensive in office today. Decrease Losartan to 25 mg daily. Chronic systolic heart failure  -LVEF 45 % 2019.  -LVEF approx 50 % per Echo 6/2021  -Not currently on GDMT. -Stop Lopressor and start Toprol XL 25 mg daily.  -Decrease Losartan to 25 mg daily as she is hypotensive in office today. Euvolemic on today's exam.    -Much time was spent counseling the patient on healthier lifestyle, following a low sodium diet <2000 mg of sodium a day and dramatically decreasing the intake of processed sugar. Follow up with Dr. Sandra Montgomery as scheduled. Follow up with EP services PRN. Thank you for allowing me to participate in the care of Vanessa Roberson. All questions and concerns were addressed to the patient/family. Alternatives to my treatment were discussed. This note was scribed in the presence of Dr. Palmira Jovel MD by Júnior Amaro RN. The scribe's documentation has been prepared under my direction and personally reviewed by me in its entirety. I confirm that the note above accurately reflects all work, physical examination, the discussion of treatments and procedures, and medical decision making performed by me.     Palmira Jovel MD, MS, [facility], [facility]  Cardiac Electrophysiology  [address], [address] [place] [address]  Cameron Machado [facility] [address]  [phone]

## 2021-09-29 NOTE — PATIENT INSTRUCTIONS
Watch \"That Sugar Film\" on [employer], which discusses the negative impact of processed sugar has on the body.

## 2021-10-03 DIAGNOSIS — Z79.899 ENCOUNTER FOR LONG-TERM (CURRENT) USE OF HIGH-RISK MEDICATION: ICD-10-CM

## 2021-10-03 DIAGNOSIS — Z51.81 ENCOUNTER FOR THERAPEUTIC DRUG MONITORING: ICD-10-CM

## 2021-10-03 DIAGNOSIS — M06.00 RHEUMATOID ARTHRITIS WITH NEGATIVE RHEUMATOID FACTOR, INVOLVING UNSPECIFIED SITE (HCC): ICD-10-CM

## 2021-10-04 RX ORDER — METHOTREXATE 2.5 MG/1
TABLET ORAL
Qty: 32 TABLET | Refills: 1 | Status: SHIPPED | OUTPATIENT
Start: 2021-10-04 | End: 2021-12-16

## 2021-12-02 DIAGNOSIS — Z51.81 ENCOUNTER FOR THERAPEUTIC DRUG MONITORING: ICD-10-CM

## 2021-12-02 DIAGNOSIS — Z79.899 ENCOUNTER FOR LONG-TERM (CURRENT) USE OF HIGH-RISK MEDICATION: ICD-10-CM

## 2021-12-02 DIAGNOSIS — M06.00 RHEUMATOID ARTHRITIS WITH NEGATIVE RHEUMATOID FACTOR, INVOLVING UNSPECIFIED SITE (HCC): ICD-10-CM

## 2021-12-02 RX ORDER — FOLIC ACID 1 MG/1
TABLET ORAL
Qty: 90 TABLET | Refills: 1 | Status: SHIPPED | OUTPATIENT
Start: 2021-12-02 | End: 2022-05-31

## 2021-12-02 NOTE — TELEPHONE ENCOUNTER
Requested Prescriptions     Pending Prescriptions Disp Refills    folic acid (FOLVITE) 1 MG tablet 90 tablet 1     Sig: Take 1 tablet by mouth daily.         Last Visit: 9/21/2021   Next Visit: 1/4/2022      Recent Labs:  Lab Results   Component Value Date     07/09/2021    K 4.4 07/09/2021     07/09/2021    CO2 26 07/09/2021    BUN 9 07/09/2021    CREATININE 0.9 10/01/2021    GLUCOSE 143 (H) 07/09/2021    CALCIUM 9.2 07/09/2021    PROT 6.7 10/01/2021    LABALBU 4.5 10/01/2021    BILITOT 0.9 10/01/2021    ALKPHOS 107 10/01/2021    AST 14 (L) 10/01/2021    ALT 13 10/01/2021    LABGLOM >60 10/01/2021    GFRAA >60 10/01/2021    AGRATIO 1.9 01/21/2021    GLOB 2.2 01/21/2021     Lab Results   Component Value Date    WBC 5.6 10/01/2021    HGB 13.0 (L) 10/01/2021    HCT 39.0 (L) 10/01/2021    MCV 97.1 10/01/2021     10/01/2021      Lab Results   Component Value Date    CREATININE 0.9 10/01/2021     Lab Results   Component Value Date    ALKPHOS 107 10/01/2021    ALT 13 10/01/2021    AST 14 10/01/2021    PROT 6.7 10/01/2021    PROT 6.3 03/12/2013    BILITOT 0.9 10/01/2021    BILIDIR <0.2 10/01/2021    LABALBU 4.5 10/01/2021

## 2021-12-03 ENCOUNTER — APPOINTMENT (OUTPATIENT)
Dept: GENERAL RADIOLOGY | Age: 66
End: 2021-12-03
Payer: COMMERCIAL

## 2021-12-03 ENCOUNTER — HOSPITAL ENCOUNTER (OUTPATIENT)
Age: 66
Setting detail: OBSERVATION
Discharge: HOME | End: 2021-12-03
Attending: EMERGENCY MEDICINE | Admitting: INTERNAL MEDICINE
Payer: COMMERCIAL

## 2021-12-03 VITALS
BODY MASS INDEX: 34.64 KG/M2 | DIASTOLIC BLOOD PRESSURE: 73 MMHG | TEMPERATURE: 98.4 F | HEIGHT: 75 IN | HEART RATE: 77 BPM | SYSTOLIC BLOOD PRESSURE: 110 MMHG | RESPIRATION RATE: 11 BRPM | WEIGHT: 278.6 LBS | OXYGEN SATURATION: 99 %

## 2021-12-03 DIAGNOSIS — R07.9 CHEST PAIN, UNSPECIFIED TYPE: Primary | ICD-10-CM

## 2021-12-03 DIAGNOSIS — I25.10 CORONARY ARTERY DISEASE DUE TO LIPID RICH PLAQUE: ICD-10-CM

## 2021-12-03 DIAGNOSIS — I25.83 CORONARY ARTERY DISEASE DUE TO LIPID RICH PLAQUE: ICD-10-CM

## 2021-12-03 LAB
A/G RATIO: 2.1 (ref 1.1–2.2)
ALBUMIN SERPL-MCNC: 4.2 G/DL (ref 3.4–5)
ALP BLD-CCNC: 92 U/L (ref 40–129)
ALT SERPL-CCNC: 23 U/L (ref 10–40)
ANION GAP SERPL CALCULATED.3IONS-SCNC: 14 MMOL/L (ref 3–16)
AST SERPL-CCNC: 16 U/L (ref 15–37)
BASOPHILS ABSOLUTE: 0 K/UL (ref 0–0.2)
BASOPHILS RELATIVE PERCENT: 0.3 %
BILIRUB SERPL-MCNC: 0.8 MG/DL (ref 0–1)
BUN BLDV-MCNC: 13 MG/DL (ref 7–20)
CALCIUM SERPL-MCNC: 8.9 MG/DL (ref 8.3–10.6)
CHLORIDE BLD-SCNC: 100 MMOL/L (ref 99–110)
CO2: 21 MMOL/L (ref 21–32)
CREAT SERPL-MCNC: 0.8 MG/DL (ref 0.8–1.3)
EKG ATRIAL RATE: 79 BPM
EKG DIAGNOSIS: NORMAL
EKG P AXIS: 31 DEGREES
EKG P-R INTERVAL: 154 MS
EKG Q-T INTERVAL: 378 MS
EKG QRS DURATION: 90 MS
EKG QTC CALCULATION (BAZETT): 433 MS
EKG R AXIS: 27 DEGREES
EKG T AXIS: 33 DEGREES
EKG VENTRICULAR RATE: 79 BPM
EOSINOPHILS ABSOLUTE: 0.1 K/UL (ref 0–0.6)
EOSINOPHILS RELATIVE PERCENT: 1.1 %
GFR AFRICAN AMERICAN: >60
GFR NON-AFRICAN AMERICAN: >60
GLUCOSE BLD-MCNC: 118 MG/DL (ref 70–99)
HCT VFR BLD CALC: 38.1 % (ref 40.5–52.5)
HEMOGLOBIN: 12.6 G/DL (ref 13.5–17.5)
LYMPHOCYTES ABSOLUTE: 1.5 K/UL (ref 1–5.1)
LYMPHOCYTES RELATIVE PERCENT: 22.7 %
MCH RBC QN AUTO: 31.5 PG (ref 26–34)
MCHC RBC AUTO-ENTMCNC: 33 G/DL (ref 31–36)
MCV RBC AUTO: 95.4 FL (ref 80–100)
MONOCYTES ABSOLUTE: 0.7 K/UL (ref 0–1.3)
MONOCYTES RELATIVE PERCENT: 10.6 %
NEUTROPHILS ABSOLUTE: 4.2 K/UL (ref 1.7–7.7)
NEUTROPHILS RELATIVE PERCENT: 65.3 %
PDW BLD-RTO: 14.7 % (ref 12.4–15.4)
PLATELET # BLD: 195 K/UL (ref 135–450)
PMV BLD AUTO: 9.7 FL (ref 5–10.5)
POC ACT LR: >400 SEC
POTASSIUM REFLEX MAGNESIUM: 4.1 MMOL/L (ref 3.5–5.1)
PRO-BNP: 453 PG/ML (ref 0–124)
RBC # BLD: 4 M/UL (ref 4.2–5.9)
SODIUM BLD-SCNC: 135 MMOL/L (ref 136–145)
TOTAL PROTEIN: 6.2 G/DL (ref 6.4–8.2)
TROPONIN: <0.01 NG/ML
WBC # BLD: 6.4 K/UL (ref 4–11)

## 2021-12-03 PROCEDURE — 2500000003 HC RX 250 WO HCPCS

## 2021-12-03 PROCEDURE — C1753 CATH, INTRAVAS ULTRASOUND: HCPCS

## 2021-12-03 PROCEDURE — 6360000002 HC RX W HCPCS

## 2021-12-03 PROCEDURE — C1874 STENT, COATED/COV W/DEL SYS: HCPCS

## 2021-12-03 PROCEDURE — 2500000003 HC RX 250 WO HCPCS: Performed by: EMERGENCY MEDICINE

## 2021-12-03 PROCEDURE — 93005 ELECTROCARDIOGRAM TRACING: CPT | Performed by: EMERGENCY MEDICINE

## 2021-12-03 PROCEDURE — 93454 CORONARY ARTERY ANGIO S&I: CPT

## 2021-12-03 PROCEDURE — 6370000000 HC RX 637 (ALT 250 FOR IP)

## 2021-12-03 PROCEDURE — 93010 ELECTROCARDIOGRAM REPORT: CPT | Performed by: INTERNAL MEDICINE

## 2021-12-03 PROCEDURE — C1887 CATHETER, GUIDING: HCPCS

## 2021-12-03 PROCEDURE — 92978 ENDOLUMINL IVUS OCT C 1ST: CPT

## 2021-12-03 PROCEDURE — C1769 GUIDE WIRE: HCPCS

## 2021-12-03 PROCEDURE — 85347 COAGULATION TIME ACTIVATED: CPT

## 2021-12-03 PROCEDURE — 99152 MOD SED SAME PHYS/QHP 5/>YRS: CPT | Performed by: INTERNAL MEDICINE

## 2021-12-03 PROCEDURE — C1894 INTRO/SHEATH, NON-LASER: HCPCS

## 2021-12-03 PROCEDURE — 99153 MOD SED SAME PHYS/QHP EA: CPT

## 2021-12-03 PROCEDURE — 99283 EMERGENCY DEPT VISIT LOW MDM: CPT

## 2021-12-03 PROCEDURE — 71045 X-RAY EXAM CHEST 1 VIEW: CPT

## 2021-12-03 PROCEDURE — 93454 CORONARY ARTERY ANGIO S&I: CPT | Performed by: INTERNAL MEDICINE

## 2021-12-03 PROCEDURE — 80053 COMPREHEN METABOLIC PANEL: CPT

## 2021-12-03 PROCEDURE — C1725 CATH, TRANSLUMIN NON-LASER: HCPCS

## 2021-12-03 PROCEDURE — 83880 ASSAY OF NATRIURETIC PEPTIDE: CPT

## 2021-12-03 PROCEDURE — C9606 PERC D-E COR REVASC W AMI S: HCPCS

## 2021-12-03 PROCEDURE — G0378 HOSPITAL OBSERVATION PER HR: HCPCS

## 2021-12-03 PROCEDURE — 96374 THER/PROPH/DIAG INJ IV PUSH: CPT

## 2021-12-03 PROCEDURE — 92978 ENDOLUMINL IVUS OCT C 1ST: CPT | Performed by: INTERNAL MEDICINE

## 2021-12-03 PROCEDURE — 85025 COMPLETE CBC W/AUTO DIFF WBC: CPT

## 2021-12-03 PROCEDURE — 99152 MOD SED SAME PHYS/QHP 5/>YRS: CPT

## 2021-12-03 PROCEDURE — 6360000004 HC RX CONTRAST MEDICATION: Performed by: EMERGENCY MEDICINE

## 2021-12-03 PROCEDURE — 92928 PRQ TCAT PLMT NTRAC ST 1 LES: CPT | Performed by: INTERNAL MEDICINE

## 2021-12-03 PROCEDURE — 2709999900 HC NON-CHARGEABLE SUPPLY

## 2021-12-03 PROCEDURE — 84484 ASSAY OF TROPONIN QUANT: CPT

## 2021-12-03 RX ORDER — ACETAMINOPHEN 650 MG/1
650 SUPPOSITORY RECTAL EVERY 6 HOURS PRN
Status: DISCONTINUED | OUTPATIENT
Start: 2021-12-03 | End: 2021-12-04

## 2021-12-03 RX ORDER — FINASTERIDE 5 MG/1
5 TABLET, FILM COATED ORAL DAILY
Status: DISCONTINUED | OUTPATIENT
Start: 2021-12-03 | End: 2021-12-04

## 2021-12-03 RX ORDER — SODIUM CHLORIDE 9 MG/ML
25 INJECTION, SOLUTION INTRAVENOUS PRN
Status: DISCONTINUED | OUTPATIENT
Start: 2021-12-03 | End: 2021-12-04

## 2021-12-03 RX ORDER — PANTOPRAZOLE SODIUM 40 MG/1
40 TABLET, DELAYED RELEASE ORAL
Status: DISCONTINUED | OUTPATIENT
Start: 2021-12-04 | End: 2021-12-04

## 2021-12-03 RX ORDER — SODIUM CHLORIDE 0.9 % (FLUSH) 0.9 %
5-40 SYRINGE (ML) INJECTION PRN
Status: DISCONTINUED | OUTPATIENT
Start: 2021-12-03 | End: 2021-12-04

## 2021-12-03 RX ORDER — LOSARTAN POTASSIUM 25 MG/1
25 TABLET ORAL DAILY
Status: DISCONTINUED | OUTPATIENT
Start: 2021-12-03 | End: 2021-12-04

## 2021-12-03 RX ORDER — HYDROCODONE BITARTRATE AND ACETAMINOPHEN 10; 325 MG/1; MG/1
1 TABLET ORAL EVERY 6 HOURS PRN
Status: DISCONTINUED | OUTPATIENT
Start: 2021-12-03 | End: 2021-12-04

## 2021-12-03 RX ORDER — CETIRIZINE HYDROCHLORIDE 10 MG/1
10 TABLET ORAL DAILY
Status: DISCONTINUED | OUTPATIENT
Start: 2021-12-03 | End: 2021-12-04

## 2021-12-03 RX ORDER — ATORVASTATIN CALCIUM 40 MG/1
40 TABLET, FILM COATED ORAL DAILY
Status: DISCONTINUED | OUTPATIENT
Start: 2021-12-03 | End: 2021-12-04

## 2021-12-03 RX ORDER — ACETAMINOPHEN 325 MG/1
650 TABLET ORAL EVERY 6 HOURS PRN
Status: DISCONTINUED | OUTPATIENT
Start: 2021-12-03 | End: 2021-12-04

## 2021-12-03 RX ORDER — DULOXETIN HYDROCHLORIDE 60 MG/1
60 CAPSULE, DELAYED RELEASE ORAL DAILY
Status: DISCONTINUED | OUTPATIENT
Start: 2021-12-03 | End: 2021-12-04

## 2021-12-03 RX ORDER — GABAPENTIN 300 MG/1
300 CAPSULE ORAL NIGHTLY
Status: DISCONTINUED | OUTPATIENT
Start: 2021-12-03 | End: 2021-12-04

## 2021-12-03 RX ORDER — ONDANSETRON 4 MG/1
4 TABLET, ORALLY DISINTEGRATING ORAL EVERY 8 HOURS PRN
Status: DISCONTINUED | OUTPATIENT
Start: 2021-12-03 | End: 2021-12-04

## 2021-12-03 RX ORDER — MONTELUKAST SODIUM 10 MG/1
10 TABLET ORAL
Status: DISCONTINUED | OUTPATIENT
Start: 2021-12-03 | End: 2021-12-04

## 2021-12-03 RX ORDER — ASPIRIN 81 MG/1
81 TABLET ORAL
Status: DISCONTINUED | OUTPATIENT
Start: 2021-12-03 | End: 2021-12-04

## 2021-12-03 RX ORDER — AMITRIPTYLINE HYDROCHLORIDE 50 MG/1
50 TABLET, FILM COATED ORAL NIGHTLY
Status: DISCONTINUED | OUTPATIENT
Start: 2021-12-03 | End: 2021-12-04

## 2021-12-03 RX ORDER — ONDANSETRON 2 MG/ML
4 INJECTION INTRAMUSCULAR; INTRAVENOUS EVERY 6 HOURS PRN
Status: DISCONTINUED | OUTPATIENT
Start: 2021-12-03 | End: 2021-12-04

## 2021-12-03 RX ORDER — PRASUGREL 10 MG/1
10 TABLET, FILM COATED ORAL
Status: DISCONTINUED | OUTPATIENT
Start: 2021-12-03 | End: 2021-12-04

## 2021-12-03 RX ORDER — ACETAMINOPHEN 325 MG/1
650 TABLET ORAL EVERY 4 HOURS PRN
Status: DISCONTINUED | OUTPATIENT
Start: 2021-12-03 | End: 2021-12-04

## 2021-12-03 RX ORDER — METOPROLOL SUCCINATE 25 MG/1
25 TABLET, EXTENDED RELEASE ORAL
Status: DISCONTINUED | OUTPATIENT
Start: 2021-12-03 | End: 2021-12-04

## 2021-12-03 RX ORDER — LORAZEPAM 0.5 MG/1
0.5 TABLET ORAL EVERY 4 HOURS PRN
Status: DISCONTINUED | OUTPATIENT
Start: 2021-12-03 | End: 2021-12-04

## 2021-12-03 RX ORDER — SODIUM CHLORIDE 0.9 % (FLUSH) 0.9 %
5-40 SYRINGE (ML) INJECTION EVERY 12 HOURS SCHEDULED
Status: DISCONTINUED | OUTPATIENT
Start: 2021-12-03 | End: 2021-12-04

## 2021-12-03 RX ORDER — NITROGLYCERIN 0.4 MG/1
0.4 TABLET SUBLINGUAL EVERY 5 MIN PRN
Status: DISCONTINUED | OUTPATIENT
Start: 2021-12-03 | End: 2021-12-04

## 2021-12-03 RX ORDER — ASPIRIN 81 MG/1
81 TABLET, CHEWABLE ORAL DAILY
Status: DISCONTINUED | OUTPATIENT
Start: 2021-12-04 | End: 2021-12-04

## 2021-12-03 RX ORDER — METOPROLOL TARTRATE 5 MG/5ML
5 INJECTION INTRAVENOUS ONCE
Status: COMPLETED | OUTPATIENT
Start: 2021-12-03 | End: 2021-12-03

## 2021-12-03 RX ORDER — ATORVASTATIN CALCIUM 40 MG/1
40 TABLET, FILM COATED ORAL NIGHTLY
Status: DISCONTINUED | OUTPATIENT
Start: 2021-12-03 | End: 2021-12-04

## 2021-12-03 RX ORDER — POLYETHYLENE GLYCOL 3350 17 G/17G
17 POWDER, FOR SOLUTION ORAL DAILY PRN
Status: DISCONTINUED | OUTPATIENT
Start: 2021-12-03 | End: 2021-12-04

## 2021-12-03 RX ADMIN — METOPROLOL TARTRATE 5 MG: 5 INJECTION INTRAVENOUS at 12:22

## 2021-12-03 RX ADMIN — IOPAMIDOL 190 ML: 755 INJECTION, SOLUTION INTRAVENOUS at 16:39

## 2021-12-03 ASSESSMENT — ENCOUNTER SYMPTOMS
NAUSEA: 0
EYE REDNESS: 0
RHINORRHEA: 0
WHEEZING: 0
VOMITING: 0
BACK PAIN: 0
ABDOMINAL PAIN: 0
DIARRHEA: 0
COUGH: 0
EYE DISCHARGE: 0
SHORTNESS OF BREATH: 1
EYE PAIN: 0
SORE THROAT: 0

## 2021-12-03 NOTE — CONSULTS
Cardiology Consultation     Katerin Whitten  1955    PCP: Jessica Viera MD  Referring Physician: Dr. Leyda Iglesias   Reason for Referral: chest pain   Chief Complaint:   Chief Complaint   Patient presents with    Chest Pain       Subjective:     History of Present Illness: The patient is 73 y.o. male with a past medical history significant for CAD ( prior STEMI w/ RCA PCI), known LAD lesion ( ~ 70-80%), who presents with the above complaint. Outpatient cardiologist is dr. Parag Cespedes. Continued intermittent chest pain without improvement on medical therapy. Presented to the ER with increasing substernal chest pain. Past Medical History:   Diagnosis Date    Arthritis     Asthma     BPH (benign prostatic hyperplasia)     Cardiomyopathy ([address]     GERD (gastroesophageal reflux disease)     HTN (hypertension) 10/29/2012    MVP (mitral valve prolapse)     PPD positive     Prolonged emergence from general anesthesia     Prostatism     Tricuspid valve prolapse     Unspecified sleep apnea      Past Surgical History:   Procedure Laterality Date    CARDIAC CATHETERIZATION      CHOLECYSTECTOMY      FOOT SURGERY      HAND SURGERY Right 2017    thumb mass excision    INGUINAL HERNIA REPAIR      bilateral    KNEE ARTHROSCOPY      OTHER SURGICAL HISTORY  2019    epidural Dr. Nina Reese at [address] [facility] [address]      right elbow    TONSILLECTOMY       Family History   Problem Relation Age of Onset    Rheum Arthritis Other     Heart Disease Father     Heart Disease Brother     Other Brother         histoplasmosis     Lupus Neg Hx      Social History     Tobacco Use    Smoking status: Former Smoker     Types: Cigarettes     Quit date: 1982     Years since quittin.8    Smokeless tobacco: Never Used   Vaping Use    Vaping Use: Never used   Substance Use Topics    Alcohol use:  Yes     Alcohol/week: 12.0 standard drinks     Types: 12 Cans of beer per flush 0.9 % injection 5-40 mL  5-40 mL IntraVENous PRN Dariela James MD        0.9 % sodium chloride infusion  25 mL IntraVENous PRN Dariela James MD        ondansetron (ZOFRAN-ODT) disintegrating tablet 4 mg  4 mg Oral Q8H PRN Dariela James MD        Or    ondansetron [facility]) injection 4 mg  4 mg IntraVENous Q6H PRN Dariela James MD        acetaminophen (TYLENOL) tablet 650 mg  650 mg Oral Q6H PRN Dariela James MD        Or    acetaminophen (TYLENOL) suppository 650 mg  650 mg Rectal Q6H PRN Dariela James MD        polyethylene glycol [facility]) packet 17 g  17 g Oral Daily PRN MD Heriberto Dillard ON 12/4/2021] aspirin chewable tablet 81 mg  81 mg Oral Daily Dariela James MD        atorvastatin (LIPITOR) tablet 40 mg  40 mg Oral Nightly Dariela James MD        enoxaparin (LOVENOX) injection 40 mg  40 mg SubCUTAneous Daily Dariela James MD        LORazepam (ATIVAN) tablet 0.5 mg  0.5 mg Oral Q4H PRN Dariela James MD        sodium chloride flush 0.9 % injection 5-40 mL  5-40 mL IntraVENous 2 times per day Lilia Salmon MD        sodium chloride flush 0.9 % injection 5-40 mL  5-40 mL IntraVENous PRN Lilia Salmon MD        0.9 % sodium chloride infusion  25 mL IntraVENous PRN Lilia Salmon MD        acetaminophen (TYLENOL) tablet 650 mg  650 mg Oral Q4H PRN Lilia Salmon MD           Review of Systems:  · Constitutional: No unanticipated weight loss. There's been no change in energy level, sleep pattern, or activity level. No fevers, chills. · Eyes: No visual changes or diplopia. No scleral icterus. · ENT: No Headaches, hearing loss or vertigo. No mouth sores or sore throat. · Cardiovascular: as reviewed in HPI  · Respiratory: No cough or wheezing, no sputum production. No hemoptysis. · Gastrointestinal: No abdominal pain, appetite loss, blood in stools. No change in bowel or bladder habits.   · Genitourinary: No dysuria, trouble voiding, or hematuria. · Musculoskeletal:  No gait disturbance, no joint complaints. · Integumentary: No rash or pruritis. · Neurological: No headache, diplopia, change in muscle strength, numbness or tingling. · Psychiatric: No anxiety or depression. · Endocrine: No temperature intolerance. No excessive thirst, fluid intake, or urination. No tremor. · Hematologic/Lymphatic: No abnormal bruising or bleeding, blood clots or swollen lymph nodes. · Allergic/Immunologic: No nasal congestion or hives. Physical Exam:   /73   Pulse 77   Temp 98.4 °F (36.9 °C) (Oral)   Resp 11   Ht 6' 3\" (1.905 m)   Wt 278 lb 9.6 oz (126.4 kg)   SpO2 99%   BMI 34.82 kg/m²   Wt Readings from Last 3 Encounters:   12/03/21 278 lb 9.6 oz (126.4 kg)   09/29/21 275 lb (124.7 kg)   09/02/21 279 lb (126.6 kg)     Constitutional: He is oriented to person, place, and time. He appears well-developed and well-nourished. In no acute distress. Head: Normocephalic and atraumatic. Pupils equal and round. Neck: Neck supple. No JVP or carotid bruit appreciated. No mass and no thyromegaly present. No lymphadenopathy present. Cardiovascular: Normal rate. Normal heart sounds. Exam reveals no gallop and no friction rub. No murmur heard. Pulmonary/Chest: Effort normal and breath sounds normal. No respiratory distress. He has no wheezes, rhonchi or rales. Abdominal: Soft, non-tender. Bowel sounds are normal. He exhibits no organomegaly, mass or bruit. Extremities: No edema. No cyanosis or clubbing. Pulses are 2+ radial/carotid bilaterally. Neurological: No gross cranial nerve deficit. Coordination normal.   Skin: Skin is warm and dry. There is no rash or diaphoresis. Psychiatric: He has a normal mood and affect.  His speech is normal and behavior is normal.     Lab Review:   FLP:    Lab Results   Component Value Date    TRIG 123 01/21/2021    HDL 35 01/21/2021    HDL 41 03/06/2012    LDLCALC 51 01/21/2021

## 2021-12-03 NOTE — PRE SEDATION
Sedation Pre-Procedure Note    Patient Name: Arron Damon   YOB: 1955  Room/Bed: REANNA/NONE  Medical Record Number: [id number]  Date: 12/3/2021   Time: 4:41 PM       Indication:  Unstable angina     Consent: I have discussed with the patient and/or the patient representative the indication, alternatives, and the possible risks and/or complications of the planned procedure and the anesthesia methods. The patient and/or patient representative appear to understand and agree to proceed. Vital Signs:   Vitals:    12/03/21 1416   BP:    Pulse:    Resp:    Temp: 98.4 °F (36.9 °C)   SpO2:        Past Medical History:   has a past medical history of Arthritis, Asthma, BPH (benign prostatic hyperplasia), Cardiomyopathy ([address] GERD (gastroesophageal reflux disease), HTN (hypertension), MVP (mitral valve prolapse), PPD positive, Prolonged emergence from general anesthesia, Prostatism, Tricuspid valve prolapse, and Unspecified sleep apnea. Past Surgical History:   has a past surgical history that includes Inguinal hernia repair; Tonsillectomy; Cholecystectomy; tendon release; Foot surgery; Knee arthroscopy; Cardiac catheterization; Hand surgery (Right, 11/09/2017); other surgical history (08/28/2019); and Percutaneous Transluminal Coronary Angio. Medications:   Scheduled Meds:   Continuous Infusions:   PRN Meds:   Home Meds:   Prior to Admission medications    Medication Sig Start Date End Date Taking? Authorizing Provider   folic acid (FOLVITE) 1 MG tablet Take 1 tablet by mouth daily.  12/2/21  Yes Kristan Ramos MD   methotrexate (RHEUMATREX) 2.5 MG chemo tablet TAKE 8 TABLETS BY MOUTH ONCE WEEKLY  Patient taking differently: TAKE 8 TABLETS BY MOUTH ONCE WEEKLY on Wednesdays 10/4/21  Yes Kristan Ramos MD   metoprolol succinate (TOPROL XL) 25 MG extended release tablet Take 1 tablet by mouth daily  Patient taking differently: Take 25 mg by mouth Daily with supper  9/29/21  Yes Radha Carrera MD losartan (COZAAR) 25 MG tablet Take 1 tablet by mouth daily 9/29/21  Yes Gee Escobedo MD   prasugrel (EFFIENT) 10 MG TABS Take 1 tablet by mouth daily  Patient taking differently: Take 10 mg by mouth Daily with supper  7/20/21  Yes Dixie Solano MD   ASPIRIN LOW DOSE 81 MG EC tablet TAKE ONE TABLET BY MOUTH DAILY  Patient taking differently: Take 81 mg by mouth Daily with supper  7/2/21  Yes Ozzy Greer MD   atorvastatin (LIPITOR) 40 MG tablet TAKE ONE TABLET BY MOUTH ONCE NIGHTLY 6/1/21  Yes Dixie Solano MD   gabapentin (NEURONTIN) 300 MG capsule Take 300 mg by mouth nightly. Yes Historical Provider, MD   busPIRone (BUSPAR) 7.5 MG tablet Take 7.5 mg by mouth Every 4 - 6 hours PRN   Yes Historical Provider, MD   nitroGLYCERIN (NITROSTAT) 0.4 MG SL tablet Place 0.4 mg under the tongue every 5 minutes as needed for Chest pain up to max of 3 total doses. If no relief after 1 dose, call 911. Yes Historical Provider, MD   finasteride (PROSCAR) 5 MG tablet Take 5 mg by mouth daily   Yes Historical Provider, MD   omeprazole (PRILOSEC) 20 MG delayed release capsule Take 20 mg by mouth Daily    Yes Historical Provider, MD   Loratadine 10 MG CAPS Take by mouth daily    Yes Historical Provider, MD   HYDROcodone-acetaminophen (NORCO)  MG per tablet Take 1 tablet by mouth every 6 hours as needed for Pain.     Yes Historical Provider, MD   montelukast (SINGULAIR) 10 MG tablet Take 10 mg by mouth Daily with supper    Yes Historical Provider, MD   amitriptyline (ELAVIL) 50 MG tablet Take 50 mg by mouth nightly    Yes Historical Provider, MD   DULoxetine (CYMBALTA) 60 MG extended release capsule Take 60 mg by mouth daily    Yes Historical Provider, MD     Coumadin Use Last 7 Days:  no  Antiplatelet drug therapy use last 7 days: yes - asa/effient   Other anticoagulant use last 7 days: no  Additional Medication Information:  n/a      Pre-Sedation Documentation and Exam:   I have personally completed a history, physical exam & review of systems for this patient (see notes).     Mallampati Airway Assessment:  Mallampati Class I - (soft palate, fauces, uvula & anterior/posterior tonsillar pillars are visible)    Prior History of Anesthesia Complications:   none    ASA Classification:  Class 2 - A normal healthy patient with mild systemic disease    Sedation/ Anesthesia Plan:   intravenous sedation    Medications Planned:   midazolam (Versed) intravenously    Patient is an appropriate candidate for plan of sedation: yes    Electronically signed by Nancy Delvalle MD on 12/3/2021 at 4:41 PM

## 2021-12-03 NOTE — ED PROVIDER NOTES
[facility]  eMERGENCY dEPARTMENT eNCOUnter        Pt Name: Ricky Gomes  MRN: [id number]  Prospergfpedro 1955  Date of evaluation: 12/3/2021  Provider: Jil Coronado MD  PCP: Som Matt MD      CHIEF COMPLAINT       Chief Complaint   Patient presents with    Chest Pain       HISTORY OFPRESENT ILLNESS   (Location/Symptom, Timing/Onset, Context/Setting, Quality, Duration, Modifying Factors,Severity)  Note limiting factors. Ricky Gomes is a 73 y.o. male       Location/Symptom: Left-sided chest pain  Timing/Onset: Wednesday night which is 2 nights ago  Context/Setting: History of known coronary artery disease. He has a stent. Apparently had a cardiac catheterization done on July 15 of this year with results as below. Quality: Pain is poorly described but at times it was stabbing. Duration: He has had 3 episodes apparently since Wednesday night however the patient does go on about how when he had a heart attack he is never quite bounced back so has he tells you his history there does seem to be some chronicity to the symptoms  Modifying Factors: Nitroglycerin definitely helped  Severity: Currently 0 but it was as bad as 8 out of 10 on Wednesday night. .  It was also associated with shortness of breath and nausea. Nursing Noteswere all reviewed and agreed with or any disagreements were addressed  in the HPI. REVIEW OF SYSTEMS    (2-9 systems for level 4, 10 or more for level 5)     Review of Systems   Constitutional: Negative for chills, fatigue and fever. HENT: Negative for ear pain, rhinorrhea and sore throat. Eyes: Negative for pain, discharge, redness and visual disturbance. Respiratory: Positive for shortness of breath. Negative for cough and wheezing. Cardiovascular: Positive for chest pain. Negative for palpitations and leg swelling. Gastrointestinal: Negative for abdominal pain, diarrhea, nausea and vomiting. Genitourinary: Negative for difficulty urinating and dysuria. Musculoskeletal: Negative for arthralgias, back pain and myalgias. Skin: Negative for rash. Allergic/Immunologic: Negative for environmental allergies. Neurological: Negative for dizziness, seizures, syncope and headaches. Hematological: Negative for adenopathy. Psychiatric/Behavioral: Negative for suicidal ideas. The patient is not nervous/anxious. PAST MEDICAL HISTORY     Past Medical History:   Diagnosis Date    Arthritis     Asthma     BPH (benign prostatic hyperplasia)     Cardiomyopathy ([address]     GERD (gastroesophageal reflux disease)     HTN (hypertension) 10/29/2012    MVP (mitral valve prolapse)     PPD positive     Prolonged emergence from general anesthesia     Prostatism     Tricuspid valve prolapse     Unspecified sleep apnea          SURGICAL HISTORY     Past Surgical History:   Procedure Laterality Date    CARDIAC CATHETERIZATION      CHOLECYSTECTOMY      FOOT SURGERY      HAND SURGERY Right 11/09/2017    thumb mass excision    INGUINAL HERNIA REPAIR      bilateral    KNEE ARTHROSCOPY      OTHER SURGICAL HISTORY  08/28/2019    epidural Dr. Heidi Dyer at [address]      right elbow    TONSILLECTOMY           CURRENTMEDICATIONS       Previous Medications    AMITRIPTYLINE (ELAVIL) 50 MG TABLET    Take 50 mg by mouth nightly     ASPIRIN LOW DOSE 81 MG EC TABLET    TAKE ONE TABLET BY MOUTH DAILY    ATORVASTATIN (LIPITOR) 40 MG TABLET    TAKE ONE TABLET BY MOUTH ONCE NIGHTLY    BUSPIRONE (BUSPAR) 7.5 MG TABLET    Take 7.5 mg by mouth Every 4 - 6 hours PRN    DULOXETINE (CYMBALTA) 60 MG EXTENDED RELEASE CAPSULE    Take 60 mg by mouth daily     FINASTERIDE (PROSCAR) 5 MG TABLET    Take 5 mg by mouth daily    FOLIC ACID (FOLVITE) 1 MG TABLET    Take 1 tablet by mouth daily. GABAPENTIN (NEURONTIN) 300 MG CAPSULE    Take 300 mg by mouth nightly.      HYDROCODONE-ACETAMINOPHEN (Reema Longo)  MG PER TABLET    Take 1 tablet by mouth every 6 hours as needed for Pain. LORATADINE 10 MG CAPS    Take by mouth daily     LOSARTAN (COZAAR) 25 MG TABLET    Take 1 tablet by mouth daily    METHOTREXATE (RHEUMATREX) 2.5 MG CHEMO TABLET    TAKE 8 TABLETS BY MOUTH ONCE WEEKLY    METOPROLOL SUCCINATE (TOPROL XL) 25 MG EXTENDED RELEASE TABLET    Take 1 tablet by mouth daily    MONTELUKAST (SINGULAIR) 10 MG TABLET    Take 10 mg by mouth Daily with supper     NITROGLYCERIN (NITROSTAT) 0.4 MG SL TABLET    Place 0.4 mg under the tongue every 5 minutes as needed for Chest pain up to max of 3 total doses. If no relief after 1 dose, call 911. OMEPRAZOLE (PRILOSEC) 20 MG DELAYED RELEASE CAPSULE    Take 20 mg by mouth Daily     PRASUGREL (EFFIENT) 10 MG TABS    Take 1 tablet by mouth daily       ALLERGIES     Cephalosporins, Pcn [penicillins], Penicillin g sodium, Ciprofloxacin, Codeine, Erythromycin, Morphine and related, and Fenoprofen calcium    FAMILY HISTORY       Family History   Problem Relation Age of Onset    Rheum Arthritis Other     Heart Disease Father     Heart Disease Brother     Other Brother         histoplasmosis     Lupus Neg Hx           SOCIAL HISTORY       Social History     Socioeconomic History    Marital status:      Spouse name: Not on file    Number of children: Not on file    Years of education: Not on file    Highest education level: Not on file   Occupational History    Not on file   Tobacco Use    Smoking status: Former Smoker     Types: Cigarettes     Quit date: 1982     Years since quittin.8    Smokeless tobacco: Never Used   Vaping Use    Vaping Use: Never used   Substance and Sexual Activity    Alcohol use:  Yes     Alcohol/week: 12.0 standard drinks     Types: 12 Cans of beer per week     Comment: wine & shots rare    Drug use: No    Sexual activity: Yes     Partners: Female     Comment: wife   Other Topics Concern    Not on file   Social History Narrative    Not on file     Social Determinants of Health     Financial Resource Strain:     Difficulty of Paying Living Expenses: Not on file   Food Insecurity:     Worried About Running Out of Food in the Last Year: Not on file    Sima of Food in the Last Year: Not on file   Transportation Needs:     Lack of Transportation (Medical): Not on file    Lack of Transportation (Non-Medical): Not on file   Physical Activity:     Days of Exercise per Week: Not on file    Minutes of Exercise per Session: Not on file   Stress:     Feeling of Stress : Not on file   Social Connections:     Frequency of Communication with Friends and Family: Not on file    Frequency of Social Gatherings with Friends and Family: Not on file    Attends Quaker Services: Not on file    Active Member of [address] [place] [address] [employer] or Organizations: Not on file    Attends Club or Organization Meetings: Not on file    Marital Status: Not on file   Intimate Partner Violence:     Fear of Current or Ex-Partner: Not on file    Emotionally Abused: Not on file    Physically Abused: Not on file    Sexually Abused: Not on file   Housing Stability:     Unable to Pay for Housing in the Last Year: Not on file    Number of [place] in the Last Year: Not on file    Unstable Housing in the Last Year: Not on file       SCREENINGS             PHYSICAL EXAM    (up to 7 for level 4, 8 or more for level 5)     ED Triage Vitals   BP Temp Temp src Pulse Resp SpO2 Height Weight   -- -- -- -- -- -- -- --      height is 6' 3\" (1.905 m) and weight is 278 lb 9.6 oz (126.4 kg). His oral temperature is 98.4 °F (36.9 °C). His blood pressure is 110/73 and his pulse is 77. His respiration is 11 and oxygen saturation is 99%. Physical Exam  Constitutional:       Appearance: He is well-developed. He is not diaphoretic. HENT:      Head: Normocephalic and atraumatic.       Right Ear: External ear normal.      Left Ear: External ear normal.   Eyes:      General: No scleral icterus. Right eye: No discharge. Left eye: No discharge. Neck:      Thyroid: No thyromegaly. Vascular: No JVD. Trachea: No tracheal deviation. Cardiovascular:      Rate and Rhythm: Normal rate and regular rhythm. Heart sounds: No murmur heard. No friction rub. No gallop. Pulmonary:      Effort: Pulmonary effort is normal. No respiratory distress. Breath sounds: Normal breath sounds. No stridor. No wheezing or rales. Comments: Patient became tachypneic to some sitting up in bed which apparently he states is chronic and usual for him. Abdominal:      General: There is no distension. Palpations: Abdomen is soft. Tenderness: There is no abdominal tenderness. There is no guarding or rebound. Musculoskeletal:         General: No tenderness. Cervical back: Normal range of motion. Skin:     General: Skin is warm and dry. Findings: No rash (On exposed body surfaces). Neurological:      Mental Status: He is alert and oriented to person, place, and time. Coordination: Coordination normal.   Psychiatric:         Behavior: Behavior normal.         Thought Content:  Thought content normal.         DIAGNOSTIC RESULTS   LABS:    Results for orders placed or performed during the hospital encounter of 12/03/21   CBC Auto Differential   Result Value Ref Range    WBC 6.4 4.0 - 11.0 K/uL    RBC 4.00 (L) 4.20 - 5.90 M/uL    Hemoglobin 12.6 (L) 13.5 - 17.5 g/dL    Hematocrit 38.1 (L) 40.5 - 52.5 %    MCV 95.4 80.0 - 100.0 fL    MCH 31.5 26.0 - 34.0 pg    MCHC 33.0 31.0 - 36.0 g/dL    RDW 14.7 12.4 - 15.4 %    Platelets 140 238 - 045 K/uL    MPV 9.7 5.0 - 10.5 fL    Neutrophils % 65.3 %    Lymphocytes % 22.7 %    Monocytes % 10.6 %    Eosinophils % 1.1 %    Basophils % 0.3 %    Neutrophils Absolute 4.2 1.7 - 7.7 K/uL    Lymphocytes Absolute 1.5 1.0 - 5.1 K/uL    Monocytes Absolute 0.7 0.0 - 1.3 K/uL    Eosinophils Absolute 0.1 0.0 - 0.6 K/uL Basophils Absolute 0.0 0.0 - 0.2 K/uL   Comprehensive Metabolic Panel w/ Reflex to MG   Result Value Ref Range    Sodium 135 (L) 136 - 145 mmol/L    Potassium reflex Magnesium 4.1 3.5 - 5.1 mmol/L    Chloride 100 99 - 110 mmol/L    CO2 21 21 - 32 mmol/L    Anion Gap 14 3 - 16    Glucose 118 (H) 70 - 99 mg/dL    BUN 13 7 - 20 mg/dL    CREATININE 0.8 0.8 - 1.3 mg/dL    GFR Non-African American >60 >60    GFR African American >60 >60    Calcium 8.9 8.3 - 10.6 mg/dL    Total Protein 6.2 (L) 6.4 - 8.2 g/dL    Albumin 4.2 3.4 - 5.0 g/dL    Albumin/Globulin Ratio 2.1 1.1 - 2.2    Total Bilirubin 0.8 0.0 - 1.0 mg/dL    Alkaline Phosphatase 92 40 - 129 U/L    ALT 23 10 - 40 U/L    AST 16 15 - 37 U/L   Troponin   Result Value Ref Range    Troponin <0.01 <0.01 ng/mL   Brain Natriuretic Peptide   Result Value Ref Range    Pro- (H) 0 - 124 pg/mL   EKG 12 Lead   Result Value Ref Range    Ventricular Rate 79 BPM    Atrial Rate 79 BPM    P-R Interval 154 ms    QRS Duration 90 ms    Q-T Interval 378 ms    QTc Calculation (Bazett) 433 ms    P Axis 31 degrees    R Axis 27 degrees    T Axis 33 degrees    Diagnosis       Sinus rhythm with frequent and consecutive Premature ventricular complexesNonspecific ST abnormalityAbnormal ECGWhen compared with ECG of 26-AUG-2020 06:14,No significant change was foundConfirmed by Griselda Reno MD, ELVIA ([id number]) on 12/3/2021 3:52:02 PM       All other labs were within normal range or not returned as of this dictation. EKG: All EKG's are interpreted by the Emergency Department Physician who either signs orCo-signs this chart in the absence of a cardiologist.    EKG visualized preliminarily interpreted by myself. Intrinsic rhythm appears to be sinus. He has frequent PVCs that are new for the most part unifocal.  The intrinsic rhythm does not show any acute ST elevation but it does have nonspecific ST findings. The axis is 27 and the overall rate is 79.   I do not appreciate any significant changes compared to previous. RADIOLOGY:   Non-plain film images such as CT, Ultrasound and MRI are read by the radiologist. Plain radiographic images are visualized and preliminarily interpreted by the  EDProvider with the below findings:    XR CHEST PORTABLE    Result Date: 12/3/2021  EXAMINATION: ONE XRAY VIEW OF THE CHEST 12/3/2021 11:30 am COMPARISON: Chest radiograph 04/25/2016 HISTORY: ORDERING SYSTEM PROVIDED HISTORY: chest pain TECHNOLOGIST PROVIDED HISTORY: Reason for exam:->chest pain Reason for Exam: CP, hx cardiac stents placed one yr ago. Acuity: Acute Type of Exam: Initial FINDINGS: Lungs are clear. No effusion or pneumothorax. Cardiomediastinal silhouette is stable with minimal tortuosity of the descending thoracic aorta. No acute cardiopulmonary findings. PROCEDURES   Unless otherwise noted below, none     Procedures    CRITICAL CARE TIME   CRITICAL CARE: There was a high probability of clinically significant/life threatening deterioration in this patient's condition which required my urgent intervention. Total critical care time was 31 minutes. This excludes any time for separately reportable procedures. CONSULTS:  IP CONSULT TO PRIMARY CARE PROVIDER  IP CONSULT TO CARDIOLOGY  IP CONSULT TO CARDIOLOGY    EMERGENCY DEPARTMENT COURSE and DIFFERENTIAL DIAGNOSIS/MDM:   Vitals:    Vitals:    12/03/21 1345 12/03/21 1401 12/03/21 1411 12/03/21 1416   BP: 121/61 110/73     Pulse: 77      Resp: 11      Temp:    98.4 °F (36.9 °C)   TempSrc:    Oral   SpO2:       Weight:   278 lb 9.6 oz (126.4 kg)    Height:           Patient was given the following medications:  Medications   metoprolol (LOPRESSOR) injection 5 mg (5 mg IntraVENous Given 12/3/21 1222)       So far stable. Case discussed with both cardiology and his primary care provider. FINAL IMPRESSION      1.  Chest pain, unspecified type          DISPOSITION/PLAN    DISPOSITION Admitted 12/03/2021 03:17:25 PM      PATIENT REFERRED TO:  No follow-up provider specified.     DISCHARGE MEDICATIONS:  New Prescriptions    No medications on file       DISCONTINUED MEDICATIONS:  Discontinued Medications    CYCLOBENZAPRINE (FLEXERIL) 10 MG TABLET                  (Please note that portions of this note were completed with a voice recognition program.  Efforts were made to editthe dictations but occasionally words are mis-transcribed.)    Kristi Carter MD (electronically signed)           Kristi Carter MD  12/03/21 [id number]

## 2021-12-03 NOTE — OP NOTE
[address]   Procedure Note    CLINICAL HISTORY:       Dov Peck is a 73 y.o. male with a history of coronary artery disease. He was admitted with complaints of chest pain. ECG with non-specific ST/T wave abnormalities     Patient Active Problem List   Diagnosis    Rheumatoid arthritis ([address]    HTN (hypertension)    Abnormal results of cardiovascular function studies    Mass of finger    Lung nodule    Tobacco abuse    Coronary artery disease due to lipid rich plaque    Angina at rest [facility])    Abnormal stress test       Prior to Admission medications    Medication Sig Start Date End Date Taking? Authorizing Provider   folic acid (FOLVITE) 1 MG tablet Take 1 tablet by mouth daily. 12/2/21  Yes David Mahan MD   methotrexate (RHEUMATREX) 2.5 MG chemo tablet TAKE 8 TABLETS BY MOUTH ONCE WEEKLY  Patient taking differently: TAKE 8 TABLETS BY MOUTH ONCE WEEKLY on Wednesdays 10/4/21  Yes David Mahan MD   metoprolol succinate (TOPROL XL) 25 MG extended release tablet Take 1 tablet by mouth daily  Patient taking differently: Take 25 mg by mouth Daily with supper  9/29/21  Yes Yaritza Patient, MD   losartan (COZAAR) 25 MG tablet Take 1 tablet by mouth daily 9/29/21  Yes Yaritza Patient, MD   prasugrel (EFFIENT) 10 MG TABS Take 1 tablet by mouth daily  Patient taking differently: Take 10 mg by mouth Daily with supper  7/20/21  Yes Nas Machuca MD   ASPIRIN LOW DOSE 81 MG EC tablet TAKE ONE TABLET BY MOUTH DAILY  Patient taking differently: Take 81 mg by mouth Daily with supper  7/2/21  Yes Jorge A Castellano MD   atorvastatin (LIPITOR) 40 MG tablet TAKE ONE TABLET BY MOUTH ONCE NIGHTLY 6/1/21  Yes Nas Machuca MD   gabapentin (NEURONTIN) 300 MG capsule Take 300 mg by mouth nightly.     Yes Historical Provider, MD   busPIRone (BUSPAR) 7.5 MG tablet Take 7.5 mg by mouth Every 4 - 6 hours PRN   Yes Historical Provider, MD   nitroGLYCERIN (NITROSTAT) 0.4 MG SL tablet Place 0.4 mg under the tongue every 5 minutes as needed for Chest pain up to max of 3 total doses. If no relief after 1 dose, call 911. Yes Historical Provider, MD   finasteride (PROSCAR) 5 MG tablet Take 5 mg by mouth daily   Yes Historical Provider, MD   omeprazole (PRILOSEC) 20 MG delayed release capsule Take 20 mg by mouth Daily    Yes Historical Provider, MD   Loratadine 10 MG CAPS Take by mouth daily    Yes Historical Provider, MD   HYDROcodone-acetaminophen (NORCO)  MG per tablet Take 1 tablet by mouth every 6 hours as needed for Pain. Yes Historical Provider, MD   montelukast (SINGULAIR) 10 MG tablet Take 10 mg by mouth Daily with supper    Yes Historical Provider, MD   amitriptyline (ELAVIL) 50 MG tablet Take 50 mg by mouth nightly    Yes Historical Provider, MD   DULoxetine (CYMBALTA) 60 MG extended release capsule Take 60 mg by mouth daily    Yes Historical Provider, MD      The risks, benefits, and details of the procedure were explained to the patient. The patient verbalized understanding and wanted to proceed. Informed written consent was obtained. INDICATION:  Unstable angina     PROCEDURES PERFORMED:   [facility] [place]   IVUS   PCI     PROCEDURE TECHNIQUE:  The patient was approached from the right radial artery using a 5-6  French slender sheath. Left coronary angiography was done using a Elvin L3.5 diagnostic catheter. Right coronary angiography was done using a Elvin R4 guide catheter. CONTRAST:  Total of 190 cc. COMPLICATIONS:  None. At the end of the procedure a TR device was used for hemostasis. EBL: 10 cc    Moderate Sedation:  Start time: 1529  Stop time: 1645  6 mg versed   200 mcg fentanyl   An independent trained observer pushed medications at my direction. We monitored the patient's level of consciousness and vital signs/physiologic status throughout the procedure duration (see start and start times above).        ANGIOGRAM/CORONARY ARTERIOGRAM:       The left main coronary artery is normal .    The left anterior descending artery has a proximal 80% lesion . The left circumflex artery is widely patent . The right coronary artery is widely patent    INTERVENTION  1. XB 3.5 guide   2. Allstar wire used to cross LAD lesion   3. IVUS passed from left main to mid LAD ( MLA< 2mm2; distal reference 3.5 mm)   4. Predilation performed with 3.0 regular balloon, 3.0 wolverine cutting balloon   5. [place] 3.5 X 18 mm JUAN deployed to prox LAD   6. Post dilation performed with 4.0 NC balloon     SUMMARY:   Single vessel obstructive CAD   S/p successful IVUS guided PCI X 1 JUAN     RECOMMENDATION:      1. Recommend beta blockade, high potency statin, aspirin and effient for 12 months  2. Referral to cardiac rehab placed  3. Patient has been advised on the importance of regular exercise of at least 20-30 minutes daily. 4.D/c home   5.  Follow up in 1-2 weeks with cardiology; Dr. Ronan Avila MD [address], Interventional Cardiology, and Peripheral Vascular [address] [facility]   (C): [phone]  Antoinette Laughlin): [phone]

## 2021-12-04 LAB
EKG ATRIAL RATE: 96 BPM
EKG DIAGNOSIS: NORMAL
EKG Q-T INTERVAL: 408 MS
EKG QRS DURATION: 82 MS
EKG QTC CALCULATION (BAZETT): 515 MS
EKG R AXIS: 40 DEGREES
EKG T AXIS: -35 DEGREES
EKG VENTRICULAR RATE: 96 BPM

## 2021-12-08 NOTE — DISCHARGE SUMMARY
medications    amitriptyline 50 MG tablet  Commonly known as: ELAVIL     atorvastatin 40 MG tablet  Commonly known as: LIPITOR  TAKE ONE TABLET BY MOUTH ONCE NIGHTLY     busPIRone 7.5 MG tablet  Commonly known as: BUSPAR     DULoxetine 60 MG extended release capsule  Commonly known as: CYMBALTA     finasteride 5 MG tablet  Commonly known as: PROSCAR     folic acid 1 MG tablet  Commonly known as: FOLVITE  Take 1 tablet by mouth daily. gabapentin 300 MG capsule  Commonly known as: NEURONTIN     HYDROcodone-acetaminophen  MG per tablet  Commonly known as: NORCO     loratadine 10 MG capsule  Commonly known as: CLARITIN     losartan 25 MG tablet  Commonly known as: COZAAR  Take 1 tablet by mouth daily     montelukast 10 MG tablet  Commonly known as: SINGULAIR     nitroGLYCERIN 0.4 MG SL tablet  Commonly known as: NITROSTAT     omeprazole 20 MG delayed release capsule  Commonly known as: PRILOSEC            Follow-up with Dr. Gricelda Mcadams in 4 weeks. Greater than 30 minutes was spent in discharge coordination. Signed:   Nolene Blizzard, MD, 12/7/2021, 10:44 PM

## 2021-12-15 DIAGNOSIS — M06.00 RHEUMATOID ARTHRITIS WITH NEGATIVE RHEUMATOID FACTOR, INVOLVING UNSPECIFIED SITE (HCC): ICD-10-CM

## 2021-12-15 DIAGNOSIS — Z51.81 ENCOUNTER FOR THERAPEUTIC DRUG MONITORING: ICD-10-CM

## 2021-12-15 DIAGNOSIS — Z79.899 ENCOUNTER FOR LONG-TERM (CURRENT) USE OF HIGH-RISK MEDICATION: ICD-10-CM

## 2021-12-16 RX ORDER — METHOTREXATE 2.5 MG/1
TABLET ORAL
Qty: 32 TABLET | Refills: 1 | Status: SHIPPED | OUTPATIENT
Start: 2021-12-16 | End: 2022-02-28

## 2021-12-29 PROBLEM — G47.30 SLEEP APNEA: Status: ACTIVE | Noted: 2021-12-29

## 2021-12-29 PROBLEM — E78.5 HYPERLIPIDEMIA: Status: ACTIVE | Noted: 2021-12-29

## 2021-12-29 PROBLEM — Z95.820 S/P ANGIOPLASTY WITH STENT: Status: ACTIVE | Noted: 2021-12-29

## 2021-12-29 PROBLEM — I49.3 PVC (PREMATURE VENTRICULAR CONTRACTION): Status: ACTIVE | Noted: 2021-12-29

## 2021-12-29 NOTE — PROGRESS NOTES
Cardiology Consultation     America Noe  1955    PCP: Rock Monreal MD  Primary Physician: Dr. Vanessa Dupont  Reason for visit: hospital f/u chest pain, s/p PCI   Chief Complaint:   Chief Complaint   Patient presents with    Follow-Up from Hospital     stent placement       Subjective:     History of Present Illness: The patient is 73 y.o. male typically followed by Dr. Shankar Salmon and Dr. Shanda Hodge with a past medical history significant for CAD s/p PCI RCA per Dr. Rena Coulter 8/2020, HTN, HLD, VICKIE, prior tobacco use, PVC's and pre diabetes. LHC with FFR prox. LAD 50-60% stenosis revealing non obstructive CAD per Dr. Celia Armas 7/15/21. Pt was admitted to [facility] 12/3/21 with unstable angina and underwent PCI LAD with JUAN. Today, he is here for follow up. He he states that he continues to have randomly occurring left upper chest pain. The pain is different from his previous angina. Patient denies exertional chest pain/pressure, dyspnea at rest, VALVERDE, PND, orthopnea, palpitations, lightheadedness, weight changes, changes in LE edema, and syncope. He reports compliance with his medications.          Past Medical History:   Diagnosis Date    Arthritis     Asthma     BPH (benign prostatic hyperplasia)     Cardiomyopathy ([address]     GERD (gastroesophageal reflux disease)     HTN (hypertension) 10/29/2012    MVP (mitral valve prolapse)     PPD positive     Prolonged emergence from general anesthesia     Prostatism     Tricuspid valve prolapse     Unspecified sleep apnea      Past Surgical History:   Procedure Laterality Date    CARDIAC CATHETERIZATION      CHOLECYSTECTOMY      FOOT SURGERY      HAND SURGERY Right 11/09/2017    thumb mass excision    INGUINAL HERNIA REPAIR      bilateral    KNEE ARTHROSCOPY      OTHER SURGICAL HISTORY  08/28/2019    epidural Dr. Layne Rodriguez at [address]      right elbow    TONSILLECTOMY       Family History   Problem Relation Age of Onset  Rheum Arthritis Other     Heart Disease Father     Heart Disease Brother     Other Brother         histoplasmosis     Lupus Neg Hx      Social History     Tobacco Use    Smoking status: Former Smoker     Types: Cigarettes     Quit date: 1982     Years since quittin.9    Smokeless tobacco: Never Used   Vaping Use    Vaping Use: Never used   Substance Use Topics    Alcohol use: Yes     Alcohol/week: 12.0 standard drinks     Types: 12 Cans of beer per week     Comment: wine & shots rare    Drug use: No       Allergies   Allergen Reactions    Cephalosporins Anaphylaxis     Anaphylaxis to Keflex in     Pcn [Penicillins] Shortness Of Breath    Penicillin G Sodium Shortness Of Breath    Ciprofloxacin Itching    Codeine Itching    Erythromycin Itching    Morphine And Related Itching    Fenoprofen Calcium Other (See Comments)     Pt doesn`t recall     Current Outpatient Medications   Medication Sig Dispense Refill    ASPIRIN LOW DOSE 81 MG EC tablet TAKE ONE TABLET BY MOUTH DAILY 30 tablet 5    methotrexate (RHEUMATREX) 2.5 MG chemo tablet TAKE 8 TABLETS BY MOUTH ONCE WEEKLY 32 tablet 1    folic acid (FOLVITE) 1 MG tablet Take 1 tablet by mouth daily. 90 tablet 1    metoprolol succinate (TOPROL XL) 25 MG extended release tablet Take 1 tablet by mouth daily (Patient taking differently: Take 25 mg by mouth Daily with supper ) 30 tablet 11    losartan (COZAAR) 25 MG tablet Take 1 tablet by mouth daily 90 tablet 3    prasugrel (EFFIENT) 10 MG TABS Take 1 tablet by mouth daily (Patient taking differently: Take 10 mg by mouth Daily with supper ) 30 tablet 6    atorvastatin (LIPITOR) 40 MG tablet TAKE ONE TABLET BY MOUTH ONCE NIGHTLY 90 tablet 3    gabapentin (NEURONTIN) 300 MG capsule Take 300 mg by mouth nightly.        busPIRone (BUSPAR) 7.5 MG tablet Take 7.5 mg by mouth Every 4 - 6 hours PRN      nitroGLYCERIN (NITROSTAT) 0.4 MG SL tablet Place 0.4 mg under the tongue every 5 minutes as needed for Chest pain up to max of 3 total doses. If no relief after 1 dose, call 911.  finasteride (PROSCAR) 5 MG tablet Take 5 mg by mouth daily      omeprazole (PRILOSEC) 20 MG delayed release capsule Take 20 mg by mouth Daily       Loratadine 10 MG CAPS Take by mouth daily       HYDROcodone-acetaminophen (NORCO)  MG per tablet Take 1 tablet by mouth every 6 hours as needed for Pain.  montelukast (SINGULAIR) 10 MG tablet Take 10 mg by mouth Daily with supper       amitriptyline (ELAVIL) 50 MG tablet Take 50 mg by mouth nightly       DULoxetine (CYMBALTA) 60 MG extended release capsule Take 60 mg by mouth daily        No current facility-administered medications for this visit. Review of Systems:  · Constitutional: No unanticipated weight loss. There's been no change in energy level, sleep pattern, or activity level. No fevers, chills. · Eyes: No visual changes or diplopia. No scleral icterus. · ENT: No Headaches, hearing loss or vertigo. No mouth sores or sore throat. · Cardiovascular: as reviewed in HPI  · Respiratory: No cough or wheezing, no sputum production. No hemoptysis. · Gastrointestinal: No abdominal pain, appetite loss, blood in stools. No change in bowel or bladder habits. · Genitourinary: No dysuria, trouble voiding, or hematuria. · Musculoskeletal:  No gait disturbance, no joint complaints. · Integumentary: No rash or pruritis. · Neurological: No headache, diplopia, change in muscle strength, numbness or tingling. · Psychiatric: No anxiety or depression. · Endocrine: No temperature intolerance. No excessive thirst, fluid intake, or urination. No tremor. · Hematologic/Lymphatic: No abnormal bruising or bleeding, blood clots or swollen lymph nodes. · Allergic/Immunologic: No nasal congestion or hives.     Physical Exam:   /68   Pulse 56   Ht 6' 3\" (1.905 m)   Wt 289 lb (131.1 kg)   SpO2 95%   BMI 36.12 kg/m²   Wt Readings from Last 3 Encounters:   01/03/22 289 lb (131.1 kg)   12/03/21 278 lb 9.6 oz (126.4 kg)   09/29/21 275 lb (124.7 kg)     Constitutional: He is oriented to person, place, and time. He appears well-developed and well-nourished. In no acute distress. Head: Normocephalic and atraumatic. Pupils equal and round. Neck: Neck supple. No JVP or carotid bruit appreciated. No mass and no thyromegaly present. No lymphadenopathy present. Cardiovascular: Normal rate. Normal heart sounds. Exam reveals no gallop and no friction rub. No murmur heard. Pulmonary/Chest: Effort normal and breath sounds normal. No respiratory distress. He has no wheezes, rhonchi or rales. Abdominal: Soft, non-tender. Bowel sounds are normal. He exhibits no organomegaly, mass or bruit. Extremities: No edema. No cyanosis or clubbing. Pulses are 2+ radial/carotid bilaterally. Neurological: No gross cranial nerve deficit. Coordination normal.   Skin: Skin is warm and dry. There is no rash or diaphoresis. Psychiatric: He has a normal mood and affect. His speech is normal and behavior is normal.     Lab Review:   FLP:    Lab Results   Component Value Date    TRIG 123 01/21/2021    HDL 35 01/21/2021    HDL 41 03/06/2012    LDLCALC 51 01/21/2021    LABVLDL 25 01/21/2021     BUN/Creatinine:    Lab Results   Component Value Date    BUN 13 12/03/2021    CREATININE 0.8 12/03/2021     PT/INR, TNI, HGB A1C:   Lab Results   Component Value Date/Time    TROPONINI <0.01 12/03/2021 12:10 PM    LABA1C 5.7 01/21/2021 08:36 AM    12/2021 proBNP 453. H/H 12.6, 38.1    No results found for: CBCAUTODIF    EKG Interpretation:   9/29/2021 sinus rhythm, frequent ectopic ventricular beats with v-rate of 75 bpm with QRS duration 99 ms. No pathologic Q waves, ventricular pre-excitation, or QT prolongation. EKG 12/3/21 Sinus rhythm with frequent and consecutive Premature ventricular complexes with junctional escape complexes. Nonspecific ST and T wave abnormality. Prolonged QT. When compared with ECG of 03-DEC-2021 11:14,Nonsustained VT now present T wave inversion more evident in Inferior leads QT has lengthened       Event monitor: 9/2-9/9/2021  Predominate rhythm: NSR, Atrial tachycardia (AT) on episode 4 beats at an average 149 bpm up to 155 bpm.  Non-sustained Ventricular tachycardia 06321 episodes longest 9 beats @ average 136 bpm up to 156 bpm. Fastest 3 beats @ average 144 bpm up to 169 bpm. PAC 0.02 %    Echo:   Echo: 5/21/2019  There is mild concentric left ventricular hypertrophy. Left ventricular cavity size is mildly dilated. Overall left ventricular systolic function appears mildly reduced. Ejection fraction is visually estimated to be 40-45%. Grade I diastolic dysfunction with normal LV filling pressures. The aortic root is mildly dilated & measures at 4.1 cms. Mildly dilated right ventricle. TAPSE 2.2cm, RV mid 4cm  IVC size is normal (<2.1cm) and collapses > 50% with respiration consistent  with normal RA pressure (3mmHg). Echo 6/4/2021  Summary  There is mildly increased left ventricular wall thickness. EF ~50%. Indeterminate diastolic function. The left atrium is moderately dilated. Right ventricular systolic function is normal .  Trivial mitral, and tricuspid regurgitation. Stress Test:   Stress Test: 5/14/2019  Normal myocardial perfusion study.    Normal myocardial perfusion.    Normal LV size and systolic function.    Overall findings represent a low risk study. Stress test 6/21/2021  moderate size moderate severity fixed inferior wall defect consist with    prior inferior wall MI. no evidence of ischemia       Cath:     [facility]: 8/25/20 per Dr. Chamberlain Bent:  1. Irene Hickman is single-vessel disease. 2.  Dominant RCA, 99% with LEYDI grade 1 flow. 3.  Left main:  Free of disease. 4.  LAD has a 10-20% proximal lesion. 5.  Left circumflex:  No obstructive disease.    CONCLUSION:  1.  Dominant RCA has a 99% proximal stenosis with LEYDI grade 1 flow.  2.  Elevated left heart filling pressures. 3.  Normal left ventricular size and systolic function with ejection  fraction of 60%.  CONCLUSION:  Successful PCI and stenting of the proximal RCA, lesion  reduced to 0%. [facility]:7/15/2021   1.  Patent left main trunk. 2.  50 to 60% discrete stenosis of proximal left anterior descending artery. 3.  Patent circumflex artery and its branches. 4.  Widely patent stented proximal right coronary artery with mild   disease post stent which does not appear hemodynamically significant. 5.  Mild mid-inferior wall hypokinesis with an estimated EF of 50% with  normal left heart hemodynamics.     INTERVENTION  1. XB 3.5 guide   2. Runthrough wire used to cross LAD lesion   3. FFR 0.81 ( physiologically non-significant )   4.    SUMMARY:   Nonobstructive CAD       12/3/21 ANGIOGRAM/CORONARY ARTERIOGRAM:      The left main coronary artery is normal .   The left anterior descending artery has a proximal 80% lesion .   The left circumflex artery is widely patent .   The right coronary artery is widely patent     INTERVENTION  1. XB 3.5 guide   2. Allstar wire used to cross LAD lesion   3. IVUS passed from left main to mid LAD ( MLA< 2mm2; distal reference 3.5 mm)   4. Predilation performed with 3.0 regular balloon, 3.0 wolverine cutting balloon   5. [place] 3.5 X 18 mm JUAN deployed to prox LAD   6. Post dilation performed with 4.0 NC balloon      SUMMARY:   Single vessel obstructive CAD   S/p successful IVUS guided PCI X 1 JUAN      CT:    Doppler:     All above diagnostic testing and laboratory data was independently visualized and reviewed by me (not simply review of report)       Assessment and Plan   1) CAD. S/p RCA JUAN 8/2020. S/p prox LAD JUAN 12/3/21   LVEF 50% by echo 6/2021  Asymptomatic. Denies angina. Continue aspirin, statin, Effient and B-blocker. Importance of not stopping Effient and ASA discussed.   Continue DAPT for at least 1 year (12/3/22)  Referral placed to cardiac rehab. Risk factor modification discussed including heart healthy diet, regular exercise and weight loss    2) HLD  LDL goal <70. LDL 51 1/2021  Continue high intensity statin    3) PVC's  Follows with Dr. Joie Mitchell. 4) VICKIE  Noncompliant with CPAP  Referral previously placed for repeat sleep study. F/u in office with Dr. Monica Chavez in 6 months. Thank you very much for allowing me to participate in the care of your patient. Please do not hesitate to contact me if you have any questions. Lauryn Mcclelland MD [address] [place] [address], Interventional Cardiology, and Peripheral Vascular Disease   [address]   Ph: [phone]  Fax: [phone]    This note was scribed in the presence of the physician by Clinton Warren RN. Physician Attestation:  The scribes documentation has been prepared under my direction and personally reviewed by me in its entirety. I confirm the note above accurately reflects all work, treatment, procedures, and medical decision making performed by me.     Electronically signed by Paula Esteves MD on 1/9/2022 at 10:06 PM

## 2022-01-03 ENCOUNTER — OFFICE VISIT (OUTPATIENT)
Dept: CARDIOLOGY CLINIC | Age: 67
End: 2022-01-03
Payer: COMMERCIAL

## 2022-01-03 VITALS
HEIGHT: 75 IN | OXYGEN SATURATION: 95 % | SYSTOLIC BLOOD PRESSURE: 128 MMHG | WEIGHT: 289 LBS | HEART RATE: 56 BPM | DIASTOLIC BLOOD PRESSURE: 68 MMHG | BODY MASS INDEX: 35.93 KG/M2

## 2022-01-03 DIAGNOSIS — G47.30 SLEEP APNEA, UNSPECIFIED TYPE: ICD-10-CM

## 2022-01-03 DIAGNOSIS — I25.10 CAD IN NATIVE ARTERY: Primary | ICD-10-CM

## 2022-01-03 DIAGNOSIS — I49.3 PVC (PREMATURE VENTRICULAR CONTRACTION): ICD-10-CM

## 2022-01-03 DIAGNOSIS — E78.5 HYPERLIPIDEMIA, UNSPECIFIED HYPERLIPIDEMIA TYPE: ICD-10-CM

## 2022-01-03 DIAGNOSIS — Z95.820 S/P ANGIOPLASTY WITH STENT: ICD-10-CM

## 2022-01-03 PROCEDURE — 1036F TOBACCO NON-USER: CPT | Performed by: INTERNAL MEDICINE

## 2022-01-03 PROCEDURE — 3017F COLORECTAL CA SCREEN DOC REV: CPT | Performed by: INTERNAL MEDICINE

## 2022-01-03 PROCEDURE — 1123F ACP DISCUSS/DSCN MKR DOCD: CPT | Performed by: INTERNAL MEDICINE

## 2022-01-03 PROCEDURE — G8427 DOCREV CUR MEDS BY ELIG CLIN: HCPCS | Performed by: INTERNAL MEDICINE

## 2022-01-03 PROCEDURE — 99213 OFFICE O/P EST LOW 20 MIN: CPT | Performed by: INTERNAL MEDICINE

## 2022-01-03 PROCEDURE — 4040F PNEUMOC VAC/ADMIN/RCVD: CPT | Performed by: INTERNAL MEDICINE

## 2022-01-03 PROCEDURE — G8417 CALC BMI ABV UP PARAM F/U: HCPCS | Performed by: INTERNAL MEDICINE

## 2022-01-03 PROCEDURE — G8484 FLU IMMUNIZE NO ADMIN: HCPCS | Performed by: INTERNAL MEDICINE

## 2022-01-03 RX ORDER — NITROGLYCERIN 0.4 MG/1
0.4 TABLET SUBLINGUAL EVERY 5 MIN PRN
Qty: 25 TABLET | Refills: 3 | Status: SHIPPED | OUTPATIENT
Start: 2022-01-03

## 2022-01-03 RX ORDER — PRASUGREL 10 MG/1
10 TABLET, FILM COATED ORAL
Qty: 90 TABLET | Refills: 3 | Status: SHIPPED | OUTPATIENT
Start: 2022-01-03

## 2022-01-03 RX ORDER — ASPIRIN 81 MG/1
TABLET, COATED ORAL
Qty: 30 TABLET | Refills: 5 | Status: SHIPPED | OUTPATIENT
Start: 2022-01-03 | End: 2022-07-01

## 2022-01-27 NOTE — PROGRESS NOTES
VSS. IV d/c'd. Pressure drsg over site. Will be dressing to go home. Stable. Price (Use Numbers Only, No Special Characters Or $): 387.5

## 2022-02-28 DIAGNOSIS — Z51.81 ENCOUNTER FOR THERAPEUTIC DRUG MONITORING: ICD-10-CM

## 2022-02-28 DIAGNOSIS — M06.00 RHEUMATOID ARTHRITIS WITH NEGATIVE RHEUMATOID FACTOR, INVOLVING UNSPECIFIED SITE (HCC): ICD-10-CM

## 2022-02-28 DIAGNOSIS — Z79.899 ENCOUNTER FOR LONG-TERM (CURRENT) USE OF HIGH-RISK MEDICATION: ICD-10-CM

## 2022-02-28 RX ORDER — METHOTREXATE 2.5 MG/1
TABLET ORAL
Qty: 32 TABLET | Refills: 0 | Status: SHIPPED | OUTPATIENT
Start: 2022-02-28 | End: 2022-04-11

## 2022-02-28 NOTE — TELEPHONE ENCOUNTER
No new visit scheduled.    Lab 12/03/21   Another MD  Last visit 9/21/21    Patient cancelled appt 1/4/22   Reason : Patient (Patient had a heart attack other appointment scheduled.)

## 2022-03-28 LAB
A/G RATIO: 2.1 (ref 1.1–2.2)
ALBUMIN SERPL-MCNC: 4.4 G/DL (ref 3.4–5)
ALP BLD-CCNC: 98 U/L (ref 40–129)
ALT SERPL-CCNC: 11 U/L (ref 10–40)
ANION GAP SERPL CALCULATED.3IONS-SCNC: 13 MMOL/L (ref 3–16)
AST SERPL-CCNC: 9 U/L (ref 15–37)
BASOPHILS ABSOLUTE: 0 K/UL (ref 0–0.2)
BASOPHILS RELATIVE PERCENT: 0.2 %
BILIRUB SERPL-MCNC: 0.3 MG/DL (ref 0–1)
BUN BLDV-MCNC: 14 MG/DL (ref 7–20)
CALCIUM SERPL-MCNC: 9 MG/DL (ref 8.3–10.6)
CHLORIDE BLD-SCNC: 102 MMOL/L (ref 99–110)
CHOLESTEROL, TOTAL: 133 MG/DL (ref 0–199)
CO2: 25 MMOL/L (ref 21–32)
CREAT SERPL-MCNC: 0.9 MG/DL (ref 0.8–1.3)
EOSINOPHILS ABSOLUTE: 0.1 K/UL (ref 0–0.6)
EOSINOPHILS RELATIVE PERCENT: 1 %
GFR AFRICAN AMERICAN: >60
GFR NON-AFRICAN AMERICAN: >60
GLUCOSE BLD-MCNC: 122 MG/DL (ref 70–99)
HCT VFR BLD CALC: 41.9 % (ref 40.5–52.5)
HDLC SERPL-MCNC: 35 MG/DL (ref 40–60)
HEMOGLOBIN: 13.9 G/DL (ref 13.5–17.5)
LDL CHOLESTEROL CALCULATED: 67 MG/DL
LYMPHOCYTES ABSOLUTE: 1.7 K/UL (ref 1–5.1)
LYMPHOCYTES RELATIVE PERCENT: 24.7 %
MCH RBC QN AUTO: 31.1 PG (ref 26–34)
MCHC RBC AUTO-ENTMCNC: 33.1 G/DL (ref 31–36)
MCV RBC AUTO: 94 FL (ref 80–100)
MONOCYTES ABSOLUTE: 0.9 K/UL (ref 0–1.3)
MONOCYTES RELATIVE PERCENT: 12.5 %
NEUTROPHILS ABSOLUTE: 4.3 K/UL (ref 1.7–7.7)
NEUTROPHILS RELATIVE PERCENT: 61.6 %
PDW BLD-RTO: 14.5 % (ref 12.4–15.4)
PLATELET # BLD: 202 K/UL (ref 135–450)
PMV BLD AUTO: 9.4 FL (ref 5–10.5)
POTASSIUM SERPL-SCNC: 3.8 MMOL/L (ref 3.5–5.1)
RBC # BLD: 4.46 M/UL (ref 4.2–5.9)
SODIUM BLD-SCNC: 140 MMOL/L (ref 136–145)
TOTAL PROTEIN: 6.5 G/DL (ref 6.4–8.2)
TRIGL SERPL-MCNC: 155 MG/DL (ref 0–150)
TSH SERPL DL<=0.05 MIU/L-ACNC: 3.19 UIU/ML (ref 0.27–4.2)
VLDLC SERPL CALC-MCNC: 31 MG/DL
WBC # BLD: 6.9 K/UL (ref 4–11)

## 2022-03-29 LAB
ESTIMATED AVERAGE GLUCOSE: 122.6 MG/DL
HBA1C MFR BLD: 5.9 %

## 2022-04-11 DIAGNOSIS — Z79.899 ENCOUNTER FOR LONG-TERM (CURRENT) USE OF HIGH-RISK MEDICATION: ICD-10-CM

## 2022-04-11 DIAGNOSIS — M06.00 RHEUMATOID ARTHRITIS WITH NEGATIVE RHEUMATOID FACTOR, INVOLVING UNSPECIFIED SITE (HCC): ICD-10-CM

## 2022-04-11 DIAGNOSIS — Z51.81 ENCOUNTER FOR THERAPEUTIC DRUG MONITORING: ICD-10-CM

## 2022-04-11 RX ORDER — METHOTREXATE 2.5 MG/1
TABLET ORAL
Qty: 32 TABLET | Refills: 0 | Status: SHIPPED | OUTPATIENT
Start: 2022-04-11 | End: 2022-05-03 | Stop reason: SDUPTHER

## 2022-05-03 ENCOUNTER — OFFICE VISIT (OUTPATIENT)
Dept: RHEUMATOLOGY | Age: 67
End: 2022-05-03
Payer: COMMERCIAL

## 2022-05-03 VITALS
BODY MASS INDEX: 35.31 KG/M2 | HEART RATE: 50 BPM | SYSTOLIC BLOOD PRESSURE: 112 MMHG | WEIGHT: 284 LBS | DIASTOLIC BLOOD PRESSURE: 80 MMHG | HEIGHT: 75 IN

## 2022-05-03 DIAGNOSIS — Z51.81 ENCOUNTER FOR THERAPEUTIC DRUG MONITORING: ICD-10-CM

## 2022-05-03 DIAGNOSIS — M06.00 RHEUMATOID ARTHRITIS WITH NEGATIVE RHEUMATOID FACTOR, INVOLVING UNSPECIFIED SITE (HCC): ICD-10-CM

## 2022-05-03 DIAGNOSIS — Z79.899 ENCOUNTER FOR LONG-TERM (CURRENT) USE OF HIGH-RISK MEDICATION: ICD-10-CM

## 2022-05-03 PROCEDURE — 4040F PNEUMOC VAC/ADMIN/RCVD: CPT | Performed by: INTERNAL MEDICINE

## 2022-05-03 PROCEDURE — G8417 CALC BMI ABV UP PARAM F/U: HCPCS | Performed by: INTERNAL MEDICINE

## 2022-05-03 PROCEDURE — 1036F TOBACCO NON-USER: CPT | Performed by: INTERNAL MEDICINE

## 2022-05-03 PROCEDURE — G8427 DOCREV CUR MEDS BY ELIG CLIN: HCPCS | Performed by: INTERNAL MEDICINE

## 2022-05-03 PROCEDURE — 99214 OFFICE O/P EST MOD 30 MIN: CPT | Performed by: INTERNAL MEDICINE

## 2022-05-03 PROCEDURE — 1123F ACP DISCUSS/DSCN MKR DOCD: CPT | Performed by: INTERNAL MEDICINE

## 2022-05-03 PROCEDURE — 3017F COLORECTAL CA SCREEN DOC REV: CPT | Performed by: INTERNAL MEDICINE

## 2022-05-03 RX ORDER — METHOTREXATE 2.5 MG/1
TABLET ORAL
Qty: 32 TABLET | Refills: 2 | Status: SHIPPED | OUTPATIENT
Start: 2022-05-03 | End: 2022-10-18

## 2022-05-03 RX ORDER — BUPROPION HYDROCHLORIDE 150 MG/1
150 TABLET ORAL EVERY MORNING
COMMUNITY

## 2022-05-03 NOTE — PROGRESS NOTES
SUBJECTIVE:    Patient ID: Martha Gilbert is a 73 y.o. male. Patient returns for follow-up of rheumatoid arthritis. He continues on methotrexate 20 mg a week. He can do his ADLs. He complains of pain in his left buttock. He notices quite a bit when he tries to try to drive any distance    Review of Systems:    Respiratory: denies cough, denies shortness of breath  Gastrointestinal: denies abdominal pain, denies nausea  Musculoskeletal:                1 hour         morning stiffness  Ears, nose, mouth: denies mucosal sores  Skin: denies rash, denies alopecia. The remainder of his review of systems is negative. Prior to Visit Medications    Medication Sig Taking? Authorizing Provider   buPROPion (WELLBUTRIN XL) 150 MG extended release tablet Take 150 mg by mouth every morning Yes Historical Provider, MD   methotrexate (RHEUMATREX) 2.5 MG chemo tablet TAKE 8 TABLETS BY MOUTH ONCE WEEKLY Yes Ruben Bui MD   ASPIRIN LOW DOSE 81 MG EC tablet TAKE ONE TABLET BY MOUTH DAILY Yes Kathy Rhodes MD   prasugrel (EFFIENT) 10 MG TABS Take 1 tablet by mouth Daily with supper Yes Isha Stallworth MD   nitroGLYCERIN (NITROSTAT) 0.4 MG SL tablet Place 1 tablet under the tongue every 5 minutes as needed for Chest pain up to max of 3 total doses. If no relief after 1 dose, call 911. Yes Isha Stallworth MD   folic acid (FOLVITE) 1 MG tablet Take 1 tablet by mouth daily. Yes Ruben Bui MD   metoprolol succinate (TOPROL XL) 25 MG extended release tablet Take 1 tablet by mouth daily  Patient taking differently: Take 25 mg by mouth Daily with supper  Yes Shantal Escobedo MD   losartan (COZAAR) 25 MG tablet Take 1 tablet by mouth daily Yes Shantal Escobedo MD   atorvastatin (LIPITOR) 40 MG tablet TAKE ONE TABLET BY MOUTH ONCE NIGHTLY Yes Yane Alegre MD   gabapentin (NEURONTIN) 300 MG capsule Take 300 mg by mouth nightly.   Yes Historical Provider, MD   busPIRone (BUSPAR) 7.5 MG tablet Take 7.5 mg by mouth Every 4 - 6 hours PRN Yes Historical Provider, MD   finasteride (PROSCAR) 5 MG tablet Take 5 mg by mouth daily Yes Historical Provider, MD   omeprazole (PRILOSEC) 20 MG delayed release capsule Take 20 mg by mouth Daily  Yes Historical Provider, MD   Loratadine 10 MG CAPS Take by mouth daily  Yes Historical Provider, MD   HYDROcodone-acetaminophen (NORCO)  MG per tablet Take 1 tablet by mouth every 6 hours as needed for Pain. Yes Historical Provider, MD   montelukast (SINGULAIR) 10 MG tablet Take 10 mg by mouth Daily with supper  Yes Historical Provider, MD   amitriptyline (ELAVIL) 50 MG tablet Take 50 mg by mouth nightly  Yes Historical Provider, MD   DULoxetine (CYMBALTA) 60 MG extended release capsule Take 60 mg by mouth daily  Yes Historical Provider, MD   metFORMIN (GLUCOPHAGE) 500 MG tablet   Historical Provider, MD        OBJECTIVE:  /80   Pulse 50   Ht 6' 3\" (1.905 m)   Wt 284 lb (128.8 kg)   BMI 35.50 kg/m²      Physical Exam:    General: the patient demonstrated normal gait and posture without evidence of overt muscle wasting. Hygiene appears normal    Ears, mouth, nose, throat: no mucosal sores      Respiratory: assessment of the patient's respiratory effort showed no evidence of abnormal respiration and no use of accessory muscles. Diaphragmatic movement is normal.  Percussion of the patient's chest showed no evidence of dullness flatness or hyperresonance. Auscultation of the patient's lungs reveal normal breath sounds in all lung fields. There is no evidence of abnormal sounds such as rales or wheezes. There is no evidence of friction rub. Cardiovascular: palpation of the patient's chest revealed no abnormal thrill. The point of maximal impulse showed no displacement. Auscultation of the heart revealed no evidence of murmur gallop or abnormal heart sound. Musculoskeletal: 1+ soft tissue swelling wrists trace swelling right elbow trace swelling first MCPs.   No definite swelling PIPs equivocal swelling right knee. Fists 90 to 95% bilaterally  Skin: no rashes    ASSESSMENT:   Rheumatoid arthritis. Chemotherapy      PLAN:   Blood work recently obtained was reviewed. I will see patient back in 3 months time.

## 2022-05-31 DIAGNOSIS — Z51.81 ENCOUNTER FOR THERAPEUTIC DRUG MONITORING: ICD-10-CM

## 2022-05-31 DIAGNOSIS — M06.00 RHEUMATOID ARTHRITIS WITH NEGATIVE RHEUMATOID FACTOR, INVOLVING UNSPECIFIED SITE (HCC): ICD-10-CM

## 2022-05-31 DIAGNOSIS — Z79.899 ENCOUNTER FOR LONG-TERM (CURRENT) USE OF HIGH-RISK MEDICATION: ICD-10-CM

## 2022-05-31 RX ORDER — FOLIC ACID 1 MG/1
TABLET ORAL
Qty: 90 TABLET | Refills: 1 | Status: SHIPPED | OUTPATIENT
Start: 2022-05-31

## 2022-05-31 RX ORDER — ATORVASTATIN CALCIUM 40 MG/1
TABLET, FILM COATED ORAL
Qty: 90 TABLET | Refills: 0 | Status: SHIPPED | OUTPATIENT
Start: 2022-05-31 | End: 2022-08-30

## 2022-06-23 NOTE — PROGRESS NOTES
[address]  Cardiology Progress Note    Pierre Velazco  [birth year] June 24, 2022      CC: \"I have VALVERDE and chest pain. \"     HPI:  The patient is 73 y.o. male with a past medical history significant for chronic back pain, limited mobility and movement, CAD with multiple PCI's and JUAN, hypertension, hyperlipidemia, VICKIE, and pre diabetes. Today, he is accompanied by his wife. He reports that initially after [facility] 12/3/21 per Dr. Maida Salazar s/p PCI x1 JUAN to LAD. Continued with off/on occasional very mild VALVERDE, chest discomfort after LHC continued. He started with consistent off/on symptoms of VALVERDE, chest discomfort with exertion and carrying items around 2/2022. Since April, occurs with all activity. He will stop to rest and relax, rest would resolve on own within 15 minutes and fatigued with headache after. He has taken SL nitro on rare occasion since 12/2021. last dose, he did take a SL nitro this past Saturday and improved his symptoms. He has not missed any medical therapy and reports tolerance. He denies missing any doses of DAPT. He does not smoke. Blood work 3/2022 stable. Patient denies dyspnea at rest,  PND, orthopnea, palpitations, lightheadedness, weight changes, changes in LE edema, and syncope. He continues to suffer from severe back pain, limited mobility especially with moving.        Past Medical History:   Diagnosis Date    Arthritis     Asthma     BPH (benign prostatic hyperplasia)     Cardiomyopathy ([address]     GERD (gastroesophageal reflux disease)     HTN (hypertension) 10/29/2012    MVP (mitral valve prolapse)     PPD positive     Prolonged emergence from general anesthesia     Prostatism     Tricuspid valve prolapse     Unspecified sleep apnea      Past Surgical History:   Procedure Laterality Date    CARDIAC CATHETERIZATION      CHOLECYSTECTOMY      FOOT SURGERY      HAND SURGERY Right 11/09/2017    thumb mass excision    INGUINAL HERNIA REPAIR      bilateral    KNEE ARTHROSCOPY      OTHER SURGICAL HISTORY  2019    epidural Dr. Laura Pretty at [address]      right elbow    TONSILLECTOMY       Family History   Problem Relation Age of Onset    Rheum Arthritis Other     Heart Disease Father     Heart Disease Brother     Other Brother         histoplasmosis     Lupus Neg Hx      Social History     Tobacco Use    Smoking status: Former Smoker     Types: Cigarettes     Quit date: 1982     Years since quittin.4    Smokeless tobacco: Never Used   Vaping Use    Vaping Use: Never used   Substance Use Topics    Alcohol use: Yes     Alcohol/week: 12.0 standard drinks     Types: 12 Cans of beer per week     Comment: wine & shots rare    Drug use: No       Allergies   Allergen Reactions    Cephalosporins Anaphylaxis     Anaphylaxis to Keflex in     Pcn [Penicillins] Shortness Of Breath    Penicillin G Sodium Shortness Of Breath    Ciprofloxacin Itching    Codeine Itching    Erythromycin Itching    Morphine And Related Itching    Fenoprofen Calcium Other (See Comments)     Pt doesn`t recall     Current Outpatient Medications   Medication Sig Dispense Refill    atorvastatin (LIPITOR) 40 MG tablet TAKE ONE TABLET BY MOUTH ONCE NIGHTLY 90 tablet 0    folic acid (FOLVITE) 1 MG tablet TAKE ONE TABLET BY MOUTH DAILY 90 tablet 1    metFORMIN (GLUCOPHAGE) 500 MG tablet On hold for a month starting 2 weeks ago      buPROPion (WELLBUTRIN XL) 150 MG extended release tablet Take 150 mg by mouth every morning      methotrexate (RHEUMATREX) 2.5 MG chemo tablet TAKE 8 TABLETS BY MOUTH ONCE WEEKLY 32 tablet 2    ASPIRIN LOW DOSE 81 MG EC tablet TAKE ONE TABLET BY MOUTH DAILY 30 tablet 5    prasugrel (EFFIENT) 10 MG TABS Take 1 tablet by mouth Daily with supper 90 tablet 3    nitroGLYCERIN (NITROSTAT) 0.4 MG SL tablet Place 1 tablet under the tongue every 5 minutes as needed for Chest pain up to max of 3 total doses.  If no relief after 1 dose, call 911. 25 tablet 3    metoprolol succinate (TOPROL XL) 25 MG extended release tablet Take 1 tablet by mouth daily (Patient taking differently: Take 25 mg by mouth Daily with supper ) 30 tablet 11    losartan (COZAAR) 25 MG tablet Take 1 tablet by mouth daily 90 tablet 3    gabapentin (NEURONTIN) 300 MG capsule Take 300 mg by mouth nightly.  busPIRone (BUSPAR) 7.5 MG tablet Take 7.5 mg by mouth Every 4 - 6 hours PRN      finasteride (PROSCAR) 5 MG tablet Take 5 mg by mouth daily      omeprazole (PRILOSEC) 20 MG delayed release capsule Take 20 mg by mouth Daily       Loratadine 10 MG CAPS Take by mouth daily       HYDROcodone-acetaminophen (NORCO)  MG per tablet Take 1 tablet by mouth every 6 hours as needed for Pain.  montelukast (SINGULAIR) 10 MG tablet Take 10 mg by mouth Daily with supper       amitriptyline (ELAVIL) 50 MG tablet Take 50 mg by mouth nightly       DULoxetine (CYMBALTA) 60 MG extended release capsule Take 60 mg by mouth daily        No current facility-administered medications for this visit. Review of Systems:  · Constitutional: no unanticipated weight loss. There's been no change in energy level, sleep pattern, or activity level. No fevers, chills. · Eyes: No visual changes or diplopia. No scleral icterus. · ENT: No Headaches, hearing loss or vertigo. No mouth sores or sore throat. · Cardiovascular: as reviewed in HPI  · Respiratory: No cough or wheezing, no sputum production. No hematemesis. · Gastrointestinal: + nausea. No abdominal pain, appetite loss, blood in stools. No change in bowel or bladder habits. · Genitourinary: No dysuria, trouble voiding, or hematuria. · Musculoskeletal:  No gait disturbance, no joint complaints. · Integumentary: No rash or pruritis. · Neurological: No headache, diplopia, change in muscle strength, numbness or tingling.  + dizziness. · Psychiatric: No anxiety or depression.   · Endocrine: No temperature intolerance. No excessive thirst, fluid intake, or urination. No tremor. · Hematologic/Lymphatic: No abnormal bruising or bleeding, blood clots or swollen lymph nodes. · Allergic/Immunologic: No nasal congestion or hives. Physical Exam:   /70   Pulse 74   Ht 6' 3\" (1.905 m)   Wt 280 lb (127 kg) Comment: per patient; scale out for repair  SpO2 96%   BMI 35.00 kg/m²   Wt Readings from Last 3 Encounters:   06/24/22 280 lb (127 kg)   05/03/22 284 lb (128.8 kg)   01/03/22 289 lb (131.1 kg)     Constitutional: He is oriented to person, place, and time. He appears well-developed and well-nourished. In no acute distress. Head: Normocephalic and atraumatic. Pupils equal and round. Neck: Neck supple. No JVP or carotid bruit appreciated. No mass and no thyromegaly present. No lymphadenopathy present. Cardiovascular: Normal rate. Normal heart sounds. Exam reveals no gallop and no friction rub. No murmur heard. Pulmonary/Chest: Effort normal and breath sounds normal. No respiratory distress. He has no wheezes, rhonchi or rales. Abdominal: Soft, non-tender. Bowel sounds are normal. He exhibits no organomegaly, mass or bruit. Extremities: No edema, cyanosis, or clubbing. Pulses are 2+ radial/dorsalis pedis/posterior tibial/carotid bilaterally. Neurological: No gross cranial nerve deficit. Coordination normal.   Skin: Skin is warm and dry. There is no rash or diaphoresis. Psychiatric: He has a normal mood and affect.  His speech is normal and behavior is normal.     Lab Review:   Lab Results   Component Value Date    TRIG 155 03/28/2022    HDL 35 03/28/2022    HDL 41 03/06/2012    LDLCALC 67 03/28/2022    LABVLDL 31 03/28/2022      Lab Results   Component Value Date    BUN 14 03/28/2022    CREATININE 0.9 03/28/2022       EKG Interpretation:   8/25/20: Sinus  Rhythm  - frequent multiform ectopic ventricular beats, # VECs = 2, # types 2, consider old anterior infarct,  -Nonspecific ST depression   + Nonspecific T-abnormality  -Nondiagnostic. 9/10/20: sinus bradycardia, 57 bpm   12/7/20: Sinus  Bradycardia  - frequent ectopic ventricular beat s, # VECs = 3, ~57 bpm.   9/2/21: Sinus  Rhythm  -with ectopic ventricular couplets with Nonspecific ST depression  -Nondiagnostic. ST and T wave abnormalities in the inferior leads. Cannot rule out ischemia ~75 bpm.   6/24/22: SR     Image Review:     Chest CT 8/1/19  Atherosclerosis, including coronary artery calcification.       Several indeterminate pulmonary nodules, measuring up to approximately 6 mm. Follow-up recommendations are as below.       RECOMMENDATIONS:   Fleischner Society guidelines for follow-up and management of incidentally   detected pulmonary nodules:       Single Solid Nodule:       Nodule size less than 6 mm   In a low-risk patient, no routine follow-up. In a high-risk patient, optional CT at 12 months.       Nodule size equals 6-8 mm   In a low-risk patient, CT at 6-12 months, then consider CT at 18-24 months. In a high-risk patient, CT at 6-12 months, then CT at 18-24 months.       Nodule size greater than 8 mm           In a low-risk patient, consider CT at 3 months, PET/CT, or tissue sampling.       In a high-risk patient, consider CT at 3 months, PET/CT, or tissue sampling.       Multiple Solid Nodules:       Nodule size less than 6 mm   In a low-risk patient, no routine follow-up. In a high-risk patient, optional CT at 12 months.       Nodule size equals 6-8 mm   In a low-risk patient, CT at 3-6 months, then consider CT at 18-24 months. In a high-risk patient, CT at 3-6 months, then CT at 18-24 months.       Nodule size greater than 8 mm   In a low-risk patient, CT at 3-6 months, then consider CT at 18-24 months.    In a high-risk patient, CT at 3-6 months, then CT at 18-24 months.       - Low risk patients include individuals with minimal or absent history of   smoking and other known risk factors.       - High risk patients include individuals with a history or smoking or known   risk factors.         Stress test 5/14/19  Normal myocardial perfusion study.    Normal myocardial perfusion.    Normal LV size and systolic function.    Overall findings represent a low risk study. ECHO 5/21/19  There is mild concentric left ventricular hypertrophy. Left ventricular cavity size is mildly dilated. Overall left ventricular systolic function appears mildly reduced. Ejection fraction is visually estimated to be 40-45%. Grade I diastolic dysfunction with normal LV filling pressures. The aortic root is mildly dilated & measures at 4.1 cms. Mildly dilated right ventricle. TAPSE 2.2cm, RV mid 4cm  IVC size is normal (<2.1cm) and collapses > 50% with respiration consistent  with normal RA pressure (3mmHg). [facility]: 8/25/20 per Dr. Faina Phillips:  1. There is single-vessel disease. 2.  Dominant RCA, 99% with LEYDI grade 1 flow. 3.  Left main:  Free of disease. 4. LAD has a 10-20% proximal lesion. 5.  Left circumflex:  No obstructive disease.  CONCLUSION:  1. Dominant RCA has a 99% proximal stenosis with LEYDI grade 1 flow. 2.  Elevated left heart filling pressures. 3.  Normal left ventricular size and systolic function with ejection  fraction of 60%. CONCLUSION:  Successful PCI and stenting of the proximal RCA, lesion  reduced to 0%. Echo: 6/4/21  Summary   There is mildly increased left ventricular wall thickness. EF 50%. Indeterminate diastolic function. The left atrium is moderately dilated. Right ventricular systolic function is normal .   Trivial mitral, and tricuspid regurgitation. Stress study: 6/21/21  Summary    moderate size moderate severity fixed inferior wall defect consist with    prior inferior wall MI. no evidence of ischemia        Recommendation    Aggressive medical therapy for coronary artery disease.       Stress Protocols     [facility]:7/15/21  CORONARY ANGIOGRAPHY:  1. Left main trunk:   It arises from the left sinus of Valsalva. It  divides into a left anterior descending artery and left circumflex  artery. Left main trunk is free of atherosclerosis. 2.  Left anterior descending artery: It is a moderate to large-sized  artery and it has an evidence of discrete 50 to 60% stenosis in the  proximal segment. Remainder of the left anterior descending artery  appears free of significant atherosclerosis. 3.  Left circumflex artery: It arises from the left main trunk. It is  a moderate to large-sized artery, gives rise to obtuse marginal branches  and right circumflex artery and its branches are free of  atherosclerosis. 4.  Right coronary artery: It arises from the right sinus of Valsalva. It is a large dominant artery and it gives rise to posterior descending  and posterior lateral branches. Right coronary artery has evidence of a  stent in the proximal to mid segment which is widely patent. There is a  mild disease distal to the stent but it is not hemodynamically  significant.     OVERALL IMPRESSION:  1. Patent left main trunk. 2.  50 to 60% discrete stenosis of proximal left anterior descending artery. 3.  Patent circumflex artery and its branches. 4.  Widely patent stented proximal right coronary artery with mild   disease post stent which does not appear hemodynamically significant. 5.  Mild mid-inferior wall hypokinesis with an estimated EF of 50% with  normal left heart hemodynamics. In view of these findings, we will proceed first with the FFR of the  left anterior descending artery which is being performed by Dr. Jazmyne Baig  and further recommendations to follow the FFR.     Dr. Albania Soliz  1. XB 3.5 guide   2. Runthrough wire used to cross LAD lesion   3.  FFR 0.81 ( physiologically non-significant )    SUMMARY: Nonobstructive CAD   RECOMMENDATION: - recommend aggressive medical therapy for CAD   - if symptoms persist recommend LAD PCI in the future      Coronary angiography: 12/2021  ANGIOGRAM/CORONARY ARTERIOGRAM:      The left main coronary artery is normal .   The left anterior descending artery has a proximal 80% lesion .   The left circumflex artery is widely patent .   The right coronary artery is widely patent   INTERVENTION  1. XB 3.5 guide   2. Allstar wire used to cross LAD lesion   3. IVUS passed from left main to mid LAD ( MLA< 2mm2; distal reference 3.5 mm)   4. Predilation performed with 3.0 regular balloon, 3.0 wolverine cutting balloon   5. Osvaldo 3.5 X 18 mm JUAN deployed to prox LAD   6. Post dilation performed with 4.0 NC balloon   SUMMARY:   Single vessel obstructive CAD   S/p successful IVUS guided PCI X 1 JUAN     Assessment/Plan:     CAD   -8/25/2020 s/p successful PCI and stenting of proximal RCA, 99% to 0% with LEYDI 1 by Dr. Dorota Santo.     -7/15/2021 repeat coronary angiography which revealed 50-60% discrete stenosis prox LAD, Dr. Nancy Sandoval performed FFR revealing non obstructive CAD with plan for aggressive medical therapy. -continued with improved SOB after switching Brilinta to Effient.   -12/2021 LAD proximal 80% stenosis s/p successful IVUS guided PCI x1 JUAN per Dr. Nancy Sandoval. Today, he reports that initially after [address] [facility] 12/3/21 per Dr. Nancy Sandoval s/p PCI x1 JUAN to LAD. Continued with off/on occasional very mild VALVERDE, chest discomfort after LHC continued. He started with consistent off/on symptoms of VALVERDE, chest discomfort with exertion and carrying items around 2/2022. Since April, occurs with all activity. He will stop to rest and relax, rest would resolve on own within 15 minutes and fatigued with headache after. He has taken SL nitro on rare occasion since 12/2021. last dose, he did take a SL nitro this past Saturday and improved his symptoms. He has not missed any medical therapy and reports tolerance. He denies missing any doses of DAPT. He does not smoke. Blood work 3/2022 stable.  Patient denies dyspnea at rest,  PND, orthopnea, palpitations, lightheadedness, weight changes, changes in LE edema, and syncope. He continues to suffer from severe back pain, limited mobility especially with moving. -OMT includes lipitor, DAPT-ASA/Effient, Toprol-XL, losartan.   -Continue to work on heart healthy diet, low salt diet. -I will schedule him for Jerica Morocho to assess progressive CAD, BMP, BNP, CBC, D-Dimer to further assess      PAC/PVC's   -7 day CAM monitor 9/2021 PAC, PVC, AFIB, Flutter with EP consult per Dr. Addi Chambers 9/2021-revealed PAC's and PVC's both of which are no longer than 6-8 beats. Hypertension, essential   -BP is stable today on medical therapy. -BMP stable 3/28/22    Hyperlipidemia, unspecified   -Lipitor 40 mg nightly   -LDLc 67, ., A1c 5.9 3/28/22    Sleep apnea  -Has not worn CPAP in several years. We have discussed multiple times proceeding with, referral for repeat sleep study. Will follow up next visit. Chronic fatigue  -TSH and CBC wnl 3/28/22    Former smoker  -Quit ~20-30 years ago but reports he was a heavy smoker. Encouraged continued smoking cessation. Obesity  -He has been working on weight loss. -Wife reports that snoring and apnea has resolved  -He has not gone for recommended sleep study. We will plan call with test results and plan follow up accordingly. Thank you very much for allowing me to participate in the care of your patient. Please do not hesitate to contact me if you have any questions. Sincerely,  Jeanine Mcconnell MD      [facility] [address] [place] [address]  Ph: [phone]  Fax: [phone]    This note was scribed in the presence of Dr. Steve MD by Lloyd Montoya RN.

## 2022-06-24 ENCOUNTER — OFFICE VISIT (OUTPATIENT)
Dept: CARDIOLOGY CLINIC | Age: 67
End: 2022-06-24
Payer: COMMERCIAL

## 2022-06-24 VITALS
DIASTOLIC BLOOD PRESSURE: 70 MMHG | OXYGEN SATURATION: 96 % | BODY MASS INDEX: 34.82 KG/M2 | HEIGHT: 75 IN | SYSTOLIC BLOOD PRESSURE: 114 MMHG | WEIGHT: 280 LBS | HEART RATE: 74 BPM

## 2022-06-24 DIAGNOSIS — R07.9 CHEST PAIN, UNSPECIFIED TYPE: ICD-10-CM

## 2022-06-24 DIAGNOSIS — R06.09 DOE (DYSPNEA ON EXERTION): ICD-10-CM

## 2022-06-24 DIAGNOSIS — I25.119 CORONARY ARTERY DISEASE INVOLVING NATIVE CORONARY ARTERY OF NATIVE HEART WITH ANGINA PECTORIS (HCC): ICD-10-CM

## 2022-06-24 DIAGNOSIS — I49.3 PVC (PREMATURE VENTRICULAR CONTRACTION): ICD-10-CM

## 2022-06-24 DIAGNOSIS — R53.83 FATIGUE, UNSPECIFIED TYPE: ICD-10-CM

## 2022-06-24 DIAGNOSIS — E78.5 HYPERLIPIDEMIA, UNSPECIFIED HYPERLIPIDEMIA TYPE: ICD-10-CM

## 2022-06-24 DIAGNOSIS — I10 ESSENTIAL HYPERTENSION: ICD-10-CM

## 2022-06-24 DIAGNOSIS — I49.1 PAC (PREMATURE ATRIAL CONTRACTION): Primary | ICD-10-CM

## 2022-06-24 LAB
D DIMER: <0.27 UG/ML FEU (ref 0–0.6)
HCT VFR BLD CALC: 35.4 % (ref 40.5–52.5)
HEMOGLOBIN: 11.9 G/DL (ref 13.5–17.5)
MCH RBC QN AUTO: 32.5 PG (ref 26–34)
MCHC RBC AUTO-ENTMCNC: 33.6 G/DL (ref 31–36)
MCV RBC AUTO: 96.9 FL (ref 80–100)
PDW BLD-RTO: 15.9 % (ref 12.4–15.4)
PLATELET # BLD: 141 K/UL (ref 135–450)
PMV BLD AUTO: 9.7 FL (ref 5–10.5)
RBC # BLD: 3.66 M/UL (ref 4.2–5.9)
WBC # BLD: 4.7 K/UL (ref 4–11)

## 2022-06-24 PROCEDURE — 3017F COLORECTAL CA SCREEN DOC REV: CPT | Performed by: INTERNAL MEDICINE

## 2022-06-24 PROCEDURE — 1123F ACP DISCUSS/DSCN MKR DOCD: CPT | Performed by: INTERNAL MEDICINE

## 2022-06-24 PROCEDURE — G8427 DOCREV CUR MEDS BY ELIG CLIN: HCPCS | Performed by: INTERNAL MEDICINE

## 2022-06-24 PROCEDURE — 99215 OFFICE O/P EST HI 40 MIN: CPT | Performed by: INTERNAL MEDICINE

## 2022-06-24 PROCEDURE — 93000 ELECTROCARDIOGRAM COMPLETE: CPT | Performed by: INTERNAL MEDICINE

## 2022-06-24 PROCEDURE — 1036F TOBACCO NON-USER: CPT | Performed by: INTERNAL MEDICINE

## 2022-06-24 PROCEDURE — G8417 CALC BMI ABV UP PARAM F/U: HCPCS | Performed by: INTERNAL MEDICINE

## 2022-06-24 NOTE — PATIENT INSTRUCTIONS
Patient Education        Cardiac Perfusion Scan (Medicine): About This Test  What is it? A cardiac perfusion scan measures the amount of blood in your heart muscle at rest and after your heart has been made to work hard. Either medicine or exercise can be used to stress the heart. This information is about using medicine for this test.  During the scan, a camera takes pictures of your heart after a radioactive tracer is injected into a vein in your arm. The tracer travels through the blood and into your heart. As the tracer moves through your heart, areas that have good blood flow absorb the tracer. Areas that don't absorb the tracer may not be getting enough blood or may have been damaged by a heart attack. The pictures show this difference. Two sets of pictures may be made during the test. One set is taken while you are resting. Another set is taken after your heart has been made to work harder (called a stress test). Why is this test done? The test is often done to find out what may be causing chest pain or pressure. It may be done after a heart attack to see if areas of the heart are not getting enough blood or to find out how much your heart has been damaged from the heart attack. How do you prepare for the test?  Tell your doctor ALL the medicines, vitamins, supplements, and herbal remedies you take. Some may increase the risk of problems during your test. Your doctor will tell you if you should stop taking any of them before the test and how soon to do it. If you take aspirin or some other blood thinner, ask your doctor if you should stop taking it before your test. Make sure that you understand exactly what your doctor wants you to do. These medicines increase the risk of bleeding. Tell your doctor if you are taking any erection-enhancing medicines (such as Cialis, Levitra, or Viagra).  You may need to take nitroglycerin during this test, which can cause a serious reaction if you have taken an erection-enhancing medicine within the previous 48 hours. Do not have any caffeine, such as coffee or tea, for 24 hours before the test.  If you are breastfeeding, you may want to pump enough breast milk before the test to get through 1 to 2 days of feeding. The radioactive tracer used in this test can get into your breast milk and is not good for the baby. How is the test done? Resting or baseline scan  · You will take your top off and be given a gown to wear. · Electrodes will be attached to your chest to keep track of your heartbeats. · You will have a tube, called an IV, going into your arm. A small amount of the radioactive tracer will be put in the IV. · You will lie on your back or your stomach on a table with a large camera positioned above your chest. The camera records the tracer's signals as it moves through your blood. · You will be asked to remain very still during each scan, which takes about 5 to 10 minutes. Several scans will be taken. Stress scan using medicine  The stress scan is done in two parts. In many hospitals, you first have the resting scan. You are then given a medicine that makes your heart work harder and you have another scan. Sometimes the stress scan is done first.  · You will have a test called an electrocardiogram (EKG or ECG), which takes about 5 to 10 minutes. You may have other EKGs during and after the stress test.  · Medicine will be put in your IV. It will make your heart work harder. You may get a headache and feel dizzy, flushed, and nauseated from the medicine, but these symptoms usually do not last long. · Your heartbeat and blood pressure may be checked. · Your symptoms such as chest pain and shortness of breath will be checked. · A few minutes after you get the medicine, another small amount of radioactive tracer is injected. You may be given something to reverse the medicine used to make your heart work harder.   · You will wait for 30 to 40 minutes and then have another resting scan. What else should you know about the test?  · Sometimes more pictures are taken 2 to 4 hours after the stress scan. · No electricity passes through your body during the test. There is no danger of getting an electrical shock. · Anytime you're exposed to radiation, there's a small chance of damage to cells or tissue. That's the case even with the low-level radioactive tracer used for this test. But the chance of damage is very low compared with the benefits of the test.  · Most of the tracer will leave your body through your urine or stool within a day. So be sure to flush the toilet right after you use it, and wash your hands well with soap and water. The amount of radiation in the tracer is very small. This means it isn't a risk for people to be around you after the test.  What happens after the test?  · You will probably be able to go home right away. · You can go back to your usual activities right away. When should you call for help? Call 911 anytime you think you may need emergency care. For example, call if:  · You passed out (lost consciousness). · You have been diagnosed with angina, and you have angina symptoms that do not go away with rest or are not getting better within 5 minutes after you take a dose of nitroglycerin. · You have symptoms of a heart attack. These may include:  ? Chest pain or pressure, or a strange feeling in the chest.  ? Sweating. ? Shortness of breath. ? Nausea or vomiting. ? Pain, pressure, or a strange feeling in the back, neck, jaw, or upper belly or in one or both shoulders or arms. ? Lightheadedness or sudden weakness. ? A fast or irregular heartbeat. After you call 911, the  may tell you to chew 1 adult-strength or 2 to 4 low-dose aspirin. Wait for an ambulance. Do not try to drive yourself. Watch closely for changes in your health, and be sure to contact your doctor if you have any problems.   Follow-up care is a key part of your treatment and safety. Be sure to make and go to all appointments, and call your doctor if you are having problems. It's also a good idea to keep a list of the medicines you take. Ask your doctor when you can expect to have your test results. Where can you learn more? Go to [email or web address] and sign in to your [employer] account. Enter R320 in the [employer] box to learn more about \"Cardiac Perfusion Scan (Medicine): About This Test.\"     If you do not have an account, please click on the \"Sign Up Now\" link. Current as of: April 29, 2021               Content Version: 13.1  © 2006-2021 [employer]. Care instructions adapted under license by [facility] ([facility]). If you have questions about a medical condition or this instruction, always ask your healthcare professional. Etlaura Junes any warranty or liability for your use of this information.

## 2022-06-25 LAB
ANION GAP SERPL CALCULATED.3IONS-SCNC: 12 MMOL/L (ref 3–16)
BUN BLDV-MCNC: 16 MG/DL (ref 7–20)
CALCIUM SERPL-MCNC: 9 MG/DL (ref 8.3–10.6)
CHLORIDE BLD-SCNC: 100 MMOL/L (ref 99–110)
CO2: 23 MMOL/L (ref 21–32)
CREAT SERPL-MCNC: 1.1 MG/DL (ref 0.8–1.3)
GFR AFRICAN AMERICAN: >60
GFR NON-AFRICAN AMERICAN: >60
GLUCOSE BLD-MCNC: 124 MG/DL (ref 70–99)
POTASSIUM SERPL-SCNC: 4.2 MMOL/L (ref 3.5–5.1)
PRO-BNP: 288 PG/ML (ref 0–124)
SODIUM BLD-SCNC: 135 MMOL/L (ref 136–145)

## 2022-07-01 RX ORDER — ASPIRIN 81 MG/1
TABLET, COATED ORAL
Qty: 60 TABLET | Refills: 3 | Status: SHIPPED | OUTPATIENT
Start: 2022-07-01

## 2022-07-14 ENCOUNTER — HOSPITAL ENCOUNTER (OUTPATIENT)
Dept: NON INVASIVE DIAGNOSTICS | Age: 67
Discharge: HOME | End: 2022-07-14
Payer: COMMERCIAL

## 2022-07-14 DIAGNOSIS — R53.83 FATIGUE, UNSPECIFIED TYPE: ICD-10-CM

## 2022-07-14 DIAGNOSIS — I25.119 CORONARY ARTERY DISEASE INVOLVING NATIVE CORONARY ARTERY OF NATIVE HEART WITH ANGINA PECTORIS (HCC): ICD-10-CM

## 2022-07-14 DIAGNOSIS — R06.09 DOE (DYSPNEA ON EXERTION): ICD-10-CM

## 2022-07-14 DIAGNOSIS — R07.9 CHEST PAIN, UNSPECIFIED TYPE: ICD-10-CM

## 2022-07-14 LAB
LV EF: 46 %
LVEF MODALITY: NORMAL

## 2022-07-14 PROCEDURE — A9502 TC99M TETROFOSMIN: HCPCS | Performed by: INTERNAL MEDICINE

## 2022-07-14 PROCEDURE — 6360000002 HC RX W HCPCS: Performed by: INTERNAL MEDICINE

## 2022-07-14 PROCEDURE — 93017 CV STRESS TEST TRACING ONLY: CPT

## 2022-07-14 PROCEDURE — 78452 HT MUSCLE IMAGE SPECT MULT: CPT

## 2022-07-14 PROCEDURE — 3430000000 HC RX DIAGNOSTIC RADIOPHARMACEUTICAL: Performed by: INTERNAL MEDICINE

## 2022-07-14 RX ADMIN — REGADENOSON 0.4 MG: 0.08 INJECTION, SOLUTION INTRAVENOUS at 09:07

## 2022-07-14 RX ADMIN — TETROFOSMIN 10 MILLICURIE: 1.38 INJECTION, POWDER, LYOPHILIZED, FOR SOLUTION INTRAVENOUS at 08:08

## 2022-07-14 RX ADMIN — TETROFOSMIN 30 MILLICURIE: 1.38 INJECTION, POWDER, LYOPHILIZED, FOR SOLUTION INTRAVENOUS at 09:10

## 2022-07-25 LAB
A/G RATIO: 2.4 (ref 1.1–2.2)
ALBUMIN SERPL-MCNC: 4.4 G/DL (ref 3.4–5)
ALP BLD-CCNC: 88 U/L (ref 40–129)
ALT SERPL-CCNC: 10 U/L (ref 10–40)
ANION GAP SERPL CALCULATED.3IONS-SCNC: 11 MMOL/L (ref 3–16)
AST SERPL-CCNC: 12 U/L (ref 15–37)
BASOPHILS ABSOLUTE: 0 K/UL (ref 0–0.2)
BASOPHILS RELATIVE PERCENT: 0.5 %
BILIRUB SERPL-MCNC: 0.5 MG/DL (ref 0–1)
BUN BLDV-MCNC: 13 MG/DL (ref 7–20)
CALCIUM SERPL-MCNC: 8.8 MG/DL (ref 8.3–10.6)
CHLORIDE BLD-SCNC: 107 MMOL/L (ref 99–110)
CHOLESTEROL, TOTAL: 98 MG/DL (ref 0–199)
CO2: 25 MMOL/L (ref 21–32)
CREAT SERPL-MCNC: 1 MG/DL (ref 0.8–1.3)
EOSINOPHILS ABSOLUTE: 0.1 K/UL (ref 0–0.6)
EOSINOPHILS RELATIVE PERCENT: 1.1 %
FERRITIN: 42.6 NG/ML (ref 30–400)
FOLATE: 19.26 NG/ML (ref 4.78–24.2)
GFR AFRICAN AMERICAN: >60
GFR NON-AFRICAN AMERICAN: >60
GLUCOSE BLD-MCNC: 123 MG/DL (ref 70–99)
HCT VFR BLD CALC: 37.5 % (ref 40.5–52.5)
HDLC SERPL-MCNC: 31 MG/DL (ref 40–60)
HEMOGLOBIN: 12.6 G/DL (ref 13.5–17.5)
IMMATURE RETIC FRACT: 0.47 (ref 0.21–0.37)
IRON SATURATION: 21 % (ref 20–50)
IRON: 72 UG/DL (ref 59–158)
LDL CHOLESTEROL CALCULATED: 38 MG/DL
LYMPHOCYTES ABSOLUTE: 1.1 K/UL (ref 1–5.1)
LYMPHOCYTES RELATIVE PERCENT: 23.2 %
MCH RBC QN AUTO: 31.7 PG (ref 26–34)
MCHC RBC AUTO-ENTMCNC: 33.5 G/DL (ref 31–36)
MCV RBC AUTO: 94.7 FL (ref 80–100)
MONOCYTES ABSOLUTE: 0.6 K/UL (ref 0–1.3)
MONOCYTES RELATIVE PERCENT: 13.2 %
NEUTROPHILS ABSOLUTE: 3 K/UL (ref 1.7–7.7)
NEUTROPHILS RELATIVE PERCENT: 62 %
PDW BLD-RTO: 13.5 % (ref 12.4–15.4)
PLATELET # BLD: 109 K/UL (ref 135–450)
PMV BLD AUTO: 10.3 FL (ref 5–10.5)
POTASSIUM SERPL-SCNC: 4.4 MMOL/L (ref 3.5–5.1)
RBC # BLD: 3.96 M/UL (ref 4.2–5.9)
RETICULOCYTE ABSOLUTE COUNT: 0.09 M/UL
RETICULOCYTE COUNT PCT: 2.3 % (ref 0.5–2.18)
SODIUM BLD-SCNC: 143 MMOL/L (ref 136–145)
TOTAL IRON BINDING CAPACITY: 335 UG/DL (ref 260–445)
TOTAL PROTEIN: 6.2 G/DL (ref 6.4–8.2)
TRIGL SERPL-MCNC: 147 MG/DL (ref 0–150)
TSH SERPL DL<=0.05 MIU/L-ACNC: 1.78 UIU/ML (ref 0.27–4.2)
VITAMIN B-12: 332 PG/ML (ref 211–911)
VLDLC SERPL CALC-MCNC: 29 MG/DL
WBC # BLD: 4.8 K/UL (ref 4–11)

## 2022-07-26 LAB
ESTIMATED AVERAGE GLUCOSE: 108.3 MG/DL
HBA1C MFR BLD: 5.4 %

## 2022-08-03 ENCOUNTER — OFFICE VISIT (OUTPATIENT)
Dept: RHEUMATOLOGY | Age: 67
End: 2022-08-03
Payer: COMMERCIAL

## 2022-08-03 VITALS — WEIGHT: 280 LBS | BODY MASS INDEX: 34.82 KG/M2 | HEIGHT: 75 IN

## 2022-08-03 DIAGNOSIS — M06.00 RHEUMATOID ARTHRITIS WITH NEGATIVE RHEUMATOID FACTOR, INVOLVING UNSPECIFIED SITE (HCC): ICD-10-CM

## 2022-08-03 DIAGNOSIS — Z79.899 ENCOUNTER FOR LONG-TERM (CURRENT) USE OF HIGH-RISK MEDICATION: ICD-10-CM

## 2022-08-03 DIAGNOSIS — Z51.81 ENCOUNTER FOR THERAPEUTIC DRUG MONITORING: ICD-10-CM

## 2022-08-03 DIAGNOSIS — M06.00 RHEUMATOID ARTHRITIS WITH NEGATIVE RHEUMATOID FACTOR, INVOLVING UNSPECIFIED SITE (HCC): Primary | ICD-10-CM

## 2022-08-03 LAB
ALBUMIN SERPL-MCNC: 4.4 G/DL (ref 3.4–5)
ALP BLD-CCNC: 92 U/L (ref 40–129)
ALT SERPL-CCNC: 13 U/L (ref 10–40)
AST SERPL-CCNC: 16 U/L (ref 15–37)
BASOPHILS ABSOLUTE: 0 K/UL (ref 0–0.2)
BASOPHILS RELATIVE PERCENT: 0.5 %
BILIRUB SERPL-MCNC: 0.7 MG/DL (ref 0–1)
BILIRUBIN DIRECT: <0.2 MG/DL (ref 0–0.3)
BILIRUBIN, INDIRECT: NORMAL MG/DL (ref 0–1)
CREAT SERPL-MCNC: 0.9 MG/DL (ref 0.8–1.3)
EOSINOPHILS ABSOLUTE: 0.1 K/UL (ref 0–0.6)
EOSINOPHILS RELATIVE PERCENT: 1.9 %
GFR AFRICAN AMERICAN: >60
GFR NON-AFRICAN AMERICAN: >60
HCT VFR BLD CALC: 39.7 % (ref 40.5–52.5)
HEMOGLOBIN: 13.2 G/DL (ref 13.5–17.5)
LYMPHOCYTES ABSOLUTE: 1.2 K/UL (ref 1–5.1)
LYMPHOCYTES RELATIVE PERCENT: 29.5 %
MCH RBC QN AUTO: 31.5 PG (ref 26–34)
MCHC RBC AUTO-ENTMCNC: 33.1 G/DL (ref 31–36)
MCV RBC AUTO: 95 FL (ref 80–100)
MONOCYTES ABSOLUTE: 0.7 K/UL (ref 0–1.3)
MONOCYTES RELATIVE PERCENT: 16.3 %
NEUTROPHILS ABSOLUTE: 2.2 K/UL (ref 1.7–7.7)
NEUTROPHILS RELATIVE PERCENT: 51.8 %
PDW BLD-RTO: 13.8 % (ref 12.4–15.4)
PLATELET # BLD: 127 K/UL (ref 135–450)
PMV BLD AUTO: 10 FL (ref 5–10.5)
RBC # BLD: 4.18 M/UL (ref 4.2–5.9)
TOTAL PROTEIN: 6.6 G/DL (ref 6.4–8.2)
WBC # BLD: 4.2 K/UL (ref 4–11)

## 2022-08-03 PROCEDURE — 1123F ACP DISCUSS/DSCN MKR DOCD: CPT | Performed by: INTERNAL MEDICINE

## 2022-08-03 PROCEDURE — 99214 OFFICE O/P EST MOD 30 MIN: CPT | Performed by: INTERNAL MEDICINE

## 2022-08-03 PROCEDURE — 1036F TOBACCO NON-USER: CPT | Performed by: INTERNAL MEDICINE

## 2022-08-03 PROCEDURE — G8427 DOCREV CUR MEDS BY ELIG CLIN: HCPCS | Performed by: INTERNAL MEDICINE

## 2022-08-03 PROCEDURE — G8417 CALC BMI ABV UP PARAM F/U: HCPCS | Performed by: INTERNAL MEDICINE

## 2022-08-03 PROCEDURE — 3017F COLORECTAL CA SCREEN DOC REV: CPT | Performed by: INTERNAL MEDICINE

## 2022-08-03 NOTE — PROGRESS NOTES
SUBJECTIVE:    Patient ID: Arabella Connor is a 73 y.o. male. The patient returns for follow-up of rheumatoid arthritis. He continues on methotrexate 20 mg a week. He can do many of his ADLs without difficulty. Review of Systems:    Respiratory: denies cough, denies shortness of breath  Gastrointestinal: denies abdominal pain, denies nausea  Musculoskeletal:               1 hour          morning stiffness  Ears, nose, mouth: denies mucosal sores  Skin: denies rash, denies alopecia. The remainder of his review of systems is negative    Prior to Visit Medications    Medication Sig Taking? Authorizing Provider   ASPIRIN LOW DOSE 81 MG EC tablet TAKE ONE TABLET BY MOUTH DAILY  Diane M Enzweiler, APRN - CNP   atorvastatin (LIPITOR) 40 MG tablet TAKE ONE TABLET BY MOUTH ONCE NIGHTLY  Marlon Abrams MD   folic acid (FOLVITE) 1 MG tablet TAKE ONE TABLET BY MOUTH DAILY  Young Rodgers MD   metFORMIN (GLUCOPHAGE) 500 MG tablet On hold for a month starting 2 weeks ago  Historical Provider, MD   buPROPion (WELLBUTRIN XL) 150 MG extended release tablet Take 150 mg by mouth every morning  Historical Provider, MD   methotrexate (RHEUMATREX) 2.5 MG chemo tablet TAKE 8 TABLETS BY MOUTH ONCE WEEKLY  Young Rodgers MD   prasugrel (EFFIENT) 10 MG TABS Take 1 tablet by mouth Daily with supper  Flavia Atkinson MD   nitroGLYCERIN (NITROSTAT) 0.4 MG SL tablet Place 1 tablet under the tongue every 5 minutes as needed for Chest pain up to max of 3 total doses. If no relief after 1 dose, call 911. Flavia Atkinson MD   metoprolol succinate (TOPROL XL) 25 MG extended release tablet Take 1 tablet by mouth daily  Patient taking differently: Take 25 mg by mouth Daily with supper   Ben Fuentes MD   losartan (COZAAR) 25 MG tablet Take 1 tablet by mouth daily  Ben Fuentes MD   gabapentin (NEURONTIN) 300 MG capsule Take 300 mg by mouth nightly.    Historical Provider, MD   busPIRone (BUSPAR) 7.5 MG tablet Take 7.5 mg by mouth Every 4 - 6 hours PRN  Historical Provider, MD   finasteride (PROSCAR) 5 MG tablet Take 5 mg by mouth daily  Historical Provider, MD   omeprazole (PRILOSEC) 20 MG delayed release capsule Take 20 mg by mouth Daily   Historical Provider, MD   Loratadine 10 MG CAPS Take by mouth daily   Historical Provider, MD   HYDROcodone-acetaminophen (NORCO)  MG per tablet Take 1 tablet by mouth every 6 hours as needed for Pain. Historical Provider, MD   montelukast (SINGULAIR) 10 MG tablet Take 10 mg by mouth Daily with supper   Historical Provider, MD   amitriptyline (ELAVIL) 50 MG tablet Take 50 mg by mouth nightly   Historical Provider, MD   DULoxetine (CYMBALTA) 60 MG extended release capsule Take 60 mg by mouth daily   Historical Provider, MD        OBJECTIVE:  Ht 6' 3\" (1.905 m)   Wt 280 lb (127 kg)   BMI 35.00 kg/m²      Physical Exam:    General: the patient demonstrated normal gait and posture without evidence of overt muscle wasting. Hygiene appears normal    Ears, mouth, nose, throat: no mucosal sores      Respiratory: assessment of the patient's respiratory effort showed no evidence of abnormal respiration and no use of accessory muscles. Diaphragmatic movement is normal.  Percussion of the patient's chest showed no evidence of dullness flatness or hyperresonance. Auscultation of the patient's lungs reveal normal breath sounds in all lung fields. There is no evidence of abnormal sounds such as rales or wheezes. There is no evidence of friction rub. Cardiovascular: palpation of the patient's chest revealed no abnormal thrill. The point of maximal impulse showed no displacement. Auscultation of the heart revealed no evidence of murmur gallop or abnormal heart sound. Musculoskeletal: 1+ soft tissue swelling wrists trace swelling first MCPs trace swelling knees fists 95%  Skin: no rashes    ASSESSMENT: Rheumatoid arthritis.   Chemotherapy        PLAN: Blood work will be obtained today to monitor for adverse

## 2022-08-30 RX ORDER — ATORVASTATIN CALCIUM 40 MG/1
TABLET, FILM COATED ORAL
Qty: 90 TABLET | Refills: 3 | Status: SHIPPED | OUTPATIENT
Start: 2022-08-30

## 2022-10-07 DIAGNOSIS — I49.3 PVC (PREMATURE VENTRICULAR CONTRACTION): ICD-10-CM

## 2022-10-07 DIAGNOSIS — I10 ESSENTIAL HYPERTENSION: ICD-10-CM

## 2022-10-07 DIAGNOSIS — I25.10 CORONARY ARTERY DISEASE INVOLVING NATIVE CORONARY ARTERY OF NATIVE HEART WITHOUT ANGINA PECTORIS: ICD-10-CM

## 2022-10-07 DIAGNOSIS — I50.22 CHRONIC SYSTOLIC HEART FAILURE (HCC): ICD-10-CM

## 2022-10-11 RX ORDER — METOPROLOL SUCCINATE 25 MG/1
TABLET, EXTENDED RELEASE ORAL
Qty: 30 TABLET | Refills: 11 | Status: SHIPPED | OUTPATIENT
Start: 2022-10-11

## 2022-10-17 DIAGNOSIS — M06.00 RHEUMATOID ARTHRITIS WITH NEGATIVE RHEUMATOID FACTOR, INVOLVING UNSPECIFIED SITE (HCC): ICD-10-CM

## 2022-10-17 DIAGNOSIS — Z51.81 ENCOUNTER FOR THERAPEUTIC DRUG MONITORING: ICD-10-CM

## 2022-10-17 DIAGNOSIS — Z79.899 ENCOUNTER FOR LONG-TERM (CURRENT) USE OF HIGH-RISK MEDICATION: ICD-10-CM

## 2022-10-18 RX ORDER — METHOTREXATE 2.5 MG/1
TABLET ORAL
Qty: 32 TABLET | Refills: 2 | Status: SHIPPED | OUTPATIENT
Start: 2022-10-18

## 2022-11-01 ENCOUNTER — TELEPHONE (OUTPATIENT)
Dept: RHEUMATOLOGY | Age: 67
End: 2022-11-01

## 2022-11-01 NOTE — TELEPHONE ENCOUNTER
Pt called stating that Felisa Loo referred him to a foot Dr. I looked in his charts and see nothing where he was referred. I told Pt that I don't see any but he's so sure that Dr Regino Rojo written him out a referral and he lost it.     Please advise or call pt [phone]

## 2022-11-09 ENCOUNTER — TELEPHONE (OUTPATIENT)
Dept: CARDIOLOGY CLINIC | Age: 67
End: 2022-11-09

## 2022-11-09 NOTE — TELEPHONE ENCOUNTER
Please let patient that there is nothing he needs to do prior to his dentist visit for a fractured tooth. He should update them that he is on aspirin and effient so that they may take any necessary bleeding precautions while fixing the tooth. Thanks.

## 2022-11-09 NOTE — TELEPHONE ENCOUNTER
[facility] called in this afternoon, he states he broke a tooth, and has to go to the dentist, he would like to know if there is something he needs to do before going. He can be reached at [phone].

## 2022-11-10 NOTE — TELEPHONE ENCOUNTER
Called patient and informed he should let dentist know he is on asa and effient, as far as from a cardiac stand point we do not need to do anything.

## 2022-11-22 ENCOUNTER — TELEPHONE (OUTPATIENT)
Dept: CARDIOLOGY CLINIC | Age: 67
End: 2022-11-22

## 2022-11-22 ENCOUNTER — OFFICE VISIT (OUTPATIENT)
Dept: CARDIOLOGY CLINIC | Age: 67
End: 2022-11-22
Payer: COMMERCIAL

## 2022-11-22 VITALS
DIASTOLIC BLOOD PRESSURE: 60 MMHG | HEIGHT: 75 IN | WEIGHT: 271 LBS | SYSTOLIC BLOOD PRESSURE: 112 MMHG | HEART RATE: 46 BPM | OXYGEN SATURATION: 100 % | BODY MASS INDEX: 33.69 KG/M2

## 2022-11-22 DIAGNOSIS — E78.2 MIXED HYPERLIPIDEMIA: ICD-10-CM

## 2022-11-22 DIAGNOSIS — I25.10 CORONARY ARTERY DISEASE INVOLVING NATIVE CORONARY ARTERY OF NATIVE HEART WITHOUT ANGINA PECTORIS: ICD-10-CM

## 2022-11-22 DIAGNOSIS — I49.3 PVC (PREMATURE VENTRICULAR CONTRACTION): ICD-10-CM

## 2022-11-22 DIAGNOSIS — I50.22 CHRONIC SYSTOLIC HEART FAILURE (HCC): ICD-10-CM

## 2022-11-22 DIAGNOSIS — I25.10 CAD IN NATIVE ARTERY: Primary | ICD-10-CM

## 2022-11-22 DIAGNOSIS — R06.09 DYSPNEA ON EXERTION: ICD-10-CM

## 2022-11-22 DIAGNOSIS — I10 ESSENTIAL HYPERTENSION: ICD-10-CM

## 2022-11-22 DIAGNOSIS — I49.1 PAC (PREMATURE ATRIAL CONTRACTION): ICD-10-CM

## 2022-11-22 LAB — D DIMER: 0.43 UG/ML FEU (ref 0–0.6)

## 2022-11-22 PROCEDURE — G8427 DOCREV CUR MEDS BY ELIG CLIN: HCPCS | Performed by: INTERNAL MEDICINE

## 2022-11-22 PROCEDURE — 1123F ACP DISCUSS/DSCN MKR DOCD: CPT | Performed by: INTERNAL MEDICINE

## 2022-11-22 PROCEDURE — G8484 FLU IMMUNIZE NO ADMIN: HCPCS | Performed by: INTERNAL MEDICINE

## 2022-11-22 PROCEDURE — 1036F TOBACCO NON-USER: CPT | Performed by: INTERNAL MEDICINE

## 2022-11-22 PROCEDURE — 3017F COLORECTAL CA SCREEN DOC REV: CPT | Performed by: INTERNAL MEDICINE

## 2022-11-22 PROCEDURE — 99215 OFFICE O/P EST HI 40 MIN: CPT | Performed by: INTERNAL MEDICINE

## 2022-11-22 PROCEDURE — 3074F SYST BP LT 130 MM HG: CPT | Performed by: INTERNAL MEDICINE

## 2022-11-22 PROCEDURE — G8417 CALC BMI ABV UP PARAM F/U: HCPCS | Performed by: INTERNAL MEDICINE

## 2022-11-22 PROCEDURE — 93000 ELECTROCARDIOGRAM COMPLETE: CPT | Performed by: INTERNAL MEDICINE

## 2022-11-22 PROCEDURE — 3078F DIAST BP <80 MM HG: CPT | Performed by: INTERNAL MEDICINE

## 2022-11-22 RX ORDER — METOPROLOL SUCCINATE 25 MG/1
12.5 TABLET, EXTENDED RELEASE ORAL DAILY
Qty: 30 TABLET | Refills: 11
Start: 2022-11-22

## 2022-11-22 RX ORDER — METHOCARBAMOL 500 MG/1
500 TABLET, FILM COATED ORAL 4 TIMES DAILY PRN
COMMUNITY

## 2022-11-22 RX ORDER — ERTUGLIFLOZIN 5 MG/1
TABLET, FILM COATED ORAL DAILY
COMMUNITY

## 2022-11-22 NOTE — PATIENT INSTRUCTIONS
The morning of your procedure you will park in the hospital parking lot and report directly to the cath lab to check in.     Pre-Procedure Instructions   You will need to fast for at least 8 hours prior to procedure. No caffeine the morning of. Hold all diabetic medications including, Metfomin. If you take Lantus/Levemir only take ½ your normal dose the evening before. All other medications can be taken in the morning with sips of water. Do not use any lotions, creams or perfume the morning of procedure. Pre-procedure lab work will need to be completed 5-7 days prior to procedure. Please have a responsible adult to drive you home after procedure. We advise you have someone to stay with you for 24 hours following procedure for precautionary measures. Depending on procedure you may require an overnight stay. Cath lab will provide you with all post procedure instructions. If you have any questions regarding the procedure itself or medications, please call [phone] and ask to speak with a nurse.

## 2022-11-22 NOTE — TELEPHONE ENCOUNTER
Please schedule RHC/LHC     Pre-Procedure Instructions   You will need to fast for at least 8 hours prior to procedure. No caffeine the morning of. Hold all diabetic medications including, Metfomin. If you take Lantus/Levemir only take ½ your normal dose the evening before. All other medications can be taken in the morning with sips of water. Do not use any lotions, creams or perfume the morning of procedure. Pre-procedure lab work will need to be completed 5-7 days prior to procedure. Please have a responsible adult to drive you home after procedure. We advise you have someone to stay with you for 24 hours following procedure for precautionary measures. Depending on procedure you may require an overnight stay. Cath lab will provide you with all post procedure instructions. If you have any questions regarding the procedure itself or medications, please call [phone] and ask to speak with a nurse.

## 2022-11-22 NOTE — PROGRESS NOTES
[facility]  Cardiology Progress Note    Carla Langston  [birth year]    2022      CC: \"I feel like my shortness of breat is getting worse. \"     HPI:  The patient is 78 y.o. male with a past medical history significant for chronic back pain, limited mobility and movement, CAD with multiple PCI's and JUAN, hypertension, hyperlipidemia, VICKIE, and pre diabetes. Today, he is here for follow up accompanied by family. He states that is shortness of breath has been worsening. He has episodes of dizzness and chest pain at times with the shortness of breath. Patient denies exertional chest pain/pressure, dyspnea at rest,  PND, orthopnea, palpitations, weight changes, changes in LE edema, and syncope. Patient reports compliance to his medications.       Past Medical History:   Diagnosis Date    Arthritis     Asthma     BPH (benign prostatic hyperplasia)     Cardiomyopathy (HCC)     GERD (gastroesophageal reflux disease)     HTN (hypertension) 10/29/2012    MVP (mitral valve prolapse)     PPD positive     Prolonged emergence from general anesthesia     Prostatism     Tricuspid valve prolapse     Unspecified sleep apnea      Past Surgical History:   Procedure Laterality Date    CARDIAC CATHETERIZATION      CHOLECYSTECTOMY      FOOT SURGERY      HAND SURGERY Right 2017    thumb mass excision    INGUINAL HERNIA REPAIR      bilateral    KNEE ARTHROSCOPY      OTHER SURGICAL HISTORY  2019    epidural Dr. Clifford Soler at [address]      right elbow    TONSILLECTOMY       Family History   Problem Relation Age of Onset    Rheum Arthritis Other     Heart Disease Father     Heart Disease Brother     Other Brother         histoplasmosis     Lupus Neg Hx      Social History     Tobacco Use    Smoking status: Former     Types: Cigarettes     Quit date: 1982     Years since quittin.8    Smokeless tobacco: Never   Vaping Use    Vaping Use: Former   Substance Use Topics    Alcohol use: Yes     Alcohol/week: 12.0 standard drinks     Types: 12 Cans of beer per week     Comment: wine & shots rare    Drug use: No       Allergies   Allergen Reactions    Cephalosporins Anaphylaxis     Anaphylaxis to Keflex in 2010    Pcn [Penicillins] Shortness Of Breath    Penicillin G Sodium Shortness Of Breath    Ciprofloxacin Itching    Codeine Itching    Erythromycin Itching    Morphine And Related Itching    Fenoprofen Calcium Other (See Comments)     Pt doesn`t recall     Current Outpatient Medications   Medication Sig Dispense Refill    Ertugliflozin L-PyroglutamicAc (STEGLATRO) 5 MG TABS Take by mouth daily      methocarbamol (ROBAXIN) 500 MG tablet Take 500 mg by mouth 4 times daily as needed      methotrexate (RHEUMATREX) 2.5 MG chemo tablet TAKE EIGHT TABLETS BY MOUTH ONCE WEEKLY 32 tablet 2    metoprolol succinate (TOPROL XL) 25 MG extended release tablet TAKE ONE TABLET BY MOUTH DAILY 30 tablet 11    atorvastatin (LIPITOR) 40 MG tablet TAKE ONE TABLET BY MOUTH ONCE NIGHTLY 90 tablet 3    ASPIRIN LOW DOSE 81 MG EC tablet TAKE ONE TABLET BY MOUTH DAILY 60 tablet 3    folic acid (FOLVITE) 1 MG tablet TAKE ONE TABLET BY MOUTH DAILY 90 tablet 1    buPROPion (WELLBUTRIN XL) 150 MG extended release tablet Take 150 mg by mouth every morning      prasugrel (EFFIENT) 10 MG TABS Take 1 tablet by mouth Daily with supper 90 tablet 3    nitroGLYCERIN (NITROSTAT) 0.4 MG SL tablet Place 1 tablet under the tongue every 5 minutes as needed for Chest pain up to max of 3 total doses. If no relief after 1 dose, call 911. 25 tablet 3    losartan (COZAAR) 25 MG tablet Take 1 tablet by mouth daily 90 tablet 3    gabapentin (NEURONTIN) 300 MG capsule Take 300 mg by mouth nightly.        finasteride (PROSCAR) 5 MG tablet Take 5 mg by mouth daily      omeprazole (PRILOSEC) 20 MG delayed release capsule Take 20 mg by mouth Daily       Loratadine 10 MG CAPS Take by mouth daily       HYDROcodone-acetaminophen (NORCO)  MG per tablet Take 1 tablet by mouth every 6 hours as needed for Pain.       montelukast (SINGULAIR) 10 MG tablet Take 10 mg by mouth Daily with supper       amitriptyline (ELAVIL) 50 MG tablet Take 50 mg by mouth nightly       DULoxetine (CYMBALTA) 60 MG extended release capsule Take 60 mg by mouth daily        No current facility-administered medications for this visit. Review of Systems:  Constitutional: no unanticipated weight loss. There's been no change in energy level, sleep pattern, or activity level. No fevers, chills. Eyes: No visual changes or diplopia. No scleral icterus. ENT: No Headaches, hearing loss or vertigo. No mouth sores or sore throat. Cardiovascular: as reviewed in HPI  Respiratory: No cough or wheezing, no sputum production. No hematemesis. Gastrointestinal: + nausea. No abdominal pain, appetite loss, blood in stools. No change in bowel or bladder habits. Genitourinary: No dysuria, trouble voiding, or hematuria. Musculoskeletal:  No gait disturbance, no joint complaints. Integumentary: No rash or pruritis. Neurological: No headache, diplopia, change in muscle strength, numbness or tingling.  + dizziness. Psychiatric: No anxiety or depression. Endocrine: No temperature intolerance. No excessive thirst, fluid intake, or urination. No tremor. Hematologic/Lymphatic: No abnormal bruising or bleeding, blood clots or swollen lymph nodes. Allergic/Immunologic: No nasal congestion or hives. Physical Exam:   /60   Pulse (!) 46   Ht 6' 3\" (1.905 m)   Wt 271 lb (122.9 kg)   SpO2 100%   BMI 33.87 kg/m²   Wt Readings from Last 3 Encounters:   11/22/22 271 lb (122.9 kg)   08/03/22 280 lb (127 kg)   06/24/22 280 lb (127 kg)     Constitutional: He is oriented to person, place, and time. He appears well-developed and well-nourished. In no acute distress. Head: Normocephalic and atraumatic. Pupils equal and round. Neck: Neck supple. No JVP or carotid bruit appreciated.  No mass incidentally   detected pulmonary nodules:       Single Solid Nodule:       Nodule size less than 6 mm   In a low-risk patient, no routine follow-up. In a high-risk patient, optional CT at 12 months. Nodule size equals 6-8 mm   In a low-risk patient, CT at 6-12 months, then consider CT at 18-24 months. In a high-risk patient, CT at 6-12 months, then CT at 18-24 months. Nodule size greater than 8 mm           In a low-risk patient, consider CT at 3 months, PET/CT, or tissue sampling. In a high-risk patient, consider CT at 3 months, PET/CT, or tissue sampling. Multiple Solid Nodules:       Nodule size less than 6 mm   In a low-risk patient, no routine follow-up. In a high-risk patient, optional CT at 12 months. Nodule size equals 6-8 mm   In a low-risk patient, CT at 3-6 months, then consider CT at 18-24 months. In a high-risk patient, CT at 3-6 months, then CT at 18-24 months. Nodule size greater than 8 mm   In a low-risk patient, CT at 3-6 months, then consider CT at 18-24 months. In a high-risk patient, CT at 3-6 months, then CT at 18-24 months. - Low risk patients include individuals with minimal or absent history of   smoking and other known risk factors. - High risk patients include individuals with a history or smoking or known   risk factors. Stress test 5/14/19  Normal myocardial perfusion study. Normal myocardial perfusion. Normal LV size and systolic function. Overall findings represent a low risk study. ECHO 5/21/19  There is mild concentric left ventricular hypertrophy. Left ventricular cavity size is mildly dilated. Overall left ventricular systolic function appears mildly reduced. Ejection fraction is visually estimated to be 40-45%. Grade I diastolic dysfunction with normal LV filling pressures. The aortic root is mildly dilated & measures at 4.1 cms. Mildly dilated right ventricle.   TAPSE 2.2cm, RV mid 4cm  IVC size is normal (<2.1cm) and collapses > 50% with respiration consistent  with normal RA pressure (3mmHg). [facility]: 8/25/20 per Dr. Lieberman Na:  1. There is single-vessel disease. 2.  Dominant RCA, 99% with LEYDI grade 1 flow. 3.  Left main:  Free of disease. 4. LAD has a 10-20% proximal lesion. 5.  Left circumflex:  No obstructive disease. CONCLUSION:  1. Dominant RCA has a 99% proximal stenosis with LEYDI grade 1 flow. 2.  Elevated left heart filling pressures. 3.  Normal left ventricular size and systolic function with ejection  fraction of 60%. CONCLUSION:  Successful PCI and stenting of the proximal RCA, lesion  reduced to 0%. Echo: 6/4/21  Summary  There is mildly increased left ventricular wall thickness. EF 50%. Indeterminate diastolic function. The left atrium is moderately dilated. Right ventricular systolic function is normal .  Trivial mitral, and tricuspid regurgitation. Stress study: 6/21/21  Summary    moderate size moderate severity fixed inferior wall defect consist with    prior inferior wall MI. no evidence of ischemia        Recommendation    Aggressive medical therapy for coronary artery disease. Stress Protocols     [facility]:7/15/21  CORONARY ANGIOGRAPHY:  1. Left main trunk: It arises from the left sinus of Valsalva. It  divides into a left anterior descending artery and left circumflex  artery. Left main trunk is free of atherosclerosis. 2.  Left anterior descending artery: It is a moderate to large-sized  artery and it has an evidence of discrete 50 to 60% stenosis in the  proximal segment. Remainder of the left anterior descending artery  appears free of significant atherosclerosis. 3.  Left circumflex artery: It arises from the left main trunk. It is  a moderate to large-sized artery, gives rise to obtuse marginal branches  and right circumflex artery and its branches are free of  atherosclerosis. 4.  Right coronary artery:   It arises from the right sinus of Valsalva. It is a large dominant artery and it gives rise to posterior descending  and posterior lateral branches. Right coronary artery has evidence of a  stent in the proximal to mid segment which is widely patent. There is a  mild disease distal to the stent but it is not hemodynamically  significant. OVERALL IMPRESSION:  1. Patent left main trunk. 2.  50 to 60% discrete stenosis of proximal left anterior descending artery. 3.  Patent circumflex artery and its branches. 4.  Widely patent stented proximal right coronary artery with mild   disease post stent which does not appear hemodynamically significant. 5.  Mild mid-inferior wall hypokinesis with an estimated EF of 50% with  normal left heart hemodynamics. In view of these findings, we will proceed first with the FFR of the  left anterior descending artery which is being performed by Dr. Charles Sanches  and further recommendations to follow the FFR. Dr. Johnathon Collier 3.5 guide   Runthrough wire used to cross LAD lesion   FFR 0.81 ( physiologically non-significant )    SUMMARY: Nonobstructive CAD   RECOMMENDATION: - recommend aggressive medical therapy for CAD   - if symptoms persist recommend LAD PCI in the future      Coronary angiography: 12/2021  ANGIOGRAM/CORONARY ARTERIOGRAM:      The left main coronary artery is normal .   The left anterior descending artery has a proximal 80% lesion . The left circumflex artery is widely patent .    The right coronary artery is widely patent   INTERVENTION  XB 3.5 guide   Allstar wire used to cross LAD lesion   IVUS passed from left main to mid LAD ( MLA< 2mm2; distal reference 3.5 mm)   Predilation performed with 3.0 regular balloon, 3.0 wolverine cutting balloon   [place] 3.5 X 18 mm JUNA deployed to prox LAD   Post dilation performed with 4.0 NC balloon   SUMMARY:   Single vessel obstructive CAD   S/p successful IVUS guided PCI X 1 JUAN       Stress test 7/14/2022   Pharmacological Stress/MPI Results:        1. Technically a satisfactory study. 2. No evidence of Ischemia by Myocardial Perfusion Imaging. 3. Gated Study shows Dilated LV : EF 46 %. Assessment/Plan:     CAD   -8/25/2020 s/p successful PCI and stenting of proximal RCA, 99% to 0% with LEYDI 1 by Dr. Karen Trammell.     -7/15/2021 repeat coronary angiography which revealed 50-60% discrete stenosis prox LAD, Dr. Nimco Padilla performed FFR revealing non obstructive CAD with plan for aggressive medical therapy. -12/2021 LAD proximal 80% stenosis s/p successful IVUS guided PCI x1 JUAN per Dr. Nimco Padilla. Increased dyspnea and chest discomfort -In view of increased dyspnea recommend coronary angiogram. RHC/LHC. Risks, benefits, expectations and alternative treatments discussed. The patient verbalizes understanding and agrees to proceed. Will order D-dimer to assess the dyspnea. Decrease metoprolol to 12.5 mg daily due to bradycardia. continue Asa, Effient, B-blocker and statin therapy. PAC/PVC's   -7 day CAM monitor 9/2021 PAC, PVC, AFIB, Flutter with EP consult per Dr. Antonina Vázquez 9/2021-revealed PAC's and PVC's both of which are no longer than 6-8 beats. Bradycardia  -Patient has evidence of significant sinus bradycardia on the EKG today. I will reduce metoprolol to 12.5 mg daily and if his symptoms of dizziness persist, will consider obtaining 30-day event monitor after he completes his cardiac work-up  Hypertension, essential   -Controlled. -BMP stable 7/25/2022    Hyperlipidemia, unspecified   -Lipitor 40 mg nightly   -LDL 38    Sleep apnea  -Encouraged him to follow up with sleep study. Chronic fatigue  -TSH and CBC wnl 3/28/22    Former smoker  -Quit ~20-30 years ago but reports he was a heavy smoker. Encouraged continued smoking cessation. Obesity  -Encouraged weight loss    Follow up after angiogram.    Thank you very much for allowing me to participate in the care of your patient.  Please do not hesitate to contact me if you have any questions.     Sincerely,  Charis Samuels MD      [address] [place] [address]  Ph: [phone]  Fax: [phone]    This note was scribed in the presence of Dr Tomas Mar, by Stevie Dodd RN

## 2022-11-23 NOTE — TELEPHONE ENCOUNTER
Spoke with the patient and his wife and scheduled his procedure. We went over the pre-procedure instructions. She verbalized understanding. She is familiar with the cath lab location as well. Procedure- [facility]  Date: 12/8/2022   Arrival time: 8:00 am  Procedure time: 9:30 am     Pre-Procedure Instructions   You will need to fast for at least 8 hours prior to procedure. No caffeine the morning of. Hold all diabetic medications including, Metfomin. If you take Lantus/Levemir only take ½ your normal dose the evening before. All other medications can be taken in the morning with sips of water. Do not use any lotions, creams or perfume the morning of procedure. Pre-procedure lab work will need to be completed 5-7 days prior to procedure. Please have a responsible adult to drive you home after procedure. We advise you have someone to stay with you for 24 hours following procedure for precautionary measures. Depending on procedure you may require an overnight stay. Cath lab will provide you with all post procedure instructions.       If you have any questions regarding the procedure itself or medications, please call [phone] and ask to speak with a nurse    Oksana/teams updated & emailed cath lab

## 2022-11-23 NOTE — TELEPHONE ENCOUNTER
M for patient to return call to schedule procedure. Pre-Procedure Instructions   You will need to fast for at least 8 hours prior to procedure. No caffeine the morning of. Hold all diabetic medications including, Metfomin. If you take Lantus/Levemir only take ½ your normal dose the evening before. All other medications can be taken in the morning with sips of water. Do not use any lotions, creams or perfume the morning of procedure. Pre-procedure lab work will need to be completed 5-7 days prior to procedure. Please have a responsible adult to drive you home after procedure. We advise you have someone to stay with you for 24 hours following procedure for precautionary measures. Depending on procedure you may require an overnight stay. Cath lab will provide you with all post procedure instructions.       If you have any questions regarding the procedure itself or medications, please call [phone] and ask to speak with a nurse

## 2022-12-01 DIAGNOSIS — Z51.81 ENCOUNTER FOR THERAPEUTIC DRUG MONITORING: ICD-10-CM

## 2022-12-01 DIAGNOSIS — Z79.899 ENCOUNTER FOR LONG-TERM (CURRENT) USE OF HIGH-RISK MEDICATION: ICD-10-CM

## 2022-12-01 DIAGNOSIS — M06.00 RHEUMATOID ARTHRITIS WITH NEGATIVE RHEUMATOID FACTOR, INVOLVING UNSPECIFIED SITE (HCC): ICD-10-CM

## 2022-12-01 RX ORDER — FOLIC ACID 1 MG/1
TABLET ORAL
Qty: 90 TABLET | Refills: 1 | Status: SHIPPED | OUTPATIENT
Start: 2022-12-01

## 2022-12-06 DIAGNOSIS — I25.10 CAD IN NATIVE ARTERY: ICD-10-CM

## 2022-12-06 LAB
ANION GAP SERPL CALCULATED.3IONS-SCNC: 13 MMOL/L (ref 3–16)
BUN BLDV-MCNC: 8 MG/DL (ref 7–20)
CALCIUM SERPL-MCNC: 8.9 MG/DL (ref 8.3–10.6)
CHLORIDE BLD-SCNC: 106 MMOL/L (ref 99–110)
CO2: 24 MMOL/L (ref 21–32)
CREAT SERPL-MCNC: 0.9 MG/DL (ref 0.8–1.3)
GFR SERPL CREATININE-BSD FRML MDRD: >60 ML/MIN/{1.73_M2}
GLUCOSE BLD-MCNC: 116 MG/DL (ref 70–99)
HCT VFR BLD CALC: 37.2 % (ref 40.5–52.5)
HEMOGLOBIN: 12 G/DL (ref 13.5–17.5)
MCH RBC QN AUTO: 30.2 PG (ref 26–34)
MCHC RBC AUTO-ENTMCNC: 32.2 G/DL (ref 31–36)
MCV RBC AUTO: 93.8 FL (ref 80–100)
PDW BLD-RTO: 15.2 % (ref 12.4–15.4)
PLATELET # BLD: 99 K/UL (ref 135–450)
PLATELET SLIDE REVIEW: ABNORMAL
PMV BLD AUTO: 10.4 FL (ref 5–10.5)
POTASSIUM SERPL-SCNC: 4.3 MMOL/L (ref 3.5–5.1)
RBC # BLD: 3.96 M/UL (ref 4.2–5.9)
SLIDE REVIEW: ABNORMAL
SODIUM BLD-SCNC: 143 MMOL/L (ref 136–145)
WBC # BLD: 4 K/UL (ref 4–11)

## 2022-12-08 ENCOUNTER — HOSPITAL ENCOUNTER (OUTPATIENT)
Dept: CARDIAC CATH/INVASIVE PROCEDURES | Age: 67
Discharge: HOME | End: 2022-12-08
Payer: COMMERCIAL

## 2022-12-08 VITALS
SYSTOLIC BLOOD PRESSURE: 134 MMHG | BODY MASS INDEX: 33.45 KG/M2 | RESPIRATION RATE: 18 BRPM | HEART RATE: 75 BPM | HEIGHT: 75 IN | TEMPERATURE: 96.3 F | OXYGEN SATURATION: 96 % | WEIGHT: 269 LBS | DIASTOLIC BLOOD PRESSURE: 80 MMHG

## 2022-12-08 PROBLEM — R06.02 SHORTNESS OF BREATH: Status: ACTIVE | Noted: 2022-12-08

## 2022-12-08 PROBLEM — I25.119 CORONARY ARTERY DISEASE INVOLVING NATIVE CORONARY ARTERY OF NATIVE HEART WITH ANGINA PECTORIS (HCC): Status: ACTIVE | Noted: 2020-08-25

## 2022-12-08 PROCEDURE — C1751 CATH, INF, PER/CENT/MIDLINE: HCPCS

## 2022-12-08 PROCEDURE — 93460 R&L HRT ART/VENTRICLE ANGIO: CPT

## 2022-12-08 PROCEDURE — 6360000004 HC RX CONTRAST MEDICATION: Performed by: INTERNAL MEDICINE

## 2022-12-08 PROCEDURE — C1769 GUIDE WIRE: HCPCS

## 2022-12-08 PROCEDURE — 99153 MOD SED SAME PHYS/QHP EA: CPT

## 2022-12-08 PROCEDURE — 2500000003 HC RX 250 WO HCPCS

## 2022-12-08 PROCEDURE — 2709999900 HC NON-CHARGEABLE SUPPLY

## 2022-12-08 PROCEDURE — C1894 INTRO/SHEATH, NON-LASER: HCPCS

## 2022-12-08 PROCEDURE — 6360000002 HC RX W HCPCS

## 2022-12-08 PROCEDURE — 99152 MOD SED SAME PHYS/QHP 5/>YRS: CPT

## 2022-12-08 RX ORDER — ONDANSETRON 2 MG/ML
4 INJECTION INTRAMUSCULAR; INTRAVENOUS EVERY 6 HOURS PRN
Status: DISCONTINUED | OUTPATIENT
Start: 2022-12-08 | End: 2022-12-09 | Stop reason: HOSPADM

## 2022-12-08 RX ORDER — SODIUM CHLORIDE 0.9 % (FLUSH) 0.9 %
5-40 SYRINGE (ML) INJECTION EVERY 12 HOURS SCHEDULED
Status: DISCONTINUED | OUTPATIENT
Start: 2022-12-08 | End: 2022-12-09 | Stop reason: HOSPADM

## 2022-12-08 RX ORDER — SODIUM CHLORIDE 0.9 % (FLUSH) 0.9 %
5-40 SYRINGE (ML) INJECTION PRN
Status: DISCONTINUED | OUTPATIENT
Start: 2022-12-08 | End: 2022-12-09 | Stop reason: HOSPADM

## 2022-12-08 RX ORDER — SODIUM CHLORIDE 9 MG/ML
INJECTION, SOLUTION INTRAVENOUS PRN
Status: DISCONTINUED | OUTPATIENT
Start: 2022-12-08 | End: 2022-12-09 | Stop reason: HOSPADM

## 2022-12-08 RX ORDER — ACETAMINOPHEN 325 MG/1
650 TABLET ORAL EVERY 4 HOURS PRN
Status: DISCONTINUED | OUTPATIENT
Start: 2022-12-08 | End: 2022-12-09 | Stop reason: HOSPADM

## 2022-12-08 RX ADMIN — IOPAMIDOL 85 ML: 612 INJECTION, SOLUTION INTRAVENOUS at 10:41

## 2022-12-08 NOTE — BRIEF OP NOTE
Brief Postoperative Note    Jaclyn Stack  YOB: 1955  [id number]    Pre-operative Diagnosis: CAD, increased angina and shortness of breath    Post-operative Diagnosis: Same    Procedure: LHC, RHC, coronary angiography, LV gram    Anesthesia: Moderate Sedation    Surgeons/Assistants: Toño Bustillo    Estimated Blood Loss: less than 50     Complications: None    Specimens: Was Not Obtained    Findings-normal right heart pressures. .                  Normal PCW-11 mmHg                  Normal CO/CI. No O2 step up to suggest ASD/PFO. LMT-patent                  LAD-patent stented proximal LAD. LCx-patent                   RCA-dominant with patent stented mid RCA. LV gram-mildly reduced LVEF of approximately 45%. With normal LVEDP  Moderate Sedation:  Start time: 9:53 AM  Stop time: 10:30 AM  2 mg versed   100 mcg fentanyl   An independent trained observer pushed medications at my direction. We monitored the patient's level of consciousness and vital signs/physiologic status throughout the procedure duration (see start and start times above).       Electronically signed by Lien Evans MD on 12/8/2022 at 10:37 AM

## 2022-12-08 NOTE — DISCHARGE INSTRUCTIONS
CARDIAC ANGIOGRAM (LEFT HEART CATH) - RADIAL ACCESS    Care of your puncture site:  Remove clear bandage 24 hours after the procedure. May shower in 24 hours  Inspect the site daily and gently clean using soap and water. Dry thoroughly and apply a Band-Aid. Normal Observations:  Soreness or tenderness which may last one week. Mild oozing from the incision site. Possible bruising that could last 2 weeks. Activity:  You may resume driving 24 hours following the procedure. You may resume normal activity in 3 days or after the wound heals. Avoid lifting more than 10 pounds for 3 days with affected arm. Do not make important / legal decisions within 24 hours after procedure. Nutrition:  Regular diet   Drink at least 8 to 10 glasses of decaffeinated, non-alcoholic fluid for the next 24 hours to flush the x-ray dye used for your angiogram out of your body. Call your doctor immediately if your condition worsens, for any other concerns, for a follow-up appointment or if you experience any of the following:  Significant bleeding that does not stop after 10 minutes of applying firm pressure on the puncture site. Increased swelling of the wrist.  Unusual pain, numbness, or tingling of the wrist/arm. Any signs of infection such as: redness, yellow drainage at the site, swelling or pain.

## 2022-12-08 NOTE — PROCEDURES
[address] [place] [address]                            CARDIAC CATHETERIZATION    PATIENT NAME: Esperanza Arroyo                    :        1955  MED REC NO:   [id number]                          ROOM:  ACCOUNT NO:   [de-identified]                           ADMIT DATE: 2022  PROVIDER:     Rylie Olivo MD    DATE OF PROCEDURE:  2022    PROCEDURE NOTE:  The patient was explained benefits, risks of procedure,  informed consent was obtained. The patient's right antecubital artery  _____ was used for a right heart catheterization. It was exchanged over  a guide to a 7-Qatari sheath. [place] catheter was advanced and  pressure and oxygen saturations were obtained in right atrium, right  ventricle, and coronary artery disease position. Pulmonary capillary  wedge pressure was also obtained. Cardiac output and indices were  obtained using thermodilution and Conor method. Subsequently, [place]  catheter was withdrawn. The right radial artery was subsequently  punctured using Seldinger technique. The 5/6-Qatari sheath was placed  in the right radial artery. The patient was given intraarterial  heparin, verapamil, and nitroglycerin. Coronary angiography was carried  out with the help of 5-Qatari Tiger catheter and 5-Qatari 3DRC catheters  for the right coronary artery. LV gram was obtained in SEARS projection. Pressure were obtained in left ventricular cavity before and after LV  gram and no gradient was noted during the pullback. The patient's  coronary angiography and pressures were carefully reviewed. All the  catheters were removed and good hemostasis was achieved around right  antecubital vein and right wrist area. Estimated blood loss was less  than 50 mL. The patient left the cath lab in stable condition.     HEMODYNAMIC DATA:  As follows:  Right atrial mean pressure was 5 mmHg  with oxygen saturation 59%. RV was 30/5 with oxygen saturation 59%. PA  pressure was 29/7 mean of 16 with the oxygen saturation 62%. Pulmonary  capillary wedge was 11 mmHg. LV pressure was 110 mmHg with LVEDP of 12  mmHg. No gradient across the aortic valve noted. Cardiac output by  Conor method was 5.65 liters per minute and cardiac index was 2.26 liters  per minute per body surface area. Thermodilution cardiac output was  5.07 liters per minute and the index was 2.03 liters per minute for body  surface area. CORONARY ANGIOGRAPHY:  1. Left main trunk: It arises from the left sinus Valsalva. It is  widely patent. It divides into LAD and circumflex artery. 2.  Left anterior descending artery: It is a moderate-to-large sized  artery that is extent in the proximal LAD, which is widely patent with  no evidence of in-stent restenosis identified. Remainder of the LAD  appears widely patent. 3.  Left circumflex artery: It arises from the left main trunk. It is  a moderate size artery and it gives as to moderate sized obtuse marginal  branch. The left circumflex and its branches are free of  atherosclerosis. 4.  Right coronary artery: It arises from right coronary cusp. It is a  large dominant artery. It gives rise to posterior descending and  posterolateral branches. Right coronary artery has a stent in the mid  segment, which is widely patent with no evidence of angiographic  in-stent restenosis. 5.  Left ventriculogram reveals a mildly reduced left ventricular  ejection fraction at about 45%. No significant MR noted. OVERALL IMPRESSION:  1. Normal right heart pressures. 2.  Normal pulmonary capillary wedge pressure. 3.  Normal cardiac output and indices. 4.  No O2 step-up to suggest ASD/PFO. 5.  Patent left main trunk. 6.  Patent stent at proximal LAD. 7.  Patent left circumflex artery. 8.  Patent stent at mid RCA. 9.  Mild left ventricular systolic dysfunction.   Estimated EF of 45%  with normal LVEDP. No gradient across the aortic valve to suggest  aortic stenosis. In view of these above findings, we will consider the patient's chest  pain and shortness of breath, noncardiac in origin.         Corey Maldonado MD    D: 12/08/2022 11:01:07       T: 12/08/2022 13:14:18     [email or web address]  Job#: [id number]     Doc#: [id number]    CC:  Fawn Boron, MD Pastor Jeans, MD

## 2022-12-08 NOTE — PRE SEDATION
Brief Pre-Op Note/Sedation Assessment      Mehnaz Nelson  1955  Room/bed info not found      [id number]  9:46 AM    Planned Procedure: Cardiac Catheterization Procedure    Post Procedure Plan: Return to same level of care    Consent: I have discussed with the patient and/or the patient representative the indication, alternatives, and the possible risks and/or complications of the planned procedure and the anesthesia methods. The patient and/or patient representative appear to understand and agree to proceed. Chief Complaint: Chest Pain/Pressure      Indications for Cath Procedure: Worsening Angina and LV Dysfunction  Anginal Classification within 2 weeks:  CCS III - Symptoms with everyday living activities, i.e., moderate limitation  NYHA Heart Failure Class within 2 weeks: Class II - Symptoms of HF on ordinary exertion, Newly Diagnosed? No, Heart Failure Type: Systolic  Is Cath Lab Visit Valve-related?: No  Surgical Risk: Low  Functional Type: Unknown    Anti- Anginal Meds within 2 weeks:   Yes: Beta Blockers, Aspirin, and Statin (Any)    Stress or Imaging Studies Performed:  None     Vital Signs:  /80   Pulse 75   Temp (!) 96.3 °F (35.7 °C) (Infrared)   Resp 18   Ht 6' 3\" (1.905 m)   Wt 269 lb (122 kg)   SpO2 96%   BMI 33.62 kg/m²     Allergies:   Allergies   Allergen Reactions    Cephalosporins Anaphylaxis     Anaphylaxis to Keflex in 2010    Pcn [Penicillins] Shortness Of Breath    Penicillin G Sodium Shortness Of Breath    Ciprofloxacin Itching    Codeine Itching    Erythromycin Itching    Morphine And Related Itching       Past Medical History:  Past Medical History:   Diagnosis Date    Arthritis     Asthma     BPH (benign prostatic hyperplasia)     Cardiomyopathy (HCC)     GERD (gastroesophageal reflux disease)     HTN (hypertension) 10/29/2012    MVP (mitral valve prolapse)     PPD positive     Prolonged emergence from general anesthesia     Prostatism     Tricuspid valve prolapse Unspecified sleep apnea          Surgical History:  Past Surgical History:   Procedure Laterality Date    CARDIAC CATHETERIZATION      CHOLECYSTECTOMY      FOOT SURGERY      HAND SURGERY Right 11/09/2017    thumb mass excision    INGUINAL HERNIA REPAIR      bilateral    KNEE ARTHROSCOPY      OTHER SURGICAL HISTORY  08/28/2019    epidural Dr. Laurita Carter at [facility]      right elbow    TONSILLECTOMY           Medications:  Current Outpatient Medications   Medication Sig Dispense Refill    folic acid (FOLVITE) 1 MG tablet TAKE ONE TABLET BY MOUTH DAILY 90 tablet 1    Ertugliflozin L-PyroglutamicAc (STEGLATRO) 5 MG TABS Take by mouth daily      methocarbamol (ROBAXIN) 500 MG tablet Take 500 mg by mouth 4 times daily as needed      metoprolol succinate (TOPROL XL) 25 MG extended release tablet Take 0.5 tablets by mouth daily 30 tablet 11    methotrexate (RHEUMATREX) 2.5 MG chemo tablet TAKE EIGHT TABLETS BY MOUTH ONCE WEEKLY 32 tablet 2    atorvastatin (LIPITOR) 40 MG tablet TAKE ONE TABLET BY MOUTH ONCE NIGHTLY 90 tablet 3    ASPIRIN LOW DOSE 81 MG EC tablet TAKE ONE TABLET BY MOUTH DAILY 60 tablet 3    buPROPion (WELLBUTRIN XL) 150 MG extended release tablet Take 150 mg by mouth every morning      prasugrel (EFFIENT) 10 MG TABS Take 1 tablet by mouth Daily with supper 90 tablet 3    nitroGLYCERIN (NITROSTAT) 0.4 MG SL tablet Place 1 tablet under the tongue every 5 minutes as needed for Chest pain up to max of 3 total doses. If no relief after 1 dose, call 911. 25 tablet 3    losartan (COZAAR) 25 MG tablet Take 1 tablet by mouth daily 90 tablet 3    gabapentin (NEURONTIN) 300 MG capsule Take 300 mg by mouth nightly.        finasteride (PROSCAR) 5 MG tablet Take 5 mg by mouth daily      omeprazole (PRILOSEC) 20 MG delayed release capsule Take 20 mg by mouth Daily       Loratadine 10 MG CAPS Take by mouth daily       HYDROcodone-acetaminophen (NORCO)  MG per tablet Take 1 tablet by mouth every 6 hours as needed for Pain.       montelukast (SINGULAIR) 10 MG tablet Take 10 mg by mouth Daily with supper       amitriptyline (ELAVIL) 50 MG tablet Take 50 mg by mouth nightly       DULoxetine (CYMBALTA) 60 MG extended release capsule Take 60 mg by mouth daily        Current Facility-Administered Medications   Medication Dose Route Frequency Provider Last Rate Last Admin    sodium chloride flush 0.9 % injection 5-40 mL  5-40 mL IntraVENous 2 times per day Jessica Lincoln MD        sodium chloride flush 0.9 % injection 5-40 mL  5-40 mL IntraVENous PRN Jessica Lincoln MD        0.9 % sodium chloride infusion   IntraVENous PRN Jessica Lincoln MD               Pre-Sedation:    Pre-Sedation Documentation and Exam:  I have personally completed a history, physical exam & review of systems for this patient (see notes). Prior History of Anesthesia Complications:   none    Modified Mallampati:  I (soft palate, uvula, fauces, tonsillar pillars visible)    ASA Classification:  Class 3 - A patient with severe systemic disease that limits activity but is not incapacitating      Augustus Scale: Activity:  2 - Able to move 4 extremities voluntarily on command  Respiration:  2 - Able to breathe deeply and cough freely  Circulation:  2 - BP+/- 20mmHg of normal  Consciousness:  2 - Fully awake  Oxygen Saturation (color):  2 - Able to maintain oxygen saturation >92% on room air    Sedation/Anesthesia Plan:  Guard the patient's safety and welfare. Minimize physical discomfort and pain. Minimize negative psychological responses to treatment by providing sedation and analgesia and maximize the potential amnesia. Patient to meet pre-procedure discharge plan.     Medication Planned:  midazolam intravenously and fentanyl intravenously    Patient is an appropriate candidate for plan of sedation: yes      Electronically signed by Brittney Cee MD on 12/8/2022 at 9:46 AM

## 2023-01-11 RX ORDER — PRASUGREL 10 MG/1
TABLET, FILM COATED ORAL
Qty: 30 TABLET | Refills: 5 | Status: SHIPPED | OUTPATIENT
Start: 2023-01-11

## 2023-01-24 ENCOUNTER — OFFICE VISIT (OUTPATIENT)
Dept: PULMONOLOGY | Age: 68
End: 2023-01-24
Payer: COMMERCIAL

## 2023-01-24 VITALS
BODY MASS INDEX: 33.89 KG/M2 | RESPIRATION RATE: 16 BRPM | SYSTOLIC BLOOD PRESSURE: 120 MMHG | TEMPERATURE: 97.5 F | HEIGHT: 75 IN | DIASTOLIC BLOOD PRESSURE: 60 MMHG | WEIGHT: 272.6 LBS

## 2023-01-24 DIAGNOSIS — R91.8 MULTIPLE LUNG NODULES ON CT: Primary | ICD-10-CM

## 2023-01-24 DIAGNOSIS — R06.02 SOB (SHORTNESS OF BREATH): ICD-10-CM

## 2023-01-24 PROCEDURE — G8417 CALC BMI ABV UP PARAM F/U: HCPCS | Performed by: INTERNAL MEDICINE

## 2023-01-24 PROCEDURE — 3074F SYST BP LT 130 MM HG: CPT | Performed by: INTERNAL MEDICINE

## 2023-01-24 PROCEDURE — 3078F DIAST BP <80 MM HG: CPT | Performed by: INTERNAL MEDICINE

## 2023-01-24 PROCEDURE — 99204 OFFICE O/P NEW MOD 45 MIN: CPT | Performed by: INTERNAL MEDICINE

## 2023-01-24 PROCEDURE — G8484 FLU IMMUNIZE NO ADMIN: HCPCS | Performed by: INTERNAL MEDICINE

## 2023-01-24 PROCEDURE — 1036F TOBACCO NON-USER: CPT | Performed by: INTERNAL MEDICINE

## 2023-01-24 PROCEDURE — 1123F ACP DISCUSS/DSCN MKR DOCD: CPT | Performed by: INTERNAL MEDICINE

## 2023-01-24 PROCEDURE — G8427 DOCREV CUR MEDS BY ELIG CLIN: HCPCS | Performed by: INTERNAL MEDICINE

## 2023-01-24 PROCEDURE — 3017F COLORECTAL CA SCREEN DOC REV: CPT | Performed by: INTERNAL MEDICINE

## 2023-01-24 ASSESSMENT — ENCOUNTER SYMPTOMS: SHORTNESS OF BREATH: 1

## 2023-01-24 NOTE — ASSESSMENT & PLAN NOTE
- Unclear etiology, reassuring recent left and right heart cath  -Patient states he has lost 20 pounds but symptoms worsened  -Unclear that there is a true pulmonary etiology but he does have a extensive distant smoking history so we will evaluate with PFTs  -CT chest as above

## 2023-01-24 NOTE — ASSESSMENT & PLAN NOTE
- Previous scan was reassuring but patient did not complete 2-year surveillance  -Repeat CT of the chest now  -Suspect rheumatoid nodules but will follow No new care gaps identified.  Powered by [employer]. Reference number: [id number].   1/19/2022 4:12:45 PM CST

## 2023-01-24 NOTE — PROGRESS NOTES
[address], SLEEP, AND CRITICAL CARE    Arron Damon (:  1955) is a 78 y.o. male,New patient, here for evaluation of the following chief complaint(s):  Shortness of Breath         ASSESSMENT/PLAN:  1. Multiple lung nodules on CT  Assessment & Plan:   - Previous scan was reassuring but patient did not complete 2-year surveillance  -Repeat CT of the chest now  -Suspect rheumatoid nodules but will follow  Orders:  -     CT CHEST WO CONTRAST; Future  2. SOB (shortness of breath)  Assessment & Plan:   - Unclear etiology, reassuring recent left and right heart cath  -Patient states he has lost 20 pounds but symptoms worsened  -Unclear that there is a true pulmonary etiology but he does have a extensive distant smoking history so we will evaluate with PFTs  -CT chest as above  Orders:  -     Full PFT Study With Bronchodilator; Future    Return in about 2 months (around 3/24/2023). Future Appointments   Date Time Provider Kaila Vera   3/8/2023  9:45 AM Ricardo Lopez MD [facility]   3/14/2023  8:20 AM [facility] Irving [facility]            Subjective   SUBJECTIVE/OBJECTIVE:  Patient has not been seen since 2019. At that time we are following him for pulmonary nodules and some shortness of breath. His surveillance CT scan initially was stable to improved however, unfortunately he was lost to follow-up. He presents now 4 years later for worsening shortness of breath. Since I last seen him patient says he underwent PCI for heart attack. Given worsening shortness of breath more recently he underwent a repeat left and right heart cath which were essentially unremarkable. He denies associated wheezing or cough. He denies chest pain. He does get lightheaded and dizzy. Has not found anything that makes it better or worse is unsure that it is exertional.      Review of Systems   Constitutional:  Positive for fatigue. Respiratory:  Positive for shortness of breath. Neurological:  Positive for light-headedness. Objective   Physical Exam     Gen:  No acute distress. Eyes: EOMI. Anicteric sclera. No conjunctival injection. ENT: No discharge. External appearance of ears and nose normal.  Neck: Trachea midline. No mass   Resp:  No crackles. No wheezes. No rhonchi. No dullness on percussion. CV: Regular rate. Regular rhythm. No murmur or rub. No edema. Neuro:  no focal neurologic deficit. Moves all extremities  Psych: Awake and alert, Oriented x 3. Judgement and insight appropriate. Mood stable. Chest X-Ray    Interpreted by: Me    View: Portable chest xray    Findings: Normal heart size, Normal lungs, Normal mediastinum        An electronic signature was used to authenticate this note.     --Zeb Allen MD

## 2023-02-07 ENCOUNTER — HOSPITAL ENCOUNTER (OUTPATIENT)
Age: 68
Discharge: HOME | End: 2023-02-07
Payer: COMMERCIAL

## 2023-02-07 ENCOUNTER — HOSPITAL ENCOUNTER (OUTPATIENT)
Dept: GENERAL RADIOLOGY | Age: 68
Discharge: HOME | End: 2023-02-07
Payer: COMMERCIAL

## 2023-02-07 ENCOUNTER — OFFICE VISIT (OUTPATIENT)
Dept: RHEUMATOLOGY | Age: 68
End: 2023-02-07
Payer: COMMERCIAL

## 2023-02-07 VITALS
BODY MASS INDEX: 33.69 KG/M2 | DIASTOLIC BLOOD PRESSURE: 62 MMHG | SYSTOLIC BLOOD PRESSURE: 114 MMHG | WEIGHT: 271 LBS | HEART RATE: 69 BPM | OXYGEN SATURATION: 97 % | HEIGHT: 75 IN

## 2023-02-07 DIAGNOSIS — M79.671 BILATERAL FOOT PAIN: ICD-10-CM

## 2023-02-07 DIAGNOSIS — M79.672 BILATERAL FOOT PAIN: ICD-10-CM

## 2023-02-07 DIAGNOSIS — M06.00 RHEUMATOID ARTHRITIS WITH NEGATIVE RHEUMATOID FACTOR, INVOLVING UNSPECIFIED SITE (HCC): ICD-10-CM

## 2023-02-07 DIAGNOSIS — Z79.899 ENCOUNTER FOR LONG-TERM (CURRENT) USE OF HIGH-RISK MEDICATION: ICD-10-CM

## 2023-02-07 DIAGNOSIS — Z51.81 ENCOUNTER FOR THERAPEUTIC DRUG MONITORING: ICD-10-CM

## 2023-02-07 DIAGNOSIS — M06.00 RHEUMATOID ARTHRITIS WITH NEGATIVE RHEUMATOID FACTOR, INVOLVING UNSPECIFIED SITE (HCC): Primary | ICD-10-CM

## 2023-02-07 PROCEDURE — 3074F SYST BP LT 130 MM HG: CPT | Performed by: INTERNAL MEDICINE

## 2023-02-07 PROCEDURE — G8484 FLU IMMUNIZE NO ADMIN: HCPCS | Performed by: INTERNAL MEDICINE

## 2023-02-07 PROCEDURE — G8417 CALC BMI ABV UP PARAM F/U: HCPCS | Performed by: INTERNAL MEDICINE

## 2023-02-07 PROCEDURE — 1123F ACP DISCUSS/DSCN MKR DOCD: CPT | Performed by: INTERNAL MEDICINE

## 2023-02-07 PROCEDURE — 3017F COLORECTAL CA SCREEN DOC REV: CPT | Performed by: INTERNAL MEDICINE

## 2023-02-07 PROCEDURE — 73620 X-RAY EXAM OF FOOT: CPT

## 2023-02-07 PROCEDURE — 73630 X-RAY EXAM OF FOOT: CPT

## 2023-02-07 PROCEDURE — 99214 OFFICE O/P EST MOD 30 MIN: CPT | Performed by: INTERNAL MEDICINE

## 2023-02-07 PROCEDURE — 1036F TOBACCO NON-USER: CPT | Performed by: INTERNAL MEDICINE

## 2023-02-07 PROCEDURE — G8427 DOCREV CUR MEDS BY ELIG CLIN: HCPCS | Performed by: INTERNAL MEDICINE

## 2023-02-07 PROCEDURE — 3078F DIAST BP <80 MM HG: CPT | Performed by: INTERNAL MEDICINE

## 2023-02-07 RX ORDER — METHOTREXATE 2.5 MG/1
TABLET ORAL
Qty: 32 TABLET | Refills: 2 | Status: SHIPPED | OUTPATIENT
Start: 2023-02-07

## 2023-02-07 RX ORDER — FOLIC ACID 1 MG/1
TABLET ORAL
Qty: 90 TABLET | Refills: 1 | Status: SHIPPED | OUTPATIENT
Start: 2023-02-07

## 2023-02-07 NOTE — PROGRESS NOTES
SUBJECTIVE:    Patient ID: Jaleesa Avila is a 78 y.o. male. Patient returns for follow-up of rheumatoid arthritis. He remains on methotrexate 20 mg a week and has significant morning stiffness. He can do his ADLs. Review of Systems:    Respiratory: denies cough, denies shortness of breath  Gastrointestinal: denies abdominal pain, denies nausea  Musculoskeletal:           1 hour             morning stiffness  Ears, nose, mouth: denies mucosal sores  Skin: denies rash, denies alopecia. The remainder of his review of systems is negative. Prior to Visit Medications    Medication Sig Taking? Authorizing Provider   prasugrel (EFFIENT) 10 MG TABS TAKE ONE TABLET BY MOUTH DAILY WITH SUPPER Yes Nikki Dejesus MD   folic acid (FOLVITE) 1 MG tablet TAKE ONE TABLET BY MOUTH DAILY Yes Enid Padilla MD   Ertugliflozin L-PyroglutamicAc (STEGLATRO) 5 MG TABS Take by mouth daily Yes Historical Provider, MD   methocarbamol (ROBAXIN) 500 MG tablet Take 500 mg by mouth 4 times daily as needed Yes Historical Provider, MD   metoprolol succinate (TOPROL XL) 25 MG extended release tablet Take 0.5 tablets by mouth daily Yes Willie Jimenez MD   methotrexate (RHEUMATREX) 2.5 MG chemo tablet TAKE EIGHT TABLETS BY MOUTH ONCE WEEKLY Yes Enid Padilla MD   atorvastatin (LIPITOR) 40 MG tablet TAKE ONE TABLET BY MOUTH ONCE NIGHTLY Yes Marlon Braxton MD   ASPIRIN LOW DOSE 81 MG EC tablet TAKE ONE TABLET BY MOUTH DAILY Yes Rolland Sheets M Enzweiler, APRN - CNP   buPROPion (WELLBUTRIN XL) 150 MG extended release tablet Take 150 mg by mouth every morning Yes Historical Provider, MD   nitroGLYCERIN (NITROSTAT) 0.4 MG SL tablet Place 1 tablet under the tongue every 5 minutes as needed for Chest pain up to max of 3 total doses. If no relief after 1 dose, call 911. Yes Nikki Dejesus MD   losartan (COZAAR) 25 MG tablet Take 1 tablet by mouth daily Yes Mitch Izaguirre MD   gabapentin (NEURONTIN) 300 MG capsule Take 300 mg by mouth nightly. Yes Historical Provider, MD   finasteride (PROSCAR) 5 MG tablet Take 5 mg by mouth daily Yes Historical Provider, MD   omeprazole (PRILOSEC) 20 MG delayed release capsule Take 20 mg by mouth Daily  Yes Historical Provider, MD   Loratadine 10 MG CAPS Take by mouth daily  Yes Historical Provider, MD   HYDROcodone-acetaminophen (NORCO)  MG per tablet Take 1 tablet by mouth every 6 hours as needed for Pain. Yes Historical Provider, MD   montelukast (SINGULAIR) 10 MG tablet Take 10 mg by mouth Daily with supper  Yes Historical Provider, MD   amitriptyline (ELAVIL) 50 MG tablet Take 50 mg by mouth nightly  Yes Historical Provider, MD   DULoxetine (CYMBALTA) 60 MG extended release capsule Take 60 mg by mouth daily  Yes Historical Provider, MD        OBJECTIVE:  /62   Pulse 69   Ht 6' 3\" (1.905 m)   Wt 271 lb (122.9 kg)   SpO2 97%   BMI 33.87 kg/m²      Physical Exam:    General: the patient demonstrated normal gait and posture without evidence of overt muscle wasting. Hygiene appears normal    Ears, mouth, nose, throat: no mucosal sores      Respiratory: assessment of the patient's respiratory effort showed no evidence of abnormal respiration and no use of accessory muscles. Diaphragmatic movement is normal.  Percussion of the patient's chest showed no evidence of dullness flatness or hyperresonance. Auscultation of the patient's lungs reveal normal breath sounds in all lung fields. There is no evidence of abnormal sounds such as rales or wheezes. There is no evidence of friction rub. Cardiovascular: palpation of the patient's chest revealed no abnormal thrill. The point of maximal impulse showed no displacement. Auscultation of the heart revealed no evidence of murmur gallop or abnormal heart sound. Musculoskeletal: 1+ soft tissue swelling wrists no definite swelling PIPs tenderness first MTP on the right tenderness at the right ankle. Tenderness to the third MTP on the left.   Fists 95 to 100%  Skin: no rashes    ASSESSMENT: Rheumatoid arthritis. Bilateral foot pain chemotherapy        PLAN: Recent blood work was reviewed. We will get x-rays of both feet. I will see him back in 3 months time.

## 2023-02-14 ENCOUNTER — TELEPHONE (OUTPATIENT)
Dept: RHEUMATOLOGY | Age: 68
End: 2023-02-14

## 2023-02-14 NOTE — TELEPHONE ENCOUNTER
Patient called stating the he called to set up an appt with Jorge A Gordillo. He reports that office is 45 minutes away. Advised pt that group has multiple locations available. [facility] is located in [facility].      He will call the office if he needs anything additional.

## 2023-03-06 ENCOUNTER — HOSPITAL ENCOUNTER (OUTPATIENT)
Dept: CT IMAGING | Age: 68
Discharge: HOME | End: 2023-03-06
Payer: COMMERCIAL

## 2023-03-06 ENCOUNTER — HOSPITAL ENCOUNTER (OUTPATIENT)
Dept: PULMONOLOGY | Age: 68
Discharge: HOME | End: 2023-03-06
Payer: COMMERCIAL

## 2023-03-06 DIAGNOSIS — R06.02 SOB (SHORTNESS OF BREATH): ICD-10-CM

## 2023-03-06 DIAGNOSIS — R91.8 MULTIPLE LUNG NODULES ON CT: ICD-10-CM

## 2023-03-06 LAB
DLCO %PRED: 93 %
DLCO PRED: NORMAL
DLCO/VA %PRED: NORMAL
DLCO/VA PRED: NORMAL
DLCO/VA: NORMAL
DLCO: 28.19 ML/MIN/MMHG
EXPIRATORY TIME-POST: NORMAL
EXPIRATORY TIME: NORMAL
FEF 25-75% %CHNG: NORMAL
FEF 25-75% %PRED-POST: NORMAL
FEF 25-75% %PRED-PRE: NORMAL
FEF 25-75% PRED: NORMAL
FEF 25-75%-POST: NORMAL
FEF 25-75%-PRE: NORMAL
FEV1 %PRED-POST: 120 %
FEV1 %PRED-PRE: 114 %
FEV1 PRED: NORMAL
FEV1-POST: NORMAL
FEV1-PRE: NORMAL
FEV1/FVC %PRED-POST: NORMAL
FEV1/FVC %PRED-PRE: NORMAL
FEV1/FVC PRED: NORMAL
FEV1/FVC-POST: 81 %
FEV1/FVC-PRE: 78 %
FVC %PRED-POST: NORMAL
FVC %PRED-PRE: NORMAL
FVC PRED: NORMAL
FVC-POST: NORMAL
FVC-PRE: NORMAL
GAW %PRED: NORMAL
GAW PRED: NORMAL
GAW: NORMAL
IC %PRED: NORMAL
IC PRED: NORMAL
IC: NORMAL
MEP: NORMAL
MIP: NORMAL
MVV %PRED-PRE: NORMAL
MVV PRED: NORMAL
MVV-PRE: NORMAL
PEF %PRED-POST: NORMAL
PEF %PRED-PRE: NORMAL
PEF PRED: NORMAL
PEF%CHNG: NORMAL
PEF-POST: NORMAL
PEF-PRE: NORMAL
RAW %PRED: NORMAL
RAW PRED: NORMAL
RAW: NORMAL
RV %PRED: NORMAL
RV PRED: NORMAL
RV: NORMAL
SVC %PRED: NORMAL
SVC PRED: NORMAL
SVC: NORMAL
TLC %PRED: 113 %
TLC PRED: NORMAL
TLC: NORMAL
VA %PRED: NORMAL
VA PRED: NORMAL
VA: NORMAL
VTG %PRED: NORMAL
VTG PRED: NORMAL
VTG: NORMAL

## 2023-03-06 PROCEDURE — 94060 EVALUATION OF WHEEZING: CPT

## 2023-03-06 PROCEDURE — 94640 AIRWAY INHALATION TREATMENT: CPT

## 2023-03-06 PROCEDURE — 6370000000 HC RX 637 (ALT 250 FOR IP): Performed by: INTERNAL MEDICINE

## 2023-03-06 PROCEDURE — 71250 CT THORAX DX C-: CPT

## 2023-03-06 PROCEDURE — 94726 PLETHYSMOGRAPHY LUNG VOLUMES: CPT

## 2023-03-06 PROCEDURE — 94729 DIFFUSING CAPACITY: CPT

## 2023-03-06 PROCEDURE — 94664 DEMO&/EVAL PT USE INHALER: CPT

## 2023-03-06 RX ORDER — ALBUTEROL SULFATE 90 UG/1
4 AEROSOL, METERED RESPIRATORY (INHALATION) ONCE
Status: COMPLETED | OUTPATIENT
Start: 2023-03-06 | End: 2023-03-06

## 2023-03-06 RX ADMIN — ALBUTEROL SULFATE 4 PUFF: 90 AEROSOL, METERED RESPIRATORY (INHALATION) at 12:12

## 2023-03-06 ASSESSMENT — PULMONARY FUNCTION TESTS
FEV1/FVC_POST: 81
FEV1_PERCENT_PREDICTED_PRE: 114
FEV1_PERCENT_PREDICTED_POST: 120
FEV1/FVC_PRE: 78

## 2023-03-08 NOTE — PROCEDURES
Reason for PFT:  SOB    Spirometry  Spirometry is normal.  There is no demonstrable obstructive defect. Lung Volumes  Lung volumes are normal.    Bronchodilator  There is no response to bronchodilator demonstrated. [Increase in FEV1 and/or FVC => 12% of control and => 200 ml]    Flow-Volume Loop  The flow-volume loop is normal.    Diffusing Capacity  Diffusing capacity is normal.    Overall Interpretation  No obstruction is present    No restriction is present    There is not a bronchodilator response present    Diffusion capacity is normal    FEV1 is 4.44 L at 114% predicted. FVC is 5.69 L at 111% predicted    FEV1/FVC ratio is 78    Normal study         OBSTRUCTION % Predicted FEV1   MILD >70%   MODERATE 60-69%   MODERATELY-SEVERE 50-59%   SEVERE 35-49%   VERY SEVERE <35%         RESTRICTION % Predicted TLC   MILD Less than LLN but > than 70%   MODERATE 60-69%   MODERATELY-SEVERE <60%                 DIFFUSION CAPACITY DL,CO % Pred   MILD >60% AND < LLN   MODERATE 40-60%   SEVERE <40%       PFT data will be scanned into the media tab in epic.     Cyn Bowman [facility] Pulmonology

## 2023-03-14 ENCOUNTER — OFFICE VISIT (OUTPATIENT)
Dept: PULMONOLOGY | Age: 68
End: 2023-03-14
Payer: COMMERCIAL

## 2023-03-14 VITALS
SYSTOLIC BLOOD PRESSURE: 90 MMHG | BODY MASS INDEX: 34.27 KG/M2 | DIASTOLIC BLOOD PRESSURE: 70 MMHG | WEIGHT: 275.6 LBS | HEART RATE: 52 BPM | TEMPERATURE: 97.2 F | RESPIRATION RATE: 18 BRPM | OXYGEN SATURATION: 92 % | HEIGHT: 75 IN

## 2023-03-14 DIAGNOSIS — R06.02 SHORTNESS OF BREATH: Primary | ICD-10-CM

## 2023-03-14 DIAGNOSIS — R91.8 MULTIPLE LUNG NODULES ON CT: ICD-10-CM

## 2023-03-14 DIAGNOSIS — Z72.0 TOBACCO ABUSE: ICD-10-CM

## 2023-03-14 PROCEDURE — 1123F ACP DISCUSS/DSCN MKR DOCD: CPT | Performed by: INTERNAL MEDICINE

## 2023-03-14 PROCEDURE — 3017F COLORECTAL CA SCREEN DOC REV: CPT | Performed by: INTERNAL MEDICINE

## 2023-03-14 PROCEDURE — 1036F TOBACCO NON-USER: CPT | Performed by: INTERNAL MEDICINE

## 2023-03-14 PROCEDURE — 3074F SYST BP LT 130 MM HG: CPT | Performed by: INTERNAL MEDICINE

## 2023-03-14 PROCEDURE — 99214 OFFICE O/P EST MOD 30 MIN: CPT | Performed by: INTERNAL MEDICINE

## 2023-03-14 PROCEDURE — G8484 FLU IMMUNIZE NO ADMIN: HCPCS | Performed by: INTERNAL MEDICINE

## 2023-03-14 PROCEDURE — G8417 CALC BMI ABV UP PARAM F/U: HCPCS | Performed by: INTERNAL MEDICINE

## 2023-03-14 PROCEDURE — G8427 DOCREV CUR MEDS BY ELIG CLIN: HCPCS | Performed by: INTERNAL MEDICINE

## 2023-03-14 PROCEDURE — 3078F DIAST BP <80 MM HG: CPT | Performed by: INTERNAL MEDICINE

## 2023-03-14 RX ORDER — ALBUTEROL SULFATE 90 UG/1
2 AEROSOL, METERED RESPIRATORY (INHALATION) EVERY 6 HOURS PRN
Qty: 18 G | Refills: 5 | Status: SHIPPED | OUTPATIENT
Start: 2023-03-14

## 2023-03-14 ASSESSMENT — ENCOUNTER SYMPTOMS: SHORTNESS OF BREATH: 1

## 2023-03-14 NOTE — PROGRESS NOTES
[address], SLEEP, AND CRITICAL CARE    Roni Rodríguez (:  1955) is a 78 y.o. male,New patient, here for evaluation of the following chief complaint(s):  Pulmonary Nodule         ASSESSMENT/PLAN:  1. Shortness of breath  Assessment & Plan:   - Has mild emphysema but dyspnea out of proportion.  -Has preserved pulmonary function testing  -Do recommend he continue on albuterol as needed likely does not need a controller inhaler at this time  Orders:  -     albuterol sulfate HFA (PROVENTIL;VENTOLIN;PROAIR) 108 (90 Base) MCG/ACT inhaler; Inhale 2 puffs into the lungs every 6 hours as needed for Wheezing or Shortness of Breath, Disp-18 g, R-5Normal  2. Tobacco abuse  Assessment & Plan:   - 30-pack-year smoking quit 20 years ago  3. Multiple lung nodules on CT    Return in about 6 months (around 2023). Future Appointments   Date Time Provider Kiala Smithi   3/16/2023  9:00 AM Toñito Hope MD [facility]   2023  9:15 AM Julio Cesar Hawk MD  [facility]   2023  8:20 AM Renee Aparicio MD [facility]            Subjective   SUBJECTIVE/OBJECTIVE:  Patient continues to have shortness of breath. Had repeat PFTs that are within normal limits. He does have mild emphysema seen on CT scan continues to have dyspnea out of proportion to lung findings.  -All lung nodules are stable on last CT scan      Review of Systems   Constitutional:  Positive for fatigue. Respiratory:  Positive for shortness of breath. Neurological:  Positive for light-headedness. Objective   Physical Exam     Gen:  No acute distress. Eyes: EOMI. Anicteric sclera. No conjunctival injection. ENT: No discharge. External appearance of ears and nose normal.  Neck: Trachea midline. No mass   Resp:  No crackles. No wheezes. No rhonchi. No dullness on percussion. CV: Regular rate. Regular rhythm. No murmur or rub. No edema. Neuro:  no focal neurologic deficit.   Moves all extremities  Psych: Awake and alert, Oriented x 3. Judgement and insight appropriate. Mood stable. Chest CT images reviewed personally by me, interpretation as follows:  -Mild background emphysema. Stable nodularities          An electronic signature was used to authenticate this note.     --Agustin Castro MD

## 2023-03-14 NOTE — ASSESSMENT & PLAN NOTE
- Has mild emphysema but dyspnea out of proportion.  -Has preserved pulmonary function testing  -Do recommend he continue on albuterol as needed likely does not need a controller inhaler at this time

## 2023-03-16 ENCOUNTER — OFFICE VISIT (OUTPATIENT)
Dept: CARDIOLOGY CLINIC | Age: 68
End: 2023-03-16
Payer: COMMERCIAL

## 2023-03-16 VITALS
HEART RATE: 54 BPM | BODY MASS INDEX: 33.45 KG/M2 | OXYGEN SATURATION: 95 % | WEIGHT: 269 LBS | DIASTOLIC BLOOD PRESSURE: 72 MMHG | SYSTOLIC BLOOD PRESSURE: 118 MMHG | HEIGHT: 75 IN

## 2023-03-16 DIAGNOSIS — I25.10 CAD IN NATIVE ARTERY: Primary | ICD-10-CM

## 2023-03-16 DIAGNOSIS — I10 ESSENTIAL HYPERTENSION: ICD-10-CM

## 2023-03-16 DIAGNOSIS — R00.1 BRADYCARDIA: ICD-10-CM

## 2023-03-16 DIAGNOSIS — R06.83 SNORING: ICD-10-CM

## 2023-03-16 DIAGNOSIS — E78.2 MIXED HYPERLIPIDEMIA: ICD-10-CM

## 2023-03-16 DIAGNOSIS — E66.9 CLASS 1 OBESITY WITH SERIOUS COMORBIDITY AND BODY MASS INDEX (BMI) OF 34.0 TO 34.9 IN ADULT, UNSPECIFIED OBESITY TYPE: ICD-10-CM

## 2023-03-16 DIAGNOSIS — R53.83 OTHER FATIGUE: ICD-10-CM

## 2023-03-16 DIAGNOSIS — Z87.891 FORMER SMOKER: ICD-10-CM

## 2023-03-16 DIAGNOSIS — I49.3 PVC (PREMATURE VENTRICULAR CONTRACTION): ICD-10-CM

## 2023-03-16 DIAGNOSIS — G47.30 SLEEP APNEA, UNSPECIFIED TYPE: ICD-10-CM

## 2023-03-16 DIAGNOSIS — I49.1 PAC (PREMATURE ATRIAL CONTRACTION): ICD-10-CM

## 2023-03-16 PROCEDURE — G8484 FLU IMMUNIZE NO ADMIN: HCPCS | Performed by: INTERNAL MEDICINE

## 2023-03-16 PROCEDURE — 1123F ACP DISCUSS/DSCN MKR DOCD: CPT | Performed by: INTERNAL MEDICINE

## 2023-03-16 PROCEDURE — G8417 CALC BMI ABV UP PARAM F/U: HCPCS | Performed by: INTERNAL MEDICINE

## 2023-03-16 PROCEDURE — 99214 OFFICE O/P EST MOD 30 MIN: CPT | Performed by: INTERNAL MEDICINE

## 2023-03-16 PROCEDURE — G8427 DOCREV CUR MEDS BY ELIG CLIN: HCPCS | Performed by: INTERNAL MEDICINE

## 2023-03-16 PROCEDURE — 1036F TOBACCO NON-USER: CPT | Performed by: INTERNAL MEDICINE

## 2023-03-16 PROCEDURE — 3017F COLORECTAL CA SCREEN DOC REV: CPT | Performed by: INTERNAL MEDICINE

## 2023-03-16 PROCEDURE — 3078F DIAST BP <80 MM HG: CPT | Performed by: INTERNAL MEDICINE

## 2023-03-16 PROCEDURE — 3074F SYST BP LT 130 MM HG: CPT | Performed by: INTERNAL MEDICINE

## 2023-03-16 NOTE — PROGRESS NOTES
[facility]  Cardiology Progress Note    America Haas  [birth year]    March 16, 2023      CC: \"I still have the same SOB and off/on CP. \"     HPI:  The patient is 78 y.o. male with a past medical history significant for chronic back pain, limited mobility and movement, CAD with multiple PCI's and JUAN, hypertension, hyperlipidemia, VICKIE, and pre diabetes. Former smoker. 11/2022 office visit, he stateed that is shortness of breath has been worsening. He has episodes of dizzness and chest pain at times with the shortness of breath. Reduced beta blocker. L/RHC with LVEF 45% otherwise normal. Symptoms non-cardiac in origin. D-Dimer also normal. He also follos with Dr. Juan Flores for mild background emphysema per CT Chest, stable nodularities. Today, he presents in f/u from [facility] 12/8/2022. He continues with VALVERDE with short distance. Odd/on CP. Sits to rest. If he is carrying items or pushing items, he has increase in VALVERDE. He does not exercise due to his VALVERDE. He uses a motorized cart at the grocery store. Patient denies changes in exertional chest pain/pressure, dyspnea at rest, changes in VALVERDE, PND, orthopnea, palpitations, lightheadedness, weight changes, changes in LE edema, and syncope. He has not resumed smoking cigarettes. He admits to medical therapy compliance and tolerating with no abnormal bruising or bleeding.        Past Medical History:   Diagnosis Date    Arthritis     Asthma     BPH (benign prostatic hyperplasia)     Cardiomyopathy (HCC)     GERD (gastroesophageal reflux disease)     HTN (hypertension) 10/29/2012    MVP (mitral valve prolapse)     PPD positive     Prolonged emergence from general anesthesia     Prostatism     Tricuspid valve prolapse     Unspecified sleep apnea      Past Surgical History:   Procedure Laterality Date    CARDIAC CATHETERIZATION      CHOLECYSTECTOMY      FOOT SURGERY      HAND SURGERY Right 11/09/2017    thumb mass excision    INGUINAL HERNIA REPAIR bilateral    KNEE ARTHROSCOPY      OTHER SURGICAL HISTORY  2019    epidural Dr. Kenneth Salmon at [address]      right elbow    TONSILLECTOMY       Family History   Problem Relation Age of Onset    Rheum Arthritis Other     Heart Disease Father     Heart Disease Brother     Other Brother         histoplasmosis     Lupus Neg Hx      Social History     Tobacco Use    Smoking status: Former     Types: Cigarettes     Quit date: 1982     Years since quittin.1    Smokeless tobacco: Never   Vaping Use    Vaping Use: Former   Substance Use Topics    Alcohol use:  Yes     Alcohol/week: 12.0 standard drinks     Types: 12 Cans of beer per week     Comment: wine & shots rare    Drug use: No       Allergies   Allergen Reactions    Cephalosporins Anaphylaxis     Anaphylaxis to Keflex in     Pcn [Penicillins] Shortness Of Breath    Penicillin G Sodium Shortness Of Breath    Ciprofloxacin Itching    Codeine Itching    Erythromycin Itching    Morphine And Related Itching     Current Outpatient Medications   Medication Sig Dispense Refill    albuterol sulfate HFA (PROVENTIL;VENTOLIN;PROAIR) 108 (90 Base) MCG/ACT inhaler Inhale 2 puffs into the lungs every 6 hours as needed for Wheezing or Shortness of Breath 18 g 5    methotrexate (RHEUMATREX) 2.5 MG chemo tablet TAKE EIGHT TABLETS BY MOUTH ONCE WEEKLY 32 tablet 2    folic acid (FOLVITE) 1 MG tablet TAKE ONE TABLET BY MOUTH DAILY 90 tablet 1    prasugrel (EFFIENT) 10 MG TABS TAKE ONE TABLET BY MOUTH DAILY WITH SUPPER 30 tablet 5    Ertugliflozin L-PyroglutamicAc (STEGLATRO) 5 MG TABS Take by mouth daily      methocarbamol (ROBAXIN) 500 MG tablet Take 500 mg by mouth 4 times daily as needed      metoprolol succinate (TOPROL XL) 25 MG extended release tablet Take 0.5 tablets by mouth daily 30 tablet 11    atorvastatin (LIPITOR) 40 MG tablet TAKE ONE TABLET BY MOUTH ONCE NIGHTLY 90 tablet 3    ASPIRIN LOW DOSE 81 MG EC tablet TAKE ONE TABLET BY MOUTH DAILY 60 tablet 3    buPROPion (WELLBUTRIN XL) 150 MG extended release tablet Take 150 mg by mouth every morning      nitroGLYCERIN (NITROSTAT) 0.4 MG SL tablet Place 1 tablet under the tongue every 5 minutes as needed for Chest pain up to max of 3 total doses. If no relief after 1 dose, call 911. 25 tablet 3    losartan (COZAAR) 25 MG tablet Take 1 tablet by mouth daily 90 tablet 3    gabapentin (NEURONTIN) 300 MG capsule Take 300 mg by mouth nightly. finasteride (PROSCAR) 5 MG tablet Take 5 mg by mouth daily      omeprazole (PRILOSEC) 20 MG delayed release capsule Take 20 mg by mouth Daily       Loratadine 10 MG CAPS Take by mouth daily       HYDROcodone-acetaminophen (NORCO)  MG per tablet Take 1 tablet by mouth every 6 hours as needed for Pain.       montelukast (SINGULAIR) 10 MG tablet Take 10 mg by mouth Daily with supper       amitriptyline (ELAVIL) 50 MG tablet Take 50 mg by mouth nightly       DULoxetine (CYMBALTA) 60 MG extended release capsule Take 60 mg by mouth daily        No current facility-administered medications for this visit. Review of Systems:  Constitutional: no unanticipated weight loss. There's been no change in energy level, sleep pattern, or activity level. No fevers, chills. Eyes: No visual changes or diplopia. No scleral icterus. ENT: No Headaches, hearing loss or vertigo. No mouth sores or sore throat. Cardiovascular: as reviewed in HPI  Respiratory: No cough or wheezing, no sputum production. No hematemesis. Gastrointestinal: + nausea. No abdominal pain, appetite loss, blood in stools. No change in bowel or bladder habits. Genitourinary: No dysuria, trouble voiding, or hematuria. Musculoskeletal:  No gait disturbance, no joint complaints. Integumentary: No rash or pruritis. Neurological: No headache, diplopia, change in muscle strength, numbness or tingling.  + dizziness. Psychiatric: No anxiety or depression. Endocrine: No temperature intolerance.  No excessive thirst, fluid intake, or urination. No tremor. Hematologic/Lymphatic: No abnormal bruising or bleeding, blood clots or swollen lymph nodes. Allergic/Immunologic: No nasal congestion or hives. Physical Exam:   /72   Pulse 54   Ht 6' 3\" (1.905 m)   Wt 269 lb (122 kg)   SpO2 95%   BMI 33.62 kg/m²   Wt Readings from Last 3 Encounters:   03/16/23 269 lb (122 kg)   03/14/23 275 lb 9.6 oz (125 kg)   02/07/23 271 lb (122.9 kg)     Constitutional: He is oriented to person, place, and time. He appears well-developed and well-nourished. In no acute distress. Head: Normocephalic and atraumatic. Pupils equal and round. Neck: Neck supple. No JVP or carotid bruit appreciated. No mass and no thyromegaly present. No lymphadenopathy present. Cardiovascular: Normal rate. Normal heart sounds. Exam reveals no gallop and no friction rub. No murmur heard. Pulmonary/Chest: Effort normal and breath sounds normal. No respiratory distress. He has no wheezes, rhonchi or rales. Abdominal: Soft, non-tender. Bowel sounds are normal. He exhibits no organomegaly, mass or bruit. Extremities: No edema, cyanosis, or clubbing. Pulses are 2+ radial/dorsalis pedis/posterior tibial/carotid bilaterally. Neurological: No gross cranial nerve deficit. Coordination normal.   Skin: Skin is warm and dry. There is no rash or diaphoresis. Psychiatric: He has a normal mood and affect.  His speech is normal and behavior is normal.     Lab Review:   Lab Results   Component Value Date/Time    TRIG 94 01/24/2023 08:29 AM    HDL 30 01/24/2023 08:29 AM    HDL 41 03/06/2012 08:55 AM    LDLCALC 60 01/24/2023 08:29 AM    LABVLDL 19 01/24/2023 08:29 AM      Lab Results   Component Value Date/Time    BUN 14 01/24/2023 08:29 AM    CREATININE 1.0 01/24/2023 08:29 AM       EKG Interpretation:   8/25/20: Sinus  Rhythm  - frequent multiform ectopic ventricular beats, # VECs = 2, # types 2, consider old anterior infarct,  -Nonspecific ST depression   +   Nonspecific T-abnormality  -Nondiagnostic. 9/10/20: sinus bradycardia, 57 bpm   12/7/20: Sinus  Bradycardia  - frequent ectopic ventricular beat s, # VECs = 3, ~57 bpm.   9/2/21: Sinus  Rhythm  -with ectopic ventricular couplets with Nonspecific ST depression  -Nondiagnostic. ST and T wave abnormalities in the inferior leads. Cannot rule out ischemia ~75 bpm.   6/24/22: SR   3/16/23    Image Review:     Chest CT 8/1/19  Atherosclerosis, including coronary artery calcification. Several indeterminate pulmonary nodules, measuring up to approximately 6 mm. Follow-up recommendations are as below. RECOMMENDATIONS:   Fleischner Society guidelines for follow-up and management of incidentally   detected pulmonary nodules:       Single Solid Nodule:       Nodule size less than 6 mm   In a low-risk patient, no routine follow-up. In a high-risk patient, optional CT at 12 months. Nodule size equals 6-8 mm   In a low-risk patient, CT at 6-12 months, then consider CT at 18-24 months. In a high-risk patient, CT at 6-12 months, then CT at 18-24 months. Nodule size greater than 8 mm           In a low-risk patient, consider CT at 3 months, PET/CT, or tissue sampling. In a high-risk patient, consider CT at 3 months, PET/CT, or tissue sampling. Multiple Solid Nodules:       Nodule size less than 6 mm   In a low-risk patient, no routine follow-up. In a high-risk patient, optional CT at 12 months. Nodule size equals 6-8 mm   In a low-risk patient, CT at 3-6 months, then consider CT at 18-24 months. In a high-risk patient, CT at 3-6 months, then CT at 18-24 months. Nodule size greater than 8 mm   In a low-risk patient, CT at 3-6 months, then consider CT at 18-24 months. In a high-risk patient, CT at 3-6 months, then CT at 18-24 months. - Low risk patients include individuals with minimal or absent history of   smoking and other known risk factors. - High risk patients include individuals with a history or smoking or known   risk factors. Stress test 5/14/19  Normal myocardial perfusion study. Normal myocardial perfusion. Normal LV size and systolic function. Overall findings represent a low risk study. ECHO 5/21/19  There is mild concentric left ventricular hypertrophy. Left ventricular cavity size is mildly dilated. Overall left ventricular systolic function appears mildly reduced. Ejection fraction is visually estimated to be 40-45%. Grade I diastolic dysfunction with normal LV filling pressures. The aortic root is mildly dilated & measures at 4.1 cms. Mildly dilated right ventricle. TAPSE 2.2cm, RV mid 4cm  IVC size is normal (<2.1cm) and collapses > 50% with respiration consistent  with normal RA pressure (3mmHg). [facility]: 8/25/20 per Dr. Higginbotham Bees:  1. There is single-vessel disease. 2.  Dominant RCA, 99% with LEYDI grade 1 flow. 3.  Left main:  Free of disease. 4. LAD has a 10-20% proximal lesion. 5.  Left circumflex:  No obstructive disease. CONCLUSION:  1. Dominant RCA has a 99% proximal stenosis with LEYDI grade 1 flow. 2.  Elevated left heart filling pressures. 3.  Normal left ventricular size and systolic function with ejection  fraction of 60%. CONCLUSION:  Successful PCI and stenting of the proximal RCA, lesion  reduced to 0%. Echo: 6/4/21  Summary  There is mildly increased left ventricular wall thickness. EF 50%. Indeterminate diastolic function. The left atrium is moderately dilated. Right ventricular systolic function is normal .  Trivial mitral, and tricuspid regurgitation. Stress study: 6/21/21  Summary    moderate size moderate severity fixed inferior wall defect consist with    prior inferior wall MI. no evidence of ischemia        Recommendation    Aggressive medical therapy for coronary artery disease. Stress Protocols     [facility]:7/15/21  CORONARY ANGIOGRAPHY:  1.   Left main trunk: It arises from the left sinus of Valsalva. It  divides into a left anterior descending artery and left circumflex  artery. Left main trunk is free of atherosclerosis. 2.  Left anterior descending artery: It is a moderate to large-sized  artery and it has an evidence of discrete 50 to 60% stenosis in the  proximal segment. Remainder of the left anterior descending artery  appears free of significant atherosclerosis. 3.  Left circumflex artery: It arises from the left main trunk. It is  a moderate to large-sized artery, gives rise to obtuse marginal branches  and right circumflex artery and its branches are free of  atherosclerosis. 4.  Right coronary artery: It arises from the right sinus of Valsalva. It is a large dominant artery and it gives rise to posterior descending  and posterior lateral branches. Right coronary artery has evidence of a  stent in the proximal to mid segment which is widely patent. There is a  mild disease distal to the stent but it is not hemodynamically  significant. OVERALL IMPRESSION:  1. Patent left main trunk. 2.  50 to 60% discrete stenosis of proximal left anterior descending artery. 3.  Patent circumflex artery and its branches. 4.  Widely patent stented proximal right coronary artery with mild   disease post stent which does not appear hemodynamically significant. 5.  Mild mid-inferior wall hypokinesis with an estimated EF of 50% with  normal left heart hemodynamics. In view of these findings, we will proceed first with the FFR of the  left anterior descending artery which is being performed by Dr. Beulah Solano  and further recommendations to follow the FFR.      Dr. Juan Carlos Ng 3.5 guide   Runthrough wire used to cross LAD lesion   FFR 0.81 ( physiologically non-significant )    SUMMARY: Nonobstructive CAD   RECOMMENDATION: - recommend aggressive medical therapy for CAD   - if symptoms persist recommend LAD PCI in the future      Coronary angiography: 12/2021  ANGIOGRAM/CORONARY ARTERIOGRAM:      The left main coronary artery is normal .   The left anterior descending artery has a proximal 80% lesion . The left circumflex artery is widely patent . The right coronary artery is widely patent   INTERVENTION  XB 3.5 guide   Allstar wire used to cross LAD lesion   IVUS passed from left main to mid LAD ( MLA< 2mm2; distal reference 3.5 mm)   Predilation performed with 3.0 regular balloon, 3.0 wolverine cutting balloon   [place] 3.5 X 18 mm JUAN deployed to prox LAD   Post dilation performed with 4.0 NC balloon   SUMMARY:   Single vessel obstructive CAD   S/p successful IVUS guided PCI X 1 JUAN       Stress test 7/14/2022   Pharmacological Stress/MPI Results:        1. Technically a satisfactory study. 2. No evidence of Ischemia by Myocardial Perfusion Imaging. 3. Gated Study shows Dilated LV : EF 46 %. L/RHC 12/8/2022  OVERALL IMPRESSION:  1. Normal right heart pressures. 2.  Normal pulmonary capillary wedge pressure. 3.  Normal cardiac output and indices. 4.  No O2 step-up to suggest ASD/PFO. 5.  Patent left main trunk. 6.  Patent stent at proximal LAD. 7.  Patent left circumflex artery. 8.  Patent stent at mid RCA. 9.  Mild left ventricular systolic dysfunction. Estimated EF of 45%  with normal LVEDP. No gradient across the aortic valve to suggest  aortic stenosis. In view of these above findings, we will consider the patient's chest  pain and shortness of breath, noncardiac in origin. Assessment/Plan:     CAD   -8/25/2020 s/p successful PCI and stenting of proximal RCA, 99% to 0% with LEYDI 1 by Dr. Katerin Berrios.     -7/15/2021 repeat coronary angiography which revealed 50-60% discrete stenosis prox LAD, Dr. Janae Torrez performed FFR revealing non obstructive CAD with plan for aggressive medical therapy. -12/2021 LAD proximal 80% stenosis s/p successful IVUS guided PCI x1 JUAN per Dr. Janae Torrez.    11/22/22 office visit with complaints of increased dyspnea and chest discomfort, decrease metoprolol to 12.5 mg daily due to bradycardia.   -In view of increased dyspnea recommend coronary angiogram. RHC/LHC 12/8/2022 LVEF 45% with normal LVEDP otherwise normal RHC and patent coronary arteries-CP and SOB noncardiac in origin. D-Dimer was also negative. Today, he continues with the same chronic VALVERDE. Again reviewed L/RHC completed 12/8/2022 as stated above at length with patient and wife. He will continue Asa, Effient, B-blocker and statin therapy. -encouraged to go ahead with sleep medicine -Dr. Oscar Yadav.   -encouraged weight loss. -encouraged slowly increasing     Mild left ventricular systolic dysfunction   -Per L/RHC otherwise normal [facility] 12/8/2023.   -SOB Non-cardiac in origin.   -He has been evaluated per pulmonary-Dr. Nora Muse for mild emphysema but SOB thought to be out of proportion.   -we discussed weight loss   -he is on methotrexate and encouraged to f/u with PCP and Dr. Nora Muse. Sleep apnea  -we again had a long discussion and encouraged him to follow up with sleep study. New referral again placed. PAC/PVC's   -7 day CAM monitor 9/2021 PAC, PVC, AFIB, Flutter with EP consult per Dr. Kristin Chacko 9/2021-revealed PAC's and PVC's both of which are no longer than 6-8 beats. Bradycardia  -Patient has evidence of significant sinus bradycardia ~45 bpm with premature heart beats on the EKG 11/2022. We reduced his metoprolol to 2.5 mg daily and if his symptoms of dizziness persist, will consider obtaining 30-day event monitor after he completes his cardiac work-up    Hypertension, essential   -Controlled today and stable. -BMP stable 1/24/23 wnl     Hyperlipidemia, unspecified   -Lipitor 40 mg nightly   -LDLc 60, ALT 8, AST 10 1/24/23. Chronic fatigue  -TSH 7/25/22  -CBC remains abnormal but stable 1/24/23    Former smoker  -Quit ~20-30 years ago but reports he was a heavy smoker. Encouraged continued smoking cessation. Obesity  -Encouraged weight loss    Follow up 6 months     Thank you very much for allowing me to participate in the care of your patient. Please do not hesitate to contact me if you have any questions. Sincerely,  Ruben Nowak MD      [facility], [address] [place] [address] [facility]idiK[facility] CenterCameron morales [place] [address]  Ph: [phone]  Fax: [phone]    This note was scribed in the presence of Dr. Jolie Thomas MD by Minerva Carlos RN. Physician Attestation:  The scribes documentation has been prepared under my direction and personally reviewed by me in its entirety. I confirm that the note above accurately reflects all work, treatment, procedures, and medical decision making performed by me.

## 2023-03-20 ENCOUNTER — TELEPHONE (OUTPATIENT)
Dept: CARDIOLOGY CLINIC | Age: 68
End: 2023-03-20

## 2023-03-20 NOTE — TELEPHONE ENCOUNTER
Anuj Batista called in stating that he needs to ask Jose Gandara, a question. I asked if I can help and he stated NO.       Anuj Batista can be reached at [phone]

## 2023-03-20 NOTE — TELEPHONE ENCOUNTER
Patient was told that he had MVP back in lat 80's early 90's but has not been told since that time with echo dating back 2012 with no evidence of MVP. Remains on beta-blocker therapy.

## 2023-04-20 ENCOUNTER — OFFICE VISIT (OUTPATIENT)
Dept: SLEEP MEDICINE | Age: 68
End: 2023-04-20

## 2023-04-20 VITALS
OXYGEN SATURATION: 96 % | DIASTOLIC BLOOD PRESSURE: 70 MMHG | HEART RATE: 48 BPM | SYSTOLIC BLOOD PRESSURE: 115 MMHG | TEMPERATURE: 97.7 F | BODY MASS INDEX: 33.97 KG/M2 | HEIGHT: 75 IN | WEIGHT: 273.2 LBS | RESPIRATION RATE: 18 BRPM

## 2023-04-20 DIAGNOSIS — G47.19 EXCESSIVE DAYTIME SLEEPINESS: ICD-10-CM

## 2023-04-20 DIAGNOSIS — R06.83 SNORING: ICD-10-CM

## 2023-04-20 DIAGNOSIS — E66.9 OBESITY (BMI 30.0-34.9): ICD-10-CM

## 2023-04-20 DIAGNOSIS — I10 PRIMARY HYPERTENSION: ICD-10-CM

## 2023-04-20 DIAGNOSIS — Z86.79 H/O ISCHEMIC HEART DISEASE: ICD-10-CM

## 2023-04-20 DIAGNOSIS — G47.33 OSA (OBSTRUCTIVE SLEEP APNEA): Primary | ICD-10-CM

## 2023-04-20 DIAGNOSIS — R06.81 WITNESSED EPISODE OF APNEA: ICD-10-CM

## 2023-04-20 ASSESSMENT — SLEEP AND FATIGUE QUESTIONNAIRES
HOW LIKELY ARE YOU TO NOD OFF OR FALL ASLEEP WHILE SITTING QUIETLY AFTER LUNCH WITHOUT ALCOHOL: 2
ESS TOTAL SCORE: 10
HOW LIKELY ARE YOU TO NOD OFF OR FALL ASLEEP WHEN YOU ARE A PASSENGER IN A CAR FOR AN HOUR WITHOUT A BREAK: 0
HOW LIKELY ARE YOU TO NOD OFF OR FALL ASLEEP WHILE SITTING INACTIVE IN A PUBLIC PLACE: 0
HOW LIKELY ARE YOU TO NOD OFF OR FALL ASLEEP WHILE LYING DOWN TO REST IN THE AFTERNOON WHEN CIRCUMSTANCES PERMIT: 3
HOW LIKELY ARE YOU TO NOD OFF OR FALL ASLEEP WHILE SITTING AND READING: 2
HOW LIKELY ARE YOU TO NOD OFF OR FALL ASLEEP IN A CAR, WHILE STOPPED FOR A FEW MINUTES IN TRAFFIC: 0
HOW LIKELY ARE YOU TO NOD OFF OR FALL ASLEEP WHILE SITTING AND TALKING TO SOMEONE: 0
NECK CIRCUMFERENCE (INCHES): 20.5
HOW LIKELY ARE YOU TO NOD OFF OR FALL ASLEEP WHILE WATCHING TV: 3

## 2023-04-20 ASSESSMENT — ENCOUNTER SYMPTOMS
EYES NEGATIVE: 1
ALLERGIC/IMMUNOLOGIC NEGATIVE: 1
APNEA: 1
GASTROINTESTINAL NEGATIVE: 1
SHORTNESS OF BREATH: 1

## 2023-04-20 NOTE — PATIENT INSTRUCTIONS
Orders Placed This Encounter   Procedures    Home Sleep Study     Standing Status:   Future     Standing Expiration Date:   4/20/2024     Order Specific Question:   Location For Sleep Study     Answer:   [place]     Order Specific Question:   Select Sleep Lab Location     Answer:   [facility]    Sleep Study with PAP Titration     Standing Status:   Future     Standing Expiration Date:   4/20/2024     Order Specific Question:   Sleep Study Titration Type     Answer:   CPAP     Order Specific Question:   Location For Sleep Study     Answer:   [place]     Order Specific Question:   Select Sleep Lab Location     Answer:   [facility]

## 2023-06-15 ENCOUNTER — TELEPHONE (OUTPATIENT)
Dept: CARDIOLOGY CLINIC | Age: 68
End: 2023-06-15

## 2023-07-11 DIAGNOSIS — M06.00 RHEUMATOID ARTHRITIS WITH NEGATIVE RHEUMATOID FACTOR, INVOLVING UNSPECIFIED SITE (HCC): ICD-10-CM

## 2023-07-11 DIAGNOSIS — Z79.899 ENCOUNTER FOR LONG-TERM (CURRENT) USE OF HIGH-RISK MEDICATION: ICD-10-CM

## 2023-07-11 DIAGNOSIS — Z51.81 ENCOUNTER FOR THERAPEUTIC DRUG MONITORING: ICD-10-CM

## 2023-07-13 RX ORDER — PRASUGREL 10 MG/1
TABLET, FILM COATED ORAL
Qty: 30 TABLET | Refills: 2 | Status: SHIPPED | OUTPATIENT
Start: 2023-07-13

## 2023-07-13 NOTE — TELEPHONE ENCOUNTER
Last OV: 3/16/23  Next OV: 9/27/23  Last refill: 1/11/23  #30  5 R/F  Most recent Labs: 1/24/23  Last EKG (if needed): 11/22/22

## 2023-09-07 RX ORDER — ATORVASTATIN CALCIUM 40 MG/1
TABLET, FILM COATED ORAL
Qty: 90 TABLET | Refills: 3 | Status: SHIPPED | OUTPATIENT
Start: 2023-09-07

## 2023-09-07 NOTE — TELEPHONE ENCOUNTER
Last OV: 3/16/23  Last labs: 23  Last EK22  Appt scheduled : 23          atorvastatin (LIPITOR) 40 MG tablet 90 tablet 3 2022     Sig: TAKE ONE TABLET BY MOUTH ONCE NIGHTLY    Sent to pharmacy as:  Atorvastatin Calcium 40 MG Oral Tablet (LIPITOR)    Cosign for Ordering: Accepted by Yoshi Fofana MD on 2022  2:20 PM    E-Prescribing Status: Receipt confirmed by pharmacy (2022  3:53 PM EDT)

## 2023-09-27 ENCOUNTER — HOSPITAL ENCOUNTER (INPATIENT)
Age: 68
LOS: 2 days | Discharge: HOME | End: 2023-09-29
Attending: EMERGENCY MEDICINE | Admitting: INTERNAL MEDICINE
Payer: COMMERCIAL

## 2023-09-27 ENCOUNTER — APPOINTMENT (OUTPATIENT)
Dept: GENERAL RADIOLOGY | Age: 68
End: 2023-09-27
Payer: COMMERCIAL

## 2023-09-27 ENCOUNTER — OFFICE VISIT (OUTPATIENT)
Dept: CARDIOLOGY CLINIC | Age: 68
End: 2023-09-27
Payer: COMMERCIAL

## 2023-09-27 VITALS
OXYGEN SATURATION: 96 % | SYSTOLIC BLOOD PRESSURE: 110 MMHG | HEIGHT: 75 IN | HEART RATE: 94 BPM | WEIGHT: 275 LBS | BODY MASS INDEX: 34.19 KG/M2 | DIASTOLIC BLOOD PRESSURE: 66 MMHG

## 2023-09-27 DIAGNOSIS — E78.2 MIXED HYPERLIPIDEMIA: ICD-10-CM

## 2023-09-27 DIAGNOSIS — E66.9 CLASS 1 OBESITY WITH SERIOUS COMORBIDITY AND BODY MASS INDEX (BMI) OF 34.0 TO 34.9 IN ADULT, UNSPECIFIED OBESITY TYPE: ICD-10-CM

## 2023-09-27 DIAGNOSIS — Z87.891 FORMER SMOKER: ICD-10-CM

## 2023-09-27 DIAGNOSIS — R07.9 CHEST PAIN, UNSPECIFIED TYPE: Primary | ICD-10-CM

## 2023-09-27 DIAGNOSIS — G47.30 SLEEP APNEA, UNSPECIFIED TYPE: ICD-10-CM

## 2023-09-27 DIAGNOSIS — R53.82 CHRONIC FATIGUE: ICD-10-CM

## 2023-09-27 DIAGNOSIS — I10 ESSENTIAL HYPERTENSION: ICD-10-CM

## 2023-09-27 DIAGNOSIS — R06.02 SHORTNESS OF BREATH: ICD-10-CM

## 2023-09-27 DIAGNOSIS — I49.3 PVC (PREMATURE VENTRICULAR CONTRACTION): ICD-10-CM

## 2023-09-27 DIAGNOSIS — I25.119 CORONARY ARTERY DISEASE INVOLVING NATIVE CORONARY ARTERY OF NATIVE HEART WITH ANGINA PECTORIS (HCC): Primary | ICD-10-CM

## 2023-09-27 DIAGNOSIS — I49.1 PAC (PREMATURE ATRIAL CONTRACTION): ICD-10-CM

## 2023-09-27 DIAGNOSIS — I47.29 NSVT (NONSUSTAINED VENTRICULAR TACHYCARDIA) (HCC): ICD-10-CM

## 2023-09-27 DIAGNOSIS — R07.89 CHEST PRESSURE: ICD-10-CM

## 2023-09-27 LAB
ALBUMIN SERPL-MCNC: 4.6 G/DL (ref 3.4–5)
ALBUMIN/GLOB SERPL: 2.1 {RATIO} (ref 1.1–2.2)
ALP SERPL-CCNC: 98 U/L (ref 40–129)
ALT SERPL-CCNC: 11 U/L (ref 10–40)
ANION GAP SERPL CALCULATED.3IONS-SCNC: 11 MMOL/L (ref 3–16)
ANISOCYTOSIS BLD QL SMEAR: ABNORMAL
ANTI-XA UNFRAC HEPARIN: 0.1 IU/ML (ref 0.3–0.7)
ANTI-XA UNFRAC HEPARIN: <0.1 IU/ML (ref 0.3–0.7)
APTT BLD: 31.3 SEC (ref 22.7–35.9)
APTT BLD: 31.5 SEC (ref 22.7–35.9)
AST SERPL-CCNC: 15 U/L (ref 15–37)
BASE EXCESS BLDV CALC-SCNC: -0.5 MMOL/L
BASOPHILS # BLD: 0 K/UL (ref 0–0.2)
BASOPHILS NFR BLD: 0.5 %
BILIRUB SERPL-MCNC: 0.6 MG/DL (ref 0–1)
BUN SERPL-MCNC: 10 MG/DL (ref 7–20)
CALCIUM SERPL-MCNC: 8.6 MG/DL (ref 8.3–10.6)
CHLORIDE SERPL-SCNC: 104 MMOL/L (ref 99–110)
CO2 BLDV-SCNC: 26 MMOL/L
CO2 SERPL-SCNC: 25 MMOL/L (ref 21–32)
COHGB MFR BLDV: 1.6 %
CREAT SERPL-MCNC: 1.1 MG/DL (ref 0.8–1.3)
D DIMER: 0.6 UG/ML FEU (ref 0–0.6)
DEPRECATED RDW RBC AUTO: 18 % (ref 12.4–15.4)
EKG ATRIAL RATE: 28 BPM
EKG ATRIAL RATE: 79 BPM
EKG DIAGNOSIS: NORMAL
EKG DIAGNOSIS: NORMAL
EKG P AXIS: 42 DEGREES
EKG P-R INTERVAL: 170 MS
EKG Q-T INTERVAL: 382 MS
EKG Q-T INTERVAL: 392 MS
EKG QRS DURATION: 84 MS
EKG QRS DURATION: 90 MS
EKG QTC CALCULATION (BAZETT): 452 MS
EKG QTC CALCULATION (BAZETT): 487 MS
EKG R AXIS: 30 DEGREES
EKG R AXIS: 65 DEGREES
EKG T AXIS: 25 DEGREES
EKG T AXIS: 270 DEGREES
EKG VENTRICULAR RATE: 80 BPM
EKG VENTRICULAR RATE: 98 BPM
EOSINOPHIL # BLD: 0.1 K/UL (ref 0–0.6)
EOSINOPHIL NFR BLD: 1 %
GFR SERPLBLD CREATININE-BSD FMLA CKD-EPI: >60 ML/MIN/{1.73_M2}
GLUCOSE SERPL-MCNC: 166 MG/DL (ref 70–99)
HCO3 BLDV-SCNC: 25 MMOL/L (ref 23–29)
HCT VFR BLD AUTO: 38.5 % (ref 40.5–52.5)
HGB BLD-MCNC: 12.3 G/DL (ref 13.5–17.5)
LYMPHOCYTES # BLD: 1.4 K/UL (ref 1–5.1)
LYMPHOCYTES NFR BLD: 21 %
MCH RBC QN AUTO: 28.1 PG (ref 26–34)
MCHC RBC AUTO-ENTMCNC: 32.1 G/DL (ref 31–36)
MCV RBC AUTO: 87.5 FL (ref 80–100)
METHGB MFR BLDV: 0.9 %
MONOCYTES # BLD: 1.1 K/UL (ref 0–1.3)
MONOCYTES NFR BLD: 16.5 %
NEUTROPHILS # BLD: 4.1 K/UL (ref 1.7–7.7)
NEUTROPHILS NFR BLD: 61 %
NT-PROBNP SERPL-MCNC: 529 PG/ML (ref 0–124)
O2 THERAPY: NORMAL
OVALOCYTES BLD QL SMEAR: ABNORMAL
PCO2 BLDV: 43.8 MMHG (ref 40–50)
PH BLDV: 7.37 [PH] (ref 7.35–7.45)
PLATELET # BLD AUTO: 97 K/UL (ref 135–450)
PLATELET BLD QL SMEAR: ABNORMAL
PMV BLD AUTO: 10.5 FL (ref 5–10.5)
PO2 BLDV: <30 MMHG
POIKILOCYTOSIS BLD QL SMEAR: ABNORMAL
POTASSIUM SERPL-SCNC: 4.4 MMOL/L (ref 3.5–5.1)
PROT SERPL-MCNC: 6.8 G/DL (ref 6.4–8.2)
RBC # BLD AUTO: 4.4 M/UL (ref 4.2–5.9)
SAO2 % BLDV: 48 %
SLIDE REVIEW: ABNORMAL
SODIUM SERPL-SCNC: 140 MMOL/L (ref 136–145)
TROPONIN, HIGH SENSITIVITY: 12 NG/L (ref 0–22)
TROPONIN, HIGH SENSITIVITY: 13 NG/L (ref 0–22)
TROPONIN, HIGH SENSITIVITY: 13 NG/L (ref 0–22)
TROPONIN, HIGH SENSITIVITY: 15 NG/L (ref 0–22)
WBC # BLD AUTO: 6.7 K/UL (ref 4–11)

## 2023-09-27 PROCEDURE — 93010 ELECTROCARDIOGRAM REPORT: CPT | Performed by: INTERNAL MEDICINE

## 2023-09-27 PROCEDURE — 93005 ELECTROCARDIOGRAM TRACING: CPT | Performed by: EMERGENCY MEDICINE

## 2023-09-27 PROCEDURE — 2060000000 HC ICU INTERMEDIATE R&B

## 2023-09-27 PROCEDURE — 96376 TX/PRO/DX INJ SAME DRUG ADON: CPT

## 2023-09-27 PROCEDURE — 2580000003 HC RX 258: Performed by: EMERGENCY MEDICINE

## 2023-09-27 PROCEDURE — 96374 THER/PROPH/DIAG INJ IV PUSH: CPT

## 2023-09-27 PROCEDURE — 2580000003 HC RX 258: Performed by: INTERNAL MEDICINE

## 2023-09-27 PROCEDURE — 6370000000 HC RX 637 (ALT 250 FOR IP): Performed by: INTERNAL MEDICINE

## 2023-09-27 PROCEDURE — 6360000002 HC RX W HCPCS: Performed by: EMERGENCY MEDICINE

## 2023-09-27 PROCEDURE — 99285 EMERGENCY DEPT VISIT HI MDM: CPT

## 2023-09-27 PROCEDURE — 83880 ASSAY OF NATRIURETIC PEPTIDE: CPT

## 2023-09-27 PROCEDURE — 99214 OFFICE O/P EST MOD 30 MIN: CPT | Performed by: INTERNAL MEDICINE

## 2023-09-27 PROCEDURE — 84484 ASSAY OF TROPONIN QUANT: CPT

## 2023-09-27 PROCEDURE — 3074F SYST BP LT 130 MM HG: CPT | Performed by: INTERNAL MEDICINE

## 2023-09-27 PROCEDURE — 1036F TOBACCO NON-USER: CPT | Performed by: INTERNAL MEDICINE

## 2023-09-27 PROCEDURE — 3017F COLORECTAL CA SCREEN DOC REV: CPT | Performed by: INTERNAL MEDICINE

## 2023-09-27 PROCEDURE — G8417 CALC BMI ABV UP PARAM F/U: HCPCS | Performed by: INTERNAL MEDICINE

## 2023-09-27 PROCEDURE — 2500000003 HC RX 250 WO HCPCS: Performed by: EMERGENCY MEDICINE

## 2023-09-27 PROCEDURE — 82803 BLOOD GASES ANY COMBINATION: CPT

## 2023-09-27 PROCEDURE — 80053 COMPREHEN METABOLIC PANEL: CPT

## 2023-09-27 PROCEDURE — 6370000000 HC RX 637 (ALT 250 FOR IP): Performed by: EMERGENCY MEDICINE

## 2023-09-27 PROCEDURE — 99291 CRITICAL CARE FIRST HOUR: CPT

## 2023-09-27 PROCEDURE — G8427 DOCREV CUR MEDS BY ELIG CLIN: HCPCS | Performed by: INTERNAL MEDICINE

## 2023-09-27 PROCEDURE — 96375 TX/PRO/DX INJ NEW DRUG ADDON: CPT

## 2023-09-27 PROCEDURE — 1123F ACP DISCUSS/DSCN MKR DOCD: CPT | Performed by: INTERNAL MEDICINE

## 2023-09-27 PROCEDURE — 85025 COMPLETE CBC W/AUTO DIFF WBC: CPT

## 2023-09-27 PROCEDURE — 85730 THROMBOPLASTIN TIME PARTIAL: CPT

## 2023-09-27 PROCEDURE — 3078F DIAST BP <80 MM HG: CPT | Performed by: INTERNAL MEDICINE

## 2023-09-27 PROCEDURE — 71045 X-RAY EXAM CHEST 1 VIEW: CPT

## 2023-09-27 PROCEDURE — 94760 N-INVAS EAR/PLS OXIMETRY 1: CPT

## 2023-09-27 PROCEDURE — 36415 COLL VENOUS BLD VENIPUNCTURE: CPT

## 2023-09-27 PROCEDURE — 85379 FIBRIN DEGRADATION QUANT: CPT

## 2023-09-27 PROCEDURE — 93000 ELECTROCARDIOGRAM COMPLETE: CPT | Performed by: INTERNAL MEDICINE

## 2023-09-27 PROCEDURE — 85520 HEPARIN ASSAY: CPT

## 2023-09-27 PROCEDURE — 96365 THER/PROPH/DIAG IV INF INIT: CPT

## 2023-09-27 RX ORDER — FOLIC ACID 1 MG/1
1 TABLET ORAL NIGHTLY
Status: DISCONTINUED | OUTPATIENT
Start: 2023-09-27 | End: 2023-09-29 | Stop reason: HOSPADM

## 2023-09-27 RX ORDER — LOSARTAN POTASSIUM 25 MG/1
25 TABLET ORAL DAILY
Status: DISCONTINUED | OUTPATIENT
Start: 2023-09-28 | End: 2023-09-29 | Stop reason: HOSPADM

## 2023-09-27 RX ORDER — METHOCARBAMOL 500 MG/1
500 TABLET, FILM COATED ORAL 4 TIMES DAILY PRN
Status: DISCONTINUED | OUTPATIENT
Start: 2023-09-27 | End: 2023-09-29 | Stop reason: HOSPADM

## 2023-09-27 RX ORDER — HEPARIN SODIUM 1000 [USP'U]/ML
4000 INJECTION, SOLUTION INTRAVENOUS; SUBCUTANEOUS ONCE
Status: COMPLETED | OUTPATIENT
Start: 2023-09-27 | End: 2023-09-27

## 2023-09-27 RX ORDER — MONTELUKAST SODIUM 10 MG/1
10 TABLET ORAL
Status: DISCONTINUED | OUTPATIENT
Start: 2023-09-27 | End: 2023-09-29 | Stop reason: HOSPADM

## 2023-09-27 RX ORDER — ONDANSETRON 2 MG/ML
4 INJECTION INTRAMUSCULAR; INTRAVENOUS ONCE
Status: COMPLETED | OUTPATIENT
Start: 2023-09-27 | End: 2023-09-27

## 2023-09-27 RX ORDER — MORPHINE SULFATE 4 MG/ML
4 INJECTION, SOLUTION INTRAMUSCULAR; INTRAVENOUS
Status: DISCONTINUED | OUTPATIENT
Start: 2023-09-27 | End: 2023-09-29 | Stop reason: HOSPADM

## 2023-09-27 RX ORDER — ASPIRIN 81 MG/1
81 TABLET ORAL DAILY
Status: DISCONTINUED | OUTPATIENT
Start: 2023-09-28 | End: 2023-09-29 | Stop reason: HOSPADM

## 2023-09-27 RX ORDER — ACETAMINOPHEN 325 MG/1
650 TABLET ORAL EVERY 6 HOURS PRN
Status: DISCONTINUED | OUTPATIENT
Start: 2023-09-27 | End: 2023-09-29 | Stop reason: HOSPADM

## 2023-09-27 RX ORDER — NITROGLYCERIN 0.4 MG/1
0.4 TABLET SUBLINGUAL EVERY 5 MIN PRN
Status: DISCONTINUED | OUTPATIENT
Start: 2023-09-27 | End: 2023-09-29 | Stop reason: HOSPADM

## 2023-09-27 RX ORDER — ONDANSETRON 2 MG/ML
4 INJECTION INTRAMUSCULAR; INTRAVENOUS EVERY 6 HOURS PRN
Status: DISCONTINUED | OUTPATIENT
Start: 2023-09-27 | End: 2023-09-29 | Stop reason: HOSPADM

## 2023-09-27 RX ORDER — ATORVASTATIN CALCIUM 80 MG/1
80 TABLET, FILM COATED ORAL NIGHTLY
Status: DISCONTINUED | OUTPATIENT
Start: 2023-09-27 | End: 2023-09-27

## 2023-09-27 RX ORDER — CETIRIZINE HYDROCHLORIDE 10 MG/1
10 TABLET ORAL DAILY
Status: DISCONTINUED | OUTPATIENT
Start: 2023-09-28 | End: 2023-09-29 | Stop reason: HOSPADM

## 2023-09-27 RX ORDER — HEPARIN SODIUM 1000 [USP'U]/ML
4000 INJECTION, SOLUTION INTRAVENOUS; SUBCUTANEOUS PRN
Status: DISCONTINUED | OUTPATIENT
Start: 2023-09-27 | End: 2023-09-27

## 2023-09-27 RX ORDER — METOPROLOL SUCCINATE 25 MG/1
12.5 TABLET, EXTENDED RELEASE ORAL
Status: DISCONTINUED | OUTPATIENT
Start: 2023-09-27 | End: 2023-09-29

## 2023-09-27 RX ORDER — POLYETHYLENE GLYCOL 3350 17 G/17G
17 POWDER, FOR SOLUTION ORAL DAILY PRN
Status: DISCONTINUED | OUTPATIENT
Start: 2023-09-27 | End: 2023-09-29 | Stop reason: HOSPADM

## 2023-09-27 RX ORDER — SODIUM CHLORIDE 9 MG/ML
INJECTION, SOLUTION INTRAVENOUS PRN
Status: DISCONTINUED | OUTPATIENT
Start: 2023-09-27 | End: 2023-09-29 | Stop reason: HOSPADM

## 2023-09-27 RX ORDER — HEPARIN SODIUM 10000 [USP'U]/100ML
0-4000 INJECTION, SOLUTION INTRAVENOUS CONTINUOUS
Status: DISCONTINUED | OUTPATIENT
Start: 2023-09-27 | End: 2023-09-28

## 2023-09-27 RX ORDER — ASPIRIN 81 MG/1
243 TABLET, CHEWABLE ORAL ONCE
Status: COMPLETED | OUTPATIENT
Start: 2023-09-27 | End: 2023-09-27

## 2023-09-27 RX ORDER — OXYCODONE AND ACETAMINOPHEN 10; 325 MG/1; MG/1
1 TABLET ORAL EVERY 4 HOURS PRN
Status: DISCONTINUED | OUTPATIENT
Start: 2023-09-27 | End: 2023-09-29 | Stop reason: HOSPADM

## 2023-09-27 RX ORDER — ASPIRIN 81 MG/1
81 TABLET, CHEWABLE ORAL DAILY
Status: DISCONTINUED | OUTPATIENT
Start: 2023-09-28 | End: 2023-09-27

## 2023-09-27 RX ORDER — DULOXETIN HYDROCHLORIDE 60 MG/1
60 CAPSULE, DELAYED RELEASE ORAL DAILY
Status: DISCONTINUED | OUTPATIENT
Start: 2023-09-28 | End: 2023-09-29 | Stop reason: HOSPADM

## 2023-09-27 RX ORDER — ATORVASTATIN CALCIUM 40 MG/1
40 TABLET, FILM COATED ORAL NIGHTLY
Status: DISCONTINUED | OUTPATIENT
Start: 2023-09-27 | End: 2023-09-29 | Stop reason: HOSPADM

## 2023-09-27 RX ORDER — FINASTERIDE 5 MG/1
5 TABLET, FILM COATED ORAL DAILY
Status: DISCONTINUED | OUTPATIENT
Start: 2023-09-28 | End: 2023-09-29 | Stop reason: HOSPADM

## 2023-09-27 RX ORDER — OXYCODONE AND ACETAMINOPHEN 10; 325 MG/1; MG/1
1 TABLET ORAL EVERY 4 HOURS PRN
COMMUNITY

## 2023-09-27 RX ORDER — BUPROPION HYDROCHLORIDE 150 MG/1
150 TABLET ORAL EVERY MORNING
Status: DISCONTINUED | OUTPATIENT
Start: 2023-09-28 | End: 2023-09-29 | Stop reason: HOSPADM

## 2023-09-27 RX ORDER — GABAPENTIN 300 MG/1
300 CAPSULE ORAL NIGHTLY
Status: DISCONTINUED | OUTPATIENT
Start: 2023-09-27 | End: 2023-09-29 | Stop reason: HOSPADM

## 2023-09-27 RX ORDER — SODIUM CHLORIDE 0.9 % (FLUSH) 0.9 %
5-40 SYRINGE (ML) INJECTION PRN
Status: DISCONTINUED | OUTPATIENT
Start: 2023-09-27 | End: 2023-09-29 | Stop reason: HOSPADM

## 2023-09-27 RX ORDER — AMITRIPTYLINE HYDROCHLORIDE 50 MG/1
50 TABLET, FILM COATED ORAL NIGHTLY
Status: DISCONTINUED | OUTPATIENT
Start: 2023-09-27 | End: 2023-09-29 | Stop reason: HOSPADM

## 2023-09-27 RX ORDER — ACETAMINOPHEN 650 MG/1
650 SUPPOSITORY RECTAL EVERY 6 HOURS PRN
Status: DISCONTINUED | OUTPATIENT
Start: 2023-09-27 | End: 2023-09-29 | Stop reason: HOSPADM

## 2023-09-27 RX ORDER — OXYCODONE HCL/ACETAMINOPHEN 10MG-325MG
TABLET ORAL
COMMUNITY
Start: 2023-09-09 | End: 2023-09-27

## 2023-09-27 RX ORDER — HEPARIN SODIUM 1000 [USP'U]/ML
2000 INJECTION, SOLUTION INTRAVENOUS; SUBCUTANEOUS PRN
Status: DISCONTINUED | OUTPATIENT
Start: 2023-09-27 | End: 2023-09-27

## 2023-09-27 RX ORDER — PANTOPRAZOLE SODIUM 40 MG/1
40 TABLET, DELAYED RELEASE ORAL
Status: DISCONTINUED | OUTPATIENT
Start: 2023-09-28 | End: 2023-09-29 | Stop reason: HOSPADM

## 2023-09-27 RX ORDER — ONDANSETRON 4 MG/1
4 TABLET, ORALLY DISINTEGRATING ORAL EVERY 8 HOURS PRN
Status: DISCONTINUED | OUTPATIENT
Start: 2023-09-27 | End: 2023-09-29 | Stop reason: HOSPADM

## 2023-09-27 RX ORDER — SODIUM CHLORIDE 0.9 % (FLUSH) 0.9 %
5-40 SYRINGE (ML) INJECTION EVERY 12 HOURS SCHEDULED
Status: DISCONTINUED | OUTPATIENT
Start: 2023-09-27 | End: 2023-09-29 | Stop reason: HOSPADM

## 2023-09-27 RX ORDER — PRASUGREL 10 MG/1
10 TABLET, FILM COATED ORAL
Status: DISCONTINUED | OUTPATIENT
Start: 2023-09-27 | End: 2023-09-29

## 2023-09-27 RX ORDER — HEPARIN SODIUM 1000 [USP'U]/ML
2000 INJECTION, SOLUTION INTRAVENOUS; SUBCUTANEOUS ONCE
Status: COMPLETED | OUTPATIENT
Start: 2023-09-27 | End: 2023-09-27

## 2023-09-27 RX ORDER — SODIUM CHLORIDE 9 MG/ML
INJECTION, SOLUTION INTRAVENOUS CONTINUOUS
Status: DISCONTINUED | OUTPATIENT
Start: 2023-09-27 | End: 2023-09-29 | Stop reason: HOSPADM

## 2023-09-27 RX ORDER — LORAZEPAM 0.5 MG/1
0.5 TABLET ORAL EVERY 4 HOURS PRN
Status: DISCONTINUED | OUTPATIENT
Start: 2023-09-27 | End: 2023-09-29 | Stop reason: HOSPADM

## 2023-09-27 RX ADMIN — FOLIC ACID 1 MG: 1 TABLET ORAL at 21:10

## 2023-09-27 RX ADMIN — AMIODARONE HYDROCHLORIDE 150 MG: 1.5 INJECTION, SOLUTION INTRAVENOUS at 10:33

## 2023-09-27 RX ADMIN — ATORVASTATIN CALCIUM 40 MG: 40 TABLET, FILM COATED ORAL at 21:10

## 2023-09-27 RX ADMIN — AMIODARONE HYDROCHLORIDE 1 MG/MIN: 50 INJECTION, SOLUTION INTRAVENOUS at 11:00

## 2023-09-27 RX ADMIN — AMITRIPTYLINE HYDROCHLORIDE 50 MG: 50 TABLET, FILM COATED ORAL at 21:10

## 2023-09-27 RX ADMIN — ASPIRIN 243 MG: 81 TABLET, CHEWABLE ORAL at 10:18

## 2023-09-27 RX ADMIN — SODIUM CHLORIDE: 9 INJECTION, SOLUTION INTRAVENOUS at 15:06

## 2023-09-27 RX ADMIN — HEPARIN SODIUM 1000 UNITS/HR: 10000 INJECTION, SOLUTION INTRAVENOUS at 12:37

## 2023-09-27 RX ADMIN — HEPARIN SODIUM 4000 UNITS: 1000 INJECTION INTRAVENOUS; SUBCUTANEOUS at 12:33

## 2023-09-27 RX ADMIN — ONDANSETRON 4 MG: 2 INJECTION INTRAMUSCULAR; INTRAVENOUS at 11:35

## 2023-09-27 RX ADMIN — AMIODARONE HYDROCHLORIDE 0.5 MG/MIN: 50 INJECTION, SOLUTION INTRAVENOUS at 20:24

## 2023-09-27 RX ADMIN — NITROGLYCERIN 0.5 INCH: 20 OINTMENT TOPICAL at 17:44

## 2023-09-27 RX ADMIN — PRASUGREL 10 MG: 10 TABLET, FILM COATED ORAL at 17:45

## 2023-09-27 RX ADMIN — METOPROLOL SUCCINATE 12.5 MG: 25 TABLET, EXTENDED RELEASE ORAL at 17:44

## 2023-09-27 RX ADMIN — HEPARIN SODIUM 2000 UNITS: 1000 INJECTION INTRAVENOUS; SUBCUTANEOUS at 20:20

## 2023-09-27 RX ADMIN — GABAPENTIN 300 MG: 300 CAPSULE ORAL at 21:10

## 2023-09-27 RX ADMIN — MONTELUKAST 10 MG: 10 TABLET, FILM COATED ORAL at 17:44

## 2023-09-27 ASSESSMENT — LIFESTYLE VARIABLES
HOW MANY STANDARD DRINKS CONTAINING ALCOHOL DO YOU HAVE ON A TYPICAL DAY: 1 OR 2
HOW OFTEN DO YOU HAVE A DRINK CONTAINING ALCOHOL: MONTHLY OR LESS

## 2023-09-27 ASSESSMENT — PAIN SCALES - GENERAL
PAINLEVEL_OUTOF10: 5
PAINLEVEL_OUTOF10: 0
PAINLEVEL_OUTOF10: 5
PAINLEVEL_OUTOF10: 0

## 2023-09-27 NOTE — PROGRESS NOTES
Clinical Pharmacy Note  Heparin Dosing Consult    Torie Ng is a 80 y.o. male ordered heparin per CAD/STEMI/NSTEMI/UA/AFIB nomogram by Dr. Dia Marquis. Lab Results   Component Value Date/Time    APTT 31.3 09/27/2023 12:20 PM     Lab Results   Component Value Date/Time    HGB 12.3 09/27/2023 10:05 AM    HCT 38.5 09/27/2023 10:05 AM    PLT 97 09/27/2023 10:05 AM       Ht Readings from Last 1 Encounters:   09/27/23 6' 3\" (1.905 m)        Wt Readings from Last 1 Encounters:   09/27/23 278 lb 7.1 oz (126.3 kg)      Assessment/Plan:  Initial bolus: 4000 units  Initial infusion rate: 1000 units/hr  Next anti-Xa: 1830 9/27/23    Pharmacy will continue to monitor adjust heparin based on anti-Xa results using nomogram below:     CAD/STEMI/NSTEMI/UA/AFIB Heparin Nomogram     Initial Bolus: 60 units/kg Max Bolus: 4,000 units       Initial Rate: 12 units/kg/hr Max Initial Rate: 1,000 units/hr     anti-Xa Bolus Titration   < 0.1 Heparin 60 units/kg bolus Increase drip by 4 units/kg/hr   0.1 - 0.29 Heparin 30 units/kg bolus Increase drip by 2 units/kg/hr   0.3 - 0.7 No Bolus No Change   0.71 - 0.8 No Bolus Decrease drip by 1 units/kg/hr   0.81 - 0.99 No Bolus Decrease drip by 2 units/kg/hr   > 1 Hold Heparin for 1 hour Decrease drip by 3 units/kg/hr     Obtain anti-Xa 6 hours after initial bolus and 6 hours after any dose change until two consecutive therapeutic anti-Xa levels are achieved - then daily.

## 2023-09-27 NOTE — PROGRESS NOTES
Medication Reconciliation    List of medications patient is currently taking is complete. Source of information: 1.  Conversation with patient at bedside                                      2. EPIC records          Mitra Warren [facility]   9/27/2023  12:37 PM

## 2023-09-27 NOTE — PROGRESS NOTES
EDSBAR report has been reviewed.       Electronically signed by Darryle Seed, RN on 9/27/2023 at 1:39 PM

## 2023-09-27 NOTE — ED NOTES
Pt arrived to dept via wheelchair with wife from home. Pt c/o intermittent CP and SOB w/ exertion. Ongoing for 2 years hx of stents. Worsening this week. Pt takes nitro and occasionally it helps. Pt awake, alert and oriented x 3. Skin warm and dry/normal color for ethnicity. Resp easy and unlabored. Pt placed in gown and on cardiac monitor. Call light in reach. Side rails x 2. PIV placed in RAC by Alfreda Gomes RN. Pt tolerated well. Blood sent to lab for analysis. EKG obtained and signed by [facility]. Pt placed on zoll monitor.       Aracelis Wylie RN  09/27/23 [id number]

## 2023-09-27 NOTE — PROGRESS NOTES
4 Eyes Skin Assessment     NAME:  Tutu Coronado OF BIRTH:  1955  MEDICAL RECORD NUMBER:  [id number]    The patient is being assessed for  Admission    I agree that at least one RN has performed a thorough Head to Toe Skin Assessment on the patient. ALL assessment sites listed below have been assessed. Areas assessed by both nurses:    Head, Face, Ears, Shoulders, Back, Chest, Arms, Elbows, Hands, Sacrum. Buttock, Coccyx, Ischium, Legs. Feet and Heels, and Under Medical Devices         Does the Patient have a Wound?  No noted wound(s)       Rickie Prevention initiated by RN: No  Wound Care Orders initiated by RN: No    Pressure Injury (Stage 3,4, Unstageable, DTI, NWPT, and Complex wounds) if present, place Wound referral order by RN under : No    New Ostomies, if present place, Ostomy referral order under : No     Nurse 1 eSignature: Electronically signed by Osiel Tavarez RN on 9/27/23 at 6:57 PM EDT    **SHARE this note so that the co-signing nurse can place an eSignature**    Nurse 2 eSignature: Electronically signed by Everardo Dixon RN on 9/28/23 at 3:49 AM EDT

## 2023-09-27 NOTE — ED NOTES
Pt walked with steady gait from BR to bed 8. Pt tolerated fairly well. Became SOB w/ exertion. 02 sats 100% on RA.      Ilona Jeans, RN  09/27/23 [id number]

## 2023-09-27 NOTE — ED PROVIDER NOTES
[address] [facility]    CHIEF COMPLAINT  Shortness of Breath (At Dr. Shelby Srinivasan office w/ c/o SOB and CP. Nitro kind of helps. Sent to ER by Kailee Girard)       HISTORY OF PRESENT ILLNESS  Natividad Jose is a 80 y.o. male with history of coronary artery disease status post stent 2 years ago, hypertension, VICKIE, cardiomyopathy with EF 45% who presents to the ED with chest pain and shortness of breath. He is not having intermittent chest pain since he received a stent a couple of years ago. This seemed to worsen in intensity and frequency in June. However over the past 3 days he has had much more significant chest pain and shortness of breath. Associated nausea and diaphoresis. Denies leg swelling. He was sent from his cardiologist office. He had a run of Charis Marroquinan in the cardiologist office (Dr Kailee Girard) is also had intermittent lightheadedness, presyncope. Symptoms are worsened by exertion. Reports a period about a week ago where he had a severe unrelenting indigestion and significant chest pressure. He did not seek medical attention at that time.      I have reviewed the following from the nursing documentation:    Past Medical History:   Diagnosis Date    Arthritis     Asthma     BPH (benign prostatic hyperplasia)     Cardiomyopathy (HCC)     GERD (gastroesophageal reflux disease)     HTN (hypertension) 10/29/2012    MVP (mitral valve prolapse)     PPD positive     Prolonged emergence from general anesthesia     Prostatism     Tricuspid valve prolapse     Unspecified sleep apnea      Past Surgical History:   Procedure Laterality Date    CARDIAC CATHETERIZATION      CHOLECYSTECTOMY      FOOT SURGERY      HAND SURGERY Right 11/09/2017    thumb mass excision    INGUINAL HERNIA REPAIR      bilateral    KNEE ARTHROSCOPY      OTHER SURGICAL HISTORY  08/28/2019    epidural Dr. Griffin Dadds at [address] [place] [address]      right elbow    TONSILLECTOMY       Family History   Problem Relation Age

## 2023-09-28 PROBLEM — R07.9 CHEST PAIN: Status: ACTIVE | Noted: 2023-09-27

## 2023-09-28 PROBLEM — I47.29 NONSUSTAINED VENTRICULAR TACHYCARDIA (HCC): Status: ACTIVE | Noted: 2023-09-28

## 2023-09-28 LAB
ANTI-XA UNFRAC HEPARIN: 0.24 IU/ML (ref 0.3–0.7)
ANTI-XA UNFRAC HEPARIN: 0.41 IU/ML (ref 0.3–0.7)
CHOLEST SERPL-MCNC: 89 MG/DL (ref 0–199)
DEPRECATED RDW RBC AUTO: 19.2 % (ref 12.4–15.4)
FOLATE SERPL-MCNC: >20 NG/ML (ref 4.78–24.2)
HCT VFR BLD AUTO: 31.5 % (ref 40.5–52.5)
HDLC SERPL-MCNC: 29 MG/DL (ref 40–60)
HGB BLD-MCNC: 10.3 G/DL (ref 13.5–17.5)
LDLC SERPL CALC-MCNC: 40 MG/DL
MCH RBC QN AUTO: 28.3 PG (ref 26–34)
MCHC RBC AUTO-ENTMCNC: 32.8 G/DL (ref 31–36)
MCV RBC AUTO: 86.1 FL (ref 80–100)
PLATELET # BLD AUTO: 71 K/UL (ref 135–450)
PMV BLD AUTO: 10.3 FL (ref 5–10.5)
RBC # BLD AUTO: 3.66 M/UL (ref 4.2–5.9)
TRIGL SERPL-MCNC: 101 MG/DL (ref 0–150)
TSH SERPL DL<=0.005 MIU/L-ACNC: 1.55 UIU/ML (ref 0.27–4.2)
VIT B12 SERPL-MCNC: 364 PG/ML (ref 211–911)
VLDLC SERPL CALC-MCNC: 20 MG/DL
WBC # BLD AUTO: 4.1 K/UL (ref 4–11)

## 2023-09-28 PROCEDURE — 2580000003 HC RX 258: Performed by: INTERNAL MEDICINE

## 2023-09-28 PROCEDURE — 99223 1ST HOSP IP/OBS HIGH 75: CPT | Performed by: INTERNAL MEDICINE

## 2023-09-28 PROCEDURE — 6370000000 HC RX 637 (ALT 250 FOR IP): Performed by: INTERNAL MEDICINE

## 2023-09-28 PROCEDURE — 94760 N-INVAS EAR/PLS OXIMETRY 1: CPT

## 2023-09-28 PROCEDURE — 85520 HEPARIN ASSAY: CPT

## 2023-09-28 PROCEDURE — 3430000000 HC RX DIAGNOSTIC RADIOPHARMACEUTICAL: Performed by: INTERNAL MEDICINE

## 2023-09-28 PROCEDURE — 84443 ASSAY THYROID STIM HORMONE: CPT

## 2023-09-28 PROCEDURE — 82746 ASSAY OF FOLIC ACID SERUM: CPT

## 2023-09-28 PROCEDURE — 85027 COMPLETE CBC AUTOMATED: CPT

## 2023-09-28 PROCEDURE — 36415 COLL VENOUS BLD VENIPUNCTURE: CPT

## 2023-09-28 PROCEDURE — 6360000002 HC RX W HCPCS: Performed by: INTERNAL MEDICINE

## 2023-09-28 PROCEDURE — 80061 LIPID PANEL: CPT

## 2023-09-28 PROCEDURE — 82607 VITAMIN B-12: CPT

## 2023-09-28 PROCEDURE — A9502 TC99M TETROFOSMIN: HCPCS | Performed by: INTERNAL MEDICINE

## 2023-09-28 PROCEDURE — 2060000000 HC ICU INTERMEDIATE R&B

## 2023-09-28 PROCEDURE — 78452 HT MUSCLE IMAGE SPECT MULT: CPT

## 2023-09-28 PROCEDURE — 93017 CV STRESS TEST TRACING ONLY: CPT

## 2023-09-28 RX ORDER — REGADENOSON 0.08 MG/ML
0.4 INJECTION, SOLUTION INTRAVENOUS
Status: COMPLETED | OUTPATIENT
Start: 2023-09-28 | End: 2023-09-28

## 2023-09-28 RX ORDER — ISOSORBIDE MONONITRATE 60 MG/1
60 TABLET, EXTENDED RELEASE ORAL DAILY
Status: DISCONTINUED | OUTPATIENT
Start: 2023-09-28 | End: 2023-09-29 | Stop reason: HOSPADM

## 2023-09-28 RX ORDER — HEPARIN SODIUM 1000 [USP'U]/ML
2000 INJECTION, SOLUTION INTRAVENOUS; SUBCUTANEOUS ONCE
Status: COMPLETED | OUTPATIENT
Start: 2023-09-28 | End: 2023-09-28

## 2023-09-28 RX ADMIN — ISOSORBIDE MONONITRATE 60 MG: 60 TABLET, EXTENDED RELEASE ORAL at 17:17

## 2023-09-28 RX ADMIN — Medication 10 ML: at 21:10

## 2023-09-28 RX ADMIN — GABAPENTIN 300 MG: 300 CAPSULE ORAL at 21:10

## 2023-09-28 RX ADMIN — MONTELUKAST 10 MG: 10 TABLET, FILM COATED ORAL at 17:17

## 2023-09-28 RX ADMIN — SODIUM CHLORIDE: 9 INJECTION, SOLUTION INTRAVENOUS at 05:02

## 2023-09-28 RX ADMIN — HEPARIN SODIUM 2000 UNITS: 1000 INJECTION INTRAVENOUS; SUBCUTANEOUS at 04:12

## 2023-09-28 RX ADMIN — REGADENOSON 0.4 MG: 0.08 INJECTION, SOLUTION INTRAVENOUS at 12:09

## 2023-09-28 RX ADMIN — PANTOPRAZOLE SODIUM 40 MG: 40 TABLET, DELAYED RELEASE ORAL at 05:09

## 2023-09-28 RX ADMIN — FOLIC ACID 1 MG: 1 TABLET ORAL at 21:10

## 2023-09-28 RX ADMIN — BUPROPION HYDROCHLORIDE 150 MG: 150 TABLET, EXTENDED RELEASE ORAL at 10:04

## 2023-09-28 RX ADMIN — TETROFOSMIN 30 MILLICURIE: 1.38 INJECTION, POWDER, LYOPHILIZED, FOR SOLUTION INTRAVENOUS at 11:30

## 2023-09-28 RX ADMIN — LOSARTAN POTASSIUM 25 MG: 25 TABLET, FILM COATED ORAL at 10:04

## 2023-09-28 RX ADMIN — TETROFOSMIN 10 MILLICURIE: 1.38 INJECTION, POWDER, LYOPHILIZED, FOR SOLUTION INTRAVENOUS at 10:37

## 2023-09-28 RX ADMIN — FINASTERIDE 5 MG: 5 TABLET, FILM COATED ORAL at 10:04

## 2023-09-28 RX ADMIN — ASPIRIN 81 MG: 81 TABLET, COATED ORAL at 10:04

## 2023-09-28 RX ADMIN — LORAZEPAM 0.5 MG: 0.5 TABLET ORAL at 23:50

## 2023-09-28 RX ADMIN — METOPROLOL SUCCINATE 12.5 MG: 25 TABLET, EXTENDED RELEASE ORAL at 17:17

## 2023-09-28 RX ADMIN — DULOXETINE HYDROCHLORIDE 60 MG: 60 CAPSULE, DELAYED RELEASE ORAL at 10:04

## 2023-09-28 RX ADMIN — NITROGLYCERIN 0.5 INCH: 20 OINTMENT TOPICAL at 00:08

## 2023-09-28 RX ADMIN — ACETAMINOPHEN 650 MG: 325 TABLET ORAL at 05:09

## 2023-09-28 RX ADMIN — PRASUGREL 10 MG: 10 TABLET, FILM COATED ORAL at 17:17

## 2023-09-28 RX ADMIN — Medication 10 ML: at 09:59

## 2023-09-28 RX ADMIN — NITROGLYCERIN 0.5 INCH: 20 OINTMENT TOPICAL at 05:09

## 2023-09-28 RX ADMIN — AMITRIPTYLINE HYDROCHLORIDE 50 MG: 50 TABLET, FILM COATED ORAL at 21:10

## 2023-09-28 RX ADMIN — ATORVASTATIN CALCIUM 40 MG: 40 TABLET, FILM COATED ORAL at 21:10

## 2023-09-28 RX ADMIN — CETIRIZINE HYDROCHLORIDE 10 MG: 10 TABLET ORAL at 10:04

## 2023-09-28 ASSESSMENT — PAIN SCALES - GENERAL
PAINLEVEL_OUTOF10: 0
PAINLEVEL_OUTOF10: 0
PAINLEVEL_OUTOF10: 3

## 2023-09-28 ASSESSMENT — PAIN DESCRIPTION - LOCATION: LOCATION: HEAD

## 2023-09-28 ASSESSMENT — PAIN DESCRIPTION - DESCRIPTORS: DESCRIPTORS: ACHING

## 2023-09-28 ASSESSMENT — PAIN DESCRIPTION - ORIENTATION: ORIENTATION: ANTERIOR

## 2023-09-28 NOTE — H&P
[address], [address]                              HISTORY AND PHYSICAL    PATIENT NAME: Kristi Currie                    :        1955  MED REC NO:   [id number]                          ROOM:       3427  ACCOUNT NO:   [de-identified]                           ADMIT DATE: 2023  PROVIDER:     Lupe Arnold MD    CHIEF COMPLAINT:  Could not breathe. HISTORY OF PRESENT ILLNESS:  The patient is chronically ill 32-year-old  white , disabled, retired male admitted through the emergency  room because of shortness of breath, arrhythmia and abnormal EKG. The  patient is known to have coronary artery disease, underwent coronary  angiogram with placement of 2 stents about 2 years ago. Since that  time, the patient has had episodic chest pain, sometimes relieved with  nitroglycerin as well as chronic ongoing dyspnea on exertion. The  patient's wife tell me that for the past 3 days, he has had increased  dyspnea on exertion as well as some anterior squeezing chest pain that  tends to common and go and sometimes is relieved with nitroglycerin. The patient had an upcoming appointment today at Dr. Jatin Vila office. He  was seen in the office and because of his crescendo symptoms, was sent  to the emergency room for further evaluation. In the emergency room,  his pulse oximetry was normal, vital signs were normal except for  tachycardia. Telemetry showed frequent extra beats with some bigemini  and trigeminy PVCs. He also had some lateral ST depression on EKG. Because of his symptoms and EKG changes, Cardiology was consulted from  the emergency room, they recommended the patient began on loading dose  of intravenous amnio with maintenance dose of amnio as well as  intravenous heparinization. Cardiology recommended admission.   In the  emergency room, chemistry profile was essentially normal.  ProBNP otherwise normal heart sounds. ABDOMEN:  Rotund. No abdominal mass, organomegaly or tenderness. Bowel  sounds were present. GENITOURINARY:  Deferred. RECTAL:  Deferred. EXTREMITIES:  Revealed no acutely inflamed joints. No extremity edema. 2+ dorsalis pedis pulses. NEUROLOGIC:  With the patient lying in bed, did not reveal any focal  neurological signs. Mentation appeared to be normal.    IMPRESSION ON ADMISSION:  1. Chest pain, possibly due to ischemic cardiac disease. 2.  Brief ventricular tachycardia without associated symptoms. 3.  Known coronary artery disease. 4.  Status post stent placement. 5.  Rheumatoid arthritis. 6.  Chronic dyspnea on exertion. 7.  Obesity. 8.  Chronic musculoskeletal pain syndrome. 9.  Prediabetes. 10. Thrombocytopenia  PLAN:  Our plan is for continuous cardiac monitoring. Cardiology  consultation. Intravenous amiodarone, topical nitrates. We will  continue his usual maintenance medication except for his weekly  methotrexate. Plan was discussed with the patient. He is in agreement  that he would also be in agreement at the present time with further  coronary angiography. Anticipated length of stay is probably three midnights. It is  anticipated the patient will be able to be discharged to the home  setting.         Bailey Melendrez MD    D: 09/27/2023 18:30:06       T: 09/27/2023 18:35:46     [email or web address]  Job#: [id number]     Doc#: [id number]    CC:

## 2023-09-28 NOTE — CONSULTS
CC: \" Chest pain/ pressure and shortness of breath is getting progressively worse. \"      HPI:  The patient is 80 y.o. male with a past medical history significant for chronic back pain, limited mobility and movement, CAD with multiple PCI's and JUAN, hypertension, hyperlipidemia, VICKIE, and pre diabetes. Former smoker presented to my office on 9/27/2023 with worsening shortness of breath with minimal exertion chest discomfort as well as dizziness. He states his symptoms are steadily worsening with minimal amount of activity. He is. His physical examination in the office was unremarkable except evidence of frequent irregular heart rhythm on the physical exam and short run of nonsustained V. tach on the EKG with ST and T wave changes. Ifeoma Wong He was sent to the emergency room and his cardiac work-up was unremarkable but he was admitted for further evaluation and care. He was also started on IV amiodarone therapy.   He feels slightly better this morning          h        Past Medical History        Past Medical History:   Diagnosis Date    Arthritis      Asthma      BPH (benign prostatic hyperplasia)      Cardiomyopathy (HCC)      GERD (gastroesophageal reflux disease)      HTN (hypertension) 10/29/2012    MVP (mitral valve prolapse)      PPD positive      Prolonged emergence from general anesthesia      Prostatism      Tricuspid valve prolapse      Unspecified sleep apnea           Past Surgical History         Past Surgical History:   Procedure Laterality Date    CARDIAC CATHETERIZATION        CHOLECYSTECTOMY        FOOT SURGERY        HAND SURGERY Right 11/09/2017     thumb mass excision    INGUINAL HERNIA REPAIR         bilateral    KNEE ARTHROSCOPY        OTHER SURGICAL HISTORY   08/28/2019     epidural Dr. Jade Avelar at [address] [place] [address] Road         right elbow    TONSILLECTOMY             Family History         Family History   Problem Relation Age of Onset    Rheum Arthritis Other      Heart Disease other known risk factors. - High risk patients include individuals with a history or smoking or known   risk factors. Stress test 5/14/19  Normal myocardial perfusion study. Normal myocardial perfusion. Normal LV size and systolic function. Overall findings represent a low risk study. ECHO 5/21/19  There is mild concentric left ventricular hypertrophy. Left ventricular cavity size is mildly dilated. Overall left ventricular systolic function appears mildly reduced. Ejection fraction is visually estimated to be 40-45%. Grade I diastolic dysfunction with normal LV filling pressures. The aortic root is mildly dilated & measures at 4.1 cms. Mildly dilated right ventricle. TAPSE 2.2cm, RV mid 4cm  IVC size is normal (<2.1cm) and collapses > 50% with respiration consistent  with normal RA pressure (3mmHg). [facility]: 8/25/20 per Dr. Betty Chavarria:  1. There is single-vessel disease. 2.  Dominant RCA, 99% with LEYDI grade 1 flow. 3.  Left main:  Free of disease. 4. LAD has a 10-20% proximal lesion. 5.  Left circumflex:  No obstructive disease. CONCLUSION:  1. Dominant RCA has a 99% proximal stenosis with LEYDI grade 1 flow. 2.  Elevated left heart filling pressures. 3.  Normal left ventricular size and systolic function with ejection  fraction of 60%. CONCLUSION:  Successful PCI and stenting of the proximal RCA, lesion  reduced to 0%. Echo: 6/4/21  Summary  There is mildly increased left ventricular wall thickness. EF 50%. Indeterminate diastolic function. The left atrium is moderately dilated. Right ventricular systolic function is normal .  Trivial mitral, and tricuspid regurgitation. Stress study: 6/21/21  Summary    moderate size moderate severity fixed inferior wall defect consist with    prior inferior wall MI. no evidence of ischemia        Recommendation    Aggressive medical therapy for coronary artery disease.        Stress Protocols

## 2023-09-28 NOTE — PROGRESS NOTES
Clinical Pharmacy Note  Heparin Dosing       Lab Results   Component Value Date/Time    APTT 31.3 09/27/2023 12:20 PM     Lab Results   Component Value Date/Time    HGB 12.3 09/27/2023 10:05 AM    HCT 38.5 09/27/2023 10:05 AM    PLT 97 09/27/2023 10:05 AM       Current Infusion Rate: 1250 units/hr  Current anti-Xa level 0.24    Plan:  Bolus: 2000 units  Rate: increase to 1500 units/hr  Next anti-Xa level: 9/28/23 1000    Pharmacy will continue to monitor and adjust based on anti-Xa results.   Luther Leal RP,9/28/2023,3:44 AM

## 2023-09-28 NOTE — CONSULTS
Nuclear Stress Test:NM MYOCARDIAL SPECT REST EXERCISE OR RX      Study location: [facility] - Nuclear Medicine      Indications: Chest pain. Hospital Status: Inpatient. Height: 75 inches Weight: 279 pounds     Risk Factors      The patient risk factors include:prior PCI on 12/03/2022;obesity, physical   activity, former tobacco use, treated hypercholesterolemia, treated   hypertension, chronic lung disease, dyslipidemia, prior MI and ( years not   smoking: 15). Conclusions      Summary   Pharmacological Stress/MPI Results:      1. Technically a satisfactory study. 2. Small area of reversible defect involving the distal inferior wall   suggestive of ischemia   3. Gated Study shows normal LV size and Systolic function; EF is 55 %. Stress Protocols      Resting ECG   Normal sinus rhythm with nonspecific ST changes. Resting HR:67 bpm      Resting BP:104/80 mmHg      Pre-stress physical exam: Heart and lung sounds   are within normal limits. Room air oxygen   saturation is 96%. Troponin T max-15. Patient   denies chest pain and shortness of breath. Proceed with Lexiscan myoview. Stress Protocol:Pharmacologic - Lexiscan's     Peak HR:88 bpm                     HR/BP product:59097   Peak BP:136/81 mmHg   Predicted HR: 153 bpm   % of predicted HR: 58   Test duration: 1 min and 40 sec   Reason for termination:Completed      ECG Findings   Non diagnostic because of Baseline ST depression . Arrhythmias   Frequent ventricular ectopy. Symptoms   Patient complained of shortness of breath with the   administration of Lexiscan. Dyspnea resolved in recovery. Patient denied chest pain. Complications   Procedure complication was none. Stress Interpretation   Non-diagnostic pharmacological stress portion of the study.      Procedure Medications    - Lexiscan I.V. 0.4 mg.10 sec     Imaging Protocols      - One Day      Rest                   Stress

## 2023-09-28 NOTE — PROGRESS NOTES
Clinical Pharmacy Note  Heparin Dosing       Lab Results   Component Value Date/Time    APTT 31.3 09/27/2023 12:20 PM     Lab Results   Component Value Date/Time    HGB 12.3 09/27/2023 10:05 AM    HCT 38.5 09/27/2023 10:05 AM    PLT 97 09/27/2023 10:05 AM       Current Infusion Rate: 1000 units/hr  Current anti-Xa level 0.1    Plan:  Bolus: 2000 units  Rate: increase to 1250 units/hr  Next anti-Xa level: 9/28/23 0230    Pharmacy will continue to monitor and adjust based on anti-Xa results.   Rica Amaya RP,9/27/2023,8:03 PM

## 2023-09-28 NOTE — PROGRESS NOTES
Oral Q4H PRN Alma Small MD        prasugrel (EFFIENT) tablet 10 mg  10 mg Oral Dinner Alma Small MD   10 mg at 09/27/23 1745    morphine (PF) injection 4 mg  4 mg IntraVENous Q2H PRN Alma Small MD        sodium chloride flush 0.9 % injection 5-40 mL  5-40 mL IntraVENous 2 times per day Alma Small MD   10 mL at 09/28/23 0959    sodium chloride flush 0.9 % injection 5-40 mL  5-40 mL IntraVENous PRN Alma Small MD        0.9 % sodium chloride infusion   IntraVENous PRN Alma Small MD        ondansetron (ZOFRAN-ODT) disintegrating tablet 4 mg  4 mg Oral Q8H PRN Alma Small MD        Or    ondansetron [facility]) injection 4 mg  4 mg IntraVENous Q6H PRN Alma Small MD        acetaminophen (TYLENOL) tablet 650 mg  650 mg Oral Q6H PRN Alma Small MD   650 mg at 09/28/23 4198    Or    acetaminophen (TYLENOL) suppository 650 mg  650 mg Rectal Q6H PRN Alma Small MD        polyethylene glycol (GLYCOLAX) packet 17 g  17 g Oral Daily PRN Alma Small MD        0.9 % sodium chloride infusion   IntraVENous Continuous Alma Small MD 75 mL/hr at 09/28/23 0502 New Bag at 09/28/23 0502    LORazepam (ATIVAN) tablet 0.5 mg  0.5 mg Oral Q4H PRN Alma Small MD        nitroglycerin (NITRO-BID) 2 % ointment 0.5 inch  0.5 inch Topical 4 times per day Alma Small MD   0.5 inch at 09/28/23 0509     Allergies   Allergen Reactions    Cephalosporins Anaphylaxis     Anaphylaxis to Keflex in 2010    Mushroom Extract Complex Anaphylaxis    Pcn [Penicillins] Shortness Of Breath    Penicillin G Sodium Shortness Of Breath    Ciprofloxacin Itching    Codeine Itching    Erythromycin Itching    Morphine And Related Itching     Principal Problem:    Chest pain  Active Problems:    Unstable angina pectoris (HCC)    Nonsustained ventricular tachycardia ([address])  Resolved Problems:    * No resolved hospital problems.  *    Blood pressure (!) 153/77,

## 2023-09-29 VITALS
BODY MASS INDEX: 33.83 KG/M2 | TEMPERATURE: 98.2 F | HEIGHT: 75 IN | DIASTOLIC BLOOD PRESSURE: 68 MMHG | WEIGHT: 272.05 LBS | OXYGEN SATURATION: 95 % | HEART RATE: 72 BPM | RESPIRATION RATE: 18 BRPM | SYSTOLIC BLOOD PRESSURE: 122 MMHG

## 2023-09-29 PROBLEM — I49.8 JUNCTIONAL RHYTHM: Status: ACTIVE | Noted: 2023-09-29

## 2023-09-29 PROBLEM — D61.818 PANCYTOPENIA (HCC): Status: ACTIVE | Noted: 2023-09-29

## 2023-09-29 LAB
ALBUMIN SERPL ELPH-MCNC: 3.3 G/DL (ref 3.1–4.9)
ALPHA1 GLOB SERPL ELPH-MCNC: 0.4 G/DL (ref 0.2–0.4)
ALPHA2 GLOB SERPL ELPH-MCNC: 0.9 G/DL (ref 0.4–1.1)
B-GLOBULIN SERPL ELPH-MCNC: 1.3 G/DL (ref 0.9–1.6)
BASOPHILS # BLD: 0 K/UL (ref 0–0.2)
BASOPHILS NFR BLD: 0.7 %
DEPRECATED RDW RBC AUTO: 18.6 % (ref 12.4–15.4)
EOSINOPHIL # BLD: 0 K/UL (ref 0–0.6)
EOSINOPHIL NFR BLD: 1 %
FERRITIN SERPL IA-MCNC: 29.7 NG/ML (ref 30–400)
GAMMA GLOB SERPL ELPH-MCNC: 1 G/DL (ref 0.6–1.8)
HAPTOGLOB SERPL-MCNC: 173 MG/DL (ref 30–200)
HCT VFR BLD AUTO: 29.5 % (ref 40.5–52.5)
HGB BLD-MCNC: 9.8 G/DL (ref 13.5–17.5)
IMMATURE RETIC FRACT: 0.51 (ref 0.21–0.37)
IRON SATN MFR SERPL: 13 % (ref 20–50)
IRON SERPL-MCNC: 37 UG/DL (ref 59–158)
KAPPA LC FREE SER-MCNC: 18.39 MG/L (ref 3.3–19.4)
KAPPA LC FREE/LAMBDA FREE SER: 1.18 {RATIO} (ref 0.26–1.65)
LAMBDA LC FREE SERPL-MCNC: 15.64 MG/L (ref 5.71–26.3)
LYMPHOCYTES # BLD: 0.9 K/UL (ref 1–5.1)
LYMPHOCYTES NFR BLD: 26.6 %
MCH RBC QN AUTO: 28.8 PG (ref 26–34)
MCHC RBC AUTO-ENTMCNC: 33.2 G/DL (ref 31–36)
MCV RBC AUTO: 86.8 FL (ref 80–100)
MONOCYTES # BLD: 0.7 K/UL (ref 0–1.3)
MONOCYTES NFR BLD: 19.9 %
NEUTROPHILS # BLD: 1.8 K/UL (ref 1.7–7.7)
NEUTROPHILS NFR BLD: 51.8 %
PLATELET # BLD AUTO: 62 K/UL (ref 135–450)
PMV BLD AUTO: 9.8 FL (ref 5–10.5)
PROT SERPL-MCNC: 6.8 G/DL (ref 6.4–8.2)
RBC # BLD AUTO: 3.4 M/UL (ref 4.2–5.9)
RETICS # AUTO: 0.1 M/UL
RETICS/RBC NFR AUTO: 2.83 % (ref 0.5–2.18)
RPT COMMENT: NORMAL
SPE/IFE INTERPRETATION: NORMAL
TIBC SERPL-MCNC: 291 UG/DL (ref 260–445)
WBC # BLD AUTO: 3.5 K/UL (ref 4–11)

## 2023-09-29 PROCEDURE — 83010 ASSAY OF HAPTOGLOBIN QUANT: CPT

## 2023-09-29 PROCEDURE — 84165 PROTEIN E-PHORESIS SERUM: CPT

## 2023-09-29 PROCEDURE — 99233 SBSQ HOSP IP/OBS HIGH 50: CPT | Performed by: INTERNAL MEDICINE

## 2023-09-29 PROCEDURE — 83883 ASSAY NEPHELOMETRY NOT SPEC: CPT

## 2023-09-29 PROCEDURE — 83550 IRON BINDING TEST: CPT

## 2023-09-29 PROCEDURE — 84155 ASSAY OF PROTEIN SERUM: CPT

## 2023-09-29 PROCEDURE — 6370000000 HC RX 637 (ALT 250 FOR IP): Performed by: INTERNAL MEDICINE

## 2023-09-29 PROCEDURE — 94760 N-INVAS EAR/PLS OXIMETRY 1: CPT

## 2023-09-29 PROCEDURE — 85045 AUTOMATED RETICULOCYTE COUNT: CPT

## 2023-09-29 PROCEDURE — 82728 ASSAY OF FERRITIN: CPT

## 2023-09-29 PROCEDURE — 83540 ASSAY OF IRON: CPT

## 2023-09-29 PROCEDURE — 85025 COMPLETE CBC W/AUTO DIFF WBC: CPT

## 2023-09-29 RX ORDER — METOPROLOL SUCCINATE 25 MG/1
25 TABLET, EXTENDED RELEASE ORAL
Status: DISCONTINUED | OUTPATIENT
Start: 2023-09-29 | End: 2023-09-29 | Stop reason: HOSPADM

## 2023-09-29 RX ORDER — ISOSORBIDE MONONITRATE 60 MG/1
60 TABLET, EXTENDED RELEASE ORAL DAILY
Qty: 30 TABLET | Refills: 3 | Status: SHIPPED | OUTPATIENT
Start: 2023-09-29

## 2023-09-29 RX ORDER — FERROUS SULFATE 325(65) MG
325 TABLET ORAL 2 TIMES DAILY
Qty: 180 TABLET | Refills: 1 | Status: SHIPPED | OUTPATIENT
Start: 2023-09-29

## 2023-09-29 RX ORDER — METOPROLOL SUCCINATE 25 MG/1
25 TABLET, EXTENDED RELEASE ORAL
Qty: 30 TABLET | Refills: 3 | Status: SHIPPED | OUTPATIENT
Start: 2023-09-29

## 2023-09-29 RX ADMIN — BUPROPION HYDROCHLORIDE 150 MG: 150 TABLET, EXTENDED RELEASE ORAL at 09:46

## 2023-09-29 RX ADMIN — PANTOPRAZOLE SODIUM 40 MG: 40 TABLET, DELAYED RELEASE ORAL at 07:15

## 2023-09-29 RX ADMIN — ISOSORBIDE MONONITRATE 60 MG: 60 TABLET, EXTENDED RELEASE ORAL at 09:45

## 2023-09-29 RX ADMIN — DULOXETINE HYDROCHLORIDE 60 MG: 60 CAPSULE, DELAYED RELEASE ORAL at 09:46

## 2023-09-29 RX ADMIN — LOSARTAN POTASSIUM 25 MG: 25 TABLET, FILM COATED ORAL at 09:45

## 2023-09-29 RX ADMIN — ASPIRIN 81 MG: 81 TABLET, COATED ORAL at 09:46

## 2023-09-29 RX ADMIN — FINASTERIDE 5 MG: 5 TABLET, FILM COATED ORAL at 09:50

## 2023-09-29 RX ADMIN — CETIRIZINE HYDROCHLORIDE 10 MG: 10 TABLET ORAL at 09:45

## 2023-09-29 ASSESSMENT — PAIN SCALES - GENERAL: PAINLEVEL_OUTOF10: 0

## 2023-09-29 NOTE — PROGRESS NOTES
CLINICAL PHARMACY NOTE: MEDS TO BEDS    Total # of Prescriptions Filled: 3   The following medications were delivered to the patient:  Current Discharge Medication List        START taking these medications    Details   isosorbide mononitrate (IMDUR) 60 MG extended release tablet Take 1 tablet by mouth daily  Qty: 30 tablet, Refills: 3      ferrous sulfate (IRON 325) 325 (65 Fe) MG tablet Take 1 tablet by mouth 2 times daily  Qty: 180 tablet, Refills: 1           Metoprol succinate er 25mg    Additional Documentation

## 2023-09-29 NOTE — DISCHARGE INSTR - DIET
Good nutrition is important when healing from an illness, injury, or surgery. Follow any nutrition recommendations given to you during your hospital stay. If you were given an oral nutrition supplement while in the hospital, continue to take this supplement at home. You can take it with meals, in-between meals, and/or before bedtime. These supplements can be purchased at most local grocery stores, pharmacies, and chain [employer]-stores. If you have any questions about your diet or nutrition, call the hospital and ask for the dietitian.   Low fat, lower calories

## 2023-09-29 NOTE — PROGRESS NOTES
Nutrition Education    Dietitian consult regarding heart healthy diet and weight loss education  Wife reported not needing any information on this topic, patient and wife have been educated in the past when stents placed. Will monitor for any nutritional needs. Ward Ordoñez RD, LD  Contact Number: Office: [phone];  [address]: [address]

## 2023-09-29 NOTE — DISCHARGE INSTR - LAB
See Dr. Jacqui Pradhan and Dorys Chaudhary. Make an appt. With Dr. Robert Aaron or Mehnaz Esteves for EGD and colon due to anemia.

## 2023-09-29 NOTE — PROGRESS NOTES
Arabella Pearson is a 80 y.o. male patient.     Current Facility-Administered Medications   Medication Dose Route Frequency Provider Last Rate Last Admin    metoprolol succinate (TOPROL XL) extended release tablet 25 mg  25 mg Oral Dinner Sarah Graham MD        isosorbide mononitrate (IMDUR) extended release tablet 60 mg  60 mg Oral Daily Marlon Gomez MD   60 mg at 09/28/23 1717    amitriptyline (ELAVIL) tablet 50 mg  50 mg Oral Nightly Kodi Dahl MD   50 mg at 09/28/23 2110    aspirin EC tablet 81 mg  81 mg Oral Daily Kodi Dahl MD   81 mg at 09/28/23 1004    atorvastatin (LIPITOR) tablet 40 mg  40 mg Oral Nightly Kodi Dahl MD   40 mg at 09/28/23 2110    buPROPion (WELLBUTRIN XL) extended release tablet 150 mg  150 mg Oral QAM Kodi Dahl MD   150 mg at 09/28/23 1004    DULoxetine (CYMBALTA) extended release capsule 60 mg  60 mg Oral Daily Kodi Dahl MD   60 mg at 09/28/23 1004    finasteride (PROSCAR) tablet 5 mg  5 mg Oral Daily Kodi Dahl MD   5 mg at 65/54/29 0809    folic acid (FOLVITE) tablet 1 mg  1 mg Oral Nightly Kodi Dahl MD   1 mg at 09/28/23 2110    gabapentin (NEURONTIN) capsule 300 mg  300 mg Oral Nightly Kodi Dahl MD   300 mg at 09/28/23 2110    cetirizine (ZYRTEC) tablet 10 mg  10 mg Oral Daily Kodi Dahl MD   10 mg at 09/28/23 1004    losartan (COZAAR) tablet 25 mg  25 mg Oral Daily Kodi Dahl MD   25 mg at 09/28/23 1004    methocarbamol (ROBAXIN) tablet 500 mg  500 mg Oral 4x Daily PRN Kodi Dahl MD        montelukast (SINGULAIR) tablet 10 mg  10 mg Oral Dinner Kodi Dahl MD   10 mg at 09/28/23 1717    nitroGLYCERIN (NITROSTAT) SL tablet 0.4 mg  0.4 mg SubLINGual Q5 Min PRN Kodi Dahl MD        pantoprazole (PROTONIX) tablet 40 mg  40 mg Oral QAM AC Kodi Dahl, MD   40 mg at 09/29/23 0715    oxyCODONE-acetaminophen (PERCOCET)  MG per tablet 1 tablet  1 tablet

## 2023-09-29 NOTE — CARE COORDINATION
Discharge order noted. Cardiology and hematology signed off, however, he has a new GI consult. We will continue to follow along. Respectfully submitted,    Myra VALDERRAMA, [facility]   [phone]    Electronically signed by LAVELLE Correa, LSW on 9/29/2023 at 9:30 AM
Discharge Plan? Yes   Freedom of Choice list was provided with basic dialogue that supports the patient's individualized plan of care/goals, treatment preferences, and shares the quality data associated with the providers?   Yes     Aviva Daniel LMSW, [address] [place] [address] Social Work Case Management   Phone: [phone]  Fax: [phone]

## 2023-09-29 NOTE — PROGRESS NOTES
[facility]   Daily Progress Note      Admit Date:  9/27/2023    Reason for initial consultation: Chest pain    CC: \"No chest pain\"    Interval History:  Pt with no acute overnight events. Notes reviewed from Dr. Tony Baig and JOSR CHRISTIAN. His wife is present bedside. He currently denies chest pain, palpitations, and dyspnea. Past surgical history/social history/family history:   has a past surgical history that includes Inguinal hernia repair; Tonsillectomy; Cholecystectomy; tendon release; Foot surgery; Knee arthroscopy; Cardiac catheterization; Hand surgery (Right, 11/09/2017); other surgical history (08/28/2019); and Percutaneous Transluminal Coronary Angio. reports that he quit smoking about 41 years ago. His smoking use included cigarettes. He has a 30.00 pack-year smoking history. He has been exposed to tobacco smoke. He has never used smokeless tobacco. He reports that he does not currently use alcohol. He reports that he does not use drugs. family history includes Heart Disease in his brother and father; Other in his brother; Rheum Arthritis in an other family member. Review of Systems:   Constitutional: No unanticipated weight loss. There's been no change in energy level, sleep pattern, or activity level. No fevers, chills. Eyes: No visual changes or diplopia. No scleral icterus. ENT: No Headaches, hearing loss or vertigo. No mouth sores or sore throat. Cardiovascular: as reviewed in HPI  Respiratory: No cough or wheezing, no sputum production. No hemoptysis. Gastrointestinal: No abdominal pain, appetite loss, blood in stools. No change in bowel or bladder habits. Genitourinary: No dysuria, trouble voiding, or hematuria. Musculoskeletal:  No gait disturbance, no joint complaints. Integumentary: No rash or pruritis. Neurological: No headache, diplopia, change in muscle strength, numbness or tingling. Psychiatric: No anxiety or depression. Endocrine: No temperature intolerance. Essential hypertension. Controlled. Goal BP <130/80. Continue medical therapy. Overall, the problems requiring hospitalization are high in severity. Will sign off. Call with questions. Outpatient follow-up is already scheduled.       Electronically signed by Juliana Laughlin MD on 9/29/2023 at 8:38 AM

## 2023-09-29 NOTE — DISCHARGE SUMMARY
[address], [facility] [address]                               DISCHARGE SUMMARY    PATIENT NAME: Amber Castro                    :        1955  MED REC NO:   [id number]                          ROOM:       7936  ACCOUNT NO:   [de-identified]                           ADMIT DATE: 2023  PROVIDER:     Wil Koehler MD                  DISCHARGE DATE:  2023    Patient of Dr. Mateusz Cook:  1.  Nonsustained ventricular tachycardia. 2.  Coronary ischemia. 3.  Pancytopenia. 4.  Rheumatoid arthritis. 5.  Obesity. 6.  Shortness of breath. 7.  Chronic pain syndromes. 8. Iron deficiency anemia  HISTORY:  The patient was a fairly healthy 62-year-old white   retired male, who lived in his own home with his wife and at other  family in town. The patient was known to have coronary artery disease  and previously had stent placement about 2 years ago. This present  admission was prompted by shortness of breath and some anterior chest  pain. He came to the emergency room. Emergency room evaluation  revealed normal troponins and normal chest x-ray, but on cardiac monitor  he had runs of 2 and 3 beats with nonsustained ventricular tachycardia  prompting need for admission. I was contacted in addition to Cardiology  being contacted and the patient was admitted to [facility] on a monitored bed  with Cardiology consultation. APPROPRIATE STUDIES:  Chemical stress test showed no acute EKG changes,  but a small area of reversible ischemia in the inferior part of the  heart. Prior to discharge, white count 3500, hemoglobin 9.8, hematocrit  29.5, platelet count 58,017. B12 and folate levels were normal.  TSH  and free T4 were normal.  Haptoglobin was normal.  Total protein was  normal.  Cholesterol was good at 89, LDL was 40. Troponin was normal  x3.   Some other laboratory tests pertinent to his pancytopenia are still  pending. HOSPITAL COURSE:  The patient's hospital course was characterized by  improvement. He was seen by Cardiology and Hematology. He was begun on  long-acting nitrate. Hematology thought that his pancytopenia might be  secondary to his methotrexate treatment or his rheumatoid arthritis with  possibly some element of iron deficiency, and EGD and colonoscopy as an  outpatient were recommended as well as outpatient follow-up with  Oncology. The patient initially was treated with intravenous amnio. Gradually, his PVCs quieted down and prior to discharge, he was having  about 1 or 2 PVCs per minute that were asymptomatic and not  hemodynamically significant. When the patient was medically stable, he  was discharged home an improved condition with follow-up with Dr. Cole Trimble, Dr. Anum Romero, and Hematology; and he was also advised to see  Dr. Estrada Aguilar or Dr. Clair Johnson about EGD and colonoscopy. Med rec was completed. Patient has multiple allergies or intolerances as enumerated in Epic. New prescriptions were sent to [facility] outpatient pharmacy.         Cl Garcia MD    D: 09/29/2023 9:40:28       T: 09/29/2023 9:44:47     [email or web address]  Job#: [id number]     Doc#: [id number]    CC:

## 2023-10-02 ENCOUNTER — TELEPHONE (OUTPATIENT)
Dept: CARDIOLOGY CLINIC | Age: 68
End: 2023-10-02

## 2023-10-02 NOTE — TELEPHONE ENCOUNTER
Patient admitted from office visit with Dr. Radha Llanos. Antiplatelet on hold with plans for outpatient EGD/colonoscopy per GI as this was not completed during admission. 10/30/2023 for EGD/colonoscopy. He has remained on ASA. Fax number provided for preop communication.

## 2023-10-02 NOTE — TELEPHONE ENCOUNTER
Patient was in hospital and was instructed to stop his Effient by Dr. Raphael Harrison. Harjit Art wife is wanting to know how long Joselyn Gallagher is to remain off of the medication? She shares that he is scheduled to have a procedure that is requiring him to be without it but that's at the end of the month.      Katherine's callback: [phone]

## 2023-10-13 NOTE — PROGRESS NOTES
[facility]  Cardiology Progress Note    America Moy  [birth year] October 17, 2023      CC: \"I feel about the same, maybe slightly better. \"     HPI:  The patient is 80 y.o. male with a past medical history significant for chronic back pain, limited mobility and movement, CAD with multiple PCI's and JUAN, hypertension, hyperlipidemia, VICKIE, and pre diabetes. Former smoker. 11/2022 office visit, he stateed that is shortness of breath has been worsening. He has episodes of dizzness and chest pain at times with the shortness of breath. Reduced beta blocker. L/RHC with LVEF 45% otherwise normal. Symptoms non-cardiac in origin. D-Dimer also normal. He also follos with Dr. Celestino Alejandra for mild background emphysema per CT Chest, stable nodularities. He was seen in office on 03/17/2023 for follow up from [facility] 12/8/2022. He continues with VALVERDE with short distance. Odd/on CP. Sits to rest. If he is carrying items or pushing items, he has increase in VALVERDE. He does not exercise due to his VALVERDE. He uses a motorized cart at the grocery store. Patient denied changes in exertional chest pain/pressure, dyspnea at rest, changes in VALVERDE, PND, orthopnea, palpitations, lightheadedness, weight changes, changes in LE edema, and syncope. He has not resumed smoking cigarettes. He admits to medical therapy compliance and tolerating with no abnormal bruising or bleeding. 9/27/23  office visit, EKG today shows run of VT. He reports chest pain/pressure and shortness of breath has progressively worsened in the past 6 months. He endorses symptoms lightheadedness, dizziness and presyncope. One episode occurred a week and half ago after carrying in groceries from the car. Patient reports compliance to his medications. He followed up with Dr. Sandi Almaguer and was told his shortness of breath was not related to his lungs. GI workup outpatient based on GI consult.      Today, he returns for hospital follow up from the

## 2023-10-17 ENCOUNTER — OFFICE VISIT (OUTPATIENT)
Dept: CARDIOLOGY CLINIC | Age: 68
End: 2023-10-17
Payer: COMMERCIAL

## 2023-10-17 VITALS
BODY MASS INDEX: 34.57 KG/M2 | OXYGEN SATURATION: 97 % | SYSTOLIC BLOOD PRESSURE: 110 MMHG | DIASTOLIC BLOOD PRESSURE: 60 MMHG | HEIGHT: 75 IN | HEART RATE: 74 BPM | WEIGHT: 278 LBS

## 2023-10-17 DIAGNOSIS — I10 ESSENTIAL HYPERTENSION: ICD-10-CM

## 2023-10-17 DIAGNOSIS — I47.20 V TACH (HCC): ICD-10-CM

## 2023-10-17 DIAGNOSIS — Z87.891 FORMER SMOKER: ICD-10-CM

## 2023-10-17 DIAGNOSIS — I50.22 CHRONIC SYSTOLIC HEART FAILURE (HCC): ICD-10-CM

## 2023-10-17 DIAGNOSIS — E66.9 CLASS 1 OBESITY WITH SERIOUS COMORBIDITY AND BODY MASS INDEX (BMI) OF 34.0 TO 34.9 IN ADULT, UNSPECIFIED OBESITY TYPE: ICD-10-CM

## 2023-10-17 DIAGNOSIS — I49.1 PAC (PREMATURE ATRIAL CONTRACTION): ICD-10-CM

## 2023-10-17 DIAGNOSIS — G47.30 SLEEP APNEA, UNSPECIFIED TYPE: ICD-10-CM

## 2023-10-17 DIAGNOSIS — I49.3 PVC (PREMATURE VENTRICULAR CONTRACTION): ICD-10-CM

## 2023-10-17 DIAGNOSIS — R00.1 BRADYCARDIA: ICD-10-CM

## 2023-10-17 DIAGNOSIS — I25.119 CORONARY ARTERY DISEASE INVOLVING NATIVE CORONARY ARTERY OF NATIVE HEART WITH ANGINA PECTORIS (HCC): ICD-10-CM

## 2023-10-17 DIAGNOSIS — E78.2 MIXED HYPERLIPIDEMIA: ICD-10-CM

## 2023-10-17 DIAGNOSIS — R06.09 DOE (DYSPNEA ON EXERTION): ICD-10-CM

## 2023-10-17 DIAGNOSIS — R53.82 CHRONIC FATIGUE: ICD-10-CM

## 2023-10-17 DIAGNOSIS — D61.818 PANCYTOPENIA (HCC): Primary | ICD-10-CM

## 2023-10-17 PROCEDURE — G8427 DOCREV CUR MEDS BY ELIG CLIN: HCPCS | Performed by: INTERNAL MEDICINE

## 2023-10-17 PROCEDURE — G8484 FLU IMMUNIZE NO ADMIN: HCPCS | Performed by: INTERNAL MEDICINE

## 2023-10-17 PROCEDURE — 3017F COLORECTAL CA SCREEN DOC REV: CPT | Performed by: INTERNAL MEDICINE

## 2023-10-17 PROCEDURE — 1111F DSCHRG MED/CURRENT MED MERGE: CPT | Performed by: INTERNAL MEDICINE

## 2023-10-17 PROCEDURE — 3074F SYST BP LT 130 MM HG: CPT | Performed by: INTERNAL MEDICINE

## 2023-10-17 PROCEDURE — G8417 CALC BMI ABV UP PARAM F/U: HCPCS | Performed by: INTERNAL MEDICINE

## 2023-10-17 PROCEDURE — 93000 ELECTROCARDIOGRAM COMPLETE: CPT | Performed by: INTERNAL MEDICINE

## 2023-10-17 PROCEDURE — 1036F TOBACCO NON-USER: CPT | Performed by: INTERNAL MEDICINE

## 2023-10-17 PROCEDURE — 99214 OFFICE O/P EST MOD 30 MIN: CPT | Performed by: INTERNAL MEDICINE

## 2023-10-17 PROCEDURE — 1123F ACP DISCUSS/DSCN MKR DOCD: CPT | Performed by: INTERNAL MEDICINE

## 2023-10-17 PROCEDURE — 3078F DIAST BP <80 MM HG: CPT | Performed by: INTERNAL MEDICINE

## 2023-10-17 RX ORDER — METOPROLOL SUCCINATE 25 MG/1
25 TABLET, EXTENDED RELEASE ORAL
Qty: 90 TABLET | Refills: 3 | Status: SHIPPED | OUTPATIENT
Start: 2023-10-17

## 2023-10-24 NOTE — PROGRESS NOTES
[facility] ENDOSCOPY COLONOSCOPY PRE-OPERATIVE INSTRUCTIONS    Procedure date_10/30/2023________Arrival time__0730__________        Surgery time____0830________     EGD to be done at the same encounter. Clear liquids the day before the procedure. Do not eat or drink anything within 5 hours of your procedure. This includes water chewing gum, mints and ice chips. You may brush your teeth and gargle the morning of your surgery, but do not swallow the water    You may be asked to stop blood thinners such as Coumadin, Plavix, Fragmin, Lovenox, etc., or any anti-inflammatories such as:  Aspirin, Ibuprofen, Advil, Naproxen prior to your procedure. We also ask that you stop any OTC medications such as fish oil, vitamin E, glucosamine, garlic, Multivitamins, COQ 10, etc.    You must make arrangements for a responsible adult to arrive with you and stay in our waiting area during your procedure. They will also need to take you home after your procedure. For your safety you will not be allowed to leave alone or drive yourself home. Also for your safety, it is strongly suggested that someone stay with you the first 24 hours after your procedure. For your comfort, please wear simple loose fitting clothing to the center. Please do not bring valuables. If you have a living will and a durable power of  for healthcare, please bring in a copy.      You will need to bring a photo ID and insurance card    Our goal is to provide you with excellent care so if you have any questions, please contact us at the [facility] at [phone]         Please note these are generalized instructions for all colonoscopy cases, you may be provided with more specific instructions if necessary

## 2023-10-24 NOTE — PROGRESS NOTES
Health history reviewed with Dr. David Reddy. Last documented plt count was 67. Ok to proceed with coloscopy/egd at outpatient center.

## 2023-10-27 ENCOUNTER — ANESTHESIA EVENT (OUTPATIENT)
Dept: ENDOSCOPY | Age: 68
End: 2023-10-27
Payer: COMMERCIAL

## 2023-10-29 ASSESSMENT — LIFESTYLE VARIABLES: SMOKING_STATUS: 1

## 2023-10-30 ENCOUNTER — ANESTHESIA (OUTPATIENT)
Dept: ENDOSCOPY | Age: 68
End: 2023-10-30
Payer: COMMERCIAL

## 2023-10-30 ENCOUNTER — HOSPITAL ENCOUNTER (OUTPATIENT)
Age: 68
Setting detail: OUTPATIENT SURGERY
Discharge: HOME | End: 2023-10-30
Attending: INTERNAL MEDICINE | Admitting: INTERNAL MEDICINE
Payer: COMMERCIAL

## 2023-10-30 VITALS
RESPIRATION RATE: 18 BRPM | TEMPERATURE: 96.9 F | HEIGHT: 75 IN | SYSTOLIC BLOOD PRESSURE: 123 MMHG | OXYGEN SATURATION: 97 % | WEIGHT: 272 LBS | HEART RATE: 91 BPM | DIASTOLIC BLOOD PRESSURE: 82 MMHG | BODY MASS INDEX: 33.82 KG/M2

## 2023-10-30 DIAGNOSIS — D63.8 CHRONIC DISEASE ANEMIA: ICD-10-CM

## 2023-10-30 PROBLEM — D50.0 IRON DEFICIENCY ANEMIA DUE TO CHRONIC BLOOD LOSS: Status: ACTIVE | Noted: 2023-10-30

## 2023-10-30 PROCEDURE — 2580000003 HC RX 258

## 2023-10-30 PROCEDURE — 2500000003 HC RX 250 WO HCPCS

## 2023-10-30 PROCEDURE — 3700000000 HC ANESTHESIA ATTENDED CARE: Performed by: INTERNAL MEDICINE

## 2023-10-30 PROCEDURE — 3609010600 HC COLONOSCOPY POLYPECTOMY SNARE/COLD BIOPSY: Performed by: INTERNAL MEDICINE

## 2023-10-30 PROCEDURE — 7100000010 HC PHASE II RECOVERY - FIRST 15 MIN: Performed by: INTERNAL MEDICINE

## 2023-10-30 PROCEDURE — 2580000003 HC RX 258: Performed by: STUDENT IN AN ORGANIZED HEALTH CARE EDUCATION/TRAINING PROGRAM

## 2023-10-30 PROCEDURE — 2720000010 HC SURG SUPPLY STERILE: Performed by: INTERNAL MEDICINE

## 2023-10-30 PROCEDURE — 6360000002 HC RX W HCPCS

## 2023-10-30 PROCEDURE — 88305 TISSUE EXAM BY PATHOLOGIST: CPT

## 2023-10-30 PROCEDURE — 7100000011 HC PHASE II RECOVERY - ADDTL 15 MIN: Performed by: INTERNAL MEDICINE

## 2023-10-30 PROCEDURE — 3609012400 HC EGD TRANSORAL BIOPSY SINGLE/MULTIPLE: Performed by: INTERNAL MEDICINE

## 2023-10-30 PROCEDURE — 3700000001 HC ADD 15 MINUTES (ANESTHESIA): Performed by: INTERNAL MEDICINE

## 2023-10-30 PROCEDURE — 2709999900 HC NON-CHARGEABLE SUPPLY: Performed by: INTERNAL MEDICINE

## 2023-10-30 RX ORDER — SODIUM CHLORIDE 9 MG/ML
INJECTION, SOLUTION INTRAVENOUS PRN
Status: DISCONTINUED | OUTPATIENT
Start: 2023-10-30 | End: 2023-10-30 | Stop reason: HOSPADM

## 2023-10-30 RX ORDER — PROPOFOL 10 MG/ML
INJECTION, EMULSION INTRAVENOUS PRN
Status: DISCONTINUED | OUTPATIENT
Start: 2023-10-30 | End: 2023-10-30 | Stop reason: SDUPTHER

## 2023-10-30 RX ORDER — LIDOCAINE HYDROCHLORIDE 20 MG/ML
INJECTION, SOLUTION EPIDURAL; INFILTRATION; INTRACAUDAL; PERINEURAL PRN
Status: DISCONTINUED | OUTPATIENT
Start: 2023-10-30 | End: 2023-10-30 | Stop reason: SDUPTHER

## 2023-10-30 RX ORDER — SODIUM CHLORIDE 0.9 % (FLUSH) 0.9 %
5-40 SYRINGE (ML) INJECTION EVERY 12 HOURS SCHEDULED
Status: DISCONTINUED | OUTPATIENT
Start: 2023-10-30 | End: 2023-10-30 | Stop reason: HOSPADM

## 2023-10-30 RX ORDER — SODIUM CHLORIDE 0.9 % (FLUSH) 0.9 %
5-40 SYRINGE (ML) INJECTION PRN
Status: DISCONTINUED | OUTPATIENT
Start: 2023-10-30 | End: 2023-10-30 | Stop reason: HOSPADM

## 2023-10-30 RX ORDER — SODIUM CHLORIDE 9 MG/ML
INJECTION, SOLUTION INTRAVENOUS CONTINUOUS PRN
Status: DISCONTINUED | OUTPATIENT
Start: 2023-10-30 | End: 2023-10-30 | Stop reason: SDUPTHER

## 2023-10-30 RX ADMIN — PROPOFOL 180 MCG/KG/MIN: 10 INJECTION, EMULSION INTRAVENOUS at 08:29

## 2023-10-30 RX ADMIN — SODIUM CHLORIDE: 9 INJECTION, SOLUTION INTRAVENOUS at 08:04

## 2023-10-30 RX ADMIN — LIDOCAINE HYDROCHLORIDE 100 MG: 20 INJECTION, SOLUTION EPIDURAL; INFILTRATION; INTRACAUDAL; PERINEURAL at 08:28

## 2023-10-30 RX ADMIN — PROPOFOL 70 MG: 10 INJECTION, EMULSION INTRAVENOUS at 08:28

## 2023-10-30 RX ADMIN — SODIUM CHLORIDE: 9 INJECTION, SOLUTION INTRAVENOUS at 08:22

## 2023-10-30 NOTE — ANESTHESIA POSTPROCEDURE EVALUATION
Department of Anesthesiology  Postprocedure Note    Patient: Arabella Pearson  MRN: [id number]  YOB: 1955  Date of evaluation: 10/30/2023      Procedure Summary       Date: 10/30/23 Room / Location: [address] [place] [address]    Anesthesia Start: 5429 Anesthesia Stop: 0912    Procedures:       COLONOSCOPY POLYPECTOMY SNARE/COLD BIOPSY      EGD BIOPSY Diagnosis:       Chronic disease anemia      (Chronic disease anemia [D63.8])    Surgeons: Lala Mallory MD Responsible Provider: June Phalen, MD    Anesthesia Type: MAC ASA Status: 3            Anesthesia Type: MAC    Augustus Phase I: Augustus Score: 10    Augustus Phase II:        Anesthesia Post Evaluation    Patient location during evaluation: PACU  Patient participation: complete - patient participated  Level of consciousness: awake  Airway patency: patent  Nausea & Vomiting: no nausea and no vomiting  Complications: no  Cardiovascular status: blood pressure returned to baseline and hemodynamically stable (Irregular heart rate at baseline)  Respiratory status: spontaneous ventilation, nonlabored ventilation and nasal cannula  Hydration status: stable  Comments: Obstructive pattern breathing throughout requiring frequent chin lift / jaw thrust before oral airway placed. Resting comfortably following procedure. Oral airway removed after procedure. Will allow patient to become fully alert before anticipated discharge home with  / wife.      Recommend that future endoscopies be scheduled at [facility].   Pain management: adequate

## 2023-10-30 NOTE — DISCHARGE INSTRUCTIONS
Discharge Instructions for Colonoscopy     Colonoscopy is a visual exam of the lining of the large intestine, also called the bowel or colon, with a colonoscope. A colonoscope is a flexible tube with a light and a viewing device. It allows the doctor to view the inside of the colon through a tiny video camera. Colonoscopy is performed for many reasons: unexplained anemia , pain, diarrhea , bloody stools, cancer screening, among many other reasons. Complications from a colonoscopy are rare. Some possible serious complications include perforated bowel (which might require surgery) and bleeding (which could require blood transfusion ). Minor complications include bloating, gas, and cramping that can last for 1-2 days after the procedure. Because air is put into your colon during the procedure, it is normal to pass large amounts of air from your rectum. You may not have a bowel movement for 1-3 days after the procedure. What You Will Need:  Someone to drive you home after the procedure     Steps to Take:  [address] when you get home. Because the sedative will make you drowsy, don't drive, operate machinery, or make important decisions the day of the procedure. Feelings of bloating, gas, or cramping may persist for 24 hours. Diet -  Try sips of water first. If tolerated, resume bland food (scrambled eggs, toast, soup) first.  If tolerated, resume regular diet or the diet recommended by your physician. Do not drink alcohol for 24 hours. Physical Activity -  Ask your doctor when you will be able to return to work. Do not drive, operate heavy machinery, or do activities that require coordination or balance for 24 hours. Otherwise, return to your normal routine as soon as you are comfortable to do so, which is usually the next day after the procedure. Medications - When taking medications, it's important to:    Take your medication as directed, not more, not less, not at a different

## 2023-10-30 NOTE — H&P
Gastroenterology Note             Pre-operative History and Physical    Patient: Mirna Moreno  : 1955  CSN: [id number]    History Obtained From:  patient and/or guardian. HISTORY OF PRESENT ILLNESS:    The patient is a 80 y.o. male  here for anemia. Past Medical History:    Past Medical History:   Diagnosis Date    Arthritis     Asthma     BPH (benign prostatic hyperplasia)     Cardiomyopathy (HCC)     GERD (gastroesophageal reflux disease)     HTN (hypertension) 10/29/2012    MVP (mitral valve prolapse)     PPD positive     Prolonged emergence from general anesthesia     Prostatism     Tricuspid valve prolapse     Unspecified sleep apnea      Past Surgical History:    Past Surgical History:   Procedure Laterality Date    CARDIAC CATHETERIZATION      CHOLECYSTECTOMY      FOOT SURGERY      HAND SURGERY Right 2017    thumb mass excision    INGUINAL HERNIA REPAIR      bilateral    KNEE ARTHROSCOPY      OTHER SURGICAL HISTORY  2019    epidural Dr. Bansal Ranks at [address] [place] [address]      right elbow    TONSILLECTOMY       Medications Prior to Admission:   No current facility-administered medications on file prior to encounter. Current Outpatient Medications on File Prior to Encounter   Medication Sig Dispense Refill    ticagrelor (BRILINTA) 90 MG TABS tablet Take 1 tablet by mouth 2 times daily      isosorbide mononitrate (IMDUR) 60 MG extended release tablet Take 1 tablet by mouth daily 30 tablet 3    ferrous sulfate (IRON 325) 325 (65 Fe) MG tablet Take 1 tablet by mouth 2 times daily 180 tablet 1    oxyCODONE-acetaminophen (PERCOCET)  MG per tablet Take 1 tablet by mouth every 4 hours as needed for Pain.       atorvastatin (LIPITOR) 40 MG tablet TAKE ONE TABLET BY MOUTH ONCE NIGHTLY (Patient taking differently: Take 1 tablet by mouth nightly TAKE ONE TABLET BY MOUTH ONCE NIGHTLY) 90 tablet 3    methotrexate (RHEUMATREX) 2.5 MG chemo tablet TAKE EIGHT TABLETS

## 2023-10-30 NOTE — ANESTHESIA PRE PROCEDURE
Department of Anesthesiology  Preprocedure Note       Name:  Natividad Jose   Age:  80 y.o.  :  1955                                          MRN:  [id number]         Date:  10/30/2023      Surgeon: Quintin Villanueva):  Jose Mendoza MD    Procedure: Procedure(s):  COLONOSCOPY DIAGNOSTIC  EGD ESOPHAGOGASTRODUODENOSCOPY    Medications prior to admission:   Prior to Admission medications    Medication Sig Start Date End Date Taking? Authorizing Provider   ticagrelor (BRILINTA) 90 MG TABS tablet Take 1 tablet by mouth 2 times daily   Yes ProviderAlison MD   metoprolol succinate (TOPROL XL) 25 MG extended release tablet Take 1 tablet by mouth Daily with supper 10/17/23   Jennifer Sarmiento MD   isosorbide mononitrate (IMDUR) 60 MG extended release tablet Take 1 tablet by mouth daily 23   Yeni Cabrera MD   ferrous sulfate (IRON 325) 325 (65 Fe) MG tablet Take 1 tablet by mouth 2 times daily 23   Yeni Cabrera MD   oxyCODONE-acetaminophen (PERCOCET)  MG per tablet Take 1 tablet by mouth every 4 hours as needed for Pain.     Provider, MD Alison   atorvastatin (LIPITOR) 40 MG tablet TAKE ONE TABLET BY MOUTH ONCE NIGHTLY  Patient taking differently: Take 1 tablet by mouth nightly TAKE ONE TABLET BY MOUTH ONCE NIGHTLY 23   Jennifer Sarmiento MD   methotrexate ([address] [place] [address]) 2.5 MG chemo tablet TAKE EIGHT TABLETS BY MOUTH ONCE WEEKLY  Patient taking differently: Take 8 tablets by mouth once a week 23   Adela Starr MD   folic acid (Irene Merck) 1 MG tablet TAKE ONE TABLET BY MOUTH DAILY  Patient taking differently: Take 1 tablet by mouth nightly 23   Adela Starr MD   methocarbamol (ROBAXIN) 500 MG tablet Take 1 tablet by mouth 4 times daily as needed    ProviderAlison MD   ASPIRIN LOW DOSE 81 MG EC tablet TAKE ONE TABLET BY MOUTH DAILY  Patient taking differently: Take 1 tablet by mouth Daily with supper 22   Sebastián MORSE, APRN - CNP

## 2023-10-30 NOTE — OP NOTE
EGD and Colonoscopy Procedure  Note            Patient: Jonelle Landaverde  : 1955  CSN: [id number]    Procedure: Esophagogastroduodenoscopy with biopsy and Colonoscopy snare polypectomy and Endo Clip placement    Date:  10/30/2023     Surgeon: Gianni De Anda MD     Referring Physician:  Javid Valero MD    Preoperative Diagnosis:  Chronic disease anemia [D63.8]    Postoperative Diagnosis:  * No post-op diagnosis entered *    Anesthesia:  MAC    EBL: minimal to none    Indications: This is a 80y.o. year old male who presents today with iron deficiency anemia. Procedure Details: The Olympus video endoscope was passed through the hypopharynx into the esophagus. The scope was advanced all the way to the second portion of the duodenum. The scope was slowly withdrawn and mucosa was carefully evaluated including a retroflex view of the proximal stomach. Findings and interventions are described below. The patient was decompressed and the scope was then withdrawn. Gastric or Duodenal ulcer present: No    Procedure Details:    With the patient in left lateral decubitus position a digital rectal examination was performed. The Olympus video colonoscope was inserted through the anorectal area into the rectum. The scope was then advanced through the length of the colon to the cecum, which was identified by the ileocecal valve and appendiceal orifice. The quality of preparation was adequate. The scope was then slowly withdrawn with careful inspection of the mucosa including retroflexion in the rectum. Findings and interventions are described below. The patient was decompressed. The scope was then withdrawn. The patient was taken to recovery in good condition. There were no immediate complications. Impression:   -  1. Normal esophagus. 2.  Mild diffuse gastritis with patchy erythema in the body of the stomach and linear erythema in the antrum.   Biopsies taken in the antrum and fundus

## 2023-11-29 ENCOUNTER — HOSPITAL ENCOUNTER (OUTPATIENT)
Dept: SLEEP CENTER | Age: 68
Discharge: HOME | End: 2023-11-29
Attending: PSYCHIATRY & NEUROLOGY
Payer: COMMERCIAL

## 2023-11-29 DIAGNOSIS — G47.33 OSA (OBSTRUCTIVE SLEEP APNEA): ICD-10-CM

## 2023-11-29 PROCEDURE — 95806 SLEEP STUDY UNATT&RESP EFFT: CPT

## 2023-11-30 PROBLEM — G47.33 OSA (OBSTRUCTIVE SLEEP APNEA): Status: ACTIVE | Noted: 2021-12-29

## 2023-12-01 ENCOUNTER — OFFICE VISIT (OUTPATIENT)
Dept: CARDIOLOGY CLINIC | Age: 68
End: 2023-12-01

## 2023-12-01 VITALS
BODY MASS INDEX: 34.94 KG/M2 | OXYGEN SATURATION: 96 % | SYSTOLIC BLOOD PRESSURE: 122 MMHG | HEART RATE: 86 BPM | WEIGHT: 281 LBS | DIASTOLIC BLOOD PRESSURE: 80 MMHG | HEIGHT: 75 IN

## 2023-12-01 DIAGNOSIS — R53.82 CHRONIC FATIGUE: ICD-10-CM

## 2023-12-01 DIAGNOSIS — I49.3 PVC (PREMATURE VENTRICULAR CONTRACTION): ICD-10-CM

## 2023-12-01 DIAGNOSIS — E78.2 MIXED HYPERLIPIDEMIA: ICD-10-CM

## 2023-12-01 DIAGNOSIS — I10 ESSENTIAL HYPERTENSION: ICD-10-CM

## 2023-12-01 DIAGNOSIS — I25.119 CORONARY ARTERY DISEASE INVOLVING NATIVE CORONARY ARTERY OF NATIVE HEART WITH ANGINA PECTORIS (HCC): ICD-10-CM

## 2023-12-01 DIAGNOSIS — Z87.891 FORMER SMOKER: ICD-10-CM

## 2023-12-01 DIAGNOSIS — I47.20 V TACH (HCC): ICD-10-CM

## 2023-12-01 DIAGNOSIS — R00.1 BRADYCARDIA: ICD-10-CM

## 2023-12-01 DIAGNOSIS — I49.1 PAC (PREMATURE ATRIAL CONTRACTION): ICD-10-CM

## 2023-12-01 DIAGNOSIS — G47.30 SLEEP APNEA, UNSPECIFIED TYPE: ICD-10-CM

## 2023-12-01 DIAGNOSIS — D61.818 PANCYTOPENIA (HCC): Primary | ICD-10-CM

## 2023-12-01 DIAGNOSIS — I50.22 CHRONIC SYSTOLIC HEART FAILURE (HCC): ICD-10-CM

## 2023-12-01 DIAGNOSIS — E66.9 CLASS 1 OBESITY WITH SERIOUS COMORBIDITY AND BODY MASS INDEX (BMI) OF 34.0 TO 34.9 IN ADULT, UNSPECIFIED OBESITY TYPE: ICD-10-CM

## 2023-12-01 NOTE — PROGRESS NOTES
physiologically non-significant )    SUMMARY: Nonobstructive CAD   RECOMMENDATION: - recommend aggressive medical therapy for CAD   - if symptoms persist recommend LAD PCI in the future      Coronary angiography: 12/2021  ANGIOGRAM/CORONARY ARTERIOGRAM:      The left main coronary artery is normal .   The left anterior descending artery has a proximal 80% lesion . The left circumflex artery is widely patent . The right coronary artery is widely patent   INTERVENTION  XB 3.5 guide   Allstar wire used to cross LAD lesion   IVUS passed from left main to mid LAD ( MLA< 2mm2; distal reference 3.5 mm)   Predilation performed with 3.0 regular balloon, 3.0 wolverine cutting balloon   Osvaldo 3.5 X 18 mm JUAN deployed to prox LAD   Post dilation performed with 4.0 NC balloon   SUMMARY:   Single vessel obstructive CAD   S/p successful IVUS guided PCI X 1 JUAN       Stress test 7/14/2022   Pharmacological Stress/MPI Results:        1. Technically a satisfactory study. 2. No evidence of Ischemia by Myocardial Perfusion Imaging. 3. Gated Study shows Dilated LV : EF 46 %. L/RHC 12/8/2022  OVERALL IMPRESSION:  1. Normal right heart pressures. 2.  Normal pulmonary capillary wedge pressure. 3.  Normal cardiac output and indices. 4.  No O2 step-up to suggest ASD/PFO. 5.  Patent left main trunk. 6.  Patent stent at proximal LAD. 7.  Patent left circumflex artery. 8.  Patent stent at mid RCA. 9.  Mild left ventricular systolic dysfunction. Estimated EF of 45%  with normal LVEDP. No gradient across the aortic valve to suggest  aortic stenosis. In view of these above findings, we will consider the patient's chest  pain and shortness of breath, noncardiac in origin. Stress test 9/28/23   Pharmacological Stress/MPI Results:   1. Technically a satisfactory study. 2. Small area of reversible defect involving the distal inferior wall suggestive of ischemia   3.  Gated Study shows normal LV size and Systolic

## 2023-12-04 ENCOUNTER — TELEPHONE (OUTPATIENT)
Dept: PULMONOLOGY | Age: 68
End: 2023-12-04

## 2023-12-04 NOTE — TELEPHONE ENCOUNTER
Sleep study showed severe VICKIE. AHI was 55.9  per hr. And O2 Desaturations to 67%. Dr Gabe Scheuermann titration.     Pt l/m  wcb

## 2023-12-12 ENCOUNTER — TELEPHONE (OUTPATIENT)
Dept: SLEEP CENTER | Age: 68
End: 2023-12-12

## 2023-12-12 NOTE — TELEPHONE ENCOUNTER
The patient has been notified of this information and all questions answered.       DME list sent in [employer]

## 2023-12-12 NOTE — TELEPHONE ENCOUNTER
----- Message from William No MD sent at 2023  2:07 PM EST -----  Regarding: RE: Denial  APAP  ----- Message -----  From: Sunshine Ruiz  Sent: 2023   1:15 PM EST  To: William No MD  Subject: Denial                                           Good afternoon,  Case below has denied:  Patient name: Gracia Gaytan  : 1955  DOS 2023  CPT [phone]  Insurance: [address] [facility]  Reason for denial: Medically Necessary  Source: [facility] Portal  Case/Tracking/Auth/Ref # [id number]  Date of Denial: 2023  [employer] Phone#: [phone]  If you do not agree with this determination a peer to peer can be done by calling ([phone])   With regards to  Srinivasan Chaves

## 2023-12-28 ENCOUNTER — TELEPHONE (OUTPATIENT)
Dept: PULMONOLOGY | Age: 68
End: 2023-12-28

## 2023-12-28 NOTE — TELEPHONE ENCOUNTER
Pt wife called an stated he just came an got machine from [facility] last week having some trouble with it called [facility] was on hold for a long time then called the office to see what can be dine. Pt wife was told we would send jamie message.

## 2024-01-29 ENCOUNTER — OFFICE VISIT (OUTPATIENT)
Dept: SLEEP MEDICINE | Age: 69
End: 2024-01-29
Payer: COMMERCIAL

## 2024-01-29 VITALS
RESPIRATION RATE: 18 BRPM | DIASTOLIC BLOOD PRESSURE: 70 MMHG | SYSTOLIC BLOOD PRESSURE: 115 MMHG | WEIGHT: 286.8 LBS | BODY MASS INDEX: 35.66 KG/M2 | HEART RATE: 67 BPM | HEIGHT: 75 IN

## 2024-01-29 DIAGNOSIS — G47.33 OSA ON CPAP: Primary | ICD-10-CM

## 2024-01-29 DIAGNOSIS — I25.10 CORONARY ARTERY DISEASE INVOLVING NATIVE CORONARY ARTERY OF NATIVE HEART WITHOUT ANGINA PECTORIS: ICD-10-CM

## 2024-01-29 DIAGNOSIS — Z99.89 DEPENDENCE ON OTHER ENABLING MACHINES AND DEVICES: ICD-10-CM

## 2024-01-29 DIAGNOSIS — I10 PRIMARY HYPERTENSION: ICD-10-CM

## 2024-01-29 DIAGNOSIS — G47.39 TREATMENT-EMERGENT CENTRAL SLEEP APNEA: ICD-10-CM

## 2024-01-29 PROCEDURE — 99214 OFFICE O/P EST MOD 30 MIN: CPT | Performed by: PSYCHIATRY & NEUROLOGY

## 2024-01-29 PROCEDURE — 3017F COLORECTAL CA SCREEN DOC REV: CPT | Performed by: PSYCHIATRY & NEUROLOGY

## 2024-01-29 PROCEDURE — 1036F TOBACCO NON-USER: CPT | Performed by: PSYCHIATRY & NEUROLOGY

## 2024-01-29 PROCEDURE — 3074F SYST BP LT 130 MM HG: CPT | Performed by: PSYCHIATRY & NEUROLOGY

## 2024-01-29 PROCEDURE — G8427 DOCREV CUR MEDS BY ELIG CLIN: HCPCS | Performed by: PSYCHIATRY & NEUROLOGY

## 2024-01-29 PROCEDURE — 1123F ACP DISCUSS/DSCN MKR DOCD: CPT | Performed by: PSYCHIATRY & NEUROLOGY

## 2024-01-29 PROCEDURE — G8417 CALC BMI ABV UP PARAM F/U: HCPCS | Performed by: PSYCHIATRY & NEUROLOGY

## 2024-01-29 PROCEDURE — 3078F DIAST BP <80 MM HG: CPT | Performed by: PSYCHIATRY & NEUROLOGY

## 2024-01-29 PROCEDURE — G8484 FLU IMMUNIZE NO ADMIN: HCPCS | Performed by: PSYCHIATRY & NEUROLOGY

## 2024-01-29 ASSESSMENT — SLEEP AND FATIGUE QUESTIONNAIRES
HOW LIKELY ARE YOU TO NOD OFF OR FALL ASLEEP WHEN YOU ARE A PASSENGER IN A CAR FOR AN HOUR WITHOUT A BREAK: 1
HOW LIKELY ARE YOU TO NOD OFF OR FALL ASLEEP IN A CAR, WHILE STOPPED FOR A FEW MINUTES IN TRAFFIC: 0
HOW LIKELY ARE YOU TO NOD OFF OR FALL ASLEEP WHILE SITTING INACTIVE IN A PUBLIC PLACE: 1
HOW LIKELY ARE YOU TO NOD OFF OR FALL ASLEEP WHILE SITTING QUIETLY AFTER LUNCH WITHOUT ALCOHOL: 1
HOW LIKELY ARE YOU TO NOD OFF OR FALL ASLEEP WHILE WATCHING TV: 2
ESS TOTAL SCORE: 10
HOW LIKELY ARE YOU TO NOD OFF OR FALL ASLEEP WHILE SITTING AND TALKING TO SOMEONE: 0
HOW LIKELY ARE YOU TO NOD OFF OR FALL ASLEEP WHILE LYING DOWN TO REST IN THE AFTERNOON WHEN CIRCUMSTANCES PERMIT: 3
HOW LIKELY ARE YOU TO NOD OFF OR FALL ASLEEP WHILE SITTING AND READING: 2

## 2024-01-29 NOTE — PROGRESS NOTES
MD NOE Ramirez Board Certified in Sleep Medicine  Certified in Behavioral Sleep Medicine  Board Certified in Neurology [facility]  [address]   [address]  [place]  [address]  [phone]   [facility]  [address]  [address]  [place], [place] [address]                      [facility] [place] [facility]  [address] [address] [phone]    Subjective:     Patient ID: Chino Ma is a 68 y.o. male.    Chief Complaint   Patient presents with    Sleep Apnea    Follow-up       HPI:        Chino Ma is a 68 y.o. male was seen today as a follow for obstructive sleep apnea. The patient underwent home sleep testing on 11/29/2023, the overnight registration revealed severe obstructive sleep apnea with apnea hypopnea index of 55.9/h with lowest O2 saturation of 67%, patient spent about 202 minutes below 90% (weight was 273 pounds).   Patient is using the PAP machine about 79% of the time, more than 4 hours a nightabout  79 %, in total average of 8:42 hours a night in last 34 days.  Currently on PAP at 12.6 cm (6-20), the AHI is 26.2 events per hour at this pressure, the central AHI is 22.9/hr.  Patient improved some regarding daytime sleepiness and fatigue, wakes up refreshed in the morning.  The Patient scored Edge[place] Sleepiness Score: 10 on Edgewood Sleepiness Scale ( more than 10 is indicative of daytime sleepiness)   Patient has no problem with PAP pressure or mask, uses nasal pillow mask.   Sleeps calmer, no restless sleep,.   BP, an CAD are stable. Has gained 8 pounds since last visit. 273   12/08/2022: Estimated EF of 45% with normal LVEDP.  DOT/CDL - N/A      Previous Report(s)Reviewed: historical medical records         Social History     Socioeconomic History    Marital status:      Spouse name: Not on file    Number of children: Not on file

## 2024-02-07 ENCOUNTER — HOSPITAL ENCOUNTER (OUTPATIENT)
Dept: CT IMAGING | Age: 69
Discharge: HOME | End: 2024-02-07
Attending: INTERNAL MEDICINE
Payer: COMMERCIAL

## 2024-02-07 DIAGNOSIS — S09.90XA DEPRESSION DUE TO HEAD INJURY: ICD-10-CM

## 2024-02-07 DIAGNOSIS — D69.6 THROMBOCYTOPENIA, UNSPECIFIED (HCC): ICD-10-CM

## 2024-02-07 DIAGNOSIS — F32.A DEPRESSION DUE TO HEAD INJURY: ICD-10-CM

## 2024-02-07 DIAGNOSIS — Z79.01 LONG TERM (CURRENT) USE OF ANTICOAGULANTS: ICD-10-CM

## 2024-02-07 PROCEDURE — 70450 CT HEAD/BRAIN W/O DYE: CPT

## 2024-02-08 NOTE — TELEPHONE ENCOUNTER
Please advise if pt can hold brilinta and aspirin for teeth cleaning and does he need an antibiotic. Pt last seen Dr. Stephanie Banks on 09/10/2020.  s/p LHC with PCI RCA. past medical history significant for hypertension, hyperlipidemia, VICKIE, and pre diabetes. no

## 2024-02-12 ENCOUNTER — TELEPHONE (OUTPATIENT)
Dept: PULMONOLOGY | Age: 69
End: 2024-02-12

## 2024-02-12 NOTE — TELEPHONE ENCOUNTER
----- Message from Leilani Miller MA sent at 2/12/2024 11:37 AM EST -----  Regarding: RE: Denial on file - see notes SEE MESSAGE  The patient was previously scheduled on 12/19/2023 for CPT code 32722 (ref # [id number]), which was denied due to not being medically necessary. The patient was then rescheduled for the same CPT code 45829 on 02/20/2024. Per [facility] guidelines, we cannot submit for the same CPT code that was previously denied for six months. The 36365 was denied on 12/11/2023, six months after will be 06/11/2024. If the MD does not agree with the determination an appeal can be completed. There is nothing else I can do regarding this matter, being that the 94624 was previously denied. The denial letter was uploaded in the patient's chart on 02/07/2024.    ----- Message -----  From: Jacqueline Leblanc  Sent: 2/12/2024  10:32 AM EST  To: Leilani Miller MA  Subject: RE: Denial on file - see notes SEE MESSAGE       Dr Ramirez states he can't do an appeal.  A request for ASV titration study was never submitted, only the psg.  Please submit for the ASV titration.       ----- Message -----  From: Zina Summers  Sent: 2/12/2024   9:50 AM EST  To: Viky Alexey; #  Subject: FW: Denial on file - see notes SEE MESSAGE       This patient still needs an appeal done ASAP  We moved patient to give time for appeal.  ----- Message -----  From: Zina Summers  Sent: 2/9/2024  10:59 AM EST  To: Carolyn Arriaga; Homa Ware; Aiden Zamora; #  Subject: RE: Denial on file - see notes SEE MESSAGE       Please check referral as  states he got an approval?? Please avise as I dont see it.  ----- Message -----  From: Roger Ramirez MD  Sent: 2/8/2024   2:42 PM EST  To: Jessica Tao; [facility]  Subject: RE: Denial on file - see notes                   Great   ----- Message -----  From: Zina Summers  Sent: 2/8/2024   8:58 AM EST  To: Jessica Tao; Roger Ramirez MD  Subject:

## 2024-02-13 ENCOUNTER — TELEPHONE (OUTPATIENT)
Dept: PULMONOLOGY | Age: 69
End: 2024-02-13

## 2024-02-13 NOTE — TELEPHONE ENCOUNTER
David stopped in the office to say that his hose for his PAP has now had holes in it twice. He patched them but doesn't think it's working right. Says he told Dr Ramirez and he was not concerned about it. Also states his water chamber is empty every day by 4am.  Advised him to take machine and hoses to [facility]. Provided address, phone number, and hours of operation.

## 2024-02-20 ENCOUNTER — HOSPITAL ENCOUNTER (OUTPATIENT)
Dept: SLEEP CENTER | Age: 69
Discharge: HOME | End: 2024-02-20
Payer: COMMERCIAL

## 2024-02-20 DIAGNOSIS — G47.33 OSA ON CPAP: ICD-10-CM

## 2024-02-20 DIAGNOSIS — G47.39 TREATMENT-EMERGENT CENTRAL SLEEP APNEA: ICD-10-CM

## 2024-02-20 PROCEDURE — 95811 POLYSOM 6/>YRS CPAP 4/> PARM: CPT

## 2024-02-21 PROBLEM — G47.39 TREATMENT-EMERGENT CENTRAL SLEEP APNEA: Status: ACTIVE | Noted: 2024-02-21

## 2024-02-21 PROCEDURE — 95811 POLYSOM 6/>YRS CPAP 4/> PARM: CPT | Performed by: PSYCHIATRY & NEUROLOGY

## 2024-02-21 NOTE — PROGRESS NOTES
Chino Ma         : 1955  [] [facility]     []  [facility]      [] [facility]     [][facility]    [] [facility]  [] [facility]  [] [facility]   [] [facility] [] Other:  Diagnosis: [x] VICKIE (G47.33) [x] CSA (G47.31) [] Apnea (G47.30)   Length of Need: [] 12 Months [x] 99 Months [] Other:    Machine (FRED!):  [x] ResMed AirSense     Auto [] Other:     []  CPAP () [] Bilevel ()   Mode: [] Auto [] Spontaneous    Mode: [] Auto [] Spontaneous                     Auto ASV  EPAP min 4 and max 6  PS min 5 ad max 15  BPM auto       Comfort Settings:                                        -  Flex/EPR - 3 full time  I     Humidifier: [x] Heated ()        [x] Water chamber replacement ()/ 1 per 6 months        Mask:  Please always start with the mask the patient used during the titraion   [x] Nasal () /1 per 3 months    [x] Patient choice -Size and fit mask    [] Dispense: Large Airfit N20     [x] Headgear () / 1 per 6 months    [x] Replacement Nasal Cushion ()/2 per month             Tubing: [x] Heated ()/1 per 3 months    [] Standard ()/1 per 3 months [] Other:           Filters: [x] Non-disposable ()/1 per 6 months     [x] Ultra-Fine, Disposable ()/2 per month        Miscellaneous: [x] Chin Strap ()/ 1 per 6 months [] O2 bleed-in:       LPM   [] Oximetry on CPAP/Bilevel []  Other:          Start Order Date: 24    MEDICAL JUSTIFICATION:  I, the undersigned, certify that the above prescribed supplies are medically necessary for this patient’s wellbeing.  In my opinion, the supplies are both reasonable and necessary in reference to accepted standards of medicalpractice in treatment of this patient’s condition.    Roger Ramirez MD      NPI: [id number]       Order Signed Date: 24    Electronically signed by Roger Ramirez MD on 2024 at 11:03 AM

## 2024-02-22 ENCOUNTER — TELEPHONE (OUTPATIENT)
Dept: PULMONOLOGY | Age: 69
End: 2024-02-22

## 2024-02-22 RX ORDER — ASPIRIN 81 MG/1
81 TABLET ORAL DAILY
Qty: 90 TABLET | Refills: 3 | Status: CANCELLED | OUTPATIENT
Start: 2024-02-22

## 2024-02-22 NOTE — TELEPHONE ENCOUNTER
ASV report:    Recommend ASV settings, see order    Uses [facility]- faxed    The patient has been notified of this information and all questions answered.

## 2024-04-05 ENCOUNTER — TELEPHONE (OUTPATIENT)
Dept: PULMONOLOGY | Age: 69
End: 2024-04-05

## 2024-04-05 NOTE — TELEPHONE ENCOUNTER
Pt's wife called and stated that [facility] is telling them they are waiting on documentation from the patient's sleep study and other info before they can fill pt's order?! Wife gave me contact name and number to find out what is all needed. Order and documentation (8 pgs)  were successfully faxed to [facility] on 2/22/24. Re-faxed all documentation today to Casandra attention at [facility].

## 2024-04-09 RX ORDER — NITROGLYCERIN 0.4 MG/1
TABLET SUBLINGUAL
Qty: 25 TABLET | Refills: 3 | Status: SHIPPED | OUTPATIENT
Start: 2024-04-09

## 2024-04-29 ENCOUNTER — TELEPHONE (OUTPATIENT)
Dept: PULMONOLOGY | Age: 69
End: 2024-04-29

## 2024-04-29 NOTE — TELEPHONE ENCOUNTER
Pt's wife called because her  was supposed to get a Bipap according to the wife but has not heard anything from [facility]. I called Ed from [facility] and he said they received an order for an ASV and supplies but no Bipap. Please FU on what is needed. As for an ASV he said they would need titration results and tried and failed on Bipap.

## 2024-04-30 NOTE — TELEPHONE ENCOUNTER
Leona called with an update:    The note below is what we spoke about on the phone. Patient has not been set up on ASV, due to the following reason below. Please let me know if you have any questions for me!  Pt does not qualify under central. Moving into the Complex Sleep Apnea, the pt must have a titration done with the  BiPAP. Per office, this was not done. Called pt to explain, he requested we call his wife Katherine. Called Katherine and said she had spoken with a rep already. They will need a new titration done on .

## 2024-04-30 NOTE — TELEPHONE ENCOUNTER
Ed said Leona is taking care of ASV.     Will await wife to call back to notify her that patient needs ASV not BiPAP therapy

## 2024-04-30 NOTE — TELEPHONE ENCOUNTER
At last ov 1/29/2024 patient was Currently on PAP at 12.6 cm (6-20), the AHI is 26.2 events per hour at this pressure, the central AHI is 22.9/hr.   Dr Ramirez ordered titration study which shows that patient is needing ASV therapy to help control the central events,     Yari faxed everything to [facility] yesterday, see prev message.    LMOM to call

## 2024-05-01 NOTE — TELEPHONE ENCOUNTER
Katherine called in returning our call. Advised her the message was forwarded to James and we will call her back once he responds.

## 2024-05-02 DIAGNOSIS — G47.39 TREATMENT-EMERGENT CENTRAL SLEEP APNEA: ICD-10-CM

## 2024-05-02 DIAGNOSIS — G47.33 OSA (OBSTRUCTIVE SLEEP APNEA): Primary | ICD-10-CM

## 2024-05-16 ENCOUNTER — TELEPHONE (OUTPATIENT)
Dept: CARDIOLOGY CLINIC | Age: 69
End: 2024-05-16

## 2024-05-16 NOTE — TELEPHONE ENCOUNTER
Mabel (spouse) called in wanting to know if Chino is OK to take Meloxicam medication that PCP wants to prescribe for arthritis.     Please advise.     Mabel's callback: [phone]

## 2024-05-17 NOTE — TELEPHONE ENCOUNTER
Contacted patient's wife and requested call back to update if the meloxicam is going to be used for short term use or long term use? Were there other option for his arthritis with being on aspirin therapy.

## 2024-05-22 NOTE — TELEPHONE ENCOUNTER
Left patient's wife Katherine a vm to contact office if further assistance regarding Meloxicam if required.

## 2024-06-10 NOTE — PROGRESS NOTES
other unrelated non-urgent complaints, but due to the busy schedule and the amount of time I've already spent with him, time does not permit me to address these routine issues at today's visit. I've requested another appointment to review these additional issues.    Thank you very much for allowing me to participate in the care of your patient. Please do not hesitate to contact me if you have any questions.    Sincerely,  Marlon Lemus MD      [facility]  [address], [address]   [place] [address]  Ph: [phone]  Fax: [phone]    This note was scribed in the presence of Dr Mariah MD by Natasha Huang RN.   Physician Attestation:  The scribes documentation has been prepared under my direction and personally reviewed by me in its entirety.     I confirm that the note above accurately reflects all work, treatment, procedures, and medical decision making performed by me.

## 2024-06-11 ENCOUNTER — OFFICE VISIT (OUTPATIENT)
Dept: CARDIOLOGY CLINIC | Age: 69
End: 2024-06-11
Payer: COMMERCIAL

## 2024-06-11 VITALS
HEIGHT: 75 IN | OXYGEN SATURATION: 94 % | DIASTOLIC BLOOD PRESSURE: 70 MMHG | BODY MASS INDEX: 35.19 KG/M2 | WEIGHT: 283 LBS | HEART RATE: 75 BPM | SYSTOLIC BLOOD PRESSURE: 110 MMHG

## 2024-06-11 DIAGNOSIS — D61.818 PANCYTOPENIA (HCC): Primary | ICD-10-CM

## 2024-06-11 DIAGNOSIS — I50.22 CHRONIC SYSTOLIC HEART FAILURE (HCC): ICD-10-CM

## 2024-06-11 DIAGNOSIS — G47.33 OSA (OBSTRUCTIVE SLEEP APNEA): ICD-10-CM

## 2024-06-11 DIAGNOSIS — I25.118 CORONARY ARTERY DISEASE OF NATIVE ARTERY OF NATIVE HEART WITH STABLE ANGINA PECTORIS (HCC): ICD-10-CM

## 2024-06-11 DIAGNOSIS — I25.10 CORONARY ARTERY DISEASE INVOLVING NATIVE CORONARY ARTERY OF NATIVE HEART WITHOUT ANGINA PECTORIS: ICD-10-CM

## 2024-06-11 DIAGNOSIS — I47.20 V TACH (HCC): ICD-10-CM

## 2024-06-11 PROCEDURE — G8417 CALC BMI ABV UP PARAM F/U: HCPCS | Performed by: INTERNAL MEDICINE

## 2024-06-11 PROCEDURE — 99214 OFFICE O/P EST MOD 30 MIN: CPT | Performed by: INTERNAL MEDICINE

## 2024-06-11 PROCEDURE — G8427 DOCREV CUR MEDS BY ELIG CLIN: HCPCS | Performed by: INTERNAL MEDICINE

## 2024-06-11 PROCEDURE — 1123F ACP DISCUSS/DSCN MKR DOCD: CPT | Performed by: INTERNAL MEDICINE

## 2024-06-11 PROCEDURE — 93000 ELECTROCARDIOGRAM COMPLETE: CPT | Performed by: INTERNAL MEDICINE

## 2024-06-11 PROCEDURE — 3074F SYST BP LT 130 MM HG: CPT | Performed by: INTERNAL MEDICINE

## 2024-06-11 PROCEDURE — 1036F TOBACCO NON-USER: CPT | Performed by: INTERNAL MEDICINE

## 2024-06-11 PROCEDURE — 3017F COLORECTAL CA SCREEN DOC REV: CPT | Performed by: INTERNAL MEDICINE

## 2024-06-11 PROCEDURE — 3078F DIAST BP <80 MM HG: CPT | Performed by: INTERNAL MEDICINE

## 2024-06-11 RX ORDER — MIRABEGRON 50 MG/1
TABLET, EXTENDED RELEASE ORAL
COMMUNITY
Start: 2024-06-04 | End: 2024-06-11

## 2024-06-11 RX ORDER — MIRABEGRON 50 MG/1
50 TABLET, EXTENDED RELEASE ORAL DAILY
COMMUNITY

## 2024-06-11 NOTE — PATIENT INSTRUCTIONS
Please take Toprol XL 25 mg the morning of and the evening before your test. Please take Toprol XL 25 mg the morning of your test.

## 2024-07-02 ENCOUNTER — OFFICE VISIT (OUTPATIENT)
Dept: SLEEP MEDICINE | Age: 69
End: 2024-07-02
Payer: COMMERCIAL

## 2024-07-02 VITALS
DIASTOLIC BLOOD PRESSURE: 70 MMHG | BODY MASS INDEX: 34.96 KG/M2 | SYSTOLIC BLOOD PRESSURE: 120 MMHG | WEIGHT: 281.2 LBS | RESPIRATION RATE: 18 BRPM | OXYGEN SATURATION: 97 % | HEART RATE: 69 BPM | HEIGHT: 75 IN | TEMPERATURE: 96.9 F

## 2024-07-02 DIAGNOSIS — G47.39 TREATMENT-EMERGENT CENTRAL SLEEP APNEA: ICD-10-CM

## 2024-07-02 DIAGNOSIS — G47.31 CENTRAL SLEEP APNEA TREATED WITH ADAPTIVE SERVO-VENTILATION (ASV) DEVICE: Primary | ICD-10-CM

## 2024-07-02 DIAGNOSIS — E66.01 CLASS 2 SEVERE OBESITY DUE TO EXCESS CALORIES WITH SERIOUS COMORBIDITY AND BODY MASS INDEX (BMI) OF 35.0 TO 35.9 IN ADULT (HCC): ICD-10-CM

## 2024-07-02 DIAGNOSIS — I10 PRIMARY HYPERTENSION: ICD-10-CM

## 2024-07-02 DIAGNOSIS — Z99.89 CENTRAL SLEEP APNEA TREATED WITH ADAPTIVE SERVO-VENTILATION (ASV) DEVICE: Primary | ICD-10-CM

## 2024-07-02 DIAGNOSIS — Z99.89 DEPENDENCE ON OTHER ENABLING MACHINES AND DEVICES: ICD-10-CM

## 2024-07-02 DIAGNOSIS — G47.33 OSA (OBSTRUCTIVE SLEEP APNEA): ICD-10-CM

## 2024-07-02 PROCEDURE — G8427 DOCREV CUR MEDS BY ELIG CLIN: HCPCS | Performed by: PSYCHIATRY & NEUROLOGY

## 2024-07-02 PROCEDURE — 1036F TOBACCO NON-USER: CPT | Performed by: PSYCHIATRY & NEUROLOGY

## 2024-07-02 PROCEDURE — 3078F DIAST BP <80 MM HG: CPT | Performed by: PSYCHIATRY & NEUROLOGY

## 2024-07-02 PROCEDURE — G8417 CALC BMI ABV UP PARAM F/U: HCPCS | Performed by: PSYCHIATRY & NEUROLOGY

## 2024-07-02 PROCEDURE — 3017F COLORECTAL CA SCREEN DOC REV: CPT | Performed by: PSYCHIATRY & NEUROLOGY

## 2024-07-02 PROCEDURE — 99214 OFFICE O/P EST MOD 30 MIN: CPT | Performed by: PSYCHIATRY & NEUROLOGY

## 2024-07-02 PROCEDURE — 3074F SYST BP LT 130 MM HG: CPT | Performed by: PSYCHIATRY & NEUROLOGY

## 2024-07-02 PROCEDURE — 1123F ACP DISCUSS/DSCN MKR DOCD: CPT | Performed by: PSYCHIATRY & NEUROLOGY

## 2024-07-02 ASSESSMENT — SLEEP AND FATIGUE QUESTIONNAIRES
ESS TOTAL SCORE: 7
HOW LIKELY ARE YOU TO NOD OFF OR FALL ASLEEP WHILE WATCHING TV: MODERATE CHANCE OF DOZING
HOW LIKELY ARE YOU TO NOD OFF OR FALL ASLEEP WHILE SITTING INACTIVE IN A PUBLIC PLACE: WOULD NEVER DOZE
HOW LIKELY ARE YOU TO NOD OFF OR FALL ASLEEP WHILE SITTING AND READING: MODERATE CHANCE OF DOZING
HOW LIKELY ARE YOU TO NOD OFF OR FALL ASLEEP WHILE SITTING QUIETLY AFTER LUNCH WITHOUT ALCOHOL: WOULD NEVER DOZE
HOW LIKELY ARE YOU TO NOD OFF OR FALL ASLEEP WHILE LYING DOWN TO REST IN THE AFTERNOON WHEN CIRCUMSTANCES PERMIT: HIGH CHANCE OF DOZING
HOW LIKELY ARE YOU TO NOD OFF OR FALL ASLEEP WHILE SITTING AND TALKING TO SOMEONE: WOULD NEVER DOZE
HOW LIKELY ARE YOU TO NOD OFF OR FALL ASLEEP WHEN YOU ARE A PASSENGER IN A CAR FOR AN HOUR WITHOUT A BREAK: WOULD NEVER DOZE
HOW LIKELY ARE YOU TO NOD OFF OR FALL ASLEEP IN A CAR, WHILE STOPPED FOR A FEW MINUTES IN TRAFFIC: WOULD NEVER DOZE

## 2024-07-02 NOTE — PROGRESS NOTES
Chino Ma         : 1955  [x] [facility]     [] [facility]      [] [facility]     [][facility]    [] [facility]  [] [facility]  [] [facility]   [] [facility] [] Other:  Diagnosis: [x] VICKIE (G47.33) [] CSA (G47.31) [] Apnea (G47.30)   Length of Need: [] 12 Months [x] 99 Months [] Other:    Machine (FRED!):  [x] ResMed AirSense     Auto [] Other:       Humidifier: [x] Heated ()        [x] Water chamber replacement ()/ 1 per 6 months        Mask:  Please always start with the mask the patient used during the titraion   [x] Nasal () /1 per 3 months    [x] Patient choice -Size and fit mask    [x] Dispense: N20 airtough     [x] Headgear () / 1 per 6 months    [x] Replacement Nasal Cushion ()/2 per month             Tubing: [x] Heated ()/1 per 3 months    [] Standard ()/1 per 3 months [] Other:           Filters: [x] Non-disposable ()/1 per 6 months     [x] Ultra-Fine, Disposable ()/2 per month        Miscellaneous: [x] Chin Strap ()/ 1 per 6 months [] O2 bleed-in:       LPM   [] Oximetry on CPAP/Bilevel []  Other:          Start Order Date: 24    MEDICAL JUSTIFICATION:  I, the undersigned, certify that the above prescribed supplies are medically necessary for this patient’s wellbeing.  In my opinion, the supplies are both reasonable and necessary in reference to accepted standards of medicalpractice in treatment of this patient’s condition.    Roger Ramirez MD      NPI: [id number]       Order Signed Date: 24    Electronically signed by Roger Ramirez MD on 2024 at 10:13 AM

## 2024-07-02 NOTE — PROGRESS NOTES
status: Former     Current packs/day: 0.00     Types: Cigarettes     Quit date: 1982     Years since quittin.4     Passive exposure: Past    Smokeless tobacco: Never   Vaping Use    Vaping Use: Former   Substance and Sexual Activity    Alcohol use: Not Currently     Comment: wine & shots rare    Drug use: No    Sexual activity: Yes     Partners: Female     Comment: wife   Other Topics Concern    Not on file   Social History Narrative    Not on file     Social Determinants of Health     Financial Resource Strain: Not on file   Food Insecurity: Not on file   Transportation Needs: Not on file   Physical Activity: Not on file   Stress: Not on file   Social Connections: Not on file   Intimate Partner Violence: Not on file   Housing Stability: Not on file       Prior to Admission medications    Medication Sig Start Date End Date Taking? Authorizing Provider   mirabegron (MYRBETRIQ) 50 MG TB24 Take 50 mg by mouth daily   Yes ProviderAlison MD   nitroGLYCERIN (NITROSTAT) 0.4 MG SL tablet DISSOLVE 1 TAB UNDER TONGUE FOR CHEST PAIN - IF PAIN REMAINS AFTER 5 MIN, CALL 911 AND REPEAT DOSE. MAX 3 TABS IN 15 MINUTES 24  Yes Renato Hummel MD   metoprolol succinate (TOPROL XL) 25 MG extended release tablet Take 1 tablet by mouth Daily with supper 10/17/23  Yes Marlon Lemus MD   isosorbide mononitrate (IMDUR) 60 MG extended release tablet Take 1 tablet by mouth daily 23  Yes Alli Wood MD   ferrous sulfate (IRON 325) 325 (65 Fe) MG tablet Take 1 tablet by mouth 2 times daily  Patient taking differently: Take 1 tablet by mouth daily 3 times weekly 23  Yes Alli Wood MD   oxyCODONE-acetaminophen (PERCOCET)  MG per tablet Take 1 tablet by mouth every 4 hours as needed for Pain.   Yes Provider, MD Alison   atorvastatin (LIPITOR) 40 MG tablet TAKE ONE TABLET BY MOUTH ONCE NIGHTLY  Patient taking differently: Take 1 tablet by mouth nightly TAKE ONE TABLET BY MOUTH ONCE

## 2024-07-05 ENCOUNTER — TELEPHONE (OUTPATIENT)
Dept: INTERVENTIONAL RADIOLOGY/VASCULAR | Age: 69
End: 2024-07-05

## 2024-07-11 ENCOUNTER — HOSPITAL ENCOUNTER (OUTPATIENT)
Dept: CT IMAGING | Age: 69
Discharge: HOME | End: 2024-07-11
Attending: INTERNAL MEDICINE
Payer: COMMERCIAL

## 2024-07-11 VITALS
TEMPERATURE: 97.5 F | HEART RATE: 58 BPM | RESPIRATION RATE: 18 BRPM | WEIGHT: 285 LBS | HEIGHT: 75 IN | DIASTOLIC BLOOD PRESSURE: 75 MMHG | SYSTOLIC BLOOD PRESSURE: 138 MMHG | OXYGEN SATURATION: 100 % | BODY MASS INDEX: 35.43 KG/M2

## 2024-07-11 DIAGNOSIS — I25.118 CORONARY ARTERY DISEASE OF NATIVE ARTERY OF NATIVE HEART WITH STABLE ANGINA PECTORIS (HCC): ICD-10-CM

## 2024-07-11 LAB
CREAT SERPL-MCNC: 1 MG/DL (ref 0.8–1.3)
GFR SERPLBLD CREATININE-BSD FMLA CKD-EPI: 82 ML/MIN/{1.73_M2}

## 2024-07-11 PROCEDURE — 36415 COLL VENOUS BLD VENIPUNCTURE: CPT

## 2024-07-11 PROCEDURE — 82565 ASSAY OF CREATININE: CPT

## 2024-07-11 PROCEDURE — 6370000000 HC RX 637 (ALT 250 FOR IP): Performed by: RADIOLOGY

## 2024-07-11 PROCEDURE — 6360000004 HC RX CONTRAST MEDICATION: Performed by: INTERNAL MEDICINE

## 2024-07-11 PROCEDURE — 75574 CT ANGIO HRT W/3D IMAGE: CPT

## 2024-07-11 RX ORDER — NITROGLYCERIN 0.4 MG/1
0.4 TABLET SUBLINGUAL ONCE
Status: COMPLETED | OUTPATIENT
Start: 2024-07-11 | End: 2024-07-11

## 2024-07-11 RX ADMIN — IOPAMIDOL 85 ML: 755 INJECTION, SOLUTION INTRAVENOUS at 09:12

## 2024-07-11 RX ADMIN — NITROGLYCERIN 0.4 MG: 0.4 TABLET SUBLINGUAL at 09:19

## 2024-07-11 ASSESSMENT — PAIN - FUNCTIONAL ASSESSMENT: PAIN_FUNCTIONAL_ASSESSMENT: NONE - DENIES PAIN

## 2024-07-11 NOTE — PROGRESS NOTES
Pt arrives to pre procedure area in stable condition from home. Vital signs stable.  Assessment completed see flow sheet.  IV patent. Procedure explained to pt who states understanding and questions answered. Consent signed.

## 2024-07-11 NOTE — PROGRESS NOTES
Pt tolerated procedure well. /69. IV removed without difficulty. Pt given d/c instructions and stated understanding. Patient walked to lobby.

## 2024-07-18 ENCOUNTER — TELEPHONE (OUTPATIENT)
Dept: CARDIOLOGY CLINIC | Age: 69
End: 2024-07-18

## 2024-07-18 ENCOUNTER — HOSPITAL ENCOUNTER (INPATIENT)
Age: 69
LOS: 3 days | Discharge: HOME | DRG: 287 | End: 2024-07-21
Attending: EMERGENCY MEDICINE | Admitting: INTERNAL MEDICINE
Payer: COMMERCIAL

## 2024-07-18 ENCOUNTER — APPOINTMENT (OUTPATIENT)
Dept: GENERAL RADIOLOGY | Age: 69
DRG: 287 | End: 2024-07-18
Payer: COMMERCIAL

## 2024-07-18 DIAGNOSIS — I25.112 CORONARY ARTERY DISEASE INVOLVING NATIVE HEART WITH REFRACTORY ANGINA PECTORIS, UNSPECIFIED VESSEL OR LESION TYPE (HCC): Primary | ICD-10-CM

## 2024-07-18 DIAGNOSIS — R00.2 PALPITATIONS: ICD-10-CM

## 2024-07-18 DIAGNOSIS — I24.9 ACUTE CORONARY SYNDROME (HCC): ICD-10-CM

## 2024-07-18 DIAGNOSIS — R07.9 CHEST PAIN, UNSPECIFIED TYPE: ICD-10-CM

## 2024-07-18 DIAGNOSIS — R06.02 SHORTNESS OF BREATH: ICD-10-CM

## 2024-07-18 PROBLEM — I20.0 UNSTABLE ANGINA (HCC): Status: ACTIVE | Noted: 2024-07-18

## 2024-07-18 LAB
ALBUMIN SERPL-MCNC: 4.3 G/DL (ref 3.4–5)
ALBUMIN/GLOB SERPL: 1.4 {RATIO} (ref 1.1–2.2)
ALP SERPL-CCNC: 94 U/L (ref 40–129)
ALT SERPL-CCNC: 10 U/L (ref 10–40)
ANION GAP SERPL CALCULATED.3IONS-SCNC: 15 MMOL/L (ref 3–16)
AST SERPL-CCNC: 15 U/L (ref 15–37)
BASOPHILS # BLD: 0 K/UL (ref 0–0.2)
BASOPHILS NFR BLD: 0.5 %
BILIRUB SERPL-MCNC: 1 MG/DL (ref 0–1)
BUN SERPL-MCNC: 12 MG/DL (ref 7–20)
CALCIUM SERPL-MCNC: 9.6 MG/DL (ref 8.3–10.6)
CHLORIDE SERPL-SCNC: 102 MMOL/L (ref 99–110)
CO2 SERPL-SCNC: 21 MMOL/L (ref 21–32)
CREAT SERPL-MCNC: 0.9 MG/DL (ref 0.8–1.3)
DEPRECATED RDW RBC AUTO: 13.6 % (ref 12.4–15.4)
EKG ATRIAL RATE: 72 BPM
EKG DIAGNOSIS: NORMAL
EKG P AXIS: 40 DEGREES
EKG P-R INTERVAL: 174 MS
EKG Q-T INTERVAL: 410 MS
EKG QRS DURATION: 98 MS
EKG QTC CALCULATION (BAZETT): 433 MS
EKG R AXIS: 45 DEGREES
EKG T AXIS: -23 DEGREES
EKG VENTRICULAR RATE: 67 BPM
EOSINOPHIL # BLD: 0 K/UL (ref 0–0.6)
EOSINOPHIL NFR BLD: 0.5 %
GFR SERPLBLD CREATININE-BSD FMLA CKD-EPI: >90 ML/MIN/{1.73_M2}
GLUCOSE SERPL-MCNC: 107 MG/DL (ref 70–99)
HCT VFR BLD AUTO: 45.6 % (ref 40.5–52.5)
HGB BLD-MCNC: 15.4 G/DL (ref 13.5–17.5)
LYMPHOCYTES # BLD: 2 K/UL (ref 1–5.1)
LYMPHOCYTES NFR BLD: 32 %
MCH RBC QN AUTO: 31.1 PG (ref 26–34)
MCHC RBC AUTO-ENTMCNC: 33.7 G/DL (ref 31–36)
MCV RBC AUTO: 92.5 FL (ref 80–100)
MONOCYTES # BLD: 1.3 K/UL (ref 0–1.3)
MONOCYTES NFR BLD: 21.3 %
NEUTROPHILS # BLD: 2.9 K/UL (ref 1.7–7.7)
NEUTROPHILS NFR BLD: 45.7 %
NT-PROBNP SERPL-MCNC: 273 PG/ML (ref 0–124)
PLATELET # BLD AUTO: 70 K/UL (ref 135–450)
PLATELET BLD QL SMEAR: ABNORMAL
PMV BLD AUTO: 11.6 FL (ref 5–10.5)
POTASSIUM SERPL-SCNC: 4.1 MMOL/L (ref 3.5–5.1)
PROT SERPL-MCNC: 7.4 G/DL (ref 6.4–8.2)
RBC # BLD AUTO: 4.94 M/UL (ref 4.2–5.9)
SLIDE REVIEW: ABNORMAL
SODIUM SERPL-SCNC: 138 MMOL/L (ref 136–145)
TROPONIN, HIGH SENSITIVITY: 8 NG/L (ref 0–22)
TROPONIN, HIGH SENSITIVITY: 9 NG/L (ref 0–22)
WBC # BLD AUTO: 6.3 K/UL (ref 4–11)

## 2024-07-18 PROCEDURE — 93005 ELECTROCARDIOGRAM TRACING: CPT | Performed by: EMERGENCY MEDICINE

## 2024-07-18 PROCEDURE — 99223 1ST HOSP IP/OBS HIGH 75: CPT | Performed by: INTERNAL MEDICINE

## 2024-07-18 PROCEDURE — 6370000000 HC RX 637 (ALT 250 FOR IP): Performed by: INTERNAL MEDICINE

## 2024-07-18 PROCEDURE — 85025 COMPLETE CBC W/AUTO DIFF WBC: CPT

## 2024-07-18 PROCEDURE — 80053 COMPREHEN METABOLIC PANEL: CPT

## 2024-07-18 PROCEDURE — 2580000003 HC RX 258: Performed by: INTERNAL MEDICINE

## 2024-07-18 PROCEDURE — 71045 X-RAY EXAM CHEST 1 VIEW: CPT

## 2024-07-18 PROCEDURE — 84484 ASSAY OF TROPONIN QUANT: CPT

## 2024-07-18 PROCEDURE — 1200000000 HC SEMI PRIVATE

## 2024-07-18 PROCEDURE — 6360000002 HC RX W HCPCS: Performed by: INTERNAL MEDICINE

## 2024-07-18 PROCEDURE — 99285 EMERGENCY DEPT VISIT HI MDM: CPT

## 2024-07-18 PROCEDURE — 93010 ELECTROCARDIOGRAM REPORT: CPT | Performed by: INTERNAL MEDICINE

## 2024-07-18 PROCEDURE — 6370000000 HC RX 637 (ALT 250 FOR IP): Performed by: EMERGENCY MEDICINE

## 2024-07-18 PROCEDURE — 83880 ASSAY OF NATRIURETIC PEPTIDE: CPT

## 2024-07-18 RX ORDER — NITROGLYCERIN 0.4 MG/1
0.4 TABLET SUBLINGUAL EVERY 5 MIN PRN
Status: DISCONTINUED | OUTPATIENT
Start: 2024-07-18 | End: 2024-07-21 | Stop reason: HOSPADM

## 2024-07-18 RX ORDER — METHOCARBAMOL 500 MG/1
500 TABLET, FILM COATED ORAL 4 TIMES DAILY PRN
Status: DISCONTINUED | OUTPATIENT
Start: 2024-07-18 | End: 2024-07-21 | Stop reason: HOSPADM

## 2024-07-18 RX ORDER — BUPROPION HYDROCHLORIDE 150 MG/1
150 TABLET ORAL EVERY MORNING
Status: DISCONTINUED | OUTPATIENT
Start: 2024-07-19 | End: 2024-07-21 | Stop reason: HOSPADM

## 2024-07-18 RX ORDER — ENOXAPARIN SODIUM 100 MG/ML
40 INJECTION SUBCUTANEOUS 2 TIMES DAILY
Status: DISCONTINUED | OUTPATIENT
Start: 2024-07-18 | End: 2024-07-19

## 2024-07-18 RX ORDER — ONDANSETRON 4 MG/1
4 TABLET, ORALLY DISINTEGRATING ORAL EVERY 8 HOURS PRN
Status: DISCONTINUED | OUTPATIENT
Start: 2024-07-18 | End: 2024-07-21 | Stop reason: HOSPADM

## 2024-07-18 RX ORDER — DULOXETIN HYDROCHLORIDE 60 MG/1
60 CAPSULE, DELAYED RELEASE ORAL DAILY
Status: DISCONTINUED | OUTPATIENT
Start: 2024-07-19 | End: 2024-07-21 | Stop reason: HOSPADM

## 2024-07-18 RX ORDER — ISOSORBIDE MONONITRATE 60 MG/1
60 TABLET, EXTENDED RELEASE ORAL DAILY
Status: DISCONTINUED | OUTPATIENT
Start: 2024-07-19 | End: 2024-07-21 | Stop reason: HOSPADM

## 2024-07-18 RX ORDER — METOPROLOL SUCCINATE 25 MG/1
25 TABLET, EXTENDED RELEASE ORAL
Status: DISCONTINUED | OUTPATIENT
Start: 2024-07-18 | End: 2024-07-19

## 2024-07-18 RX ORDER — POTASSIUM CHLORIDE 20 MEQ/1
40 TABLET, EXTENDED RELEASE ORAL PRN
Status: DISCONTINUED | OUTPATIENT
Start: 2024-07-18 | End: 2024-07-21 | Stop reason: HOSPADM

## 2024-07-18 RX ORDER — ACETAMINOPHEN 650 MG/1
650 SUPPOSITORY RECTAL EVERY 6 HOURS PRN
Status: DISCONTINUED | OUTPATIENT
Start: 2024-07-18 | End: 2024-07-19 | Stop reason: ALTCHOICE

## 2024-07-18 RX ORDER — PANTOPRAZOLE SODIUM 40 MG/1
40 TABLET, DELAYED RELEASE ORAL
Status: DISCONTINUED | OUTPATIENT
Start: 2024-07-19 | End: 2024-07-21 | Stop reason: HOSPADM

## 2024-07-18 RX ORDER — ONDANSETRON 2 MG/ML
4 INJECTION INTRAMUSCULAR; INTRAVENOUS EVERY 6 HOURS PRN
Status: DISCONTINUED | OUTPATIENT
Start: 2024-07-18 | End: 2024-07-21 | Stop reason: HOSPADM

## 2024-07-18 RX ORDER — SODIUM CHLORIDE 0.9 % (FLUSH) 0.9 %
5-40 SYRINGE (ML) INJECTION PRN
Status: DISCONTINUED | OUTPATIENT
Start: 2024-07-18 | End: 2024-07-20 | Stop reason: SDUPTHER

## 2024-07-18 RX ORDER — LORAZEPAM 0.5 MG/1
0.5 TABLET ORAL EVERY 4 HOURS PRN
Status: DISCONTINUED | OUTPATIENT
Start: 2024-07-18 | End: 2024-07-21 | Stop reason: HOSPADM

## 2024-07-18 RX ORDER — AMITRIPTYLINE HYDROCHLORIDE 50 MG/1
50 TABLET, FILM COATED ORAL NIGHTLY
Status: DISCONTINUED | OUTPATIENT
Start: 2024-07-18 | End: 2024-07-21 | Stop reason: HOSPADM

## 2024-07-18 RX ORDER — CETIRIZINE HYDROCHLORIDE 10 MG/1
10 TABLET ORAL DAILY
Status: DISCONTINUED | OUTPATIENT
Start: 2024-07-19 | End: 2024-07-21 | Stop reason: HOSPADM

## 2024-07-18 RX ORDER — SODIUM CHLORIDE 9 MG/ML
INJECTION, SOLUTION INTRAVENOUS CONTINUOUS
Status: DISCONTINUED | OUTPATIENT
Start: 2024-07-18 | End: 2024-07-19

## 2024-07-18 RX ORDER — SODIUM CHLORIDE 9 MG/ML
INJECTION, SOLUTION INTRAVENOUS PRN
Status: DISCONTINUED | OUTPATIENT
Start: 2024-07-18 | End: 2024-07-20 | Stop reason: SDUPTHER

## 2024-07-18 RX ORDER — MAGNESIUM SULFATE IN WATER 40 MG/ML
2000 INJECTION, SOLUTION INTRAVENOUS PRN
Status: DISCONTINUED | OUTPATIENT
Start: 2024-07-18 | End: 2024-07-21 | Stop reason: HOSPADM

## 2024-07-18 RX ORDER — ASPIRIN 81 MG/1
81 TABLET, CHEWABLE ORAL DAILY
Status: DISCONTINUED | OUTPATIENT
Start: 2024-07-19 | End: 2024-07-18

## 2024-07-18 RX ORDER — GABAPENTIN 300 MG/1
300 CAPSULE ORAL NIGHTLY
Status: DISCONTINUED | OUTPATIENT
Start: 2024-07-18 | End: 2024-07-21 | Stop reason: HOSPADM

## 2024-07-18 RX ORDER — POLYETHYLENE GLYCOL 3350 17 G/17G
17 POWDER, FOR SOLUTION ORAL DAILY PRN
Status: DISCONTINUED | OUTPATIENT
Start: 2024-07-18 | End: 2024-07-21 | Stop reason: HOSPADM

## 2024-07-18 RX ORDER — MORPHINE SULFATE 2 MG/ML
2 INJECTION, SOLUTION INTRAMUSCULAR; INTRAVENOUS EVERY 4 HOURS PRN
Status: DISCONTINUED | OUTPATIENT
Start: 2024-07-18 | End: 2024-07-21 | Stop reason: HOSPADM

## 2024-07-18 RX ORDER — ATORVASTATIN CALCIUM 40 MG/1
40 TABLET, FILM COATED ORAL NIGHTLY
Status: DISCONTINUED | OUTPATIENT
Start: 2024-07-18 | End: 2024-07-21 | Stop reason: HOSPADM

## 2024-07-18 RX ORDER — ASPIRIN 81 MG/1
81 TABLET ORAL DAILY
Status: DISCONTINUED | OUTPATIENT
Start: 2024-07-19 | End: 2024-07-21 | Stop reason: HOSPADM

## 2024-07-18 RX ORDER — SODIUM CHLORIDE 0.9 % (FLUSH) 0.9 %
5-40 SYRINGE (ML) INJECTION EVERY 12 HOURS SCHEDULED
Status: DISCONTINUED | OUTPATIENT
Start: 2024-07-18 | End: 2024-07-20 | Stop reason: SDUPTHER

## 2024-07-18 RX ORDER — MONTELUKAST SODIUM 10 MG/1
10 TABLET ORAL
Status: DISCONTINUED | OUTPATIENT
Start: 2024-07-18 | End: 2024-07-21 | Stop reason: HOSPADM

## 2024-07-18 RX ORDER — FERROUS SULFATE 325(65) MG
325 TABLET ORAL DAILY
Status: DISCONTINUED | OUTPATIENT
Start: 2024-07-19 | End: 2024-07-21 | Stop reason: HOSPADM

## 2024-07-18 RX ORDER — OXYCODONE AND ACETAMINOPHEN 10; 325 MG/1; MG/1
1 TABLET ORAL EVERY 4 HOURS PRN
Status: DISCONTINUED | OUTPATIENT
Start: 2024-07-18 | End: 2024-07-21 | Stop reason: HOSPADM

## 2024-07-18 RX ORDER — ACETAMINOPHEN 325 MG/1
650 TABLET ORAL EVERY 6 HOURS PRN
Status: DISCONTINUED | OUTPATIENT
Start: 2024-07-18 | End: 2024-07-19 | Stop reason: ALTCHOICE

## 2024-07-18 RX ORDER — LANOLIN ALCOHOL/MO/W.PET/CERES
3 CREAM (GRAM) TOPICAL NIGHTLY PRN
Status: DISCONTINUED | OUTPATIENT
Start: 2024-07-18 | End: 2024-07-21 | Stop reason: HOSPADM

## 2024-07-18 RX ORDER — POTASSIUM CHLORIDE 7.45 MG/ML
10 INJECTION INTRAVENOUS PRN
Status: DISCONTINUED | OUTPATIENT
Start: 2024-07-18 | End: 2024-07-21 | Stop reason: HOSPADM

## 2024-07-18 RX ORDER — ASPIRIN 81 MG/1
324 TABLET, CHEWABLE ORAL ONCE
Status: COMPLETED | OUTPATIENT
Start: 2024-07-18 | End: 2024-07-18

## 2024-07-18 RX ORDER — MAGNESIUM HYDROXIDE/ALUMINUM HYDROXICE/SIMETHICONE 120; 1200; 1200 MG/30ML; MG/30ML; MG/30ML
30 SUSPENSION ORAL EVERY 6 HOURS PRN
Status: DISCONTINUED | OUTPATIENT
Start: 2024-07-18 | End: 2024-07-21 | Stop reason: HOSPADM

## 2024-07-18 RX ADMIN — ATORVASTATIN CALCIUM 40 MG: 40 TABLET, FILM COATED ORAL at 21:57

## 2024-07-18 RX ADMIN — GABAPENTIN 300 MG: 300 CAPSULE ORAL at 22:14

## 2024-07-18 RX ADMIN — ENOXAPARIN SODIUM 40 MG: 100 INJECTION SUBCUTANEOUS at 21:57

## 2024-07-18 RX ADMIN — SODIUM CHLORIDE: 9 INJECTION, SOLUTION INTRAVENOUS at 22:23

## 2024-07-18 RX ADMIN — METOPROLOL SUCCINATE 25 MG: 25 TABLET, EXTENDED RELEASE ORAL at 21:57

## 2024-07-18 RX ADMIN — AMITRIPTYLINE HYDROCHLORIDE 50 MG: 50 TABLET, FILM COATED ORAL at 21:57

## 2024-07-18 RX ADMIN — NITROGLYCERIN 0.4 MG: 0.4 TABLET, ORALLY DISINTEGRATING SUBLINGUAL at 18:30

## 2024-07-18 RX ADMIN — NITROGLYCERIN 0.4 MG: 0.4 TABLET, ORALLY DISINTEGRATING SUBLINGUAL at 19:25

## 2024-07-18 RX ADMIN — ASPIRIN 324 MG: 81 TABLET, CHEWABLE ORAL at 14:16

## 2024-07-18 RX ADMIN — MORPHINE SULFATE 2 MG: 2 INJECTION, SOLUTION INTRAMUSCULAR; INTRAVENOUS at 22:18

## 2024-07-18 RX ADMIN — MONTELUKAST 10 MG: 10 TABLET, FILM COATED ORAL at 21:57

## 2024-07-18 ASSESSMENT — LIFESTYLE VARIABLES: HOW OFTEN DO YOU HAVE A DRINK CONTAINING ALCOHOL: NEVER

## 2024-07-18 ASSESSMENT — PAIN DESCRIPTION - ORIENTATION
ORIENTATION: MID
ORIENTATION: LEFT
ORIENTATION: MID

## 2024-07-18 ASSESSMENT — PAIN SCALES - GENERAL
PAINLEVEL_OUTOF10: 9
PAINLEVEL_OUTOF10: 8
PAINLEVEL_OUTOF10: 6
PAINLEVEL_OUTOF10: 4
PAINLEVEL_OUTOF10: 5

## 2024-07-18 ASSESSMENT — PAIN - FUNCTIONAL ASSESSMENT
PAIN_FUNCTIONAL_ASSESSMENT: PREVENTS OR INTERFERES SOME ACTIVE ACTIVITIES AND ADLS
PAIN_FUNCTIONAL_ASSESSMENT: PREVENTS OR INTERFERES SOME ACTIVE ACTIVITIES AND ADLS
PAIN_FUNCTIONAL_ASSESSMENT: 0-10
PAIN_FUNCTIONAL_ASSESSMENT: PREVENTS OR INTERFERES SOME ACTIVE ACTIVITIES AND ADLS
PAIN_FUNCTIONAL_ASSESSMENT: PREVENTS OR INTERFERES SOME ACTIVE ACTIVITIES AND ADLS

## 2024-07-18 ASSESSMENT — PAIN DESCRIPTION - ONSET
ONSET: ON-GOING
ONSET: ON-GOING

## 2024-07-18 ASSESSMENT — PAIN DESCRIPTION - DESCRIPTORS
DESCRIPTORS: ACHING
DESCRIPTORS: ACHING
DESCRIPTORS: SHARP;HEAVINESS
DESCRIPTORS: ACHING
DESCRIPTORS: ACHING

## 2024-07-18 ASSESSMENT — PAIN DESCRIPTION - PAIN TYPE
TYPE: ACUTE PAIN;CHRONIC PAIN
TYPE: ACUTE PAIN

## 2024-07-18 ASSESSMENT — PAIN DESCRIPTION - LOCATION
LOCATION: CHEST

## 2024-07-18 ASSESSMENT — PAIN DESCRIPTION - FREQUENCY
FREQUENCY: INTERMITTENT
FREQUENCY: INTERMITTENT
FREQUENCY: CONTINUOUS

## 2024-07-18 NOTE — ED NOTES
Pharmacy Medication Reconciliation Note     List of medications patient is currently taking is complete.    Source of information:   EMR  Conversation with patient and wife at bedside    Notes regarding home medications:   Took his morning medications today   Takes his blood pressure and cardiac meds in the evening       Mushtaq Adams PharmD  7/18/2024  4:08 PM

## 2024-07-18 NOTE — CONSULTS
Referring Physician: * No referring provider recorded for this case *  Reason for Consultation: Unstable angina  Chief Complaint: I am having more frequent chest tightness and shortness of breath      Subjective:   History of Present Illness:  Chino Ma is a 68 y.o. patient well-known to me with prior history of coronary artery disease with multiple stents, hypertension, hyperlipidemia, obstructive sleep apnea who presented to the hospital with complaints of worsening chest tightness and shortness of breath.  He underwent coronary CT angiography last week his CT angiography raise the possibility of significant stenosis in his mid LAD beyond the previously placed stent.    I have been asked to provide consultation regarding further management and testing.    Review of Systems:   All 12 point review of symptoms completed. Pertinent positives identified in the HPI, all other review of symptoms negative as below.    Past Medical History:   has a past medical history of Arthritis, Asthma, BPH (benign prostatic hyperplasia), Cardiomyopathy (HCC), GERD (gastroesophageal reflux disease), HTN (hypertension), MVP (mitral valve prolapse), PPD positive, Prolonged emergence from general anesthesia, Prostatism, Tricuspid valve prolapse, and Unspecified sleep apnea.    Surgical History:   has a past surgical history that includes Inguinal hernia repair; Tonsillectomy; Cholecystectomy; tendon release; Foot surgery; Knee arthroscopy; Cardiac catheterization; Hand surgery (Right, 11/09/2017); other surgical history (08/28/2019); Percutaneous Transluminal Coronary Angio; Colonoscopy (N/A, 10/30/2023); and Upper gastrointestinal endoscopy (N/A, 10/30/2023).     Social History:   reports that he quit smoking about 42 years ago. His smoking use included cigarettes. He has been exposed to tobacco smoke. He has never used smokeless tobacco. He reports that he does not currently use alcohol. He reports that he does not use drugs.  and pedal pulses, symmetric.   Skin: No rashes or lesions.   Pysch: Normal mood and affect. Alert and oriented x 4.   Neurologic: Normal gross motor and sensory exam.         Labs     CBC:   Lab Results   Component Value Date/Time    WBC 6.3 07/18/2024 02:10 PM    RBC 4.94 07/18/2024 02:10 PM    HGB 15.4 07/18/2024 02:10 PM    HCT 45.6 07/18/2024 02:10 PM    MCV 92.5 07/18/2024 02:10 PM    RDW 13.6 07/18/2024 02:10 PM    PLT 70 07/18/2024 02:10 PM     CMP:  Lab Results   Component Value Date/Time     07/18/2024 02:10 PM    K 4.1 07/18/2024 02:10 PM     07/18/2024 02:10 PM    CO2 21 07/18/2024 02:10 PM    BUN 12 07/18/2024 02:10 PM    CREATININE 0.9 07/18/2024 02:10 PM    GFRAA >60 08/03/2022 09:31 AM    GFRAA >60 06/11/2013 08:33 AM    AGRATIO 1.4 07/18/2024 02:10 PM    LABGLOM >90 07/18/2024 02:10 PM    LABGLOM >60 02/13/2024 08:22 AM    GLUCOSE 107 07/18/2024 02:10 PM    CALCIUM 9.6 07/18/2024 02:10 PM    BILITOT 1.0 07/18/2024 02:10 PM    ALKPHOS 94 07/18/2024 02:10 PM    AST 15 07/18/2024 02:10 PM    ALT 10 07/18/2024 02:10 PM     PT/INR:  No results found for: \"PTINR\"  HgBA1c:  Lab Results   Component Value Date    LABA1C 5.4 02/13/2024     Lab Results   Component Value Date    TROPONINI <0.01 12/03/2021         Cardiac Data     Last EKG: Normal sinus rhythm with frequent PVCs and minor ST and T wave abnormalities    Echo: 6/4/2021 there is mildly increased left ventricular wall thickness. EF   50%.   Indeterminate diastolic function.   The left atrium is moderately dilated.   Right ventricular systolic function is normal .   Trivial mitral, and tricuspid regurgitation.       Stress Test:    Cath 12/8/2022:1.  Normal right heart pressures.  2.  Normal pulmonary capillary wedge pressure.  3.  Normal cardiac output and indices.  4.  No O2 step-up to suggest ASD/PFO.  5.  Patent left main trunk.  6.  Patent stent at proximal LAD.  7.  Patent left circumflex artery.  8.  Patent stent at mid RCA.  9.

## 2024-07-18 NOTE — TELEPHONE ENCOUNTER
Patient called in requesting an call back from Dr. Lemus. He would like discuss his plan please.    Please Advise: [phone]

## 2024-07-18 NOTE — ED PROVIDER NOTES
[facility] EMERGENCY DEPARTMENT     EMERGENCY DEPARTMENT ENCOUNTER            Pt Name: Chino Ma   MRN: [id number]   Birthdate 1955   Date of evaluation: 7/18/2024   Provider: Royal Gimenez MD   PCP: Alli Wood MD   Note Started: 2:08 PM EDT 7/18/24          CHIEF COMPLAINT     Chief Complaint   Patient presents with    Shortness of Breath    Chest Pain     Off and on, angiogram one week ago at [facility]. Dr. Lemus sent pt to ER after results indicate a blockage            HISTORY OF PRESENT ILLNESS:   History from : Patient and Wife, chart review  Limitations to history : None     Chino Ma is a 68 y.o. male who presents to the emergency room with complaints of chest pain and shortness of breath.  Patient was directed to come to the emergency room by his cardiologist, Dr. Lemus.  Patient states has been having chest pain that substernal and nonradiating.  States he is also been having shortness of breath.  Mainly feels short of breath with exertion.  Patient denies any cough.  Patient denies any nausea or vomiting.  No diaphoresis.  States he does have some generalized abdominal pain which he thinks is from not eating.  Patient states he been having normal urinary and bowel habits.  He denies any lower extremity pain or swelling.  Does take aspirin nightly.  Denies taking any aspirin today.  Patient was called by his cardiology team and notified to come to the emergency room for further cardiac evaluation and admission because he had an abnormal CTA cardiac with contrast performed at [facility] on 11/7/2024 which showed possible occlusion distal to his stent in his LAD.    Nursing Notes were all reviewed and agreed with, or any disagreements were addressed in the HPI.     REVIEW OF SYSTEMS :    Positives and Pertinent negatives as per HPI.      MEDICAL HISTORY   has a past medical history of Arthritis, Asthma, BPH (benign prostatic hyperplasia),

## 2024-07-18 NOTE — CARE COORDINATION
Case Management Assessment  Initial Evaluation    Date/Time of Evaluation: 7/18/2024 6:26 PM  Assessment Completed by: KATHYA Kaminski    If patient is discharged prior to next notation, then this note serves as note for discharge by case management.    Patient Name: Chino Ma                   YOB: 1955  Diagnosis: Unstable angina (HCC) [I20.0]                   Date / Time: 7/18/2024  1:54 PM    Patient Admission Status: Inpatient   Readmission Risk (Low < 19, Mod (19-27), High > 27): Readmission Risk Score: 9.6    Current PCP: Alli Wood MD  PCP verified by CM? Yes    Chart Reviewed: Yes      History Provided by: Patient  Patient Orientation: Alert and Oriented    Patient Cognition: Alert    Hospitalization in the last 30 days (Readmission):  Yes    If yes, Readmission Assessment in  Navigator will be completed.    Advance Directives:      Code Status: Prior   Patient's Primary Decision Maker is: Legal Next of Kin    Primary Decision Maker: Mabel Ma - Spouse - [phone]    Secondary Decision Maker: Dulce Ma - Child - [phone]    Secondary Decision Maker: Pati Ma - Child    Discharge Planning:    Patient lives with: Spouse/Significant Other Type of Home: House  Primary Care Giver: Self  Patient Support Systems include: Spouse/Significant Other, Children   Current Financial resources: Medicare  Current community resources: None  Current services prior to admission: None            Current DME:              Type of Home Care services:  None    ADLS  Prior functional level: Independent in ADLs/IADLs  Current functional level: Independent in ADLs/IADLs    PT AM-PAC:   /24  OT AM-PAC:   /24    Family can provide assistance at DC: Yes  Would you like Case Management to discuss the discharge plan with any other family members/significant others, and if so, who? Yes (wife - Katherine)  Plans to Return to Present Housing: Yes  Other Identified Issues/Barriers to  none

## 2024-07-18 NOTE — TELEPHONE ENCOUNTER
I spoke to patient and his wife.  Since he is experiencing worsening shortness of breath as well as chest discomfort, I have advised him to go to the emergency room for evaluation and  admission.  If his workup in ER does not show any acute findings, he will need left and right heart study done tomorrow morning otherwise he will need the studies later today

## 2024-07-18 NOTE — ACP (ADVANCE CARE PLANNING)
Advance Care Planning     Advance Care Planning Activator (Inpatient)  Conversation Note      Date of ACP Conversation: 7/18/2024     Conversation Conducted with: Patient with Decision Making Capacity    ACP Activator: KATHYA Kaminski        Health Care Decision Maker:     Current Designated Health Care Decision Maker:     Primary Decision Maker: Mabel Ma - Spouse - [phone]    Secondary Decision Maker: Dulce Ma - Child - [phone]    Secondary Decision Maker: FrancescaPati - Child    Today we documented Decision Maker(s) consistent with Legal Next of Kin hierarchy.    Care Preferences    Ventilation:  \"If you were in your present state of health and suddenly became very ill and were unable to breathe on your own, what would your preference be about the use of a ventilator (breathing machine) if it were available to you?\"      Would the patient desire the use of ventilator (breathing machine)?: yes    \"If your health worsens and it becomes clear that your chance of recovery is unlikely, what would your preference be about the use of a ventilator (breathing machine) if it were available to you?\"     Would the patient desire the use of ventilator (breathing machine)?: Yes      Resuscitation  \"CPR works best to restart the heart when there is a sudden event, like a heart attack, in someone who is otherwise healthy. Unfortunately, CPR does not typically restart the heart for people who have serious health conditions or who are very sick.\"    \"In the event your heart stopped as a result of an underlying serious health condition, would you want attempts to be made to restart your heart (answer \"yes\" for attempt to resuscitate) or would you prefer a natural death (answer \"no\" for do not attempt to resuscitate)?\" yes       [x] Yes   [] No   Educated Patient / Decision Maker regarding differences between Advance Directives and portable DNR orders.    Length of ACP Conversation in minutes:       Conversation Outcomes:  ACP discussion completed    Follow-up plan:    [] Schedule follow-up conversation to continue planning  [] Referred individual to Provider for additional questions/concerns   [] Advised patient/agent/surrogate to review completed ACP document and update if needed with changes in condition, patient preferences or care setting    [x] This note routed to one or more involved healthcare providers

## 2024-07-18 NOTE — TELEPHONE ENCOUNTER
Dr. Lemus spoke to patient who states he is having increased CP. Dr Lemus will advise patient to proceed to ED.

## 2024-07-19 ENCOUNTER — APPOINTMENT (OUTPATIENT)
Age: 69
DRG: 287 | End: 2024-07-19
Attending: INTERNAL MEDICINE
Payer: COMMERCIAL

## 2024-07-19 PROBLEM — I25.112 CORONARY ARTERY DISEASE INVOLVING NATIVE HEART WITH REFRACTORY ANGINA PECTORIS (HCC): Status: ACTIVE | Noted: 2020-08-25

## 2024-07-19 LAB
ANION GAP SERPL CALCULATED.3IONS-SCNC: 14 MMOL/L (ref 3–16)
BUN SERPL-MCNC: 13 MG/DL (ref 7–20)
CALCIUM SERPL-MCNC: 9.1 MG/DL (ref 8.3–10.6)
CHLORIDE SERPL-SCNC: 108 MMOL/L (ref 99–110)
CHOLEST SERPL-MCNC: 106 MG/DL (ref 0–199)
CO2 SERPL-SCNC: 23 MMOL/L (ref 21–32)
CREAT SERPL-MCNC: 1.1 MG/DL (ref 0.8–1.3)
DEPRECATED RDW RBC AUTO: 13.6 % (ref 12.4–15.4)
ECHO AO ASC DIAM: 4.3 CM
ECHO AO ASCENDING AORTA INDEX: 1.74 CM/M2
ECHO AO ROOT DIAM: 4.2 CM
ECHO AO ROOT INDEX: 1.7 CM/M2
ECHO AV AREA PEAK VELOCITY: 3.9 CM2
ECHO AV AREA VTI: 4 CM2
ECHO AV AREA/BSA PEAK VELOCITY: 1.6 CM2/M2
ECHO AV AREA/BSA VTI: 1.6 CM2/M2
ECHO AV MEAN GRADIENT: 3 MMHG
ECHO AV MEAN VELOCITY: 0.8 M/S
ECHO AV PEAK GRADIENT: 4 MMHG
ECHO AV PEAK VELOCITY: 1.1 M/S
ECHO AV VELOCITY RATIO: 0.73
ECHO AV VTI: 20.3 CM
ECHO BSA: 2.52 M2
ECHO BSA: 2.61 M2
ECHO LA AREA 4C: 21.7 CM2
ECHO LA MAJOR AXIS: 5.2 CM
ECHO LA VOL MOD A4C: 68 ML (ref 18–58)
ECHO LA VOLUME INDEX MOD A4C: 28 ML/M2 (ref 16–34)
ECHO LV E' LATERAL VELOCITY: 6 CM/S
ECHO LV E' SEPTAL VELOCITY: 6 CM/S
ECHO LV FRACTIONAL SHORTENING: 25 % (ref 28–44)
ECHO LV INTERNAL DIMENSION DIASTOLE INDEX: 2.31 CM/M2
ECHO LV INTERNAL DIMENSION DIASTOLIC: 5.7 CM (ref 4.2–5.9)
ECHO LV INTERNAL DIMENSION SYSTOLIC INDEX: 1.74 CM/M2
ECHO LV INTERNAL DIMENSION SYSTOLIC: 4.3 CM
ECHO LV IVSD: 1.3 CM (ref 0.6–1)
ECHO LV MASS 2D: 322.2 G (ref 88–224)
ECHO LV MASS INDEX 2D: 130.5 G/M2 (ref 49–115)
ECHO LV POSTERIOR WALL DIASTOLIC: 1.3 CM (ref 0.6–1)
ECHO LV RELATIVE WALL THICKNESS RATIO: 0.46
ECHO LVOT AREA: 5.3 CM2
ECHO LVOT AV VTI INDEX: 0.76
ECHO LVOT DIAM: 2.6 CM
ECHO LVOT MEAN GRADIENT: 2 MMHG
ECHO LVOT PEAK GRADIENT: 2 MMHG
ECHO LVOT PEAK VELOCITY: 0.8 M/S
ECHO LVOT STROKE VOLUME INDEX: 33.1 ML/M2
ECHO LVOT SV: 81.7 ML
ECHO LVOT VTI: 15.4 CM
ECHO MV A VELOCITY: 0.67 M/S
ECHO MV AREA VTI: 4.4 CM2
ECHO MV E DECELERATION TIME (DT): 221 MS
ECHO MV E VELOCITY: 0.54 M/S
ECHO MV E/A RATIO: 0.81
ECHO MV E/E' LATERAL: 9
ECHO MV E/E' RATIO (AVERAGED): 9
ECHO MV E/E' SEPTAL: 9
ECHO MV LVOT VTI INDEX: 1.19
ECHO MV MAX VELOCITY: 0.6 M/S
ECHO MV MEAN GRADIENT: 1 MMHG
ECHO MV MEAN VELOCITY: 0.3 M/S
ECHO MV PEAK GRADIENT: 2 MMHG
ECHO MV VTI: 18.4 CM
ECHO PV MAX VELOCITY: 0.9 M/S
ECHO PV PEAK GRADIENT: 3 MMHG
ECHO RV BASAL DIMENSION: 5.4 CM
ECHO RV FREE WALL PEAK S': 13 CM/S
ECHO RV MID DIMENSION: 3.8 CM
ECHO RV TAPSE: 2.6 CM (ref 1.7–?)
GFR SERPLBLD CREATININE-BSD FMLA CKD-EPI: 73 ML/MIN/{1.73_M2}
GLUCOSE SERPL-MCNC: 101 MG/DL (ref 70–99)
HCT VFR BLD AUTO: 43.6 % (ref 40.5–52.5)
HDLC SERPL-MCNC: 27 MG/DL (ref 40–60)
HGB BLD-MCNC: 14.9 G/DL (ref 13.5–17.5)
LDLC SERPL CALC-MCNC: 54 MG/DL
MCH RBC QN AUTO: 31.1 PG (ref 26–34)
MCHC RBC AUTO-ENTMCNC: 34.2 G/DL (ref 31–36)
MCV RBC AUTO: 91 FL (ref 80–100)
PLATELET # BLD AUTO: 55 K/UL (ref 135–450)
PLATELET BLD QL SMEAR: ABNORMAL
PMV BLD AUTO: 11 FL (ref 5–10.5)
POTASSIUM SERPL-SCNC: 4.5 MMOL/L (ref 3.5–5.1)
RBC # BLD AUTO: 4.79 M/UL (ref 4.2–5.9)
SLIDE REVIEW: ABNORMAL
SODIUM SERPL-SCNC: 145 MMOL/L (ref 136–145)
TRIGL SERPL-MCNC: 126 MG/DL (ref 0–150)
TSH SERPL DL<=0.005 MIU/L-ACNC: 2.39 UIU/ML (ref 0.27–4.2)
VLDLC SERPL CALC-MCNC: 25 MG/DL
WBC # BLD AUTO: 5.8 K/UL (ref 4–11)

## 2024-07-19 PROCEDURE — 93306 TTE W/DOPPLER COMPLETE: CPT | Performed by: INTERNAL MEDICINE

## 2024-07-19 PROCEDURE — 4A023N8 MEASUREMENT OF CARDIAC SAMPLING AND PRESSURE, BILATERAL, PERCUTANEOUS APPROACH: ICD-10-PCS | Performed by: INTERNAL MEDICINE

## 2024-07-19 PROCEDURE — 99153 MOD SED SAME PHYS/QHP EA: CPT | Performed by: INTERNAL MEDICINE

## 2024-07-19 PROCEDURE — 93453 R&L HRT CATH W/VENTRICLGRPHY: CPT | Performed by: INTERNAL MEDICINE

## 2024-07-19 PROCEDURE — 80048 BASIC METABOLIC PNL TOTAL CA: CPT

## 2024-07-19 PROCEDURE — 6360000004 HC RX CONTRAST MEDICATION: Performed by: INTERNAL MEDICINE

## 2024-07-19 PROCEDURE — 6360000002 HC RX W HCPCS: Performed by: INTERNAL MEDICINE

## 2024-07-19 PROCEDURE — 80061 LIPID PANEL: CPT

## 2024-07-19 PROCEDURE — C1894 INTRO/SHEATH, NON-LASER: HCPCS | Performed by: INTERNAL MEDICINE

## 2024-07-19 PROCEDURE — 6370000000 HC RX 637 (ALT 250 FOR IP): Performed by: INTERNAL MEDICINE

## 2024-07-19 PROCEDURE — 2709999900 HC NON-CHARGEABLE SUPPLY: Performed by: INTERNAL MEDICINE

## 2024-07-19 PROCEDURE — B2151ZZ FLUOROSCOPY OF LEFT HEART USING LOW OSMOLAR CONTRAST: ICD-10-PCS | Performed by: INTERNAL MEDICINE

## 2024-07-19 PROCEDURE — NBSRV NON-BILLABLE SERVICE: Performed by: INTERNAL MEDICINE

## 2024-07-19 PROCEDURE — C1769 GUIDE WIRE: HCPCS | Performed by: INTERNAL MEDICINE

## 2024-07-19 PROCEDURE — 36415 COLL VENOUS BLD VENIPUNCTURE: CPT

## 2024-07-19 PROCEDURE — 94760 N-INVAS EAR/PLS OXIMETRY 1: CPT

## 2024-07-19 PROCEDURE — 93460 R&L HRT ART/VENTRICLE ANGIO: CPT | Performed by: INTERNAL MEDICINE

## 2024-07-19 PROCEDURE — 99152 MOD SED SAME PHYS/QHP 5/>YRS: CPT | Performed by: INTERNAL MEDICINE

## 2024-07-19 PROCEDURE — C8929 TTE W OR WO FOL WCON,DOPPLER: HCPCS

## 2024-07-19 PROCEDURE — 2580000003 HC RX 258: Performed by: INTERNAL MEDICINE

## 2024-07-19 PROCEDURE — 2500000003 HC RX 250 WO HCPCS: Performed by: INTERNAL MEDICINE

## 2024-07-19 PROCEDURE — 84443 ASSAY THYROID STIM HORMONE: CPT

## 2024-07-19 PROCEDURE — 85027 COMPLETE CBC AUTOMATED: CPT

## 2024-07-19 PROCEDURE — 2060000000 HC ICU INTERMEDIATE R&B

## 2024-07-19 PROCEDURE — B2111ZZ FLUOROSCOPY OF MULTIPLE CORONARY ARTERIES USING LOW OSMOLAR CONTRAST: ICD-10-PCS | Performed by: INTERNAL MEDICINE

## 2024-07-19 PROCEDURE — C1751 CATH, INF, PER/CENT/MIDLINE: HCPCS | Performed by: INTERNAL MEDICINE

## 2024-07-19 RX ORDER — METOPROLOL SUCCINATE 25 MG/1
25 TABLET, EXTENDED RELEASE ORAL 2 TIMES DAILY
Status: DISCONTINUED | OUTPATIENT
Start: 2024-07-19 | End: 2024-07-21 | Stop reason: HOSPADM

## 2024-07-19 RX ORDER — ACETAMINOPHEN 325 MG/1
650 TABLET ORAL EVERY 4 HOURS PRN
Status: DISCONTINUED | OUTPATIENT
Start: 2024-07-19 | End: 2024-07-21 | Stop reason: HOSPADM

## 2024-07-19 RX ORDER — FENTANYL CITRATE 50 UG/ML
INJECTION, SOLUTION INTRAMUSCULAR; INTRAVENOUS PRN
Status: DISCONTINUED | OUTPATIENT
Start: 2024-07-19 | End: 2024-07-19 | Stop reason: HOSPADM

## 2024-07-19 RX ORDER — HEPARIN SODIUM 1000 [USP'U]/ML
INJECTION, SOLUTION INTRAVENOUS; SUBCUTANEOUS PRN
Status: DISCONTINUED | OUTPATIENT
Start: 2024-07-19 | End: 2024-07-19 | Stop reason: HOSPADM

## 2024-07-19 RX ORDER — MIDAZOLAM HYDROCHLORIDE 1 MG/ML
INJECTION INTRAMUSCULAR; INTRAVENOUS PRN
Status: DISCONTINUED | OUTPATIENT
Start: 2024-07-19 | End: 2024-07-19 | Stop reason: HOSPADM

## 2024-07-19 RX ORDER — LIDOCAINE HYDROCHLORIDE 10 MG/ML
INJECTION, SOLUTION INFILTRATION; PERINEURAL PRN
Status: DISCONTINUED | OUTPATIENT
Start: 2024-07-19 | End: 2024-07-19 | Stop reason: HOSPADM

## 2024-07-19 RX ORDER — SODIUM CHLORIDE 9 MG/ML
INJECTION, SOLUTION INTRAVENOUS PRN
Status: DISCONTINUED | OUTPATIENT
Start: 2024-07-19 | End: 2024-07-19 | Stop reason: HOSPADM

## 2024-07-19 RX ORDER — ASPIRIN 325 MG
325 TABLET ORAL ONCE
Status: COMPLETED | OUTPATIENT
Start: 2024-07-19 | End: 2024-07-19

## 2024-07-19 RX ORDER — SODIUM CHLORIDE 0.9 % (FLUSH) 0.9 %
5-40 SYRINGE (ML) INJECTION PRN
Status: DISCONTINUED | OUTPATIENT
Start: 2024-07-19 | End: 2024-07-21 | Stop reason: HOSPADM

## 2024-07-19 RX ORDER — SODIUM CHLORIDE 9 MG/ML
INJECTION, SOLUTION INTRAVENOUS PRN
Status: DISCONTINUED | OUTPATIENT
Start: 2024-07-19 | End: 2024-07-20

## 2024-07-19 RX ORDER — ENOXAPARIN SODIUM 100 MG/ML
30 INJECTION SUBCUTANEOUS 2 TIMES DAILY
Status: DISCONTINUED | OUTPATIENT
Start: 2024-07-19 | End: 2024-07-21 | Stop reason: HOSPADM

## 2024-07-19 RX ORDER — FUROSEMIDE 10 MG/ML
40 INJECTION INTRAMUSCULAR; INTRAVENOUS 2 TIMES DAILY
Status: DISCONTINUED | OUTPATIENT
Start: 2024-07-19 | End: 2024-07-20

## 2024-07-19 RX ORDER — SODIUM CHLORIDE 0.9 % (FLUSH) 0.9 %
5-40 SYRINGE (ML) INJECTION EVERY 12 HOURS SCHEDULED
Status: DISCONTINUED | OUTPATIENT
Start: 2024-07-19 | End: 2024-07-21 | Stop reason: HOSPADM

## 2024-07-19 RX ORDER — SODIUM CHLORIDE 0.9 % (FLUSH) 0.9 %
5-40 SYRINGE (ML) INJECTION PRN
Status: DISCONTINUED | OUTPATIENT
Start: 2024-07-19 | End: 2024-07-19 | Stop reason: HOSPADM

## 2024-07-19 RX ORDER — SODIUM CHLORIDE 0.9 % (FLUSH) 0.9 %
5-40 SYRINGE (ML) INJECTION EVERY 12 HOURS SCHEDULED
Status: DISCONTINUED | OUTPATIENT
Start: 2024-07-19 | End: 2024-07-19 | Stop reason: HOSPADM

## 2024-07-19 RX ADMIN — PERFLUTREN 1.5 ML: 6.52 INJECTION, SUSPENSION INTRAVENOUS at 14:34

## 2024-07-19 RX ADMIN — DULOXETINE HYDROCHLORIDE 60 MG: 60 CAPSULE, DELAYED RELEASE ORAL at 10:30

## 2024-07-19 RX ADMIN — BUPROPION HYDROCHLORIDE 150 MG: 150 TABLET, EXTENDED RELEASE ORAL at 10:30

## 2024-07-19 RX ADMIN — ACETAMINOPHEN 325MG 650 MG: 325 TABLET ORAL at 16:32

## 2024-07-19 RX ADMIN — ENOXAPARIN SODIUM 30 MG: 100 INJECTION SUBCUTANEOUS at 20:09

## 2024-07-19 RX ADMIN — FERROUS SULFATE TAB 325 MG (65 MG ELEMENTAL FE) 325 MG: 325 (65 FE) TAB at 10:30

## 2024-07-19 RX ADMIN — METOPROLOL SUCCINATE 25 MG: 25 TABLET, EXTENDED RELEASE ORAL at 20:09

## 2024-07-19 RX ADMIN — SODIUM CHLORIDE, PRESERVATIVE FREE 10 ML: 5 INJECTION INTRAVENOUS at 20:12

## 2024-07-19 RX ADMIN — ASPIRIN 325 MG: 325 TABLET ORAL at 07:39

## 2024-07-19 RX ADMIN — ENOXAPARIN SODIUM 30 MG: 100 INJECTION SUBCUTANEOUS at 10:35

## 2024-07-19 RX ADMIN — ONDANSETRON 4 MG: 2 INJECTION INTRAMUSCULAR; INTRAVENOUS at 12:55

## 2024-07-19 RX ADMIN — MONTELUKAST 10 MG: 10 TABLET, FILM COATED ORAL at 17:49

## 2024-07-19 RX ADMIN — SODIUM CHLORIDE, PRESERVATIVE FREE 10 ML: 5 INJECTION INTRAVENOUS at 20:13

## 2024-07-19 RX ADMIN — GABAPENTIN 300 MG: 300 CAPSULE ORAL at 20:09

## 2024-07-19 RX ADMIN — AMITRIPTYLINE HYDROCHLORIDE 50 MG: 50 TABLET, FILM COATED ORAL at 20:09

## 2024-07-19 RX ADMIN — SODIUM CHLORIDE, PRESERVATIVE FREE 10 ML: 5 INJECTION INTRAVENOUS at 09:00

## 2024-07-19 RX ADMIN — ATORVASTATIN CALCIUM 40 MG: 40 TABLET, FILM COATED ORAL at 20:09

## 2024-07-19 RX ADMIN — NITROGLYCERIN 0.4 MG: 0.4 TABLET, ORALLY DISINTEGRATING SUBLINGUAL at 12:46

## 2024-07-19 RX ADMIN — FUROSEMIDE 40 MG: 10 INJECTION, SOLUTION INTRAMUSCULAR; INTRAVENOUS at 10:30

## 2024-07-19 RX ADMIN — FUROSEMIDE 40 MG: 10 INJECTION, SOLUTION INTRAMUSCULAR; INTRAVENOUS at 17:49

## 2024-07-19 RX ADMIN — ISOSORBIDE MONONITRATE 60 MG: 60 TABLET, EXTENDED RELEASE ORAL at 10:30

## 2024-07-19 RX ADMIN — CETIRIZINE HYDROCHLORIDE 10 MG: 10 TABLET, FILM COATED ORAL at 10:30

## 2024-07-19 ASSESSMENT — PAIN DESCRIPTION - ORIENTATION
ORIENTATION: MID
ORIENTATION: MID
ORIENTATION: LEFT

## 2024-07-19 ASSESSMENT — PAIN DESCRIPTION - LOCATION
LOCATION: HEAD
LOCATION: CHEST

## 2024-07-19 ASSESSMENT — PAIN SCALES - GENERAL
PAINLEVEL_OUTOF10: 7
PAINLEVEL_OUTOF10: 5
PAINLEVEL_OUTOF10: 0
PAINLEVEL_OUTOF10: 4
PAINLEVEL_OUTOF10: 5
PAINLEVEL_OUTOF10: 4
PAINLEVEL_OUTOF10: 3
PAINLEVEL_OUTOF10: 0
PAINLEVEL_OUTOF10: 5

## 2024-07-19 ASSESSMENT — PAIN DESCRIPTION - DESCRIPTORS
DESCRIPTORS: ACHING
DESCRIPTORS: ACHING
DESCRIPTORS: DISCOMFORT
DESCRIPTORS: ACHING
DESCRIPTORS: TIGHTNESS;SQUEEZING

## 2024-07-19 ASSESSMENT — PAIN - FUNCTIONAL ASSESSMENT
PAIN_FUNCTIONAL_ASSESSMENT: PREVENTS OR INTERFERES SOME ACTIVE ACTIVITIES AND ADLS
PAIN_FUNCTIONAL_ASSESSMENT: PREVENTS OR INTERFERES SOME ACTIVE ACTIVITIES AND ADLS

## 2024-07-19 NOTE — PROGRESS NOTES
Patient does not want to go on NIV at this time. Patient advised to call RN if he changes his mind

## 2024-07-19 NOTE — PLAN OF CARE
Problem: Discharge Planning  Goal: Discharge to home or other facility with appropriate resources  Outcome: Progressing  Flowsheets (Taken 7/18/2024 4022)  Discharge to home or other facility with appropriate resources:   Identify barriers to discharge with patient and caregiver   Arrange for needed discharge resources and transportation as appropriate   Identify discharge learning needs (meds, wound care, etc)   Refer to discharge planning if patient needs post-hospital services based on physician order or complex needs related to functional status, cognitive ability or social support system     Problem: Pain  Goal: Verbalizes/displays adequate comfort level or baseline comfort level  Outcome: Progressing

## 2024-07-19 NOTE — PROGRESS NOTES
1230 - TR band was removed from right wrist and gauze and tegaderm placed. Patient tolerated well. There are no signs of swelling or drainage at insertion site.    1245 - Patient reports chest pain 5/10. Gave prn nitro. Pain is resolved at reassessment    1253 - Patient reports nausea. Gave prn zofran. Patient reports relief at reassessment.

## 2024-07-19 NOTE — PLAN OF CARE
Problem: Discharge Planning  Goal: Discharge to home or other facility with appropriate resources  7/19/2024 1446 by Trixie Sexton, RN  Outcome: Progressing     Problem: Pain  Goal: Verbalizes/displays adequate comfort level or baseline comfort level  7/19/2024 1446 by Trixie Sexton, RN  Outcome: Progressing     Problem: Safety - Adult  Goal: Free from fall injury  Outcome: Progressing

## 2024-07-19 NOTE — FLOWSHEET NOTE
07/19/24 0500   Vital Signs   Temp Source Oral   Pulse 66   Heart Rate Source Telemetry   Respirations 11   /76   MAP (Calculated) 88   BP Location Right upper arm   BP Method Automatic   Patient Position Semi fowlers   Pain Assessment   Pain Assessment 0-10   Pain Level 5   Patient's Stated Pain Goal 0 - No pain   Pain Location Chest   Pain Orientation Mid   Pain Descriptors Aching   Functional Pain Assessment Prevents or interferes some active activities and ADLs   Opioid-Induced Sedation   POSS Score 1   RASS   Amaro Agitation Sedation Scale (RASS) 0   Oxygen Therapy   SpO2 96 %   Pulse Oximetry Type Intermittent   Pulse Oximeter Device Mode Intermittent   Pulse Oximeter Device Location Finger   O2 Device None (Room air)     Pt pleth/resps low 8-12 when sleeping, new to home Bipap, placed order per protocol and notified RT to provide at hosp version at bedside.  Pt agreeable to use until awake for AM. Not giving Morphine for chest discomfort d/t resp rate at this time.  Pt agreeable and verbalizes understanding, no s/s of distress noted, call light within reach.

## 2024-07-20 PROBLEM — I42.8 NON-ISCHEMIC CARDIOMYOPATHY (HCC): Status: ACTIVE | Noted: 2024-07-20

## 2024-07-20 LAB
ALBUMIN SERPL-MCNC: 4.2 G/DL (ref 3.4–5)
ANION GAP SERPL CALCULATED.3IONS-SCNC: 11 MMOL/L (ref 3–16)
BUN SERPL-MCNC: 19 MG/DL (ref 7–20)
CALCIUM SERPL-MCNC: 9 MG/DL (ref 8.3–10.6)
CHLORIDE SERPL-SCNC: 101 MMOL/L (ref 99–110)
CO2 SERPL-SCNC: 26 MMOL/L (ref 21–32)
CREAT SERPL-MCNC: 1 MG/DL (ref 0.8–1.3)
DEPRECATED RDW RBC AUTO: 13.7 % (ref 12.4–15.4)
GFR SERPLBLD CREATININE-BSD FMLA CKD-EPI: 82 ML/MIN/{1.73_M2}
GLUCOSE SERPL-MCNC: 108 MG/DL (ref 70–99)
HCT VFR BLD AUTO: 43.6 % (ref 40.5–52.5)
HGB BLD-MCNC: 14.8 G/DL (ref 13.5–17.5)
MCH RBC QN AUTO: 31 PG (ref 26–34)
MCHC RBC AUTO-ENTMCNC: 33.9 G/DL (ref 31–36)
MCV RBC AUTO: 91.4 FL (ref 80–100)
PHOSPHATE SERPL-MCNC: 4.2 MG/DL (ref 2.5–4.9)
PLATELET # BLD AUTO: 62 K/UL (ref 135–450)
PLATELET BLD QL SMEAR: ABNORMAL
PMV BLD AUTO: 11.4 FL (ref 5–10.5)
POTASSIUM SERPL-SCNC: 4.2 MMOL/L (ref 3.5–5.1)
RBC # BLD AUTO: 4.77 M/UL (ref 4.2–5.9)
SLIDE REVIEW: ABNORMAL
SODIUM SERPL-SCNC: 138 MMOL/L (ref 136–145)
TSH SERPL DL<=0.005 MIU/L-ACNC: 2.11 UIU/ML (ref 0.27–4.2)
WBC # BLD AUTO: 6.7 K/UL (ref 4–11)

## 2024-07-20 PROCEDURE — 85027 COMPLETE CBC AUTOMATED: CPT

## 2024-07-20 PROCEDURE — 6360000002 HC RX W HCPCS: Performed by: INTERNAL MEDICINE

## 2024-07-20 PROCEDURE — 2060000000 HC ICU INTERMEDIATE R&B

## 2024-07-20 PROCEDURE — 6360000002 HC RX W HCPCS: Performed by: NURSE PRACTITIONER

## 2024-07-20 PROCEDURE — 99232 SBSQ HOSP IP/OBS MODERATE 35: CPT | Performed by: NURSE PRACTITIONER

## 2024-07-20 PROCEDURE — 80069 RENAL FUNCTION PANEL: CPT

## 2024-07-20 PROCEDURE — 6370000000 HC RX 637 (ALT 250 FOR IP): Performed by: INTERNAL MEDICINE

## 2024-07-20 PROCEDURE — 84443 ASSAY THYROID STIM HORMONE: CPT

## 2024-07-20 PROCEDURE — 6370000000 HC RX 637 (ALT 250 FOR IP): Performed by: NURSE PRACTITIONER

## 2024-07-20 PROCEDURE — 94760 N-INVAS EAR/PLS OXIMETRY 1: CPT

## 2024-07-20 PROCEDURE — 2580000003 HC RX 258: Performed by: INTERNAL MEDICINE

## 2024-07-20 PROCEDURE — 36415 COLL VENOUS BLD VENIPUNCTURE: CPT

## 2024-07-20 RX ORDER — FUROSEMIDE 40 MG/1
40 TABLET ORAL DAILY
Qty: 60 TABLET | Refills: 3 | Status: CANCELLED | OUTPATIENT
Start: 2024-07-20

## 2024-07-20 RX ORDER — POTASSIUM CHLORIDE 20 MEQ/1
20 TABLET, EXTENDED RELEASE ORAL DAILY
Qty: 30 TABLET | Refills: 5 | Status: CANCELLED | OUTPATIENT
Start: 2024-07-20

## 2024-07-20 RX ORDER — FUROSEMIDE 10 MG/ML
40 INJECTION INTRAMUSCULAR; INTRAVENOUS 3 TIMES DAILY
Status: DISCONTINUED | OUTPATIENT
Start: 2024-07-20 | End: 2024-07-21

## 2024-07-20 RX ORDER — LOSARTAN POTASSIUM 25 MG/1
25 TABLET ORAL DAILY
Status: DISCONTINUED | OUTPATIENT
Start: 2024-07-20 | End: 2024-07-21 | Stop reason: HOSPADM

## 2024-07-20 RX ADMIN — PANTOPRAZOLE SODIUM 40 MG: 40 TABLET, DELAYED RELEASE ORAL at 06:20

## 2024-07-20 RX ADMIN — ACETAMINOPHEN 325MG 650 MG: 325 TABLET ORAL at 09:02

## 2024-07-20 RX ADMIN — DULOXETINE HYDROCHLORIDE 60 MG: 60 CAPSULE, DELAYED RELEASE ORAL at 09:02

## 2024-07-20 RX ADMIN — FERROUS SULFATE TAB 325 MG (65 MG ELEMENTAL FE) 325 MG: 325 (65 FE) TAB at 09:02

## 2024-07-20 RX ADMIN — AMITRIPTYLINE HYDROCHLORIDE 50 MG: 50 TABLET, FILM COATED ORAL at 20:26

## 2024-07-20 RX ADMIN — EMPAGLIFLOZIN 10 MG: 10 TABLET, FILM COATED ORAL at 09:02

## 2024-07-20 RX ADMIN — ENOXAPARIN SODIUM 30 MG: 100 INJECTION SUBCUTANEOUS at 09:04

## 2024-07-20 RX ADMIN — FUROSEMIDE 40 MG: 10 INJECTION, SOLUTION INTRAMUSCULAR; INTRAVENOUS at 14:17

## 2024-07-20 RX ADMIN — CETIRIZINE HYDROCHLORIDE 10 MG: 10 TABLET, FILM COATED ORAL at 09:03

## 2024-07-20 RX ADMIN — ISOSORBIDE MONONITRATE 60 MG: 60 TABLET, EXTENDED RELEASE ORAL at 09:03

## 2024-07-20 RX ADMIN — FUROSEMIDE 40 MG: 10 INJECTION, SOLUTION INTRAMUSCULAR; INTRAVENOUS at 20:27

## 2024-07-20 RX ADMIN — SODIUM CHLORIDE, PRESERVATIVE FREE 10 ML: 5 INJECTION INTRAVENOUS at 20:28

## 2024-07-20 RX ADMIN — BUPROPION HYDROCHLORIDE 150 MG: 150 TABLET, EXTENDED RELEASE ORAL at 09:02

## 2024-07-20 RX ADMIN — SODIUM CHLORIDE, PRESERVATIVE FREE 10 ML: 5 INJECTION INTRAVENOUS at 09:07

## 2024-07-20 RX ADMIN — GABAPENTIN 300 MG: 300 CAPSULE ORAL at 20:26

## 2024-07-20 RX ADMIN — FUROSEMIDE 40 MG: 10 INJECTION, SOLUTION INTRAMUSCULAR; INTRAVENOUS at 09:04

## 2024-07-20 RX ADMIN — MONTELUKAST 10 MG: 10 TABLET, FILM COATED ORAL at 18:02

## 2024-07-20 RX ADMIN — METOPROLOL SUCCINATE 25 MG: 25 TABLET, EXTENDED RELEASE ORAL at 20:26

## 2024-07-20 RX ADMIN — LOSARTAN POTASSIUM 25 MG: 25 TABLET, FILM COATED ORAL at 12:12

## 2024-07-20 RX ADMIN — METOPROLOL SUCCINATE 25 MG: 25 TABLET, EXTENDED RELEASE ORAL at 09:03

## 2024-07-20 RX ADMIN — ASPIRIN 81 MG: 81 TABLET, COATED ORAL at 09:03

## 2024-07-20 RX ADMIN — ATORVASTATIN CALCIUM 40 MG: 40 TABLET, FILM COATED ORAL at 20:26

## 2024-07-20 RX ADMIN — ENOXAPARIN SODIUM 30 MG: 100 INJECTION SUBCUTANEOUS at 20:26

## 2024-07-20 ASSESSMENT — PAIN SCALES - GENERAL
PAINLEVEL_OUTOF10: 0
PAINLEVEL_OUTOF10: 3

## 2024-07-20 ASSESSMENT — PAIN DESCRIPTION - LOCATION: LOCATION: HEAD

## 2024-07-20 NOTE — PLAN OF CARE
Problem: Discharge Planning  Goal: Discharge to home or other facility with appropriate resources  7/20/2024 0101 by Trudi Parmar, RN  Outcome: Progressing     Problem: Pain  Goal: Verbalizes/displays adequate comfort level or baseline comfort level  7/20/2024 0101 by Trudi Parmar, RN  Outcome: Progressing     Problem: Safety - Adult  Goal: Free from fall injury  7/20/2024 0101 by Trudi Parmar, RN  Outcome: Progressing

## 2024-07-20 NOTE — PROGRESS NOTES
Chino Ma is a 68 y.o. male patient.    Current Facility-Administered Medications   Medication Dose Route Frequency Provider Last Rate Last Admin    losartan (COZAAR) tablet 25 mg  25 mg Oral Daily Ana Cristina Fitch APRN - CNP   25 mg at 07/20/24 1212    furosemide (LASIX) injection 40 mg  40 mg IntraVENous TID Ana Cristina Fitch APRN - CNP        enoxaparin Sodium (LOVENOX) injection 30 mg  30 mg SubCUTAneous BID Alli Wood MD   30 mg at 07/20/24 0904    metoprolol succinate (TOPROL XL) extended release tablet 25 mg  25 mg Oral BID SarahFreya N, DO   25 mg at 07/20/24 0903    sodium chloride flush 0.9 % injection 5-40 mL  5-40 mL IntraVENous 2 times per day SarahFreya N, DO   10 mL at 07/20/24 0907    sodium chloride flush 0.9 % injection 5-40 mL  5-40 mL IntraVENous PRN SarahFreya N, DO        acetaminophen (TYLENOL) tablet 650 mg  650 mg Oral Q4H PRN Freya Smith, DO   650 mg at 07/20/24 0902    empagliflozin (JARDIANCE) tablet 10 mg  10 mg Oral Daily SarahFreya demarco, DO   10 mg at 07/20/24 0902    amitriptyline (ELAVIL) tablet 50 mg  50 mg Oral Nightly Alli Wood MD   50 mg at 07/19/24 2009    aspirin EC tablet 81 mg  81 mg Oral Daily Alli Wood MD   81 mg at 07/20/24 0903    atorvastatin (LIPITOR) tablet 40 mg  40 mg Oral Nightly Alli Wood MD   40 mg at 07/19/24 2009    buPROPion (WELLBUTRIN XL) extended release tablet 150 mg  150 mg Oral QAM Alli Wood MD   150 mg at 07/20/24 0902    DULoxetine (CYMBALTA) extended release capsule 60 mg  60 mg Oral Daily Alli Wood MD   60 mg at 07/20/24 0902    ferrous sulfate (IRON 325) tablet 325 mg  325 mg Oral Daily Alli Wood MD   325 mg at 07/20/24 0902    gabapentin (NEURONTIN) capsule 300 mg  300 mg Oral Nightly Alli Wood MD   300 mg at 07/19/24 2009    isosorbide mononitrate (IMDUR) extended release tablet 60 mg  60 mg Oral Daily Alli Wood MD   2010    Mushroom Extract Complex Anaphylaxis    Pcn [Penicillins] Shortness Of Breath    Penicillin G Sodium Shortness Of Breath    Ciprofloxacin Itching    Codeine Itching    Erythromycin Itching     Principal Problem:    Unstable angina (HCC)  Active Problems:    Coronary artery disease involving native heart with refractory angina pectoris (HCC)    Acute coronary syndrome (HCC)  Resolved Problems:    * No resolved hospital problems. *    Blood pressure 123/79, pulse 80, temperature 97.6 °F (36.4 °C), temperature source Oral, resp. rate 14, height 1.905 m (6' 3\"), weight 125.5 kg (276 lb 10.8 oz), SpO2 96 %.    Subjective Feels better  Objective:  Vital signs: (most recent): Blood pressure 123/79, pulse 80, temperature 97.6 °F (36.4 °C), temperature source Oral, resp. rate 14, height 1.905 m (6' 3\"), weight 125.5 kg (276 lb 10.8 oz), SpO2 96 %.    A&O, CTPA, RR&R with PVCs, no edema.  Assessment & Plan Continue diuresis. Rec. Weight loss. D/W patient and wife. Probable D/C tomorrow.    Alli Wood MD  7/20/2024

## 2024-07-20 NOTE — PROGRESS NOTES
NIV therapy refused by patient.    Electronically signed by Carlos Frances RCP on 7/19/2024 at 11:48 PM

## 2024-07-20 NOTE — PLAN OF CARE
Problem: Discharge Planning  Goal: Discharge to home or other facility with appropriate resources  7/20/2024 1043 by Janice Espinosa RN  Outcome: Progressing     Problem: Pain  Goal: Verbalizes/displays adequate comfort level or baseline comfort level  7/20/2024 1043 by Janice Espinosa RN  Outcome: Progressing     Problem: Safety - Adult  Goal: Free from fall injury  7/20/2024 1043 by Janice Espinosa RN  Outcome: Progressing

## 2024-07-20 NOTE — H&P
[facility]          [address] [place] [address]                           HISTORY & PHYSICAL      PATIENT NAME: LUPE WATSON              : 1955  MED REC NO: [id number]                      ROOM: [place] [address]  ACCOUNT NO: [id number]                       ADMIT DATE: 2024  PROVIDER: Alli Wood MD      Patient of Dr. Wood.    CHIEF COMPLAINT:  Chest pain and shortness of breath.    HISTORY OF PRESENT ILLNESS:  The patient is a fairly healthy 68-year-old white, , disabled male who lives in his own home with his wife.  The patient has a several year history of coronary artery disease.  About 2 years ago, he underwent coronary angiography with stent placement in the LAD.  Lately, the patient has been noting anterior chest pain with exertion as well as dyspnea on exertion.  Because of this, he underwent a CT angiogram of the heart on 2024.  The radiologist who interpreted the study was concerned about blockage in the LAD distal to the LAD stent.  The patient's cardiologist was contacted and recommended the patient come to the emergency room at [facility].  Emergency room evaluation revealed no acute EKG changes.  Troponin was normal, but in view of the abnormal CT angiogram findings, it was felt the patient required admission and coronary angiography.  I was contacted and agreed to admit the patient to PCU on a monitored bed with cardiology consultation.    PAST MEDICAL HISTORY:  Coronary artery disease, previous stenting of the LAD,  multiple lung nodules on CT scanning that are probably benign, rheumatoid arthritis for which he follows with Rheumatology, arterial hypertension, hyperlipidemia, episodic PVCs, previous bout of nonsustained ventricular tachycardia, pancytopenia, possibly related to his RA  medications; iron deficiency anemia, treatment for central sleep apnea, chronic musculoskeletal especially low back pain

## 2024-07-20 NOTE — PROGRESS NOTES
Cardiology - PROGRESS NOTE    Admit Date: 7/18/2024     Reason for follow up:      prior history of coronary artery disease with multiple stents, hypertension, hyperlipidemia, obstructive sleep apnea who presented to the hospital with complaints of worsening chest tightness and shortness of breath.  He underwent coronary CT angiography last week his CT angiography raise the possibility of significant stenosis in his mid LAD beyond the previously placed stent.         Social History:   reports that he quit smoking about 42 years ago. His smoking use included cigarettes. He has been exposed to tobacco smoke. He has never used smokeless tobacco. He reports that he does not currently use alcohol. He reports that he does not use drugs.   Family History: family history includes Heart Disease in his brother and father; Other in his brother; Rheum Arthritis in an other family member.        Interval History:   Patient seen and examined and notes reviewed   Status post left heart cath   Frequent ectopy during the procedure   LVEDP 29  Sitting in bed  NAD  Mild orthopnea overnight   All other systems reviewed and negative except as above.        Diet: ADULT DIET; Regular; 4 carb choices (60 gm/meal); Low Fat/Low Chol/High Fiber/2 gm Na      In: 720 [P.O.:720]  Out: 1410    Patient Vitals for the past 96 hrs (Last 3 readings):   Weight   07/20/24 0600 125.5 kg (276 lb 10.8 oz)   07/19/24 1408 120.2 kg (265 lb)   07/19/24 0600 120.5 kg (265 lb 11.2 oz)           Data:   Scheduled Meds:   Scheduled Meds:   enoxaparin  30 mg SubCUTAneous BID    metoprolol succinate  25 mg Oral BID    furosemide  40 mg IntraVENous BID    sodium chloride flush  5-40 mL IntraVENous 2 times per day    empagliflozin  10 mg Oral Daily    amitriptyline  50 mg Oral Nightly    aspirin  81 mg Oral Daily    atorvastatin  40 mg Oral Nightly    buPROPion  150 mg Oral QAM    DULoxetine  60 mg Oral Daily  additional medical therapies.  One week Holter monitor upon discharge to quantify PVC burden.         Objective:   Vitals: /70   Pulse 69   Temp 97.4 °F (36.3 °C) (Oral)   Resp 16   Ht 1.905 m (6' 3\")   Wt 125.5 kg (276 lb 10.8 oz)   SpO2 97%   BMI 34.58 kg/m²   General appearance: alert, obese,  appears stated age and cooperative, No acute distress   Skin: Skin color, texture, turgor normal. No rashes or ecchymosis.  HEENT: Head: Normal, normocephalic, atraumatic.  Neck: no carotid bruit and no JVD  Lungs: diminished breath sounds bilaterally, no accessory muscle use, no respiratory distress, on RA  Heart: regular rate and rhythm, S1, S2 normal, no murmur, click, rub or gallop  Abdomen: soft, non-tender; bowel sounds normal; no masses,  no organomegaly  Extremities:  +1 BLE  , pulses: DP +2/+2, PT +2/+2,   Neurologic: Mental status: Alert, oriented, thought content appropriate, no tremors, no gross sensory motor deficit,   Psychiatric: normal insight and affect      Assessment & Plan:      Plan:  1.  CAD   Patient with known history of coronary disease   Status post left heart cath 7- showing patent stents   Frequent ectopy noted during procedure   On aspirin, statin, Toprol    Continue  2. PVC    On Toprol XL twice daily   Continue dosage    7-day monitor as an outpatient to assess PVC burden    3.  Cardiomyopathy   Non ischemic/acute systolic heart failure    EF 40 to 45%   LVEDP 29 per cath   Grade 1 diastolic dysfunction   On Jardiance, Toprol-XL, Lasix IV push    Continue IV Lasix today with increased dosage    Hopefully transition to oral in AM   And losartan    Will attempt to change losartan to Entresto as an outpatient if blood pressure tolerates   If renal function remains stable we will add Aldactone in the morning   Fluid and sodium restriction   Daily weights and strict I's and O's   Heart failure education   No indication for ICD as EF greater than 35%     Ana Cristina Fitch, APRN-CNP

## 2024-07-21 VITALS
BODY MASS INDEX: 34.01 KG/M2 | OXYGEN SATURATION: 97 % | WEIGHT: 273.5 LBS | HEART RATE: 62 BPM | DIASTOLIC BLOOD PRESSURE: 82 MMHG | RESPIRATION RATE: 12 BRPM | SYSTOLIC BLOOD PRESSURE: 111 MMHG | HEIGHT: 75 IN | TEMPERATURE: 97.9 F

## 2024-07-21 LAB
ALBUMIN SERPL-MCNC: 4.3 G/DL (ref 3.4–5)
ANION GAP SERPL CALCULATED.3IONS-SCNC: 11 MMOL/L (ref 3–16)
BUN SERPL-MCNC: 21 MG/DL (ref 7–20)
CALCIUM SERPL-MCNC: 9.2 MG/DL (ref 8.3–10.6)
CHLORIDE SERPL-SCNC: 98 MMOL/L (ref 99–110)
CO2 SERPL-SCNC: 27 MMOL/L (ref 21–32)
CREAT SERPL-MCNC: 1.1 MG/DL (ref 0.8–1.3)
GFR SERPLBLD CREATININE-BSD FMLA CKD-EPI: 73 ML/MIN/{1.73_M2}
GLUCOSE SERPL-MCNC: 129 MG/DL (ref 70–99)
PHOSPHATE SERPL-MCNC: 3.2 MG/DL (ref 2.5–4.9)
POTASSIUM SERPL-SCNC: 3.6 MMOL/L (ref 3.5–5.1)
SODIUM SERPL-SCNC: 136 MMOL/L (ref 136–145)

## 2024-07-21 PROCEDURE — 94760 N-INVAS EAR/PLS OXIMETRY 1: CPT

## 2024-07-21 PROCEDURE — 80069 RENAL FUNCTION PANEL: CPT

## 2024-07-21 PROCEDURE — 36415 COLL VENOUS BLD VENIPUNCTURE: CPT

## 2024-07-21 PROCEDURE — 6370000000 HC RX 637 (ALT 250 FOR IP): Performed by: INTERNAL MEDICINE

## 2024-07-21 PROCEDURE — 6360000002 HC RX W HCPCS: Performed by: INTERNAL MEDICINE

## 2024-07-21 PROCEDURE — 6370000000 HC RX 637 (ALT 250 FOR IP): Performed by: NURSE PRACTITIONER

## 2024-07-21 PROCEDURE — 2580000003 HC RX 258: Performed by: INTERNAL MEDICINE

## 2024-07-21 PROCEDURE — 99232 SBSQ HOSP IP/OBS MODERATE 35: CPT | Performed by: NURSE PRACTITIONER

## 2024-07-21 RX ORDER — FUROSEMIDE 20 MG/1
20 TABLET ORAL 2 TIMES DAILY
Status: DISCONTINUED | OUTPATIENT
Start: 2024-07-22 | End: 2024-07-21 | Stop reason: HOSPADM

## 2024-07-21 RX ORDER — METOPROLOL SUCCINATE 25 MG/1
25 TABLET, EXTENDED RELEASE ORAL 2 TIMES DAILY
Qty: 30 TABLET | Refills: 3 | Status: SHIPPED | OUTPATIENT
Start: 2024-07-21

## 2024-07-21 RX ORDER — SPIRONOLACTONE 25 MG/1
12.5 TABLET ORAL DAILY
Qty: 30 TABLET | Refills: 3 | Status: SHIPPED | OUTPATIENT
Start: 2024-07-22

## 2024-07-21 RX ORDER — LOSARTAN POTASSIUM 25 MG/1
25 TABLET ORAL DAILY
Qty: 30 TABLET | Refills: 3 | Status: SHIPPED | OUTPATIENT
Start: 2024-07-21

## 2024-07-21 RX ORDER — FUROSEMIDE 20 MG/1
20 TABLET ORAL 2 TIMES DAILY
Qty: 60 TABLET | Refills: 3 | Status: SHIPPED | OUTPATIENT
Start: 2024-07-22

## 2024-07-21 RX ORDER — SPIRONOLACTONE 25 MG/1
12.5 TABLET ORAL DAILY
Status: DISCONTINUED | OUTPATIENT
Start: 2024-07-21 | End: 2024-07-21 | Stop reason: HOSPADM

## 2024-07-21 RX ADMIN — ISOSORBIDE MONONITRATE 60 MG: 60 TABLET, EXTENDED RELEASE ORAL at 09:17

## 2024-07-21 RX ADMIN — LOSARTAN POTASSIUM 25 MG: 25 TABLET, FILM COATED ORAL at 09:17

## 2024-07-21 RX ADMIN — ASPIRIN 81 MG: 81 TABLET, COATED ORAL at 09:17

## 2024-07-21 RX ADMIN — CETIRIZINE HYDROCHLORIDE 10 MG: 10 TABLET, FILM COATED ORAL at 09:17

## 2024-07-21 RX ADMIN — METOPROLOL SUCCINATE 25 MG: 25 TABLET, EXTENDED RELEASE ORAL at 09:17

## 2024-07-21 RX ADMIN — ENOXAPARIN SODIUM 30 MG: 100 INJECTION SUBCUTANEOUS at 09:21

## 2024-07-21 RX ADMIN — BUPROPION HYDROCHLORIDE 150 MG: 150 TABLET, EXTENDED RELEASE ORAL at 09:17

## 2024-07-21 RX ADMIN — SODIUM CHLORIDE, PRESERVATIVE FREE 10 ML: 5 INJECTION INTRAVENOUS at 09:21

## 2024-07-21 RX ADMIN — SPIRONOLACTONE 12.5 MG: 25 TABLET ORAL at 11:26

## 2024-07-21 RX ADMIN — EMPAGLIFLOZIN 10 MG: 10 TABLET, FILM COATED ORAL at 09:17

## 2024-07-21 RX ADMIN — FERROUS SULFATE TAB 325 MG (65 MG ELEMENTAL FE) 325 MG: 325 (65 FE) TAB at 09:17

## 2024-07-21 RX ADMIN — DULOXETINE HYDROCHLORIDE 60 MG: 60 CAPSULE, DELAYED RELEASE ORAL at 09:17

## 2024-07-21 RX ADMIN — PANTOPRAZOLE SODIUM 40 MG: 40 TABLET, DELAYED RELEASE ORAL at 05:51

## 2024-07-21 NOTE — DISCHARGE SUMMARY
[facility]          [address] [place] [address]                            DISCHARGE SUMMARY      PATIENT NAME: LUPE WATSON              : 1955  MED REC NO: [id number]                      ROOM: [place] [address]  ACCOUNT NO: [id number]                       ADMIT DATE: 2024  PROVIDER: Alli Wood MD      Patient of Dr. Wood.    FINAL DIAGNOSES:    1. Nonischemic cardiomyopathy.  2. Systolic and diastolic congestive heart failure.  3. Coronary artery disease.  4. Hypertension.  5. Obesity.  6. Chronic low back pain syndrome.  7. Rheumatoid arthritis.  8. Osteoarthritis.    HISTORY:  The patient is a fairly healthy 68-year-old white , disabled male, who lives in his own home with his wife.  The patient was known to have coronary artery disease about 2 years prior to this admission.  He had an LAD and a right coronary artery stent placed.  For the past several weeks, the patient has been having increased dyspnea on exertion and chest pain.  The patient underwent an outpatient CT angiogram on the day of admission, which showed possible obstruction distal to his LAD stent.  Cardiology was consulted and recommended the patient to the emergency room [facility].  In the emergency room, his troponin was normal.  There were no acute EKG changes, but Cardiology felt the patient needed an urgent coronary angiogram as well as a right heart catheterization.  I was contacted and agreed to admit the patient to PCU with Cardiology consultation.  Appropriate studies, prior to discharge, BUN was 21, creatinine was 1.1, potassium was 3.6, random glucose 126.  Calcium and phosphorus were normal.  CBC was normal except for the patient's chronically low platelet count, varying between 50,000 to 70,000, which were stable values in this patient.  On , the patient underwent coronary angiography and right heart catheterization by Dr. Smith.

## 2024-07-21 NOTE — PROGRESS NOTES
Chino Ma is a 68 y.o. male patient.    Current Facility-Administered Medications   Medication Dose Route Frequency Provider Last Rate Last Admin    [START ON 7/22/2024] furosemide (LASIX) tablet 20 mg  20 mg Oral BID Ana Cristina Fitch APRN - CNP        spironolactone (ALDACTONE) tablet 12.5 mg  12.5 mg Oral Daily Ana Cristina Fitch APRN - CNP   12.5 mg at 07/21/24 1126    losartan (COZAAR) tablet 25 mg  25 mg Oral Daily Ana Cristina Fitch APRN - CNP   25 mg at 07/21/24 0917    enoxaparin Sodium (LOVENOX) injection 30 mg  30 mg SubCUTAneous BID Alli Wood MD   30 mg at 07/21/24 0921    metoprolol succinate (TOPROL XL) extended release tablet 25 mg  25 mg Oral BID SarahFreya N, DO   25 mg at 07/21/24 0917    sodium chloride flush 0.9 % injection 5-40 mL  5-40 mL IntraVENous 2 times per day SarahFreya N, DO   10 mL at 07/21/24 0921    sodium chloride flush 0.9 % injection 5-40 mL  5-40 mL IntraVENous PRN SarahFreya N, DO        acetaminophen (TYLENOL) tablet 650 mg  650 mg Oral Q4H PRN Freya Smith DO   650 mg at 07/20/24 0902    empagliflozin (JARDIANCE) tablet 10 mg  10 mg Oral Daily SarahFreya demarco N, DO   10 mg at 07/21/24 0917    amitriptyline (ELAVIL) tablet 50 mg  50 mg Oral Nightly Alli Wood MD   50 mg at 07/20/24 2026    aspirin EC tablet 81 mg  81 mg Oral Daily Alli Wood MD   81 mg at 07/21/24 0917    atorvastatin (LIPITOR) tablet 40 mg  40 mg Oral Nightly Alli Wood MD   40 mg at 07/20/24 2026    buPROPion (WELLBUTRIN XL) extended release tablet 150 mg  150 mg Oral QAM Alli Wood MD   150 mg at 07/21/24 0917    DULoxetine (CYMBALTA) extended release capsule 60 mg  60 mg Oral Daily Alli Wood MD   60 mg at 07/21/24 0917    ferrous sulfate (IRON 325) tablet 325 mg  325 mg Oral Daily Alli Wood MD   325 mg at 07/21/24 0917    gabapentin (NEURONTIN) capsule 300 mg  300 mg Oral Nightly Alli Wood MD    300 mg at 07/20/24 2026    isosorbide mononitrate (IMDUR) extended release tablet 60 mg  60 mg Oral Daily Alli Wood MD   60 mg at 07/21/24 0917    cetirizine (ZYRTEC) tablet 10 mg  10 mg Oral Daily Alli Wood MD   10 mg at 07/21/24 0917    methocarbamol (ROBAXIN) tablet 500 mg  500 mg Oral 4x Daily PRN Alli Wood MD        montelukast (SINGULAIR) tablet 10 mg  10 mg Oral Dinner Alli Wood MD   10 mg at 07/20/24 1802    nitroGLYCERIN (NITROSTAT) SL tablet 0.4 mg  0.4 mg SubLINGual Q5 Min PRN Alli Wood MD   0.4 mg at 07/19/24 1246    pantoprazole (PROTONIX) tablet 40 mg  40 mg Oral QAM AC Alli Wood MD   40 mg at 07/21/24 0551    oxyCODONE-acetaminophen (PERCOCET)  MG per tablet 1 tablet  1 tablet Oral Q4H PRN Alli Wood MD        morphine (PF) injection 2 mg  2 mg IntraVENous Q4H PRN Alli Wood MD   2 mg at 07/18/24 2218    potassium chloride (KLOR-CON M) extended release tablet 40 mEq  40 mEq Oral PRN Alli Wood MD        Or    potassium bicarb-citric acid (EFFER-K) effervescent tablet 40 mEq  40 mEq Oral PRN Alli Wood MD        Or    potassium chloride 10 mEq/100 mL IVPB (Peripheral Line)  10 mEq IntraVENous PRN Alli Wood MD        magnesium sulfate 2000 mg in 50 mL IVPB premix  2,000 mg IntraVENous PRN Alli Wood MD        ondansetron (ZOFRAN-ODT) disintegrating tablet 4 mg  4 mg Oral Q8H PRN Alli Wood MD        Or    ondansetron (ZOFRAN) injection 4 mg  4 mg IntraVENous Q6H PRN Alli Wood MD   4 mg at 07/19/24 1255    polyethylene glycol (GLYCOLAX) packet 17 g  17 g Oral Daily PRN Alli Wood MD        LORazepam (ATIVAN) tablet 0.5 mg  0.5 mg Oral Q4H PRN Alli Wood MD        melatonin tablet 3 mg  3 mg Oral Nightly PRN Alli Wood MD        aluminum & magnesium hydroxide-simethicone (MAALOX) 200-200-20 MG/5ML suspension 30 mL  30 mL

## 2024-07-21 NOTE — PROGRESS NOTES
Pt discharged at this time. Reviewed all discharge instructions and follow up appts with patient. Patient states that he understands. Ivs removed at this time without complications. Pt does not voice any questions or concerns at this time. Belongings in hand. Medications sent to [facility] pharmacy. Patient UAL and walked to vehicle with wife. Patient tolerated well.

## 2024-07-21 NOTE — PLAN OF CARE
Problem: Discharge Planning  Goal: Discharge to home or other facility with appropriate resources  7/20/2024 2219 by Shaista Acevedo, RN  Outcome: Progressing  Flowsheets (Taken 7/20/2024 2219)  Discharge to home or other facility with appropriate resources:   Identify barriers to discharge with patient and caregiver   Arrange for needed discharge resources and transportation as appropriate     Problem: Pain  Goal: Verbalizes/displays adequate comfort level or baseline comfort level  7/20/2024 2219 by Shaista Acevedo, RN  Outcome: Progressing  Flowsheets (Taken 7/20/2024 2219)  Verbalizes/displays adequate comfort level or baseline comfort level:   Encourage patient to monitor pain and request assistance   Assess pain using appropriate pain scale   Administer analgesics based on type and severity of pain and evaluate response   Implement non-pharmacological measures as appropriate and evaluate response   Consider cultural and social influences on pain and pain management   Notify Licensed Independent Practitioner if interventions unsuccessful or patient reports new pain     Problem: Safety - Adult  Goal: Free from fall injury  7/20/2024 2219 by Shaista Acevedo, RN  Outcome: Progressing  Flowsheets (Taken 7/20/2024 2219)  Free From Fall Injury: Instruct family/caregiver on patient safety

## 2024-07-21 NOTE — PROGRESS NOTES
Cardiology - PROGRESS NOTE    Admit Date: 7/18/2024     Reason for follow up:      prior history of coronary artery disease with multiple stents, hypertension, hyperlipidemia, obstructive sleep apnea who presented to the hospital with complaints of worsening chest tightness and shortness of breath.  He underwent coronary CT angiography last week his CT angiography raise the possibility of significant stenosis in his mid LAD beyond the previously placed stent.         Social History:   reports that he quit smoking about 42 years ago. His smoking use included cigarettes. He has been exposed to tobacco smoke. He has never used smokeless tobacco. He reports that he does not currently use alcohol. He reports that he does not use drugs.   Family History: family history includes Heart Disease in his brother and father; Other in his brother; Rheum Arthritis in an other family member.        Interval History:   Patient seen and examined and notes reviewed    left heart cath 7/19/2024   Frequent ectopy during the procedure   LVEDP 29  Need a.m. labs    All other systems reviewed and negative except as above.        Diet: ADULT DIET; Regular; 4 carb choices (60 gm/meal); Low Fat/Low Chol/High Fiber/2 gm Na      In: 380 [P.O.:380]  Out: -    Patient Vitals for the past 96 hrs (Last 3 readings):   Weight   07/21/24 0359 124.1 kg (273 lb 8 oz)   07/20/24 0600 125.5 kg (276 lb 10.8 oz)   07/19/24 1408 120.2 kg (265 lb)           Data:   Scheduled Meds:   Scheduled Meds:   losartan  25 mg Oral Daily    furosemide  40 mg IntraVENous TID    enoxaparin  30 mg SubCUTAneous BID    metoprolol succinate  25 mg Oral BID    sodium chloride flush  5-40 mL IntraVENous 2 times per day    empagliflozin  10 mg Oral Daily    amitriptyline  50 mg Oral Nightly    aspirin  81 mg Oral Daily    atorvastatin  40 mg Oral Nightly    buPROPion  150 mg Oral QAM    DULoxetine  60 mg Oral Daily     and tricuspid regurgitation.    Echo:    Left Ventricle: Mildly reduced left ventricular systolic function with a visually estimated EF of 40 - 45%. Left ventricle is mildly dilated. Mildly increased wall thickness. Global hypokinesis present. Grade I diastolic dysfunction with normal LAP.    Right Ventricle: Right ventricle is moderately dilated. Normal systolic function. TAPSE is 2.6 cm.    Mitral Valve: Trace regurgitation.    Left Atrium: Left atrium size is normal.    Aorta: Mildly dilated aortic root. Ao root diameter is 4.2 cm. Mildly dilated ascending aorta. Ao ascending diameter is 4.3 cm.    Image quality is suboptimal. Contrast used: Definity.    Coronary CT angiography 7/11/2024  Patent stent is seen in the proximal 3rd of the LAD. Distal to the stent,  there is an area of calcified and noncalcified plaque seen with poor  visualization of the lumen of the LAD in this location. This region is also  in an area phase misregistration artifact.  While this poor visualization of  the lumen could be secondary to a combination of blooming artifact from  calcified plaque and phase misregistration artifact, an area of severe  luminal narrowing in the LAD could also have this appearance.     No significant plaque or flow limiting stenosis noted in the right coronary  artery.  Stent in the right coronary artery appears patent.     Scattered plaque is seen in the circumflex coronary artery with mild luminal  narrowing.    Cath:   7/19/24  Patent LAD and RCA stents.   LVEF difficult to estimate with ventricular ectopy but appears at least mildly reduced with elevated filling pressures.    Nonischemic cardiomyopathy. New HF diagnosis. IV Lasix and increased Toprol dose. Echocardiogram in hospital to guide additional medical therapies.  One week Holter monitor upon discharge to quantify PVC burden.         Objective:   Vitals: BP (!) 101/45   Pulse 52   Temp 98.1 °F (36.7 °C) (Oral)   Resp 14   Ht 1.905 m (6' 3\")

## 2024-07-21 NOTE — CARE COORDINATION
Case Management Discharge Note          Date / Time of Note: 7/21/2024 3:12 PM                  Patient Name: Chino Ma   YOB: 1955  Diagnosis: Shortness of breath [R06.02]  Unstable angina (HCC) [I20.0]  Acute coronary syndrome (HCC) [I24.9]  Chest pain, unspecified type [R07.9]  Coronary artery disease involving native heart with refractory angina pectoris, unspecified vessel or lesion type (HCC) [I25.112]   Date / Time: 7/18/2024  1:54 PM    Financial:  Payor: [employer] / Plan: [employer] PLU / Product Type: *No Product type* /      Pharmacy:    [facility] [id number] - [facility] [address] [phone] - F [phone]  [address] [facility] [address]  Phone: [phone] Fax: [phone]    [facility] [address] -  [phone] - F [phone]  [address] [facility] [address]  Phone: [phone] Fax: [phone]      Assistance purchasing medications?: Potential Assistance Purchasing Medications: No  Assistance provided by Case Management: None at this time    DISCHARGE Disposition: Home- No Services Needed      Transportation:  Transportation PLAN for discharge: family   Mode of Transport: Private Car    Time of Transport: 3:30 PM       IMM Completed:   Yes, Case management has presented and reviewed [facility] letter #2.   IMM Letter given to Patient/Family/Significant other/Guardian/POA/by:: patient at bedside   IMM Letter date given:: 07/21/24  IMM Letter time given:: 1511.   Patient and/or family/POA verbalized understanding of their medicare rights and appeal process if needed. Patient and/or family/POA has signed, initialed and placed the date and time on IMM letter #2 on the the appropriate lines. Copy of letter offered and they are aware that the original copy of IMM letter #2 is available prior to discharge from the paper chart on the unit.  Electronic

## 2024-07-25 NOTE — PROGRESS NOTES
TABS IN 15 MINUTES 25 tablet 3    isosorbide mononitrate (IMDUR) 60 MG extended release tablet Take 1 tablet by mouth daily (Patient taking differently: Take 1 tablet by mouth Daily with supper) 30 tablet 3    ferrous sulfate (IRON 325) 325 (65 Fe) MG tablet Take 1 tablet by mouth 2 times daily (Patient taking differently: Take 1 tablet by mouth daily 3 times weekly - MWF) 180 tablet 1    oxyCODONE-acetaminophen (PERCOCET)  MG per tablet Take 1 tablet by mouth every 4 hours as needed for Pain.      atorvastatin (LIPITOR) 40 MG tablet TAKE ONE TABLET BY MOUTH ONCE NIGHTLY (Patient taking differently: Take 1 tablet by mouth nightly TAKE ONE TABLET BY MOUTH ONCE NIGHTLY) 90 tablet 3    methocarbamol (ROBAXIN) 500 MG tablet Take 1 tablet by mouth 4 times daily as needed (muscle spasms)      ASPIRIN LOW DOSE 81 MG EC tablet TAKE ONE TABLET BY MOUTH DAILY (Patient taking differently: Take 1 tablet by mouth Daily with supper) 60 tablet 3    buPROPion (WELLBUTRIN XL) 150 MG extended release tablet Take 1 tablet by mouth every morning      gabapentin (NEURONTIN) 300 MG capsule Take 1 capsule by mouth nightly.      omeprazole (PRILOSEC) 20 MG delayed release capsule Take 1 capsule by mouth Daily      Loratadine 10 MG CAPS Take 1 capsule by mouth nightly      montelukast (SINGULAIR) 10 MG tablet Take 1 tablet by mouth Daily with supper      amitriptyline (ELAVIL) 50 MG tablet Take 1 tablet by mouth nightly      DULoxetine (CYMBALTA) 60 MG extended release capsule Take 1 capsule by mouth daily       No current facility-administered medications for this visit.     REVIEW OF SYSTEMS:   CONSTITUTIONAL: No major weight gain or loss, fatigue, weakness, night sweats or fever. There's been no change in energy level, sleep pattern, or activity level.     HEENT: No new vision difficulties or ringing in the ears.  RESPIRATORY: No new SOB, PND, orthopnea or cough.   CARDIOVASCULAR: See HPI  GI: No nausea, vomiting, diarrhea,

## 2024-07-29 ENCOUNTER — OFFICE VISIT (OUTPATIENT)
Dept: CARDIOLOGY CLINIC | Age: 69
End: 2024-07-29
Payer: COMMERCIAL

## 2024-07-29 VITALS
HEART RATE: 51 BPM | BODY MASS INDEX: 34.69 KG/M2 | DIASTOLIC BLOOD PRESSURE: 78 MMHG | SYSTOLIC BLOOD PRESSURE: 116 MMHG | WEIGHT: 279 LBS | OXYGEN SATURATION: 95 % | HEIGHT: 75 IN

## 2024-07-29 DIAGNOSIS — I10 PRIMARY HYPERTENSION: ICD-10-CM

## 2024-07-29 DIAGNOSIS — E78.2 MIXED HYPERLIPIDEMIA: ICD-10-CM

## 2024-07-29 DIAGNOSIS — I42.8 NON-ISCHEMIC CARDIOMYOPATHY (HCC): ICD-10-CM

## 2024-07-29 DIAGNOSIS — I25.112 CORONARY ARTERY DISEASE INVOLVING NATIVE CORONARY ARTERY OF NATIVE HEART WITH REFRACTORY ANGINA PECTORIS (HCC): Primary | ICD-10-CM

## 2024-07-29 DIAGNOSIS — Z72.0 TOBACCO ABUSE: ICD-10-CM

## 2024-07-29 PROCEDURE — G8417 CALC BMI ABV UP PARAM F/U: HCPCS | Performed by: NURSE PRACTITIONER

## 2024-07-29 PROCEDURE — 3078F DIAST BP <80 MM HG: CPT | Performed by: NURSE PRACTITIONER

## 2024-07-29 PROCEDURE — G8427 DOCREV CUR MEDS BY ELIG CLIN: HCPCS | Performed by: NURSE PRACTITIONER

## 2024-07-29 PROCEDURE — 1036F TOBACCO NON-USER: CPT | Performed by: NURSE PRACTITIONER

## 2024-07-29 PROCEDURE — 99214 OFFICE O/P EST MOD 30 MIN: CPT | Performed by: NURSE PRACTITIONER

## 2024-07-29 PROCEDURE — 1111F DSCHRG MED/CURRENT MED MERGE: CPT | Performed by: NURSE PRACTITIONER

## 2024-07-29 PROCEDURE — 3017F COLORECTAL CA SCREEN DOC REV: CPT | Performed by: NURSE PRACTITIONER

## 2024-07-29 PROCEDURE — 1123F ACP DISCUSS/DSCN MKR DOCD: CPT | Performed by: NURSE PRACTITIONER

## 2024-07-29 PROCEDURE — 3074F SYST BP LT 130 MM HG: CPT | Performed by: NURSE PRACTITIONER

## 2024-08-01 NOTE — PROGRESS NOTES
Physician Progress Note      PATIENT:               LUPE WATSON  CSN #:                  [id number]  :                       1955  ADMIT DATE:       2024 1:54 PM  DISCH DATE:        2024 3:27 PM  RESPONDING  PROVIDER #:        Alli Wood MD          QUERY TEXT:    Dear Dr. Wood,  Pt admitted with CP and SOB and has systolic and diastolic CHF documented in   discharge summary  and Cardiology notes \"acute systolic heart failure\"   and  and \"new heart failure diagnosis\" per Cath report . If possible,   please document in progress notes and discharge summary further specificity   regarding the type and acuity of CHF:    The medical record reflects the following:  Risk Factors: Hx CAD, stents, HTN, HLD, VICKIE, V-tach, PVCs  Clinical Indicators:  ED for SOB and CP, Admit for Possible occlusive   coronary artery disease of the LAD and Possible congestive heart failure.      Cath report with patent stents, and \"Nonischemic cardiomyopathy. New HF   diagnosis.\" noted.  and  Cardiology notes \"acute systolic heart   failure\" and noted \"Grade 1 diastolic dysfunction\" (Echo EF=40-45% with Grade   1 diastolic dysfunction). Discharge summary notes \"Systolic and diastolic   congestive heart failure\"  Treatment: Cardiology consult, angiogram, Echo, IV Lasix, increase Toprol,   daily wt, I and O, heart failure education, Aldactone  Thank you,  Tresa Llamas RN, CDS  [email or web address]  Options provided:  -- Acute Systolic and Diastolic CHF  -- Acute Systolic CHF/HFrEF  -- Other - I will add my own diagnosis  -- Disagree - Not applicable / Not valid  -- Disagree - Clinically unable to determine / Unknown  -- Refer to Clinical Documentation Reviewer    PROVIDER RESPONSE TEXT:    This patient is in acute systolic and diastolic CHF.    Query created by: Tresa Teresa on 2024 11:54 AM      Electronically signed by:  Alli Wood MD 2024 4:22

## 2024-08-12 DIAGNOSIS — I42.8 NON-ISCHEMIC CARDIOMYOPATHY (HCC): ICD-10-CM

## 2024-08-12 DIAGNOSIS — I25.112 CORONARY ARTERY DISEASE INVOLVING NATIVE CORONARY ARTERY OF NATIVE HEART WITH REFRACTORY ANGINA PECTORIS (HCC): ICD-10-CM

## 2024-08-12 LAB
ANION GAP SERPL CALCULATED.3IONS-SCNC: 13 MMOL/L (ref 3–16)
BUN SERPL-MCNC: 15 MG/DL (ref 7–20)
CALCIUM SERPL-MCNC: 9.5 MG/DL (ref 8.3–10.6)
CHLORIDE SERPL-SCNC: 106 MMOL/L (ref 99–110)
CO2 SERPL-SCNC: 26 MMOL/L (ref 21–32)
CREAT SERPL-MCNC: 1 MG/DL (ref 0.8–1.3)
GFR SERPLBLD CREATININE-BSD FMLA CKD-EPI: 82 ML/MIN/{1.73_M2}
GLUCOSE SERPL-MCNC: 117 MG/DL (ref 70–99)
POTASSIUM SERPL-SCNC: 4 MMOL/L (ref 3.5–5.1)
SODIUM SERPL-SCNC: 145 MMOL/L (ref 136–145)

## 2024-08-19 ENCOUNTER — TELEPHONE (OUTPATIENT)
Dept: CARDIOLOGY CLINIC | Age: 69
End: 2024-08-19

## 2024-08-26 ENCOUNTER — TELEPHONE (OUTPATIENT)
Dept: ADMINISTRATIVE | Age: 69
End: 2024-08-26

## 2024-08-26 NOTE — TELEPHONE ENCOUNTER
Request Reference Number: [id number]. ENTRESTO TAB 24-26MG is approved through 08/26/2025. Your patient may now fill this prescription and it will be covered.  Authorization Expiration Date: 8/26/2025

## 2024-08-26 NOTE — TELEPHONE ENCOUNTER
Submitted PA for ENTRESTO  [facility] Key: [id number]  STATUS: PENDING.    Follow up done daily; if no decision with in three days we will refax.  If another three days goes by with no decision will call the insurance for status.

## 2024-08-26 NOTE — TELEPHONE ENCOUNTER
Called spoke with patient is aware Entresto 24-26 mg has been approved through insurance. States understanding.

## 2024-08-26 NOTE — TELEPHONE ENCOUNTER
Medication Question/Concern    What is the name of the medication you need to speak with someone about?  sacubitril-valsartan (ENTRESTO)   Was this prescribed by your cardiologist?   yes  Dosage of the medication:  24-26 MG per tablet   How are you taking this medication (QD, BID, TID, QID, PRN):  Take 1 tablet by mouth 2 times daily   What issues/concerns are you having with this medication?  He has questions on this medication and states his pharmacy was supposed to be reaching out to our office. When trying to figure out what the pharmacy is needing, David could not state what the issue was. Thinks it may be an insurance issue.     David's callback: [phone]

## 2024-08-28 NOTE — PROGRESS NOTES
[facility]     Outpatient Follow Up Note    CHIEF COMPLAINT / HPI: Follow Up secondary to coronary artery disease s/p 12/21 LAD x JUAN and 8/2020 RCA x JUAN, Hypertension/ Hyperlipidemia/ Diastolic CHF/ tobacco abuse/ and VICKIE    Chino Ma is 68 y.o. male who presents today for a routine follow up related to the above mentioned issues.  Subjective:   Since the time of last office visit, the patient admits their symptoms have not changed.      7/18/2024-7/23/2024 [facility]  The patient is a fairly healthy 68-year-old white , disabled male, who lives in his own home with his wife.  The patient was known to have coronary artery disease about 2 years prior to this admission.  He had an LAD and a right coronary artery stent placed.  For the past several weeks, the patient has been having increased dyspnea on exertion and chest pain.  The patient underwent an outpatient CT angiogram on the day of admission, which showed possible obstruction distal to his LAD stent.  Cardiology was consulted and recommended the patient to the emergency room [facility].  In the emergency room, his troponin was normal.  There were no acute EKG changes, but Cardiology felt the patient needed an urgent coronary angiogram as well as a right heart catheterization.   On 07/19, the patient underwent coronary angiography and right heart catheterization by Dr. Smith.  Basically, no new coronary artery obstructions were noted and the patient's stents were noted to be open, but on the right heart catheterization, the patient had increased right ventricular filling pressures.  The patient's left ventricular ejection fraction was mildly reduced about 40%.  Consequently, following the procedure, which the patient tolerated well, it was determined the patient could benefit from diuresis and he was begun on intravenous Lasix by Cardiology.  On telemetry, the patient was noted to have frequent PVCs.  Consequently, a 1 week Holter monitor

## 2024-08-30 ENCOUNTER — OFFICE VISIT (OUTPATIENT)
Dept: CARDIOLOGY CLINIC | Age: 69
End: 2024-08-30
Payer: COMMERCIAL

## 2024-08-30 VITALS
BODY MASS INDEX: 34.57 KG/M2 | HEART RATE: 56 BPM | WEIGHT: 278 LBS | OXYGEN SATURATION: 92 % | SYSTOLIC BLOOD PRESSURE: 128 MMHG | HEIGHT: 75 IN | DIASTOLIC BLOOD PRESSURE: 62 MMHG

## 2024-08-30 DIAGNOSIS — I10 PRIMARY HYPERTENSION: ICD-10-CM

## 2024-08-30 DIAGNOSIS — I25.112 CORONARY ARTERY DISEASE INVOLVING NATIVE CORONARY ARTERY OF NATIVE HEART WITH REFRACTORY ANGINA PECTORIS (HCC): ICD-10-CM

## 2024-08-30 DIAGNOSIS — I42.8 NON-ISCHEMIC CARDIOMYOPATHY (HCC): Primary | ICD-10-CM

## 2024-08-30 DIAGNOSIS — I47.29 NSVT (NONSUSTAINED VENTRICULAR TACHYCARDIA) (HCC): ICD-10-CM

## 2024-08-30 PROCEDURE — 3017F COLORECTAL CA SCREEN DOC REV: CPT | Performed by: NURSE PRACTITIONER

## 2024-08-30 PROCEDURE — G8417 CALC BMI ABV UP PARAM F/U: HCPCS | Performed by: NURSE PRACTITIONER

## 2024-08-30 PROCEDURE — G8427 DOCREV CUR MEDS BY ELIG CLIN: HCPCS | Performed by: NURSE PRACTITIONER

## 2024-08-30 PROCEDURE — 1123F ACP DISCUSS/DSCN MKR DOCD: CPT | Performed by: NURSE PRACTITIONER

## 2024-08-30 PROCEDURE — 3074F SYST BP LT 130 MM HG: CPT | Performed by: NURSE PRACTITIONER

## 2024-08-30 PROCEDURE — 99214 OFFICE O/P EST MOD 30 MIN: CPT | Performed by: NURSE PRACTITIONER

## 2024-08-30 PROCEDURE — 1036F TOBACCO NON-USER: CPT | Performed by: NURSE PRACTITIONER

## 2024-08-30 PROCEDURE — 3078F DIAST BP <80 MM HG: CPT | Performed by: NURSE PRACTITIONER

## 2024-09-10 DIAGNOSIS — I42.8 NON-ISCHEMIC CARDIOMYOPATHY (HCC): ICD-10-CM

## 2024-09-10 LAB
ALBUMIN SERPL-MCNC: 4.4 G/DL (ref 3.4–5)
ALBUMIN/GLOB SERPL: 2.3 {RATIO} (ref 1.1–2.2)
ALP SERPL-CCNC: 77 U/L (ref 40–129)
ALT SERPL-CCNC: 14 U/L (ref 10–40)
ANION GAP SERPL CALCULATED.3IONS-SCNC: 12 MMOL/L (ref 3–16)
AST SERPL-CCNC: 19 U/L (ref 15–37)
BILIRUB SERPL-MCNC: 0.8 MG/DL (ref 0–1)
BUN SERPL-MCNC: 13 MG/DL (ref 7–20)
CALCIUM SERPL-MCNC: 8.6 MG/DL (ref 8.3–10.6)
CHLORIDE SERPL-SCNC: 103 MMOL/L (ref 99–110)
CO2 SERPL-SCNC: 25 MMOL/L (ref 21–32)
CREAT SERPL-MCNC: 1.1 MG/DL (ref 0.8–1.3)
GFR SERPLBLD CREATININE-BSD FMLA CKD-EPI: 73 ML/MIN/{1.73_M2}
GLUCOSE SERPL-MCNC: 147 MG/DL (ref 70–99)
POTASSIUM SERPL-SCNC: 4.2 MMOL/L (ref 3.5–5.1)
PROT SERPL-MCNC: 6.3 G/DL (ref 6.4–8.2)
SODIUM SERPL-SCNC: 140 MMOL/L (ref 136–145)

## 2024-09-12 ENCOUNTER — TELEPHONE (OUTPATIENT)
Dept: CARDIOLOGY CLINIC | Age: 69
End: 2024-09-12

## 2024-09-16 RX ORDER — ATORVASTATIN CALCIUM 40 MG/1
TABLET, FILM COATED ORAL
Qty: 90 TABLET | Refills: 3 | Status: SHIPPED | OUTPATIENT
Start: 2024-09-16

## 2024-09-17 ENCOUNTER — TELEPHONE (OUTPATIENT)
Dept: CARDIOLOGY CLINIC | Age: 69
End: 2024-09-17

## 2024-09-17 ENCOUNTER — INITIAL CONSULT (OUTPATIENT)
Dept: CARDIOLOGY CLINIC | Age: 69
End: 2024-09-17
Payer: COMMERCIAL

## 2024-09-17 VITALS
OXYGEN SATURATION: 96 % | BODY MASS INDEX: 34.57 KG/M2 | HEIGHT: 75 IN | DIASTOLIC BLOOD PRESSURE: 66 MMHG | HEART RATE: 76 BPM | SYSTOLIC BLOOD PRESSURE: 100 MMHG | WEIGHT: 278 LBS

## 2024-09-17 DIAGNOSIS — I49.3 PVC (PREMATURE VENTRICULAR CONTRACTION): Primary | ICD-10-CM

## 2024-09-17 DIAGNOSIS — I10 ESSENTIAL HYPERTENSION: ICD-10-CM

## 2024-09-17 DIAGNOSIS — G47.33 OSA (OBSTRUCTIVE SLEEP APNEA): ICD-10-CM

## 2024-09-17 DIAGNOSIS — I25.10 CORONARY ARTERY DISEASE INVOLVING NATIVE CORONARY ARTERY OF NATIVE HEART WITHOUT ANGINA PECTORIS: ICD-10-CM

## 2024-09-17 DIAGNOSIS — I42.8 NON-ISCHEMIC CARDIOMYOPATHY (HCC): ICD-10-CM

## 2024-09-17 PROCEDURE — 1123F ACP DISCUSS/DSCN MKR DOCD: CPT | Performed by: INTERNAL MEDICINE

## 2024-09-17 PROCEDURE — 3017F COLORECTAL CA SCREEN DOC REV: CPT | Performed by: INTERNAL MEDICINE

## 2024-09-17 PROCEDURE — G8417 CALC BMI ABV UP PARAM F/U: HCPCS | Performed by: INTERNAL MEDICINE

## 2024-09-17 PROCEDURE — G8427 DOCREV CUR MEDS BY ELIG CLIN: HCPCS | Performed by: INTERNAL MEDICINE

## 2024-09-17 PROCEDURE — 1036F TOBACCO NON-USER: CPT | Performed by: INTERNAL MEDICINE

## 2024-09-17 PROCEDURE — 3074F SYST BP LT 130 MM HG: CPT | Performed by: INTERNAL MEDICINE

## 2024-09-17 PROCEDURE — 3078F DIAST BP <80 MM HG: CPT | Performed by: INTERNAL MEDICINE

## 2024-09-17 PROCEDURE — 99214 OFFICE O/P EST MOD 30 MIN: CPT | Performed by: INTERNAL MEDICINE

## 2024-09-17 PROCEDURE — 93000 ELECTROCARDIOGRAM COMPLETE: CPT | Performed by: INTERNAL MEDICINE

## 2024-09-18 ENCOUNTER — TELEPHONE (OUTPATIENT)
Dept: CARDIOLOGY CLINIC | Age: 69
End: 2024-09-18

## 2024-09-27 DIAGNOSIS — I49.3 PVC (PREMATURE VENTRICULAR CONTRACTION): ICD-10-CM

## 2024-09-27 LAB
ABO + RH BLD: NORMAL
ANION GAP SERPL CALCULATED.3IONS-SCNC: 10 MMOL/L (ref 3–16)
BLD GP AB SCN SERPL QL: NORMAL
BUN SERPL-MCNC: 14 MG/DL (ref 7–20)
CALCIUM SERPL-MCNC: 9.3 MG/DL (ref 8.3–10.6)
CHLORIDE SERPL-SCNC: 104 MMOL/L (ref 99–110)
CO2 SERPL-SCNC: 26 MMOL/L (ref 21–32)
CREAT SERPL-MCNC: 1.1 MG/DL (ref 0.8–1.3)
GFR SERPLBLD CREATININE-BSD FMLA CKD-EPI: 73 ML/MIN/{1.73_M2}
GLUCOSE SERPL-MCNC: 116 MG/DL (ref 70–99)
POTASSIUM SERPL-SCNC: 4 MMOL/L (ref 3.5–5.1)
SODIUM SERPL-SCNC: 140 MMOL/L (ref 136–145)

## 2024-10-02 ENCOUNTER — ANESTHESIA (OUTPATIENT)
Age: 69
End: 2024-10-02
Payer: COMMERCIAL

## 2024-10-02 ENCOUNTER — HOSPITAL ENCOUNTER (OUTPATIENT)
Age: 69
Setting detail: OUTPATIENT SURGERY
Discharge: HOME | End: 2024-10-02
Attending: INTERNAL MEDICINE | Admitting: INTERNAL MEDICINE
Payer: COMMERCIAL

## 2024-10-02 ENCOUNTER — ANESTHESIA EVENT (OUTPATIENT)
Age: 69
End: 2024-10-02
Payer: COMMERCIAL

## 2024-10-02 VITALS
OXYGEN SATURATION: 93 % | TEMPERATURE: 97.5 F | DIASTOLIC BLOOD PRESSURE: 96 MMHG | HEIGHT: 75 IN | WEIGHT: 278 LBS | SYSTOLIC BLOOD PRESSURE: 138 MMHG | HEART RATE: 88 BPM | RESPIRATION RATE: 16 BRPM | BODY MASS INDEX: 34.57 KG/M2

## 2024-10-02 DIAGNOSIS — I49.3 PVC (PREMATURE VENTRICULAR CONTRACTION): ICD-10-CM

## 2024-10-02 LAB
ABO + RH BLD: NORMAL
ANION GAP SERPL CALCULATED.3IONS-SCNC: 14 MMOL/L (ref 3–16)
BLD GP AB SCN SERPL QL: NORMAL
BUN SERPL-MCNC: 13 MG/DL (ref 7–20)
CALCIUM SERPL-MCNC: 9.4 MG/DL (ref 8.3–10.6)
CHLORIDE SERPL-SCNC: 101 MMOL/L (ref 99–110)
CO2 SERPL-SCNC: 24 MMOL/L (ref 21–32)
CREAT SERPL-MCNC: 1.2 MG/DL (ref 0.8–1.3)
DEPRECATED RDW RBC AUTO: 13.5 % (ref 12.4–15.4)
ECHO BSA: 2.58 M2
EKG ATRIAL RATE: 92 BPM
EKG DIAGNOSIS: NORMAL
EKG P AXIS: 20 DEGREES
EKG P-R INTERVAL: 166 MS
EKG Q-T INTERVAL: 382 MS
EKG QRS DURATION: 90 MS
EKG QTC CALCULATION (BAZETT): 472 MS
EKG R AXIS: 26 DEGREES
EKG T AXIS: 9 DEGREES
EKG VENTRICULAR RATE: 92 BPM
GFR SERPLBLD CREATININE-BSD FMLA CKD-EPI: 66 ML/MIN/{1.73_M2}
GLUCOSE SERPL-MCNC: 143 MG/DL (ref 70–99)
HCT VFR BLD AUTO: 45.9 % (ref 40.5–52.5)
HGB BLD-MCNC: 15.5 G/DL (ref 13.5–17.5)
MCH RBC QN AUTO: 31.2 PG (ref 26–34)
MCHC RBC AUTO-ENTMCNC: 33.7 G/DL (ref 31–36)
MCV RBC AUTO: 92.4 FL (ref 80–100)
PATH INTERP BLD-IMP: NO
PLATELET # BLD AUTO: 67 K/UL (ref 135–450)
PLATELET BLD QL SMEAR: ABNORMAL
PMV BLD AUTO: 11 FL (ref 5–10.5)
POC ACT LR: 317 SEC
POC ACT LR: 334 SEC
POC ACT LR: 335 SEC
POC ACT LR: 369 SEC
POC ACT LR: 385 SEC
POTASSIUM SERPL-SCNC: 3.6 MMOL/L (ref 3.5–5.1)
RBC # BLD AUTO: 4.97 M/UL (ref 4.2–5.9)
SLIDE REVIEW: ABNORMAL
SODIUM SERPL-SCNC: 139 MMOL/L (ref 136–145)
WBC # BLD AUTO: 5.5 K/UL (ref 4–11)

## 2024-10-02 PROCEDURE — 86900 BLOOD TYPING SEROLOGIC ABO: CPT

## 2024-10-02 PROCEDURE — 7100000000 HC PACU RECOVERY - FIRST 15 MIN: Performed by: INTERNAL MEDICINE

## 2024-10-02 PROCEDURE — 7100000010 HC PHASE II RECOVERY - FIRST 15 MIN: Performed by: INTERNAL MEDICINE

## 2024-10-02 PROCEDURE — 86850 RBC ANTIBODY SCREEN: CPT

## 2024-10-02 PROCEDURE — 80048 BASIC METABOLIC PNL TOTAL CA: CPT

## 2024-10-02 PROCEDURE — 93662 INTRACARDIAC ECG (ICE): CPT | Performed by: INTERNAL MEDICINE

## 2024-10-02 PROCEDURE — C1769 GUIDE WIRE: HCPCS | Performed by: INTERNAL MEDICINE

## 2024-10-02 PROCEDURE — 3700000001 HC ADD 15 MINUTES (ANESTHESIA): Performed by: INTERNAL MEDICINE

## 2024-10-02 PROCEDURE — 93005 ELECTROCARDIOGRAM TRACING: CPT

## 2024-10-02 PROCEDURE — 36415 COLL VENOUS BLD VENIPUNCTURE: CPT

## 2024-10-02 PROCEDURE — 7100000001 HC PACU RECOVERY - ADDTL 15 MIN: Performed by: INTERNAL MEDICINE

## 2024-10-02 PROCEDURE — 2500000003 HC RX 250 WO HCPCS: Performed by: INTERNAL MEDICINE

## 2024-10-02 PROCEDURE — 85347 COAGULATION TIME ACTIVATED: CPT

## 2024-10-02 PROCEDURE — 85027 COMPLETE CBC AUTOMATED: CPT

## 2024-10-02 PROCEDURE — C1766 INTRO/SHEATH,STRBLE,NON-PEEL: HCPCS | Performed by: INTERNAL MEDICINE

## 2024-10-02 PROCEDURE — C1894 INTRO/SHEATH, NON-LASER: HCPCS | Performed by: INTERNAL MEDICINE

## 2024-10-02 PROCEDURE — 6360000002 HC RX W HCPCS: Performed by: REGISTERED NURSE

## 2024-10-02 PROCEDURE — 2709999900 HC NON-CHARGEABLE SUPPLY: Performed by: INTERNAL MEDICINE

## 2024-10-02 PROCEDURE — C1759 CATH, INTRA ECHOCARDIOGRAPHY: HCPCS | Performed by: INTERNAL MEDICINE

## 2024-10-02 PROCEDURE — 93655 ICAR CATH ABLTJ DSCRT ARRHYT: CPT | Performed by: INTERNAL MEDICINE

## 2024-10-02 PROCEDURE — 93654 COMPRE EP EVAL TX VT: CPT | Performed by: INTERNAL MEDICINE

## 2024-10-02 PROCEDURE — 2500000003 HC RX 250 WO HCPCS: Performed by: REGISTERED NURSE

## 2024-10-02 PROCEDURE — C1732 CATH, EP, DIAG/ABL, 3D/VECT: HCPCS | Performed by: INTERNAL MEDICINE

## 2024-10-02 PROCEDURE — 93462 L HRT CATH TRNSPTL PUNCTURE: CPT | Performed by: INTERNAL MEDICINE

## 2024-10-02 PROCEDURE — 3700000000 HC ANESTHESIA ATTENDED CARE: Performed by: INTERNAL MEDICINE

## 2024-10-02 PROCEDURE — 7100000011 HC PHASE II RECOVERY - ADDTL 15 MIN: Performed by: INTERNAL MEDICINE

## 2024-10-02 PROCEDURE — 86901 BLOOD TYPING SEROLOGIC RH(D): CPT

## 2024-10-02 PROCEDURE — 2580000003 HC RX 258: Performed by: REGISTERED NURSE

## 2024-10-02 RX ORDER — FENTANYL CITRATE 50 UG/ML
INJECTION, SOLUTION INTRAMUSCULAR; INTRAVENOUS
Status: DISCONTINUED | OUTPATIENT
Start: 2024-10-02 | End: 2024-10-02 | Stop reason: SDUPTHER

## 2024-10-02 RX ORDER — SODIUM CHLORIDE 0.9 % (FLUSH) 0.9 %
5-40 SYRINGE (ML) INJECTION EVERY 12 HOURS SCHEDULED
Status: DISCONTINUED | OUTPATIENT
Start: 2024-10-02 | End: 2024-10-02 | Stop reason: HOSPADM

## 2024-10-02 RX ORDER — PHENYLEPHRINE HCL IN 0.9% NACL 1 MG/10 ML
SYRINGE (ML) INTRAVENOUS
Status: DISCONTINUED | OUTPATIENT
Start: 2024-10-02 | End: 2024-10-02 | Stop reason: SDUPTHER

## 2024-10-02 RX ORDER — ONDANSETRON 2 MG/ML
4 INJECTION INTRAMUSCULAR; INTRAVENOUS
Status: DISCONTINUED | OUTPATIENT
Start: 2024-10-02 | End: 2024-10-02 | Stop reason: HOSPADM

## 2024-10-02 RX ORDER — SODIUM CHLORIDE 9 MG/ML
INJECTION, SOLUTION INTRAVENOUS
Status: DISCONTINUED | OUTPATIENT
Start: 2024-10-02 | End: 2024-10-02 | Stop reason: SDUPTHER

## 2024-10-02 RX ORDER — MIDAZOLAM HYDROCHLORIDE 1 MG/ML
INJECTION INTRAMUSCULAR; INTRAVENOUS
Status: DISCONTINUED | OUTPATIENT
Start: 2024-10-02 | End: 2024-10-02 | Stop reason: SDUPTHER

## 2024-10-02 RX ORDER — LIDOCAINE HYDROCHLORIDE 20 MG/ML
INJECTION, SOLUTION EPIDURAL; INFILTRATION; INTRACAUDAL; PERINEURAL
Status: DISCONTINUED | OUTPATIENT
Start: 2024-10-02 | End: 2024-10-02 | Stop reason: SDUPTHER

## 2024-10-02 RX ORDER — MEPERIDINE HYDROCHLORIDE 25 MG/ML
12.5 INJECTION INTRAMUSCULAR; INTRAVENOUS; SUBCUTANEOUS EVERY 5 MIN PRN
Status: DISCONTINUED | OUTPATIENT
Start: 2024-10-02 | End: 2024-10-02 | Stop reason: HOSPADM

## 2024-10-02 RX ORDER — ONDANSETRON 2 MG/ML
INJECTION INTRAMUSCULAR; INTRAVENOUS
Status: DISCONTINUED | OUTPATIENT
Start: 2024-10-02 | End: 2024-10-02 | Stop reason: SDUPTHER

## 2024-10-02 RX ORDER — HYDROMORPHONE HYDROCHLORIDE 2 MG/ML
0.5 INJECTION, SOLUTION INTRAMUSCULAR; INTRAVENOUS; SUBCUTANEOUS EVERY 5 MIN PRN
Status: DISCONTINUED | OUTPATIENT
Start: 2024-10-02 | End: 2024-10-02 | Stop reason: HOSPADM

## 2024-10-02 RX ORDER — SUCCINYLCHOLINE/SOD CL,ISO/PF 200MG/10ML
SYRINGE (ML) INTRAVENOUS
Status: DISCONTINUED | OUTPATIENT
Start: 2024-10-02 | End: 2024-10-02 | Stop reason: SDUPTHER

## 2024-10-02 RX ORDER — SODIUM CHLORIDE 0.9 % (FLUSH) 0.9 %
5-40 SYRINGE (ML) INJECTION PRN
Status: DISCONTINUED | OUTPATIENT
Start: 2024-10-02 | End: 2024-10-02 | Stop reason: HOSPADM

## 2024-10-02 RX ORDER — HEPARIN SODIUM 1000 [USP'U]/ML
INJECTION, SOLUTION INTRAVENOUS; SUBCUTANEOUS
Status: DISCONTINUED | OUTPATIENT
Start: 2024-10-02 | End: 2024-10-02 | Stop reason: SDUPTHER

## 2024-10-02 RX ORDER — ROCURONIUM BROMIDE 10 MG/ML
INJECTION, SOLUTION INTRAVENOUS
Status: DISCONTINUED | OUTPATIENT
Start: 2024-10-02 | End: 2024-10-02 | Stop reason: SDUPTHER

## 2024-10-02 RX ORDER — PROPOFOL 10 MG/ML
INJECTION, EMULSION INTRAVENOUS
Status: DISCONTINUED | OUTPATIENT
Start: 2024-10-02 | End: 2024-10-02 | Stop reason: SDUPTHER

## 2024-10-02 RX ORDER — NALOXONE HYDROCHLORIDE 0.4 MG/ML
INJECTION, SOLUTION INTRAMUSCULAR; INTRAVENOUS; SUBCUTANEOUS PRN
Status: DISCONTINUED | OUTPATIENT
Start: 2024-10-02 | End: 2024-10-02 | Stop reason: HOSPADM

## 2024-10-02 RX ORDER — PROTAMINE SULFATE 10 MG/ML
INJECTION, SOLUTION INTRAVENOUS
Status: DISCONTINUED | OUTPATIENT
Start: 2024-10-02 | End: 2024-10-02 | Stop reason: SDUPTHER

## 2024-10-02 RX ORDER — DEXAMETHASONE SODIUM PHOSPHATE 4 MG/ML
INJECTION, SOLUTION INTRA-ARTICULAR; INTRALESIONAL; INTRAMUSCULAR; INTRAVENOUS; SOFT TISSUE
Status: DISCONTINUED | OUTPATIENT
Start: 2024-10-02 | End: 2024-10-02 | Stop reason: SDUPTHER

## 2024-10-02 RX ORDER — SODIUM CHLORIDE 9 MG/ML
INJECTION, SOLUTION INTRAVENOUS PRN
Status: DISCONTINUED | OUTPATIENT
Start: 2024-10-02 | End: 2024-10-02 | Stop reason: HOSPADM

## 2024-10-02 RX ADMIN — FENTANYL CITRATE 50 MCG: 50 INJECTION, SOLUTION INTRAMUSCULAR; INTRAVENOUS at 08:16

## 2024-10-02 RX ADMIN — ROCURONIUM BROMIDE 50 MG: 10 INJECTION, SOLUTION INTRAVENOUS at 08:24

## 2024-10-02 RX ADMIN — HEPARIN SODIUM 5000 UNITS: 1000 INJECTION INTRAVENOUS; SUBCUTANEOUS at 10:23

## 2024-10-02 RX ADMIN — PROPOFOL 120 MCG/KG/MIN: 10 INJECTION, EMULSION INTRAVENOUS at 08:20

## 2024-10-02 RX ADMIN — Medication 100 MCG: at 08:58

## 2024-10-02 RX ADMIN — ONDANSETRON 4 MG: 2 INJECTION INTRAMUSCULAR; INTRAVENOUS at 08:24

## 2024-10-02 RX ADMIN — PROPOFOL 200 MG: 10 INJECTION, EMULSION INTRAVENOUS at 08:16

## 2024-10-02 RX ADMIN — MIDAZOLAM 2 MG: 1 INJECTION INTRAMUSCULAR; INTRAVENOUS at 08:14

## 2024-10-02 RX ADMIN — Medication 100 MCG: at 08:53

## 2024-10-02 RX ADMIN — ROCURONIUM BROMIDE 20 MG: 10 INJECTION, SOLUTION INTRAVENOUS at 11:55

## 2024-10-02 RX ADMIN — ROCURONIUM BROMIDE 20 MG: 10 INJECTION, SOLUTION INTRAVENOUS at 11:27

## 2024-10-02 RX ADMIN — HEPARIN SODIUM 2000 UNITS: 1000 INJECTION INTRAVENOUS; SUBCUTANEOUS at 11:12

## 2024-10-02 RX ADMIN — FENTANYL CITRATE 25 MCG: 50 INJECTION, SOLUTION INTRAMUSCULAR; INTRAVENOUS at 12:34

## 2024-10-02 RX ADMIN — HEPARIN SODIUM 2000 UNITS: 1000 INJECTION INTRAVENOUS; SUBCUTANEOUS at 12:15

## 2024-10-02 RX ADMIN — HEPARIN SODIUM 2000 UNITS: 1000 INJECTION INTRAVENOUS; SUBCUTANEOUS at 11:33

## 2024-10-02 RX ADMIN — HEPARIN SODIUM 8000 UNITS: 1000 INJECTION INTRAVENOUS; SUBCUTANEOUS at 10:05

## 2024-10-02 RX ADMIN — Medication 100 MCG: at 09:16

## 2024-10-02 RX ADMIN — FENTANYL CITRATE 25 MCG: 50 INJECTION, SOLUTION INTRAMUSCULAR; INTRAVENOUS at 12:49

## 2024-10-02 RX ADMIN — LIDOCAINE HYDROCHLORIDE 100 MG: 20 INJECTION, SOLUTION EPIDURAL; INFILTRATION; INTRACAUDAL; PERINEURAL at 08:16

## 2024-10-02 RX ADMIN — Medication 100 MCG: at 08:39

## 2024-10-02 RX ADMIN — ROCURONIUM BROMIDE 20 MG: 10 INJECTION, SOLUTION INTRAVENOUS at 10:23

## 2024-10-02 RX ADMIN — ROCURONIUM BROMIDE 20 MG: 10 INJECTION, SOLUTION INTRAVENOUS at 10:56

## 2024-10-02 RX ADMIN — HEPARIN SODIUM 2000 UNITS: 1000 INJECTION INTRAVENOUS; SUBCUTANEOUS at 10:56

## 2024-10-02 RX ADMIN — SUGAMMADEX 300 MG: 100 INJECTION, SOLUTION INTRAVENOUS at 12:43

## 2024-10-02 RX ADMIN — ROCURONIUM BROMIDE 20 MG: 10 INJECTION, SOLUTION INTRAVENOUS at 09:39

## 2024-10-02 RX ADMIN — Medication 120 MG: at 08:16

## 2024-10-02 RX ADMIN — ROCURONIUM BROMIDE 20 MG: 10 INJECTION, SOLUTION INTRAVENOUS at 09:07

## 2024-10-02 RX ADMIN — PROTAMINE SULFATE 50 MG: 10 INJECTION, SOLUTION INTRAVENOUS at 12:37

## 2024-10-02 RX ADMIN — SODIUM CHLORIDE: 9 INJECTION, SOLUTION INTRAVENOUS at 08:50

## 2024-10-02 RX ADMIN — PHENYLEPHRINE HYDROCHLORIDE 30 MCG/MIN: 10 INJECTION INTRAVENOUS at 09:15

## 2024-10-02 RX ADMIN — DEXAMETHASONE SODIUM PHOSPHATE 4 MG: 4 INJECTION, SOLUTION INTRAMUSCULAR; INTRAVENOUS at 08:24

## 2024-10-02 RX ADMIN — SODIUM CHLORIDE: 9 INJECTION, SOLUTION INTRAVENOUS at 08:05

## 2024-10-02 ASSESSMENT — LIFESTYLE VARIABLES: SMOKING_STATUS: 1

## 2024-10-02 NOTE — FLOWSHEET NOTE
Phase 1 complete, pt seen by anesthesiologist. VSS, pt resting comfortably. R groin site is CDI, no hematoma noted. Pt raised to a semi-fowlers position. Pt is in NSR with frequent PVCs. Will transition to phase 2 for d/c, family updated.

## 2024-10-02 NOTE — PROGRESS NOTES
Pt arrived to PACU from Cath lab, VSS, pt arouses to voice. R groin site is CDI, no hematoma noted; figure 8 in place. R pedal pulse is +2. Pt is NSR with frequent PVCs, bigeminal at times. Will continue to monitor.

## 2024-10-02 NOTE — H&P
[facility]          Electrophysiology H&P Note       Date of Procedure: 10/2/2024  Patient's Name: Chino Ma  YOB: 1955   Medical Record Number: [id number]    Indication:  Premature ventricular complexes   Electrophysiology study and ablation    Consent:   I have discussed with the patient and/or the patient representative the indication, alternatives, and the possible risks and/or complications of the planned procedure and the anesthesia methods. The patient and/or patient representative appear to understand and agree to proceed.    Past Medical History:   Diagnosis Date    Arthritis     Asthma     BPH (benign prostatic hyperplasia)     Cardiomyopathy (HCC)     GERD (gastroesophageal reflux disease)     HTN (hypertension) 10/29/2012    MVP (mitral valve prolapse)     PPD positive     Prolonged emergence from general anesthesia     Prostatism     Tricuspid valve prolapse     Unspecified sleep apnea        Past Surgical History:   Procedure Laterality Date    CARDIAC CATHETERIZATION      CARDIAC PROCEDURE N/A 7/19/2024    Left and right heart cath / coronary angiography performed by Freya Smith DO at [facility] CARDIAC CATH LAB    CHOLECYSTECTOMY      COLONOSCOPY N/A 10/30/2023    COLONOSCOPY POLYPECTOMY SNARE/COLD BIOPSY performed by Hitesh Patel MD at [facility] ENDOSCOPY    FOOT SURGERY      HAND SURGERY Right 11/09/2017    thumb mass excision    INGUINAL HERNIA REPAIR      bilateral    KNEE ARTHROSCOPY      OTHER SURGICAL HISTORY  08/28/2019    epidural Dr. Ace at [facility]    PTCA      TENDON RELEASE      right elbow    TONSILLECTOMY      UPPER GASTROINTESTINAL ENDOSCOPY N/A 10/30/2023    EGD BIOPSY performed by Hitesh Patel MD at [facility] ENDOSCOPY       Prior to Admission medications    Medication Sig Start Date End Date Taking? Authorizing Provider   atorvastatin (LIPITOR) 40 MG tablet TAKE ONE TABLET BY MOUTH ONCE NIGHTLY 9/16/24  Yes Marlon Lemus MD  tablet by mouth 2 times daily 7/29/24   Dorinda Cui, APRN - CNP   nitroGLYCERIN (NITROSTAT) 0.4 MG SL tablet DISSOLVE 1 TAB UNDER TONGUE FOR CHEST PAIN - IF PAIN REMAINS AFTER 5 MIN, CALL 911 AND REPEAT DOSE. MAX 3 TABS IN 15 MINUTES 4/9/24   Renato Hummel MD   ferrous sulfate (IRON 325) 325 (65 Fe) MG tablet Take 1 tablet by mouth 2 times daily  Patient taking differently: Take 1 tablet by mouth daily 3 times weekly - MWF 9/29/23   Alli Wood MD         Pre-sedation Documentation and Exam:  I have personally completed a history, physical exam & review of systems for this patient (see notes).    /82   Pulse 93   Temp 97.5 °F (36.4 °C) (Axillary)   Resp 18   Ht 1.905 m (6' 3\")   Wt 126.1 kg (278 lb)   SpO2 (!) 89%   BMI 34.75 kg/m²   NAD  Chest clear non labored  Heart regular rate irregular rhythm  Abd soft non tender  No focal neuro deficits  Appropriate mood and affect       Mallampati Airway Assessment:  II     ASA Classification:  Class II - A patient with mild systemic disease     Sedation/Anesthesia Plan:  General      Medications Planned:  Per anesthesia       Inocente Lam MD  [facility]   Office: [phone]  Fax: [phone]

## 2024-10-02 NOTE — ANESTHESIA POSTPROCEDURE EVALUATION
Department of Anesthesiology  Postprocedure Note    Patient: Chino Ma  MRN: [id number]  YOB: 1955  Date of evaluation: 10/2/2024    Procedure Summary       Date: 10/02/24 Room / Location: [facility] EP LAB 5 / [facility] CARDIAC CATH LAB    Anesthesia Start: 0805 Anesthesia Stop:     Procedure: Ablation PVC Diagnosis:       PVC (premature ventricular contraction)      (PVC (premature ventricular contraction) [I49.3])    Providers: Inocente Lam MD Responsible Provider: Duarte Beltre MD    Anesthesia Type: general ASA Status: 3            Anesthesia Type: No value filed.    Augustus Phase I: Augustus Score: 10    Augustus Phase II:      Anesthesia Post Evaluation    Patient location during evaluation: PACU  Patient participation: complete - patient participated  Level of consciousness: awake  Airway patency: patent  Nausea & Vomiting: no vomiting and no nausea  Cardiovascular status: hemodynamically stable  Respiratory status: acceptable  Hydration status: stable  Multimodal analgesia pain management approach  Pain management: adequate    No notable events documented.

## 2024-10-02 NOTE — DISCHARGE INSTRUCTIONS
Cardiac Ablation     Do you have the help you need at home?    WHAT YOU SHOULD KNOW:   Your heart has a special electrical system built into it that controls your heart rhythm. Sometimes there is a problem with this electrical system in the heart muscle. This problem may cause an arrhythmia ([id number]), or abnormal heart rhythm. If medicine does not correct the problem, or if you do not wish to take medicines long-term, you may need a cardiac ablation (Markie-trenton). An ablation may also be called a catheter ablation, or a radiofrequency ablation.    An ablation procedure is usually done at the same time as an electrophysiology study. This test is used to \"map out\" the electrical pathways in your heart that control your heart rhythm. This test helps your doctor find the exact spot where the ablation needs to be done. During an ablation, energy is sent through a special catheter to the area of your heart that has the electrical problem. This energy causes a tiny area of the heart muscle to scar, stopping the electrical problem and allowing your heart to beat regularly. Ask your caregiver for more information about your heart problem, and tests and treatments that may be done for it.   AFTER YOU LEAVE:   Medicines:   Keep a list of your medicines: Keep a written list of the medicines you take, the amounts, and when and why you take them. Bring the list of your medicines or the pill bottles when you see your caregivers. Do not take any medicines, over-the-counter drugs, vitamins, herbs, or food supplements without first talking to caregivers.     Take your medicine as directed: Always take your medicine as directed by caregivers. Call your caregiver if you think your medicines are not helping or if you feel you are having side effects. Do not quit taking your medicines until you discuss it with your caregiver.     Aspirin or blood thinners: Your caregiver may want you to take aspirin or another blood thinner for 2

## 2024-10-02 NOTE — ANESTHESIA PRE PROCEDURE
Applicable):  No results found for: \"LABABO\"    Drug/Infectious Status (If Applicable):  No results found for: \"HIV\", \"HEPCAB\"    COVID-19 Screening (If Applicable): No results found for: \"COVID19\"        Anesthesia Evaluation  Patient summary reviewed and Nursing notes reviewed  Airway: Mallampati: III  TM distance: >3 FB   Neck ROM: limited  Comment: Increased neck circumference, known VICKIE.  Mouth opening: > = 3 FB   Dental:      Comment: Fair dentition, grinds? Denies loose teeth    Pulmonary:   (+)     sleep apnea:       asthma: current smoker    (-) rhonchi, wheezes and rales                           Cardiovascular:  Exercise tolerance: poor (<4 METS)  (+) hypertension (losartan discontinued per wife):, angina: no interval change, CAD:, CABG/stent (s/p stent x2):, dysrhythmias (h/o junctional rhythm): PVC, SVT and ventricular tachycardia, VALVERDE:, hyperlipidemia    (-) orthopnea (denied), weak pulses and peripheral edema      Rhythm: regular  Rate: normal           Beta Blocker:  Dose within 24 Hrs (still taking metoprolol per wife)      ROS comment: History of ischemic cardiomyopathy    EKG:  Sinus  Rhythm    Frequent PVCs, ventricular trigeminy   Non-specific ST depression.     Echocardiogram (2021):  Summary  There is mildly increased left ventricular wall thickness. LVEF 50%. Indeterminate diastolic function. The left atrium is moderately dilated. Right ventricular systolic function is normal. Trivial mitral, and tricuspid regurgitation.    NM Stress Test (9/28/2023):  Summary:  1. Technically a satisfactory study.  2. Small area of reversible defect involving the distal inferior wall suggestive of ischemia  3. Gated Study shows normal LV size and Systolic function; EF is 55 %.     No further cardiac work-up indicated by Dr. Lemus during recent admission; troponins negative at that time     Neuro/Psych:   (+) depression/anxiety    (-) seizures            ROS comment: Chronic pain  Chronic narcotic use

## 2024-10-03 LAB
POC ACT LR: >400 SEC
POC ACT LR: >400 SEC

## 2024-10-07 LAB — ECHO BSA: 2.58 M2

## 2024-10-11 ENCOUNTER — TELEPHONE (OUTPATIENT)
Dept: PULMONOLOGY | Age: 69
End: 2024-10-11

## 2024-10-11 NOTE — TELEPHONE ENCOUNTER
Patient called and asked if we can send an order to [facility] for full face mask. The nasal mask is drying/raw. Please advise

## 2024-10-14 NOTE — PROGRESS NOTES
Chino Ma         : 1955  [x] [facility]     [] [facility]      [] [facility]     [][facility]    [] [facility]  [] [facility]  [] [facility]   [] [facility] [] Other:  Diagnosis: [x] VICKIE (G47.33) [] CSA (G47.31) [] Apnea (G47.30)   Length of Need: [] 12 Months [x] 99 Months [] Other:    Machine (FRED!):  [x] [employer]Med AirSense     Auto [] Other:       Humidifier: [x] Heated ()        [x] Water chamber replacement ()/ 1 per 6 months        Mask:  Please always start with the mask the patient used during the titraion    [x] Full Face () /1 per 3 months    [x] Patient Choice - Size and fit mask    [] Dispense:     [x] Headgear () / 1 per 3 months    [x] Interface Replacement ()/1 per month            Tubing: [x] Heated ()/1 per 3 months    [] Standard ()/1 per 3 months [] Other:           Filters: [x] Non-disposable ()/1 per 6 months     [x] Ultra-Fine, Disposable ()/2 per month        Miscellaneous: [x] Chin Strap ()/ 1 per 6 months [] O2 bleed-in:       LPM   [] Oximetry on CPAP/Bilevel []  Other:          Start Order Date: 10/14/24    MEDICAL JUSTIFICATION:  I, the undersigned, certify that the above prescribed supplies are medically necessary for this patient’s wellbeing.  In my opinion, the supplies are both reasonable and necessary in reference to accepted standards of medicalpractice in treatment of this patient’s condition.    Roger Ramirez MD      NPI: [id number]       Order Signed Date: 10/14/24    Electronically signed by Roger Ramirez MD on 10/14/2024 at 9:34 AM

## 2024-12-09 NOTE — PROGRESS NOTES
[facility]  Cardiology Progress Note    Chino Ma  1955 November 26, 2024      CC: \"    HPI:  The patient is 69 y.o. male with a past medical history significant for chronic back pain, limited mobility and movement, V-tach/PVC, CAD with multiple PCI's and JUAN, hypertension, hyperlipidemia, VICKIE, and pre diabetes. Former smoker.     11/2022 office visit, he stateed that is shortness of breath has been worsening. He has episodes of dizzness and chest pain at times with the shortness of breath. Reduced beta blocker. L/RHC with LVEF 45% otherwise normal. Symptoms non-cardiac in origin. D-Dimer also normal. He also follos with Dr. HAYLEE Morse-Pulmonary for mild background emphysema per CT Chest, stable nodularities.     He was seen in office on 03/17/2023 for follow up from /[facility] 12/8/2022. He continues with VALVERDE with short distance. Odd/on CP. Sits to rest. If he is carrying items or pushing items, he has increase in VALVERDE. He does not exercise due to his VALVERDE. He uses a motorized cart at the grocery store. Patient denied changes in exertional chest pain/pressure, dyspnea at rest, changes in VALVERDE, PND, orthopnea, palpitations, lightheadedness, weight changes, changes in LE edema, and syncope. He has not resumed smoking cigarettes. He admits to medical therapy compliance and tolerating with no abnormal bruising or bleeding.     9/27/23  office visit, EKG today shows run of VT. He reports chest pain/pressure and shortness of breath has progressively worsened in the past 6 months. He endorses symptoms lightheadedness, dizziness and presyncope. One episode occurred a week and half ago after carrying in groceries from the car. Patient reports compliance to his medications. He followed up with Dr. Morse Pulmonologist and was told his shortness of breath was not related to his lungs. GI workup outpatient based on GI consult.     10/17/2023 he returns for hospital follow up from the above hospitalization. He 
50 MG/ML SOAJ Inject 50 mg into the skin every 7 days      mirabegron (MYRBETRIQ) 50 MG TB24 Take 50 mg by mouth daily      nitroGLYCERIN (NITROSTAT) 0.4 MG SL tablet DISSOLVE 1 TAB UNDER TONGUE FOR CHEST PAIN - IF PAIN REMAINS AFTER 5 MIN, CALL 911 AND REPEAT DOSE. MAX 3 TABS IN 15 MINUTES 25 tablet 3    oxyCODONE-acetaminophen (PERCOCET)  MG per tablet Take 1 tablet by mouth every 4 hours as needed for Pain.      ASPIRIN LOW DOSE 81 MG EC tablet TAKE ONE TABLET BY MOUTH DAILY (Patient taking differently: Take 1 tablet by mouth Daily with supper) 60 tablet 3    buPROPion (WELLBUTRIN XL) 150 MG extended release tablet Take 1 tablet by mouth every morning      omeprazole (PRILOSEC) 20 MG delayed release capsule Take 1 capsule by mouth Daily      Loratadine 10 MG CAPS Take 1 capsule by mouth nightly      montelukast (SINGULAIR) 10 MG tablet Take 1 tablet by mouth Daily with supper      amitriptyline (ELAVIL) 50 MG tablet Take 1 tablet by mouth nightly      DULoxetine (CYMBALTA) 60 MG extended release capsule Take 1 capsule by mouth daily       No current facility-administered medications for this visit.       Review of Systems:  Constitutional: no unanticipated weight loss. There's been no change in energy level, sleep pattern, or activity level. No fevers, chills.   Eyes: No visual changes or diplopia. No scleral icterus.  ENT: No Headaches, hearing loss or vertigo. No mouth sores or sore throat.  Cardiovascular: as reviewed in HPI  Respiratory: No cough or wheezing, no sputum production. No hematemesis.    Gastrointestinal: + nausea. No abdominal pain, appetite loss, blood in stools. No change in bowel or bladder habits.  Genitourinary: No dysuria, trouble voiding, or hematuria.  Musculoskeletal:  No gait disturbance, no joint complaints.  Integumentary: No rash or pruritis.  Neurological: No headache, diplopia, change in muscle strength, numbness or tingling.  + dizziness.   Psychiatric: No anxiety or

## 2024-12-10 ENCOUNTER — OFFICE VISIT (OUTPATIENT)
Dept: CARDIOLOGY CLINIC | Age: 69
End: 2024-12-10
Payer: COMMERCIAL

## 2024-12-10 VITALS
OXYGEN SATURATION: 96 % | DIASTOLIC BLOOD PRESSURE: 64 MMHG | HEART RATE: 70 BPM | HEIGHT: 75 IN | WEIGHT: 283 LBS | SYSTOLIC BLOOD PRESSURE: 96 MMHG | BODY MASS INDEX: 35.19 KG/M2

## 2024-12-10 DIAGNOSIS — I49.3 PVC (PREMATURE VENTRICULAR CONTRACTION): ICD-10-CM

## 2024-12-10 DIAGNOSIS — I49.1 PAC (PREMATURE ATRIAL CONTRACTION): ICD-10-CM

## 2024-12-10 DIAGNOSIS — I50.22 CHRONIC SYSTOLIC HEART FAILURE (HCC): ICD-10-CM

## 2024-12-10 DIAGNOSIS — I10 ESSENTIAL HYPERTENSION: ICD-10-CM

## 2024-12-10 DIAGNOSIS — I47.20 V TACH (HCC): ICD-10-CM

## 2024-12-10 DIAGNOSIS — R00.1 BRADYCARDIA: ICD-10-CM

## 2024-12-10 DIAGNOSIS — D61.818 PANCYTOPENIA (HCC): Primary | ICD-10-CM

## 2024-12-10 DIAGNOSIS — I25.10 CORONARY ARTERY DISEASE INVOLVING NATIVE CORONARY ARTERY OF NATIVE HEART WITHOUT ANGINA PECTORIS: ICD-10-CM

## 2024-12-10 DIAGNOSIS — G47.33 OSA (OBSTRUCTIVE SLEEP APNEA): ICD-10-CM

## 2024-12-10 DIAGNOSIS — E78.2 MIXED HYPERLIPIDEMIA: ICD-10-CM

## 2024-12-10 LAB
ANION GAP SERPL CALCULATED.3IONS-SCNC: 10 MMOL/L (ref 3–16)
BUN SERPL-MCNC: 11 MG/DL (ref 7–20)
CALCIUM SERPL-MCNC: 9.6 MG/DL (ref 8.3–10.6)
CHLORIDE SERPL-SCNC: 104 MMOL/L (ref 99–110)
CO2 SERPL-SCNC: 27 MMOL/L (ref 21–32)
CREAT SERPL-MCNC: 1.1 MG/DL (ref 0.8–1.3)
DEPRECATED RDW RBC AUTO: 14.4 % (ref 12.4–15.4)
GFR SERPLBLD CREATININE-BSD FMLA CKD-EPI: 73 ML/MIN/{1.73_M2}
GLUCOSE SERPL-MCNC: 116 MG/DL (ref 70–99)
HCT VFR BLD AUTO: 45 % (ref 40.5–52.5)
HGB BLD-MCNC: 14.6 G/DL (ref 13.5–17.5)
MCH RBC QN AUTO: 30.7 PG (ref 26–34)
MCHC RBC AUTO-ENTMCNC: 32.4 G/DL (ref 31–36)
MCV RBC AUTO: 94.6 FL (ref 80–100)
NT-PROBNP SERPL-MCNC: 76 PG/ML (ref 0–124)
PLATELET # BLD AUTO: 84 K/UL (ref 135–450)
PMV BLD AUTO: 12 FL (ref 5–10.5)
POTASSIUM SERPL-SCNC: 4.4 MMOL/L (ref 3.5–5.1)
RBC # BLD AUTO: 4.75 M/UL (ref 4.2–5.9)
SODIUM SERPL-SCNC: 141 MMOL/L (ref 136–145)
WBC # BLD AUTO: 5.9 K/UL (ref 4–11)

## 2024-12-10 PROCEDURE — 3074F SYST BP LT 130 MM HG: CPT | Performed by: INTERNAL MEDICINE

## 2024-12-10 PROCEDURE — 1123F ACP DISCUSS/DSCN MKR DOCD: CPT | Performed by: INTERNAL MEDICINE

## 2024-12-10 PROCEDURE — 93000 ELECTROCARDIOGRAM COMPLETE: CPT | Performed by: INTERNAL MEDICINE

## 2024-12-10 PROCEDURE — 99214 OFFICE O/P EST MOD 30 MIN: CPT | Performed by: INTERNAL MEDICINE

## 2024-12-10 PROCEDURE — 3078F DIAST BP <80 MM HG: CPT | Performed by: INTERNAL MEDICINE

## 2024-12-16 ENCOUNTER — TELEPHONE (OUTPATIENT)
Dept: CARDIOLOGY CLINIC | Age: 69
End: 2024-12-16

## 2024-12-16 NOTE — TELEPHONE ENCOUNTER
Chino states at last OV on 12/10/24 there was talks about Enbrel - medication his rheumatologist prescribed and whether if it was OK for him to take or not with his cardiac history. Chino states Mariah was going to look into but he never heard anything.     Please advise    Chino's callback: [phone]

## 2024-12-17 ENCOUNTER — HOSPITAL ENCOUNTER (OUTPATIENT)
Dept: CARDIOLOGY | Age: 69
Discharge: HOME | End: 2024-12-19
Attending: INTERNAL MEDICINE
Payer: COMMERCIAL

## 2024-12-17 VITALS
WEIGHT: 283 LBS | DIASTOLIC BLOOD PRESSURE: 62 MMHG | SYSTOLIC BLOOD PRESSURE: 114 MMHG | BODY MASS INDEX: 35.19 KG/M2 | HEIGHT: 75 IN

## 2024-12-17 DIAGNOSIS — I50.22 CHRONIC SYSTOLIC HEART FAILURE (HCC): ICD-10-CM

## 2024-12-17 LAB
ECHO AO ASC DIAM: 3.7 CM
ECHO AO ASCENDING AORTA INDEX: 1.46 CM/M2
ECHO AO ROOT DIAM: 3.9 CM
ECHO AO ROOT INDEX: 1.54 CM/M2
ECHO AV AREA PEAK VELOCITY: 2.2 CM2
ECHO AV AREA VTI: 2.3 CM2
ECHO AV AREA/BSA PEAK VELOCITY: 0.9 CM2/M2
ECHO AV AREA/BSA VTI: 0.9 CM2/M2
ECHO AV MEAN GRADIENT: 3 MMHG
ECHO AV MEAN VELOCITY: 0.9 M/S
ECHO AV PEAK GRADIENT: 5 MMHG
ECHO AV PEAK VELOCITY: 1.2 M/S
ECHO AV VELOCITY RATIO: 0.58
ECHO AV VTI: 27.7 CM
ECHO BSA: 2.61 M2
ECHO LA AREA 2C: 25.6 CM2
ECHO LA AREA 4C: 31.6 CM2
ECHO LA DIAMETER INDEX: 1.69 CM/M2
ECHO LA DIAMETER: 4.3 CM
ECHO LA MAJOR AXIS: 7.5 CM
ECHO LA MINOR AXIS: 6.1 CM
ECHO LA TO AORTIC ROOT RATIO: 1.1
ECHO LA VOL BP: 104 ML (ref 18–58)
ECHO LA VOL MOD A2C: 85 ML (ref 18–58)
ECHO LA VOL MOD A4C: 105 ML (ref 18–58)
ECHO LA VOL/BSA BIPLANE: 41 ML/M2 (ref 16–34)
ECHO LA VOLUME INDEX MOD A2C: 33 ML/M2 (ref 16–34)
ECHO LA VOLUME INDEX MOD A4C: 41 ML/M2 (ref 16–34)
ECHO LV E' LATERAL VELOCITY: 7.45 CM/S
ECHO LV E' SEPTAL VELOCITY: 4.64 CM/S
ECHO LV EDV A4C: 125 ML
ECHO LV EDV INDEX A4C: 49 ML/M2
ECHO LV EF PHYSICIAN: 43 %
ECHO LV FRACTIONAL SHORTENING: 23 % (ref 28–44)
ECHO LV INTERNAL DIMENSION DIASTOLE INDEX: 2.52 CM/M2
ECHO LV INTERNAL DIMENSION DIASTOLIC: 6.4 CM (ref 4.2–5.9)
ECHO LV INTERNAL DIMENSION SYSTOLIC INDEX: 1.93 CM/M2
ECHO LV INTERNAL DIMENSION SYSTOLIC: 4.9 CM
ECHO LV IVSD: 1.2 CM (ref 0.6–1)
ECHO LV MASS 2D: 369 G (ref 88–224)
ECHO LV MASS INDEX 2D: 145.3 G/M2 (ref 49–115)
ECHO LV POSTERIOR WALL DIASTOLIC: 1.3 CM (ref 0.6–1)
ECHO LV RELATIVE WALL THICKNESS RATIO: 0.41
ECHO LVOT AREA: 3.5 CM2
ECHO LVOT AV VTI INDEX: 0.65
ECHO LVOT DIAM: 2.1 CM
ECHO LVOT MEAN GRADIENT: 1 MMHG
ECHO LVOT PEAK GRADIENT: 2 MMHG
ECHO LVOT PEAK VELOCITY: 0.7 M/S
ECHO LVOT STROKE VOLUME INDEX: 24.5 ML/M2
ECHO LVOT SV: 62.3 ML
ECHO LVOT VTI: 18 CM
ECHO MV A VELOCITY: 0.72 M/S
ECHO MV E VELOCITY: 0.51 M/S
ECHO MV E/A RATIO: 0.71
ECHO MV E/E' LATERAL: 6.85
ECHO MV E/E' RATIO (AVERAGED): 8.92
ECHO MV E/E' SEPTAL: 10.99
ECHO RA AREA 4C: 18 CM2
ECHO RA END SYSTOLIC VOLUME APICAL 4 CHAMBER INDEX BSA: 20 ML/M2
ECHO RA MAJOR AXIS INDEX: 1.14 CM/M2
ECHO RA MAJOR AXIS: 2.9 CM
ECHO RA VOLUME: 52 ML
ECHO RV BASAL DIMENSION: 4 CM
ECHO RV FREE WALL PEAK S': 11.7 CM/S
ECHO RV LONGITUDINAL DIMENSION: 7.8 CM
ECHO RV TAPSE: 2.1 CM (ref 1.7–?)

## 2024-12-17 PROCEDURE — 93306 TTE W/DOPPLER COMPLETE: CPT

## 2024-12-17 PROCEDURE — 93306 TTE W/DOPPLER COMPLETE: CPT | Performed by: INTERNAL MEDICINE

## 2024-12-17 NOTE — TELEPHONE ENCOUNTER
I reviewed Enbrel literature.  There is some question of CHF exacerbation disease still controversial I therefore feel that benefit of taking the drug outweighs the risks and  patient should  start Enbrel therapy

## 2024-12-18 RX ORDER — SACUBITRIL AND VALSARTAN 24; 26 MG/1; MG/1
1 TABLET, FILM COATED ORAL 2 TIMES DAILY
Qty: 60 TABLET | Refills: 3 | Status: SHIPPED | OUTPATIENT
Start: 2024-12-18

## 2024-12-18 NOTE — TELEPHONE ENCOUNTER
Last OV: 12/10/24 - Lemus  Next OV: 24 - Verjesus manuel  Last Labs: 12/10/24  Last EK/10/24  Last Filled:     Disp Refills Start End     sacubitril-valsartan (ENTRESTO) 24-26 MG per tablet 60 tablet 3 2024 --    Sig - Route: Take 1 tablet by mouth 2 times daily - Oral    Sent to pharmacy as: Sacubitril-Valsartan 24-26 MG Oral Tablet (ENTRESTO)    E-Prescribing Status: Receipt confirmed by pharmacy (2024 10:14 AM EDT)    Prior authorization: Closed - Prior Authorization not required for patient/medication

## 2024-12-19 ENCOUNTER — OFFICE VISIT (OUTPATIENT)
Dept: CARDIOLOGY CLINIC | Age: 69
End: 2024-12-19
Payer: COMMERCIAL

## 2024-12-19 VITALS
HEIGHT: 75 IN | WEIGHT: 283 LBS | BODY MASS INDEX: 35.19 KG/M2 | SYSTOLIC BLOOD PRESSURE: 112 MMHG | DIASTOLIC BLOOD PRESSURE: 68 MMHG | OXYGEN SATURATION: 99 % | HEART RATE: 63 BPM

## 2024-12-19 DIAGNOSIS — I49.3 PVC (PREMATURE VENTRICULAR CONTRACTION): Primary | ICD-10-CM

## 2024-12-19 DIAGNOSIS — I25.5 ISCHEMIC CARDIOMYOPATHY: ICD-10-CM

## 2024-12-19 DIAGNOSIS — I25.118 CORONARY ARTERY DISEASE OF NATIVE ARTERY OF NATIVE HEART WITH STABLE ANGINA PECTORIS (HCC): ICD-10-CM

## 2024-12-19 DIAGNOSIS — G47.33 OSA (OBSTRUCTIVE SLEEP APNEA): ICD-10-CM

## 2024-12-19 PROCEDURE — 3017F COLORECTAL CA SCREEN DOC REV: CPT | Performed by: INTERNAL MEDICINE

## 2024-12-19 PROCEDURE — G8417 CALC BMI ABV UP PARAM F/U: HCPCS | Performed by: INTERNAL MEDICINE

## 2024-12-19 PROCEDURE — 93000 ELECTROCARDIOGRAM COMPLETE: CPT | Performed by: INTERNAL MEDICINE

## 2024-12-19 PROCEDURE — 1123F ACP DISCUSS/DSCN MKR DOCD: CPT | Performed by: INTERNAL MEDICINE

## 2024-12-19 PROCEDURE — G8484 FLU IMMUNIZE NO ADMIN: HCPCS | Performed by: INTERNAL MEDICINE

## 2024-12-19 PROCEDURE — 99214 OFFICE O/P EST MOD 30 MIN: CPT | Performed by: INTERNAL MEDICINE

## 2024-12-19 PROCEDURE — 3074F SYST BP LT 130 MM HG: CPT | Performed by: INTERNAL MEDICINE

## 2024-12-19 PROCEDURE — G8427 DOCREV CUR MEDS BY ELIG CLIN: HCPCS | Performed by: INTERNAL MEDICINE

## 2024-12-19 PROCEDURE — 1036F TOBACCO NON-USER: CPT | Performed by: INTERNAL MEDICINE

## 2024-12-19 PROCEDURE — 3078F DIAST BP <80 MM HG: CPT | Performed by: INTERNAL MEDICINE

## 2024-12-19 RX ORDER — AMIODARONE HYDROCHLORIDE 200 MG/1
TABLET ORAL
Qty: 49 TABLET | Refills: 5 | Status: SHIPPED | OUTPATIENT
Start: 2024-12-19

## 2024-12-19 NOTE — PROGRESS NOTES
SUMMARY:   Nonobstructive CAD   I independently reviewed and interpreted the ECG, MCOT, echocardiogram, stress test, and coronary angiography/PCI results and used them for my plan of care.         I independently reviewed relevant and available cardiac diagnostic tests ECG, CXR, Echo, Stress test, Device interrogation, Holter, CT scan.      Outside medical records via Care everywhere reviewed and summarized in H&P above.     Assessment/plan:    Premature ventricular contractions  Nonsustained ventricular tachycardia  -18% burden on monitor from 7/2024, 2 morphologies  - Underwent PVC ablation successfully terminating moderator band PVC but unsuccessfully screening PVC from inferobasal aspect of LV (suppression not termination)  - PVC # 1 : Left bundle/left/superior/V6 transition  - PVC # 2:  Right bundle/superior/left/V6 transition  - Given ongoing symptoms we discussed repeating ablation (likelihood for epicardial access), treating with medication or continued observation with repeat monitor, patient will elected to proceed with amiodarone.  - TSH 1.60 (09/27/2024)  - AST 17 ALT 14 (09/27/2024   - Start amiodarone, 400 mg twice daily for 1 week followed by 20 mg daily, repeat monitor in 1 month    Ischemic cardiomyopathy   Coronary artery disease  - EF 40-45 % Per Echo 12/17/2024  - Continue GDMT including Entresto, metoprolol, aldactone  - PCI to RCA in 8 of 2020  - 80% stenosis of proximal LAD by IVUS, PCI, 12/2021  - Continue ASA and statin.    Amiodarone, repeat monitor, follow-up 4 to 6 months    NOTE: This report was transcribed using voice recognition software. Every effort was made to ensure accuracy, however, inadvertent computerized transcription errors may be present.     Physician Attestation: I, Dr. Inocente Lam, confirm that the scribe's documentation has been prepared under my direction and personally reviewed by me in its entirety.  I also confirm that the note above accurately reflects all

## 2024-12-19 NOTE — PATIENT INSTRUCTIONS
Amiodarone Instructions    You are taking amiodarone which is an antiarrhythmic medication. This helps keep you in a regular rhythm when you have atrial fibrillation atrial flutter or ventricular tachycardia.    Due to the potential for side effects of the amiodarone, we monitor your liver, thyroid and lungs while taking this medication.    A T4, TSH and liver profile should be done every 6 months while taking amiodarone. These are blood tests that look at the levels in your thryoid and liver.    A chest xray should be done at least once a year. A Pulmonary Function Test should be done once a year.     You should wear sunscreen daily as your skin  can burn easily while taking amiodarone.    You should have your eyes examined once a year checking for corneal deposits while taking amiodarone.

## 2024-12-30 NOTE — H&P
CC: \"I feel like my shortness of breat is getting worse. \"      HPI:  The patient is 78 y.o. male with a past medical history significant for chronic back pain, limited mobility and movement, CAD with multiple PCI's and JUAN, hypertension, hyperlipidemia, VICKIE, and pre diabetes. Today, he is here for follow up accompanied by family. He states that is shortness of breath has been worsening. He has episodes of dizzness and chest pain at times with the shortness of breath. Patient denies exertional chest pain/pressure, dyspnea at rest,  PND, orthopnea, palpitations, weight changes, changes in LE edema, and syncope. Patient reports compliance to his medications. Past Medical History:   Diagnosis Date    Arthritis      Asthma      BPH (benign prostatic hyperplasia)      Cardiomyopathy (HCC)      GERD (gastroesophageal reflux disease)      HTN (hypertension) 10/29/2012    MVP (mitral valve prolapse)      PPD positive      Prolonged emergence from general anesthesia      Prostatism      Tricuspid valve prolapse      Unspecified sleep apnea              Past Surgical History:   Procedure Laterality Date    CARDIAC CATHETERIZATION        CHOLECYSTECTOMY        FOOT SURGERY        HAND SURGERY Right 2017     thumb mass excision    INGUINAL HERNIA REPAIR         bilateral    KNEE ARTHROSCOPY        OTHER SURGICAL HISTORY   2019     epidural Dr. Coni Jones at [address] [place] [address]         right elbow    TONSILLECTOMY                Family History   Problem Relation Age of Onset    Rheum Arthritis Other      Heart Disease Father      Heart Disease Brother      Other Brother           histoplasmosis     Lupus Neg Hx        Social History            Tobacco Use    Smoking status: Former       Types: Cigarettes       Quit date: 1982       Years since quittin.8    Smokeless tobacco: Never   Vaping Use    Vaping Use: Former   Substance Use Topics    Alcohol use:  Yes Patient alert and pleasant with no complaints.  Here today for prenatal visit.  Urine for glucose and protein obtained   Discharge instructions have been discussed with the patient. Patient advised to call our office with any questions or concerns.   Voiced understanding.      Alcohol/week: 12.0 standard drinks       Types: 12 Cans of beer per week       Comment: wine & shots rare    Drug use: No               Allergies   Allergen Reactions    Cephalosporins Anaphylaxis       Anaphylaxis to Keflex in 2010    Pcn [Penicillins] Shortness Of Breath    Penicillin G Sodium Shortness Of Breath    Ciprofloxacin Itching    Codeine Itching    Erythromycin Itching    Morphine And Related Itching    Fenoprofen Calcium Other (See Comments)       Pt doesn`t recall             Current Outpatient Medications   Medication Sig Dispense Refill    Ertugliflozin L-PyroglutamicAc (STEGLATRO) 5 MG TABS Take by mouth daily        methocarbamol (ROBAXIN) 500 MG tablet Take 500 mg by mouth 4 times daily as needed        methotrexate (RHEUMATREX) 2.5 MG chemo tablet TAKE EIGHT TABLETS BY MOUTH ONCE WEEKLY 32 tablet 2    metoprolol succinate (TOPROL XL) 25 MG extended release tablet TAKE ONE TABLET BY MOUTH DAILY 30 tablet 11    atorvastatin (LIPITOR) 40 MG tablet TAKE ONE TABLET BY MOUTH ONCE NIGHTLY 90 tablet 3    ASPIRIN LOW DOSE 81 MG EC tablet TAKE ONE TABLET BY MOUTH DAILY 60 tablet 3    folic acid (FOLVITE) 1 MG tablet TAKE ONE TABLET BY MOUTH DAILY 90 tablet 1    buPROPion (WELLBUTRIN XL) 150 MG extended release tablet Take 150 mg by mouth every morning        prasugrel (EFFIENT) 10 MG TABS Take 1 tablet by mouth Daily with supper 90 tablet 3    nitroGLYCERIN (NITROSTAT) 0.4 MG SL tablet Place 1 tablet under the tongue every 5 minutes as needed for Chest pain up to max of 3 total doses. If no relief after 1 dose, call 911. 25 tablet 3    losartan (COZAAR) 25 MG tablet Take 1 tablet by mouth daily 90 tablet 3    gabapentin (NEURONTIN) 300 MG capsule Take 300 mg by mouth nightly.          finasteride (PROSCAR) 5 MG tablet Take 5 mg by mouth daily        omeprazole (PRILOSEC) 20 MG delayed release capsule Take 20 mg by mouth Daily         Loratadine 10 MG CAPS Take by mouth daily HYDROcodone-acetaminophen (NORCO)  MG per tablet Take 1 tablet by mouth every 6 hours as needed for Pain.         montelukast (SINGULAIR) 10 MG tablet Take 10 mg by mouth Daily with supper         amitriptyline (ELAVIL) 50 MG tablet Take 50 mg by mouth nightly         DULoxetine (CYMBALTA) 60 MG extended release capsule Take 60 mg by mouth daily           No current facility-administered medications for this visit. Review of Systems:  Constitutional: no unanticipated weight loss. There's been no change in energy level, sleep pattern, or activity level. No fevers, chills. Eyes: No visual changes or diplopia. No scleral icterus. ENT: No Headaches, hearing loss or vertigo. No mouth sores or sore throat. Cardiovascular: as reviewed in HPI  Respiratory: No cough or wheezing, no sputum production. No hematemesis. Gastrointestinal: + nausea. No abdominal pain, appetite loss, blood in stools. No change in bowel or bladder habits. Genitourinary: No dysuria, trouble voiding, or hematuria. Musculoskeletal:  No gait disturbance, no joint complaints. Integumentary: No rash or pruritis. Neurological: No headache, diplopia, change in muscle strength, numbness or tingling.  + dizziness. Psychiatric: No anxiety or depression. Endocrine: No temperature intolerance. No excessive thirst, fluid intake, or urination. No tremor. Hematologic/Lymphatic: No abnormal bruising or bleeding, blood clots or swollen lymph nodes. Allergic/Immunologic: No nasal congestion or hives. Physical Exam:   /60   Pulse (!) 46   Ht 6' 3\" (1.905 m)   Wt 271 lb (122.9 kg)   SpO2 100%   BMI 33.87 kg/m²       Wt Readings from Last 3 Encounters:   11/22/22 271 lb (122.9 kg)   08/03/22 280 lb (127 kg)   06/24/22 280 lb (127 kg)      Constitutional: He is oriented to person, place, and time. He appears well-developed and well-nourished. In no acute distress. Head: Normocephalic and atraumatic.  Pupils equal and round.  Neck: Neck supple. No JVP or carotid bruit appreciated. No mass and no thyromegaly present. No lymphadenopathy present. Cardiovascular: Normal rate. Normal heart sounds. Exam reveals no gallop and no friction rub. No murmur heard. Pulmonary/Chest: Effort normal and breath sounds normal. No respiratory distress. He has no wheezes, rhonchi or rales. Abdominal: Soft, non-tender. Bowel sounds are normal. He exhibits no organomegaly, mass or bruit. Extremities: No edema, cyanosis, or clubbing. Pulses are 2+ radial/dorsalis pedis/posterior tibial/carotid bilaterally. Neurological: No gross cranial nerve deficit. Coordination normal.   Skin: Skin is warm and dry. There is no rash or diaphoresis. Psychiatric: He has a normal mood and affect. His speech is normal and behavior is normal.      Lab Review:         Lab Results   Component Value Date/Time     TRIG 147 07/25/2022 08:25 AM     HDL 31 07/25/2022 08:25 AM     HDL 41 03/06/2012 08:55 AM     LDLCALC 38 07/25/2022 08:25 AM     LABVLDL 29 07/25/2022 08:25 AM            Lab Results   Component Value Date/Time     BUN 13 07/25/2022 08:25 AM     CREATININE 0.9 08/03/2022 09:31 AM         EKG Interpretation:   8/25/20: Sinus  Rhythm  - frequent multiform ectopic ventricular beats, # VECs = 2, # types 2, consider old anterior infarct,  -Nonspecific ST depression   +   Nonspecific T-abnormality  -Nondiagnostic. 9/10/20: sinus bradycardia, 57 bpm   12/7/20: Sinus  Bradycardia  - frequent ectopic ventricular beat s, # VECs = 3, ~57 bpm.   9/2/21: Sinus  Rhythm  -with ectopic ventricular couplets with Nonspecific ST depression  -Nondiagnostic. ST and T wave abnormalities in the inferior leads. Cannot rule out ischemia ~75 bpm.   6/24/22: SR      Image Review:      Chest CT 8/1/19  Atherosclerosis, including coronary artery calcification. Several indeterminate pulmonary nodules, measuring up to approximately 6 mm. Follow-up recommendations are as below. RECOMMENDATIONS:   Fleischner Society guidelines for follow-up and management of incidentally   detected pulmonary nodules:       Single Solid Nodule:       Nodule size less than 6 mm   In a low-risk patient, no routine follow-up. In a high-risk patient, optional CT at 12 months. Nodule size equals 6-8 mm   In a low-risk patient, CT at 6-12 months, then consider CT at 18-24 months. In a high-risk patient, CT at 6-12 months, then CT at 18-24 months. Nodule size greater than 8 mm           In a low-risk patient, consider CT at 3 months, PET/CT, or tissue sampling. In a high-risk patient, consider CT at 3 months, PET/CT, or tissue sampling. Multiple Solid Nodules:       Nodule size less than 6 mm   In a low-risk patient, no routine follow-up. In a high-risk patient, optional CT at 12 months. Nodule size equals 6-8 mm   In a low-risk patient, CT at 3-6 months, then consider CT at 18-24 months. In a high-risk patient, CT at 3-6 months, then CT at 18-24 months. Nodule size greater than 8 mm   In a low-risk patient, CT at 3-6 months, then consider CT at 18-24 months. In a high-risk patient, CT at 3-6 months, then CT at 18-24 months. - Low risk patients include individuals with minimal or absent history of   smoking and other known risk factors. - High risk patients include individuals with a history or smoking or known   risk factors. Stress test 5/14/19  Normal myocardial perfusion study. Normal myocardial perfusion. Normal LV size and systolic function. Overall findings represent a low risk study. ECHO 5/21/19  There is mild concentric left ventricular hypertrophy. Left ventricular cavity size is mildly dilated. Overall left ventricular systolic function appears mildly reduced. Ejection fraction is visually estimated to be 40-45%. Grade I diastolic dysfunction with normal LV filling pressures.   The aortic root is mildly dilated & measures at 4.1 cms. Mildly dilated right ventricle. TAPSE 2.2cm, RV mid 4cm  IVC size is normal (<2.1cm) and collapses > 50% with respiration consistent  with normal RA pressure (3mmHg). [facility]: 20 per Dr. Skyla Devlni. There is single-vessel disease. 2.  Dominant RCA, 99% with LEYDI grade 1 flow. 3.  Left main:  Free of disease. 4. LAD has a 10-20% proximal lesion. 5.  Left circumflex:  No obstructive disease. CONCLUSION:  1. Dominant RCA has a 99% proximal stenosis with LEYDI grade 1 flow. 2.  Elevated left heart filling pressures. 3.  Normal left ventricular size and systolic function with ejection  fraction of 60%. CONCLUSION:  Successful PCI and stenting of the proximal RCA, lesion  reduced to 0%. Echo: 21  Summary  There is mildly increased left ventricular wall thickness. EF 50%. Indeterminate diastolic function. The left atrium is moderately dilated. Right ventricular systolic function is normal .  Trivial mitral, and tricuspid regurgitation. Stress study: 21  Summary    moderate size moderate severity fixed inferior wall defect consist with    prior inferior wall MI. no evidence of ischemia        Recommendation    Aggressive medical therapy for coronary artery disease. Stress Protocols      [facility]:7/15/21  CORONARY ANGIOGRAPHY:  1. Left main trunk: It arises from the left sinus of Valsalva. It  divides into a left anterior descending artery and left circumflex  artery. Left main trunk is free of atherosclerosis. 2.  Left anterior descending artery: It is a moderate to large-sized  artery and it has an evidence of discrete 50 to 60% stenosis in the  proximal segment. Remainder of the left anterior descending artery  appears free of significant atherosclerosis. 3.  Left circumflex artery: It arises from the left main trunk.   It is  a moderate to large-sized artery, gives rise to obtuse marginal branches  and right circumflex artery and its branches are free of  atherosclerosis. 4.  Right coronary artery: It arises from the right sinus of Valsalva. It is a large dominant artery and it gives rise to posterior descending  and posterior lateral branches. Right coronary artery has evidence of a  stent in the proximal to mid segment which is widely patent. There is a  mild disease distal to the stent but it is not hemodynamically  significant. OVERALL IMPRESSION:  1. Patent left main trunk. 2.  50 to 60% discrete stenosis of proximal left anterior descending artery. 3.  Patent circumflex artery and its branches. 4.  Widely patent stented proximal right coronary artery with mild   disease post stent which does not appear hemodynamically significant. 5.  Mild mid-inferior wall hypokinesis with an estimated EF of 50% with  normal left heart hemodynamics. In view of these findings, we will proceed first with the FFR of the  left anterior descending artery which is being performed by Dr. Ludy Mahan  and further recommendations to follow the FFR. Dr. Marcelina Bueno 3.5 guide   Runthrough wire used to cross LAD lesion   FFR 0.81 ( physiologically non-significant )    SUMMARY: Nonobstructive CAD   RECOMMENDATION: - recommend aggressive medical therapy for CAD   - if symptoms persist recommend LAD PCI in the future      Coronary angiography: 12/2021  ANGIOGRAM/CORONARY ARTERIOGRAM:      The left main coronary artery is normal .   The left anterior descending artery has a proximal 80% lesion . The left circumflex artery is widely patent .    The right coronary artery is widely patent   INTERVENTION  XB 3.5 guide   Allstar wire used to cross LAD lesion   IVUS passed from left main to mid LAD ( MLA< 2mm2; distal reference 3.5 mm)   Predilation performed with 3.0 regular balloon, 3.0 wolverine cutting balloon   Osvaldo 3.5 X 18 mm JUAN deployed to prox LAD   Post dilation performed with 4.0 NC balloon   SUMMARY:   Single vessel obstructive CAD S/p successful IVUS guided PCI X 1 JUAN         Stress test 7/14/2022   Pharmacological Stress/MPI Results:        1. Technically a satisfactory study. 2. No evidence of Ischemia by Myocardial Perfusion Imaging. 3. Gated Study shows Dilated LV : EF 46 %. Assessment/Plan:      CAD   -8/25/2020 s/p successful PCI and stenting of proximal RCA, 99% to 0% with LEYDI 1 by Dr. Melissa Browning.     -7/15/2021 repeat coronary angiography which revealed 50-60% discrete stenosis prox LAD, Dr. Julio César Morillo performed FFR revealing non obstructive CAD with plan for aggressive medical therapy. -12/2021 LAD proximal 80% stenosis s/p successful IVUS guided PCI x1 JUAN per Dr. Julio César Morillo. Increased dyspnea and chest discomfort -In view of increased dyspnea recommend coronary angiogram. RHC/LHC. Risks, benefits, expectations and alternative treatments discussed. The patient verbalizes understanding and agrees to proceed. Will order D-dimer to assess the dyspnea. Decrease metoprolol to 12.5 mg daily due to bradycardia. continue Asa, Effient, B-blocker and statin therapy. PAC/PVC's   -7 day CAM monitor 9/2021 PAC, PVC, AFIB, Flutter with EP consult per Dr. Mela Muse 9/2021-revealed PAC's and PVC's both of which are no longer than 6-8 beats. Bradycardia  -Patient has evidence of significant sinus bradycardia on the EKG today. I will reduce metoprolol to 12.5 mg daily and if his symptoms of dizziness persist, will consider obtaining 30-day event monitor after he completes his cardiac work-up  Hypertension, essential   -Controlled. -BMP stable 7/25/2022     Hyperlipidemia, unspecified   -Lipitor 40 mg nightly   -LDL 38     Sleep apnea  -Encouraged him to follow up with sleep study. Chronic fatigue  -TSH and CBC wnl 3/28/22     Former smoker  -Quit ~20-30 years ago but reports he was a heavy smoker. Encouraged continued smoking cessation.       Obesity  -Encouraged weight loss     Follow up after angiogram.     Thank you very much for allowing me to participate in the care of your patient. Please do not hesitate to contact me if you have any questions.      Sincerely,  Negrito Renteria MD        [facility], [facility]  Ph: [phone]

## 2025-01-09 ENCOUNTER — OFFICE VISIT (OUTPATIENT)
Dept: CARDIOLOGY CLINIC | Age: 70
End: 2025-01-09

## 2025-01-09 VITALS
HEIGHT: 75 IN | SYSTOLIC BLOOD PRESSURE: 116 MMHG | OXYGEN SATURATION: 95 % | BODY MASS INDEX: 35.68 KG/M2 | HEART RATE: 52 BPM | WEIGHT: 287 LBS | DIASTOLIC BLOOD PRESSURE: 66 MMHG

## 2025-01-09 DIAGNOSIS — I49.3 PVC (PREMATURE VENTRICULAR CONTRACTION): ICD-10-CM

## 2025-01-09 DIAGNOSIS — E78.2 MIXED HYPERLIPIDEMIA: ICD-10-CM

## 2025-01-09 DIAGNOSIS — I95.9 HYPOTENSION, UNSPECIFIED HYPOTENSION TYPE: ICD-10-CM

## 2025-01-09 DIAGNOSIS — I50.22 CHRONIC SYSTOLIC HEART FAILURE (HCC): ICD-10-CM

## 2025-01-09 DIAGNOSIS — E66.811 CLASS 1 OBESITY WITH SERIOUS COMORBIDITY AND BODY MASS INDEX (BMI) OF 34.0 TO 34.9 IN ADULT, UNSPECIFIED OBESITY TYPE: ICD-10-CM

## 2025-01-09 DIAGNOSIS — I25.5 ISCHEMIC CARDIOMYOPATHY: ICD-10-CM

## 2025-01-09 DIAGNOSIS — I10 ESSENTIAL HYPERTENSION: ICD-10-CM

## 2025-01-09 DIAGNOSIS — Z87.891 FORMER SMOKER: ICD-10-CM

## 2025-01-09 DIAGNOSIS — I25.10 CORONARY ARTERY DISEASE INVOLVING NATIVE CORONARY ARTERY OF NATIVE HEART WITHOUT ANGINA PECTORIS: ICD-10-CM

## 2025-01-09 DIAGNOSIS — D61.818 PANCYTOPENIA (HCC): ICD-10-CM

## 2025-01-09 DIAGNOSIS — R00.1 BRADYCARDIA: ICD-10-CM

## 2025-01-09 DIAGNOSIS — I49.1 PAC (PREMATURE ATRIAL CONTRACTION): ICD-10-CM

## 2025-01-09 DIAGNOSIS — R53.82 CHRONIC FATIGUE: ICD-10-CM

## 2025-01-09 DIAGNOSIS — G47.30 SLEEP APNEA, UNSPECIFIED TYPE: ICD-10-CM

## 2025-01-09 DIAGNOSIS — I49.9 VENTRICULAR ARRHYTHMIA: Primary | ICD-10-CM

## 2025-01-09 RX ORDER — BUSPIRONE HYDROCHLORIDE 7.5 MG/1
7.5 TABLET ORAL 2 TIMES DAILY
COMMUNITY
Start: 2024-12-19

## 2025-01-09 NOTE — PROGRESS NOTES
[facility]  Cardiology Progress Note    [place] Ma  1955 January 9, 2025      CC: \"I am doing better on new medication.\"     HPI:  The patient is 69 y.o. male with a past medical history significant for chronic back pain, limited mobility and movement, V-tach/PVC, CAD with multiple PCI's and JUAN, hypertension, hyperlipidemia, VICKIE, and pre diabetes. Former smoker.     11/2022 office visit, he stateed that is shortness of breath has been worsening. He has episodes of dizzness and chest pain at times with the shortness of breath. Reduced beta blocker. L/RHC with LVEF 45% otherwise normal. Symptoms non-cardiac in origin. D-Dimer also normal. He also follos with Dr. HAYLEE Morse-Pulmonary for mild background emphysema per CT Chest, stable nodularities.     He was seen in office on 03/17/2023 for follow up from L/C 12/8/2022. He continues with VALVERDE with short distance. Odd/on CP. Sits to rest. If he is carrying items or pushing items, he has increase in VALVERDE. He does not exercise due to his VALVERDE. He uses a motorized cart at the grocery store. Patient denied changes in exertional chest pain/pressure, dyspnea at rest, changes in VALVERDE, PND, orthopnea, palpitations, lightheadedness, weight changes, changes in LE edema, and syncope. He has not resumed smoking cigarettes. He admits to medical therapy compliance and tolerating with no abnormal bruising or bleeding.     9/27/23  office visit, EKG today shows run of VT. He reports chest pain/pressure and shortness of breath has progressively worsened in the past 6 months. He endorses symptoms lightheadedness, dizziness and presyncope. One episode occurred a week and half ago after carrying in groceries from the car. Patient reports compliance to his medications. He followed up with Dr. Morse Pulmonologist and was told his shortness of breath was not related to his lungs. GI workup outpatient based on GI consult.     10/17/2023 he returns for hospital follow up

## 2025-01-27 DIAGNOSIS — I49.9 VENTRICULAR ARRHYTHMIA: Primary | ICD-10-CM

## 2025-02-20 ENCOUNTER — OFFICE VISIT (OUTPATIENT)
Dept: CARDIOLOGY CLINIC | Age: 70
End: 2025-02-20
Payer: COMMERCIAL

## 2025-02-20 VITALS
BODY MASS INDEX: 35.56 KG/M2 | HEIGHT: 75 IN | HEART RATE: 72 BPM | WEIGHT: 286 LBS | DIASTOLIC BLOOD PRESSURE: 72 MMHG | SYSTOLIC BLOOD PRESSURE: 110 MMHG | OXYGEN SATURATION: 96 %

## 2025-02-20 DIAGNOSIS — I49.3 PVC (PREMATURE VENTRICULAR CONTRACTION): Primary | ICD-10-CM

## 2025-02-20 DIAGNOSIS — I47.29 NSVT (NONSUSTAINED VENTRICULAR TACHYCARDIA) (HCC): ICD-10-CM

## 2025-02-20 DIAGNOSIS — Z79.899 ON AMIODARONE THERAPY: ICD-10-CM

## 2025-02-20 DIAGNOSIS — I25.5 ISCHEMIC CARDIOMYOPATHY: ICD-10-CM

## 2025-02-20 DIAGNOSIS — I25.119 CORONARY ARTERY DISEASE INVOLVING NATIVE CORONARY ARTERY OF NATIVE HEART WITH ANGINA PECTORIS: ICD-10-CM

## 2025-02-20 PROCEDURE — 1036F TOBACCO NON-USER: CPT | Performed by: INTERNAL MEDICINE

## 2025-02-20 PROCEDURE — 1123F ACP DISCUSS/DSCN MKR DOCD: CPT | Performed by: INTERNAL MEDICINE

## 2025-02-20 PROCEDURE — 3074F SYST BP LT 130 MM HG: CPT | Performed by: INTERNAL MEDICINE

## 2025-02-20 PROCEDURE — 99214 OFFICE O/P EST MOD 30 MIN: CPT | Performed by: INTERNAL MEDICINE

## 2025-02-20 PROCEDURE — G2211 COMPLEX E/M VISIT ADD ON: HCPCS | Performed by: INTERNAL MEDICINE

## 2025-02-20 PROCEDURE — 93000 ELECTROCARDIOGRAM COMPLETE: CPT | Performed by: INTERNAL MEDICINE

## 2025-02-20 PROCEDURE — G8417 CALC BMI ABV UP PARAM F/U: HCPCS | Performed by: INTERNAL MEDICINE

## 2025-02-20 PROCEDURE — G8427 DOCREV CUR MEDS BY ELIG CLIN: HCPCS | Performed by: INTERNAL MEDICINE

## 2025-02-20 PROCEDURE — 3078F DIAST BP <80 MM HG: CPT | Performed by: INTERNAL MEDICINE

## 2025-02-20 PROCEDURE — 3017F COLORECTAL CA SCREEN DOC REV: CPT | Performed by: INTERNAL MEDICINE

## 2025-02-20 RX ORDER — FUROSEMIDE 20 MG/1
TABLET ORAL
Qty: 30 TABLET | Refills: 1 | Status: SHIPPED | OUTPATIENT
Start: 2025-02-20

## 2025-02-20 RX ORDER — AMIODARONE HYDROCHLORIDE 200 MG/1
200 TABLET ORAL DAILY
COMMUNITY
Start: 2025-02-20

## 2025-03-05 ENCOUNTER — HOSPITAL ENCOUNTER (OUTPATIENT)
Age: 70
Discharge: HOME | End: 2025-03-05
Payer: COMMERCIAL

## 2025-03-05 ENCOUNTER — HOSPITAL ENCOUNTER (OUTPATIENT)
Dept: GENERAL RADIOLOGY | Age: 70
Discharge: HOME | End: 2025-03-05
Attending: INTERNAL MEDICINE
Payer: COMMERCIAL

## 2025-03-05 ENCOUNTER — TRANSCRIBE ORDERS (OUTPATIENT)
Dept: GENERAL RADIOLOGY | Age: 70
End: 2025-03-05

## 2025-03-05 PROCEDURE — 73660 X-RAY EXAM OF TOE(S): CPT

## 2025-03-20 DIAGNOSIS — I49.3 PVC (PREMATURE VENTRICULAR CONTRACTION): ICD-10-CM

## 2025-03-20 DIAGNOSIS — Z79.899 ON AMIODARONE THERAPY: ICD-10-CM

## 2025-03-20 LAB
ALBUMIN SERPL-MCNC: 4.3 G/DL (ref 3.4–5)
ALP SERPL-CCNC: 82 U/L (ref 40–129)
ALT SERPL-CCNC: 23 U/L (ref 10–40)
AST SERPL-CCNC: 19 U/L (ref 15–37)
BILIRUB DIRECT SERPL-MCNC: 0.2 MG/DL (ref 0–0.3)
BILIRUB INDIRECT SERPL-MCNC: 0.2 MG/DL (ref 0–1)
BILIRUB SERPL-MCNC: 0.4 MG/DL (ref 0–1)
PROT SERPL-MCNC: 6.4 G/DL (ref 6.4–8.2)
TSH SERPL DL<=0.005 MIU/L-ACNC: 3.67 UIU/ML (ref 0.27–4.2)

## 2025-04-04 NOTE — PROGRESS NOTES
Addended by: OMAR CLIFFORD on: 4/4/2025 07:48 AM     Modules accepted: Orders     Nuclear stress test results reviewed. .  Patient has a very small reversible defect. He also has evidence of worsening hemoglobin and platelet counts and will need further evaluation. .  We will hold off on any cardiac work-up at this time

## 2025-04-28 ENCOUNTER — HOSPITAL ENCOUNTER (OUTPATIENT)
Dept: GENERAL RADIOLOGY | Age: 70
Discharge: HOME | End: 2025-04-28
Attending: INTERNAL MEDICINE
Payer: COMMERCIAL

## 2025-04-28 ENCOUNTER — HOSPITAL ENCOUNTER (OUTPATIENT)
Age: 70
Discharge: HOME | End: 2025-04-28
Payer: COMMERCIAL

## 2025-04-28 PROCEDURE — 73600 X-RAY EXAM OF ANKLE: CPT

## 2025-04-28 RX ORDER — SACUBITRIL AND VALSARTAN 24; 26 MG/1; MG/1
1 TABLET, FILM COATED ORAL 2 TIMES DAILY
Qty: 60 TABLET | Refills: 1 | Status: SHIPPED | OUTPATIENT
Start: 2025-04-28 | End: 2025-04-29 | Stop reason: SDUPTHER

## 2025-04-28 NOTE — TELEPHONE ENCOUNTER
Medication Refill    Medication needing refilled:  ENTRESTO     Dosage of the medication:  24-26 MG per tablet     How are you taking this medication (QD, BID, TID, QID, PRN):  TAKE 1 TABLET BY MOUTH 2 TIMES A DAY     30 or 90 day supply called in:  60 tablet     When will you run out of your medication:  PATIENT IS OUT OF MEDICATION    Which Pharmacy are we sending the medication to?:    [facility] [id number] - [place], [place] - [address] - P [phone] - F [phone]  [address] [facility] [address]  Phone: [phone]  Fax: [phone]

## 2025-04-28 NOTE — TELEPHONE ENCOUNTER
Last OV: 02/20/25  Next OV: 06/24/25  Last refill:12/18/24  Most recent Labs: BMP 12/10/24  Last EKG (if needed):02/20/25        1. Rest. Stay well hydrated. Recommend Aspirin 81mg over the counter daily until further evaluation.  Take all of your other medications as previously prescribed.   2. Follow up with your PMD and Cardiologist at [facility] at [phone] within 48-72 hours. Show copies of your reports given to you.    3. Return to ED for worsening or continued chest pain, shortness of breath, weakness, or any other concerning symptoms.

## 2025-04-29 RX ORDER — SACUBITRIL AND VALSARTAN 24; 26 MG/1; MG/1
1 TABLET, FILM COATED ORAL 2 TIMES DAILY
Qty: 60 TABLET | Refills: 3 | Status: SHIPPED | OUTPATIENT
Start: 2025-04-29

## 2025-04-29 NOTE — TELEPHONE ENCOUNTER
Received fax from [facility] requesting a refill for Entresto 24 MG-26MG Tablet. Routing to Dr. Lemus.    Last OV:2025 Verdict   Next OV: 2025  Labs: BMP 12/10/2024   Last [id number]

## 2025-06-23 NOTE — PROGRESS NOTES
[facility]   Electrophysiology Follow up     Date: 6/24/2025    I had the privilege of visiting Chino Ma in the office.     CC: Premature ventricular complexes    HPI: Chino Ma is a 69 y.o. male history of hypertension, hyperlipidemia, obstructive sleep apnea, pancytopenia, coronary artery disease with PCI to RCA in August 2020, nonobstructive LAD disease of 50 to 60% at that time, frequent episodes of NSVT on 9/2021 monitor (17,350 episodes), later with 18% PVC burden, repeat cardiac catheterization in July 2024 with patent stents, symptomatic with PVCs and with reduced LVEF so underwent PVC ablation 10/7/2024 successfully eliminating moderator band PVC however had a second PVC with RV/superior/left/V6 transition that was targeted along the inferior LV base but could not be eliminated due to the mid myocardial/epicardial origin (could get suppression with 50 W, long duration, half-normal saline but not elimination), as follow-up continue to complain of palpitations, fatigue and shortness of breath although there was some chronicity to this not exactly explained by PVCs, elected to start on amiodarone, repeat monitor with 3% burden (single morphology composing 81% of total PVCs).    Patient presents today as follow-up for the management of PVCs.  As noted during last clinic once he had initially been started on amiodarone he had symptomatic improvement however he now continues to complain of the shortness of breath, fatigue, overall just feels bad, symptoms similar to that he experienced prior to his stent placement.    [x] Full ROS obtained and negative except as mentioned in HPI      Prior to Admission medications    Medication Sig Start Date End Date Taking? Authorizing Provider   sacubitril-valsartan (ENTRESTO) 24-26 MG per tablet Take 1 tablet by mouth 2 times daily 4/29/25  Yes Marlon Lemus MD   amiodarone (CORDARONE) 200 MG tablet Take 1 tablet by mouth daily 2/20/25  Yes

## 2025-06-24 ENCOUNTER — OFFICE VISIT (OUTPATIENT)
Dept: CARDIOLOGY CLINIC | Age: 70
End: 2025-06-24
Payer: COMMERCIAL

## 2025-06-24 VITALS
OXYGEN SATURATION: 95 % | WEIGHT: 283 LBS | DIASTOLIC BLOOD PRESSURE: 68 MMHG | HEIGHT: 75 IN | BODY MASS INDEX: 35.19 KG/M2 | HEART RATE: 58 BPM | SYSTOLIC BLOOD PRESSURE: 110 MMHG

## 2025-06-24 DIAGNOSIS — I25.119 CORONARY ARTERY DISEASE INVOLVING NATIVE CORONARY ARTERY OF NATIVE HEART WITH ANGINA PECTORIS: ICD-10-CM

## 2025-06-24 DIAGNOSIS — I49.3 PVC (PREMATURE VENTRICULAR CONTRACTION): ICD-10-CM

## 2025-06-24 DIAGNOSIS — I47.29 NSVT (NONSUSTAINED VENTRICULAR TACHYCARDIA) (HCC): Primary | ICD-10-CM

## 2025-06-24 DIAGNOSIS — Z79.899 ON AMIODARONE THERAPY: ICD-10-CM

## 2025-06-24 DIAGNOSIS — I25.5 ISCHEMIC CARDIOMYOPATHY: ICD-10-CM

## 2025-06-24 PROCEDURE — 3078F DIAST BP <80 MM HG: CPT | Performed by: INTERNAL MEDICINE

## 2025-06-24 PROCEDURE — 93000 ELECTROCARDIOGRAM COMPLETE: CPT | Performed by: INTERNAL MEDICINE

## 2025-06-24 PROCEDURE — G8417 CALC BMI ABV UP PARAM F/U: HCPCS | Performed by: INTERNAL MEDICINE

## 2025-06-24 PROCEDURE — 3017F COLORECTAL CA SCREEN DOC REV: CPT | Performed by: INTERNAL MEDICINE

## 2025-06-24 PROCEDURE — G2211 COMPLEX E/M VISIT ADD ON: HCPCS | Performed by: INTERNAL MEDICINE

## 2025-06-24 PROCEDURE — 3074F SYST BP LT 130 MM HG: CPT | Performed by: INTERNAL MEDICINE

## 2025-06-24 PROCEDURE — G8427 DOCREV CUR MEDS BY ELIG CLIN: HCPCS | Performed by: INTERNAL MEDICINE

## 2025-06-24 PROCEDURE — 1036F TOBACCO NON-USER: CPT | Performed by: INTERNAL MEDICINE

## 2025-06-24 PROCEDURE — 1123F ACP DISCUSS/DSCN MKR DOCD: CPT | Performed by: INTERNAL MEDICINE

## 2025-06-24 PROCEDURE — 99214 OFFICE O/P EST MOD 30 MIN: CPT | Performed by: INTERNAL MEDICINE

## 2025-06-24 RX ORDER — METOPROLOL SUCCINATE 25 MG/1
25 TABLET, EXTENDED RELEASE ORAL DAILY
Qty: 30 TABLET | Refills: 3
Start: 2025-06-24

## 2025-07-01 NOTE — TELEPHONE ENCOUNTER
Medication Refill    When was your last appointment with cardiology?    (If 1 yr or longer, please schedule appointment)    (If patient has been told they do not need to follow-up - medications should be filled by PCP)  6/24/25    When did you last have labs drawn?   6/4/25    Medication needing refilled?  amiodarone (CORDARONE)     Dosage of the medication?  200 MG tablet     How are you taking this medication (QD, BID, TID, QID, PRN)?  Take 1 tablet by mouth daily     Do you want a 30 or 90 day supply?  30    When will you run out of your medication?   Already out    Which Pharmacy are we sending this medication to?  [facility] [id number] [place] [address]     Pharmacy Phone:[phone]   Pharmacy Fax:[phone]

## 2025-07-01 NOTE — TELEPHONE ENCOUNTER
Last OV: 06/24/25  Next OV: 07/10/25  Last refill:02/20/25  Most recent Labs: TSH 06/04/25  Last EKG (if needed):06/24/25

## 2025-07-02 ENCOUNTER — OFFICE VISIT (OUTPATIENT)
Dept: SLEEP MEDICINE | Age: 70
End: 2025-07-02
Payer: COMMERCIAL

## 2025-07-02 VITALS
HEIGHT: 75 IN | RESPIRATION RATE: 18 BRPM | WEIGHT: 281.6 LBS | HEART RATE: 82 BPM | OXYGEN SATURATION: 93 % | TEMPERATURE: 97.8 F | BODY MASS INDEX: 35.01 KG/M2 | SYSTOLIC BLOOD PRESSURE: 125 MMHG | DIASTOLIC BLOOD PRESSURE: 70 MMHG

## 2025-07-02 DIAGNOSIS — G47.33 OSA ON CPAP: Primary | ICD-10-CM

## 2025-07-02 DIAGNOSIS — I42.8 NON-ISCHEMIC CARDIOMYOPATHY (HCC): ICD-10-CM

## 2025-07-02 DIAGNOSIS — E66.812 CLASS 2 SEVERE OBESITY DUE TO EXCESS CALORIES WITH SERIOUS COMORBIDITY AND BODY MASS INDEX (BMI) OF 35.0 TO 35.9 IN ADULT (HCC): ICD-10-CM

## 2025-07-02 DIAGNOSIS — Z99.89 DEPENDENCE ON OTHER ENABLING MACHINES AND DEVICES: ICD-10-CM

## 2025-07-02 DIAGNOSIS — E66.01 CLASS 2 SEVERE OBESITY DUE TO EXCESS CALORIES WITH SERIOUS COMORBIDITY AND BODY MASS INDEX (BMI) OF 35.0 TO 35.9 IN ADULT (HCC): ICD-10-CM

## 2025-07-02 DIAGNOSIS — G47.39 TREATMENT-EMERGENT CENTRAL SLEEP APNEA: ICD-10-CM

## 2025-07-02 DIAGNOSIS — I10 PRIMARY HYPERTENSION: ICD-10-CM

## 2025-07-02 PROCEDURE — G2211 COMPLEX E/M VISIT ADD ON: HCPCS | Performed by: PSYCHIATRY & NEUROLOGY

## 2025-07-02 PROCEDURE — 1036F TOBACCO NON-USER: CPT | Performed by: PSYCHIATRY & NEUROLOGY

## 2025-07-02 PROCEDURE — G8427 DOCREV CUR MEDS BY ELIG CLIN: HCPCS | Performed by: PSYCHIATRY & NEUROLOGY

## 2025-07-02 PROCEDURE — 3078F DIAST BP <80 MM HG: CPT | Performed by: PSYCHIATRY & NEUROLOGY

## 2025-07-02 PROCEDURE — G8417 CALC BMI ABV UP PARAM F/U: HCPCS | Performed by: PSYCHIATRY & NEUROLOGY

## 2025-07-02 PROCEDURE — 99214 OFFICE O/P EST MOD 30 MIN: CPT | Performed by: PSYCHIATRY & NEUROLOGY

## 2025-07-02 PROCEDURE — 3074F SYST BP LT 130 MM HG: CPT | Performed by: PSYCHIATRY & NEUROLOGY

## 2025-07-02 PROCEDURE — 3017F COLORECTAL CA SCREEN DOC REV: CPT | Performed by: PSYCHIATRY & NEUROLOGY

## 2025-07-02 PROCEDURE — 1123F ACP DISCUSS/DSCN MKR DOCD: CPT | Performed by: PSYCHIATRY & NEUROLOGY

## 2025-07-02 ASSESSMENT — SLEEP AND FATIGUE QUESTIONNAIRES
ESS TOTAL SCORE: 12
HOW LIKELY ARE YOU TO NOD OFF OR FALL ASLEEP WHILE SITTING AND TALKING TO SOMEONE: WOULD NEVER DOZE
HOW LIKELY ARE YOU TO NOD OFF OR FALL ASLEEP WHILE LYING DOWN TO REST IN THE AFTERNOON WHEN CIRCUMSTANCES PERMIT: HIGH CHANCE OF DOZING
HOW LIKELY ARE YOU TO NOD OFF OR FALL ASLEEP WHILE SITTING AND READING: MODERATE CHANCE OF DOZING
HOW LIKELY ARE YOU TO NOD OFF OR FALL ASLEEP IN A CAR, WHILE STOPPED FOR A FEW MINUTES IN TRAFFIC: WOULD NEVER DOZE
HOW LIKELY ARE YOU TO NOD OFF OR FALL ASLEEP WHILE SITTING QUIETLY AFTER LUNCH WITHOUT ALCOHOL: HIGH CHANCE OF DOZING
HOW LIKELY ARE YOU TO NOD OFF OR FALL ASLEEP WHEN YOU ARE A PASSENGER IN A CAR FOR AN HOUR WITHOUT A BREAK: WOULD NEVER DOZE
HOW LIKELY ARE YOU TO NOD OFF OR FALL ASLEEP WHILE SITTING INACTIVE IN A PUBLIC PLACE: MODERATE CHANCE OF DOZING
HOW LIKELY ARE YOU TO NOD OFF OR FALL ASLEEP WHILE WATCHING TV: MODERATE CHANCE OF DOZING

## 2025-07-02 ASSESSMENT — ENCOUNTER SYMPTOMS
APNEA: 0
CHOKING: 0

## 2025-07-02 NOTE — PROGRESS NOTES
MD NOE Ramirez Board Certified in Sleep Medicine  Certified in Behavioral Sleep Medicine  Board Certified in Neurology Standard Sleep Medicine  [address]   [address]  [place]  [address]  [phone]   [facility]  [address]  [address]  [place], [place] [address]                      [facility] [place] [facility]  [address] [address] [phone]    Subjective:     Patient ID: Chino Ma is a 69 y.o. male.    Chief Complaint   Patient presents with    Follow-up    Sleep Apnea       HPI:        Chino Ma is a 69 y.o. male was seen today as annual follow for severe obstructive sleep apnea with apnea hypopnea index of 55.9/h with lowest O2 saturation of 67%, patient spent about 202 minutes below 90% (weight was 273 pounds). HAD ASV titration for treatment emergent central events,   Patient is using the PAP machine about 96% of the time, more than 4 hours a night about  77 %, in total average of 6:48 hours a night in last 90 days.  Currently on ASV cm (EPAP 4 and 6, PS 5 and 15), the AHI is only 0.7 events per hour at this pressure.  Patient improved regarding daytime sleepiness and fatigue, wakes up refreshed in the morning.  The Patient scored [place] Sleepiness Score: 12 on [place] Sleepiness Scale ( more than 10 is indicative of daytime sleepiness)   Patient has no problem with PAP pressure or mask.N20 airtough   But still has itching.    BP, cardiomyopathy, are stable. Has not gained weight pounds since last visit. 281  DOT/CDL - N/A      Previous Report(s)Reviewed: historical medical records         Social History     Socioeconomic History    Marital status:      Spouse name: Not on file    Number of children: Not on file    Years of education: Not on file    Highest education level: Not on file   Occupational History    Not on file   Tobacco Use

## 2025-07-02 NOTE — PROGRESS NOTES
Chino Ma         : 1955  [x] MSC     [] [facility]      [] [employer]     [][employer]    [] [employer]  [] [employer]   [] [facility]  [] [employer] [] [employer] [] [employer] [] [facility] [] [employer] [] [facility]  Diagnosis: [x] VICKIE (G47.33) [] CSA (G47.31) [] Apnea (G47.30)   Length of Need: [] 12 Months [x] 99 Months [] Other:    Machine (FRED!):  [x] ResMed AirSense     Auto [] Other:       Humidifier: [x] Heated ()        [x] Water chamber replacement ()/ 1 per 6 months        Mask:  Please always start with the mask the patient used during the titraion    [x] Full Face () /1 per 3 months    [x] Patient Choice - Size and fit mask    [] Dispense:     [x] Headgear () / 1 per 3 months    [x] Interface Replacement ()/1 per month            Tubing: [x] Heated ()/1 per 3 months    [] Standard ()/1 per 3 months [] Other:           Filters: [x] Non-disposable ()/1 per 6 months     [x] Ultra-Fine, Disposable ()/2 per month        Miscellaneous: [x] Chin Strap ()/ 1 per 6 months [] O2 bleed-in:       LPM   [] Oximetry on CPAP/Bilevel []  Other:          Start Order Date: 25    MEDICAL JUSTIFICATION:  I, the undersigned, certify that the above prescribed supplies are medically necessary for this patient’s wellbeing.  In my opinion, the supplies are both reasonable and necessary in reference to accepted standards of medicalpractice in treatment of this patient’s condition.    Roger Ramirez MD      NPI: [id number]       Order Signed Date: 25    Electronically signed by Roger Ramirez MD on 2025 at 9:11 AM

## 2025-07-03 RX ORDER — AMIODARONE HYDROCHLORIDE 200 MG/1
200 TABLET ORAL DAILY
Qty: 90 TABLET | Refills: 3 | Status: SHIPPED | OUTPATIENT
Start: 2025-07-03

## 2025-07-03 NOTE — TELEPHONE ENCOUNTER
Last OV: 25 - Verdick  Next OV: 7/10/25 - Lemus  Last Labs: 25  Last EK25   Last Filled: 25 Was Historical Med. Please see message below. Patient out of medication

## 2025-07-03 NOTE — TELEPHONE ENCOUNTER
Medication Question/Concern    What is the name of the medication you need to speak with someone about?  amiodarone (CORDARONE)   Was this prescribed by your cardiologist?   yes  Dosage of the medication:  200 MG tablet   How are you taking this medication (QD, BID, TID, QID, PRN):  Take 1 tablet by mouth daily   What issues/concerns are you having with this medication?    Katherine (spouse) called stating that Kathy has not received new script of this medication. Katherine states David is all out completely and would like to have refill today if possible.     She would like a callback once sent to pharmacy - [phone]    Which Pharmacy are we sending this medication to?   [facility] [id number] [address]   Pharmacy Phone:[phone]   Pharmacy Fax:[phone]

## 2025-07-10 ENCOUNTER — HOSPITAL ENCOUNTER (OUTPATIENT)
Dept: CT IMAGING | Age: 70
Discharge: HOME | End: 2025-07-10
Attending: INTERNAL MEDICINE
Payer: COMMERCIAL

## 2025-07-10 ENCOUNTER — OFFICE VISIT (OUTPATIENT)
Dept: CARDIOLOGY CLINIC | Age: 70
End: 2025-07-10
Payer: COMMERCIAL

## 2025-07-10 VITALS
HEART RATE: 56 BPM | WEIGHT: 290 LBS | BODY MASS INDEX: 36.06 KG/M2 | SYSTOLIC BLOOD PRESSURE: 116 MMHG | OXYGEN SATURATION: 96 % | DIASTOLIC BLOOD PRESSURE: 76 MMHG | HEIGHT: 75 IN

## 2025-07-10 DIAGNOSIS — R07.9 INTERMITTENT CHEST PAIN: ICD-10-CM

## 2025-07-10 DIAGNOSIS — R06.81 BREATHLESSNESS: ICD-10-CM

## 2025-07-10 DIAGNOSIS — I49.9 VENTRICULAR ARRHYTHMIA: ICD-10-CM

## 2025-07-10 DIAGNOSIS — R00.1 BRADYCARDIA: ICD-10-CM

## 2025-07-10 DIAGNOSIS — I51.89 LEFT VENTRICULAR SYSTOLIC DYSFUNCTION (LVSD): ICD-10-CM

## 2025-07-10 DIAGNOSIS — I25.119 CORONARY ARTERY DISEASE INVOLVING NATIVE CORONARY ARTERY OF NATIVE HEART WITH ANGINA PECTORIS: ICD-10-CM

## 2025-07-10 DIAGNOSIS — E78.5 HYPERLIPIDEMIA, UNSPECIFIED HYPERLIPIDEMIA TYPE: ICD-10-CM

## 2025-07-10 DIAGNOSIS — I51.89 LEFT VENTRICULAR SYSTOLIC DYSFUNCTION (LVSD): Primary | ICD-10-CM

## 2025-07-10 DIAGNOSIS — I10 ESSENTIAL HYPERTENSION: ICD-10-CM

## 2025-07-10 DIAGNOSIS — R42 DIZZINESS: ICD-10-CM

## 2025-07-10 PROCEDURE — 6360000004 HC RX CONTRAST MEDICATION: Performed by: INTERNAL MEDICINE

## 2025-07-10 PROCEDURE — 71260 CT THORAX DX C+: CPT

## 2025-07-10 PROCEDURE — 1123F ACP DISCUSS/DSCN MKR DOCD: CPT | Performed by: INTERNAL MEDICINE

## 2025-07-10 PROCEDURE — 3074F SYST BP LT 130 MM HG: CPT | Performed by: INTERNAL MEDICINE

## 2025-07-10 PROCEDURE — G8417 CALC BMI ABV UP PARAM F/U: HCPCS | Performed by: INTERNAL MEDICINE

## 2025-07-10 PROCEDURE — 3078F DIAST BP <80 MM HG: CPT | Performed by: INTERNAL MEDICINE

## 2025-07-10 PROCEDURE — 93000 ELECTROCARDIOGRAM COMPLETE: CPT | Performed by: INTERNAL MEDICINE

## 2025-07-10 PROCEDURE — 1036F TOBACCO NON-USER: CPT | Performed by: INTERNAL MEDICINE

## 2025-07-10 PROCEDURE — G8427 DOCREV CUR MEDS BY ELIG CLIN: HCPCS | Performed by: INTERNAL MEDICINE

## 2025-07-10 PROCEDURE — G2211 COMPLEX E/M VISIT ADD ON: HCPCS | Performed by: INTERNAL MEDICINE

## 2025-07-10 PROCEDURE — 99214 OFFICE O/P EST MOD 30 MIN: CPT | Performed by: INTERNAL MEDICINE

## 2025-07-10 PROCEDURE — 3017F COLORECTAL CA SCREEN DOC REV: CPT | Performed by: INTERNAL MEDICINE

## 2025-07-10 RX ORDER — SACUBITRIL AND VALSARTAN 24; 26 MG/1; MG/1
0.5 TABLET, FILM COATED ORAL 2 TIMES DAILY
Qty: 90 TABLET | Refills: 3 | Status: SHIPPED | OUTPATIENT
Start: 2025-07-10

## 2025-07-10 RX ORDER — IOPAMIDOL 755 MG/ML
75 INJECTION, SOLUTION INTRAVASCULAR
Status: COMPLETED | OUTPATIENT
Start: 2025-07-10 | End: 2025-07-10

## 2025-07-10 RX ORDER — SPIRONOLACTONE 25 MG/1
12.5 TABLET ORAL DAILY
Qty: 45 TABLET | Refills: 3 | Status: SHIPPED | OUTPATIENT
Start: 2025-07-10

## 2025-07-10 RX ORDER — METOPROLOL SUCCINATE 25 MG/1
25 TABLET, EXTENDED RELEASE ORAL DAILY
Qty: 90 TABLET | Refills: 3 | Status: SHIPPED | OUTPATIENT
Start: 2025-07-10

## 2025-07-10 RX ADMIN — IOPAMIDOL 75 ML: 755 INJECTION, SOLUTION INTRAVENOUS at 16:13

## 2025-07-10 NOTE — PROGRESS NOTES
RHC and patent coronary arteries-CP and SOB noncardiac in origin. D-Dimer was also negative.   -admission for Pancytopenia 9/2023 from our office visit. GI workup with EGD/colonoscopy scheduled 10/30/2023  -during admission despite prior multivessel stenting, will discontinue Effient as the patient is having a downtrending hemoglobin and is iron deficient  -7/19/24-LHC required no intervention     -Today, he complains of shortness of breath but denies having any chest tightness or pressure  -He will continue Asa,  B-blocker and statin therapy.   -no changes at that time      Hypertension, essential   -BP stable today   -Entresto, metoprolol succinate, spironolactone.   -I have also told him to continue hold his Jardiance and isosorbide    Hyperlipidemia, unspecified   -Lipitor 40 mg nightly   -LDL 55 (9/27/24)   -no changes at this times.    Ventricular arrhythmias/PAC/PVC's/bradycardia   -EKG today show regular rhythm  -Monitor 7/2024 showed - PVC burden 18 %.  -referred to EP, underwent PVC ablation 10/7/24 with Dr. Lam   -Echo EF 40-45% 7/19/24  -Echocardiogram reviewed 12/2024.  LVEF appears unchanged from the previous echocardiogram.  Continue current cardiac meds   -s/p PVC ablation 10/2024.     -Holter 6 day, 8 hour monitor 1/21/25 revealing NSR, 1 run atrial tachycardia-11 beats. 1 run NSVT, 3 beats, 1.2% PAC burden, 3% PVC burden, single morphology composing 81% PVC burden. Patient triggered during NSR, SB, PVC.     -Has since followed up with Dr. Lam 2/10, 3/10, 6/25 with ongoing symptoms of breathlessness, fatigue.   He reduce his the dose of amiodarone but started having heart palpitation and has restarted his amiodarone therapy  He also feels that his symptoms have slightly worsened after he tried to reduce his metoprolol but he is currently taking metoprolol 25 mg daily      Sleep apnea  -Following with sleep medicine  -on BiPAP    Pancytopenia.   -Admitted 9/2023 with worsening SOB with

## 2025-07-17 ENCOUNTER — HOSPITAL ENCOUNTER (OUTPATIENT)
Dept: PULMONOLOGY | Age: 70
Discharge: HOME | End: 2025-07-17
Attending: INTERNAL MEDICINE

## 2025-07-21 ENCOUNTER — TELEPHONE (OUTPATIENT)
Dept: PULMONOLOGY | Age: 70
End: 2025-07-21

## 2025-07-25 ENCOUNTER — HOSPITAL ENCOUNTER (OUTPATIENT)
Dept: PULMONOLOGY | Age: 70
Discharge: HOME | End: 2025-07-25
Attending: INTERNAL MEDICINE
Payer: COMMERCIAL

## 2025-07-25 DIAGNOSIS — R06.81 BREATHLESSNESS: ICD-10-CM

## 2025-07-25 DIAGNOSIS — R07.9 INTERMITTENT CHEST PAIN: ICD-10-CM

## 2025-07-25 DIAGNOSIS — R42 DIZZINESS: ICD-10-CM

## 2025-07-25 LAB
DLCO %PRED: 84 %
DLCO PRED: NORMAL
DLCO/VA %PRED: NORMAL
DLCO/VA PRED: NORMAL
DLCO/VA: NORMAL
DLCO: NORMAL
EXPIRATORY TIME-POST: NORMAL
EXPIRATORY TIME: NORMAL
FEF 25-75 %CHNG: NORMAL
FEF 25-75 POST %PRED: NORMAL
FEF 25-75% %PRED-PRE: NORMAL
FEF 25-75% PRED: NORMAL
FEF 25-75-POST: NORMAL
FEF 25-75-PRE: NORMAL
FEV1 %PRED-POST: 103 %
FEV1 %PRED-PRE: 98 %
FEV1 PRED: NORMAL
FEV1-POST: NORMAL
FEV1-PRE: NORMAL
FEV1/FVC %PRED-POST: 79 %
FEV1/FVC %PRED-PRE: 76 %
FEV1/FVC PRED: NORMAL
FEV1/FVC-POST: NORMAL
FEV1/FVC-PRE: NORMAL
FVC %PRED-POST: NORMAL
FVC %PRED-PRE: NORMAL
FVC PRED: NORMAL
FVC-POST: NORMAL
FVC-PRE: NORMAL
GAW %PRED: NORMAL
GAW PRED: NORMAL
GAW: NORMAL
IC PRE %PRED: NORMAL
IC PRED: NORMAL
IC: NORMAL
MEP: NORMAL
MIP: NORMAL
MVV %PRED-PRE: NORMAL
MVV PRED: NORMAL
MVV-PRE: NORMAL
PEF %PRED-POST: NORMAL
PEF %PRED-PRE: NORMAL
PEF PRED: NORMAL
PEF%CHNG: NORMAL
PEF-POST: NORMAL
PEF-PRE: NORMAL
RAW %PRED: NORMAL
RAW PRED: NORMAL
RAW: NORMAL
RV PRE %PRED: NORMAL
RV PRED: NORMAL
RV: NORMAL
SVC %PRED: NORMAL
SVC PRED: NORMAL
SVC: NORMAL
TLC PRE %PRED: 93 %
TLC PRED: NORMAL
TLC: NORMAL
VA %PRED: NORMAL
VA PRED: NORMAL
VA: NORMAL
VTG %PRED: NORMAL
VTG PRED: NORMAL
VTG: NORMAL

## 2025-07-25 PROCEDURE — 94640 AIRWAY INHALATION TREATMENT: CPT

## 2025-07-25 PROCEDURE — 6370000000 HC RX 637 (ALT 250 FOR IP): Performed by: INTERNAL MEDICINE

## 2025-07-25 PROCEDURE — 94060 EVALUATION OF WHEEZING: CPT

## 2025-07-25 PROCEDURE — 94729 DIFFUSING CAPACITY: CPT

## 2025-07-25 PROCEDURE — 94664 DEMO&/EVAL PT USE INHALER: CPT

## 2025-07-25 PROCEDURE — 94726 PLETHYSMOGRAPHY LUNG VOLUMES: CPT

## 2025-07-25 RX ORDER — ALBUTEROL SULFATE 90 UG/1
4 INHALANT RESPIRATORY (INHALATION) ONCE
Status: COMPLETED | OUTPATIENT
Start: 2025-07-25 | End: 2025-07-25

## 2025-07-25 RX ADMIN — ALBUTEROL SULFATE 4 PUFF: 90 AEROSOL, METERED RESPIRATORY (INHALATION) at 08:55

## 2025-07-25 ASSESSMENT — PULMONARY FUNCTION TESTS
FEV1_PERCENT_PREDICTED_POST: 103
FEV1_PERCENT_PREDICTED_PRE: 98
FEV1/FVC_PERCENT_PREDICTED_POST: 79
FEV1/FVC_PERCENT_PREDICTED_PRE: 76

## 2025-07-25 NOTE — PROCEDURES
spirometry was acceptable and reproducible by ATS standards      Spirometry/Flow volume loop:  Mild airflow obstruction with no statistically significant postbronchodilator response    Lung volumes:  Normal lung volumes    Diffusing capacity:  No, diffusion capacity    Impression:  Mild airflow obstruction with no statistically significant postbronchodilator response.    FEV1 %Pred-Post   Date Value Ref Range Status   07/25/2025 103 % Final     FEV1/FVC-Post   Date Value Ref Range Status   03/06/2023 81 % Final     TLC Pre %Pred   Date Value Ref Range Status   07/25/2025 93 % Final     DLCO %Pred   Date Value Ref Range Status   07/25/2025 84 % Final           OBSTRUCTION % Predicted FEV1   MILD >70%   MODERATE 60-69%   MODERATELY-SEVERE 50-59%   SEVERE 35-49%   VERY SEVERE <35%         RESTRICTION % Predicted TLC   MILD 66-80%   MODERATE 54-65%   MODERATELY-SEVERE <54%                 DIFFUSION CAPACITY DLCO % Pred   MILD >60% AND < LLN   MODERATE 40-60%   SEVERE <40%       PFT data will be scanned into the media tab under this encounter. Please see the scanned data for numerical values.     Sammi Gaspar MD  [facility], Sleep and Critical Care Medicine

## 2025-08-25 ENCOUNTER — TELEPHONE (OUTPATIENT)
Dept: PULMONOLOGY | Age: 70
End: 2025-08-25

## (undated) DEVICE — CATHETER ABLAT 8FR L115CM 1-6-2MM SPC TIP 3.5MM DF CRV

## (undated) DEVICE — PERCUTANEOUS ENTRY THINWALL NEEDLE  ONE-PART: Brand: COOK

## (undated) DEVICE — RADIFOCUS OPTITORQUE ANGIOGRAPHIC CATHETER: Brand: OPTITORQUE

## (undated) DEVICE — 1 X VERSACROSS STEERABLE SHEATH (INCLUDING  1 X DILATOR AND 1 X J-TIP GUIDEWIRE); 1 X VERSACROSS RF WIRE (INCLUDING 1 X CONNECTOR CABLE (SINGLE USE)); 1 X DISPERSIVE ELECTRODE: Brand: VERSACROSS STEERABLE ACCESS SOLUTION

## (undated) DEVICE — CATHETER MAP D-F CRV 2.4 MM SPC TRUEREF TECHNOLOGY OPTRELL

## (undated) DEVICE — CATHETER DIAG 5FR L100CM LUMN ID0.047IN JL3.5 CRV 0 SIDE H

## (undated) DEVICE — Device

## (undated) DEVICE — CATHETER 5FR CORDIS PIG 145DEG 110CM

## (undated) DEVICE — PAD, DEFIB, ADULT, RADIOTRANS, PHYSIO: Brand: MEDLINE

## (undated) DEVICE — SNARE ENDOSCP POLYP 2.4 MM 240 CM 10 MM 2.8 MM CAPTIVATOR

## (undated) DEVICE — CATHETER US 8FR L90CM GRN TIP OVERLAY FOR GE-VIVID I VIVID

## (undated) DEVICE — TUBING PMP FOR CARTO SYS SMARTABLATE

## (undated) DEVICE — CATH LAB PACK: Brand: MEDLINE INDUSTRIES, INC.

## (undated) DEVICE — GLIDESHEATH SLENDER NITINOL HYDROPHILIC COATED INTRODUCER SHEATH: Brand: GLIDESHEATH SLENDER

## (undated) DEVICE — TR BAND RADIAL ARTERY COMPRESSION DEVICE: Brand: TR BAND

## (undated) DEVICE — 3M™ RED DOT™ REPOSITIONABLE MONITORING ELECTRODE 2670-5, 5/BAG, 200/CASE, 54/PLT: Brand: RED DOT™

## (undated) DEVICE — CORDIS J-WIRE 80CM

## (undated) DEVICE — CATHETER 5FR JR4 CORDIS 100CM

## (undated) DEVICE — 260 CM J TIP WIRE .035

## (undated) DEVICE — CORDIS 6 FR 23CM SHEATH

## (undated) DEVICE — CATHETER THRMDIL 7FR L110CM STD PULM ART 4 INFUS LUMN SWN

## (undated) DEVICE — PROBE COVER KIT: Brand: MEDLINE INDUSTRIES, INC.

## (undated) DEVICE — CORDIS 8FR 23CM SHEATH

## (undated) DEVICE — REPLAY HEMOSTASIS CLIP, 11MM SPAN: Brand: REPLAY

## (undated) DEVICE — PINNACLE INTRODUCER SHEATH: Brand: PINNACLE

## (undated) DEVICE — FORCEPS BX 240CM 2.4MM L NDL RAD JAW 4 M00513334

## (undated) DEVICE — GLIDESHEATH SLENDER STAINLESS STEEL KIT: Brand: GLIDESHEATH SLENDER

## (undated) DEVICE — ELECTRODE PT RET AD L9FT HI MOIST COND ADH HYDRGEL CORDED